# Patient Record
Sex: MALE | Race: BLACK OR AFRICAN AMERICAN | NOT HISPANIC OR LATINO | Employment: OTHER | URBAN - METROPOLITAN AREA
[De-identification: names, ages, dates, MRNs, and addresses within clinical notes are randomized per-mention and may not be internally consistent; named-entity substitution may affect disease eponyms.]

---

## 2017-02-22 ENCOUNTER — HOSPITAL ENCOUNTER (OUTPATIENT)
Dept: RADIOLOGY | Facility: OTHER | Age: 52
Discharge: HOME | End: 2017-02-22
Attending: INTERNAL MEDICINE
Payer: MEDICAID

## 2017-02-22 DIAGNOSIS — B17.10 ACUTE HEPATITIS C: ICD-10-CM

## 2017-02-22 PROCEDURE — 91200 LIVER ELASTOGRAPHY: CPT | Mod: TC

## 2017-02-22 PROCEDURE — 91200 LIVER ELASTOGRAPHY: CPT | Mod: 26,,, | Performed by: RADIOLOGY

## 2017-12-19 ENCOUNTER — HOSPITAL ENCOUNTER (EMERGENCY)
Facility: OTHER | Age: 52
Discharge: HOME | End: 2017-12-19
Attending: EMERGENCY MEDICINE
Payer: MEDICAID

## 2017-12-19 VITALS
WEIGHT: 194 LBS | SYSTOLIC BLOOD PRESSURE: 132 MMHG | TEMPERATURE: 99 F | HEART RATE: 74 BPM | BODY MASS INDEX: 28.73 KG/M2 | HEIGHT: 69 IN | RESPIRATION RATE: 16 BRPM | DIASTOLIC BLOOD PRESSURE: 89 MMHG | OXYGEN SATURATION: 98 %

## 2017-12-19 DIAGNOSIS — R73.9 HYPERGLYCEMIA: Primary | ICD-10-CM

## 2017-12-19 LAB — POCT GLUCOSE: 256 MG/DL (ref 70–110)

## 2017-12-19 PROCEDURE — 82962 GLUCOSE BLOOD TEST: CPT

## 2017-12-19 PROCEDURE — 99283 EMERGENCY DEPT VISIT LOW MDM: CPT | Mod: 25

## 2017-12-19 RX ORDER — ATORVASTATIN CALCIUM 20 MG/1
20 TABLET, FILM COATED ORAL DAILY
COMMUNITY
End: 2018-02-19 | Stop reason: SDUPTHER

## 2017-12-19 RX ORDER — AMLODIPINE BESYLATE 10 MG/1
10 TABLET ORAL DAILY
COMMUNITY
End: 2018-02-06

## 2017-12-19 RX ORDER — LEVOTHYROXINE SODIUM 25 UG/1
25 TABLET ORAL DAILY
COMMUNITY
End: 2018-02-19 | Stop reason: SDUPTHER

## 2017-12-19 NOTE — ED NOTES
"Pt reporting his CBG has been elevated for 6 months, reports his CBG ranges from 200-500. Pt reports he takes CBG once a week. Denies pain, foot sores, headache. Pt states "i'm just nervous, I don't want to have to get anything amputated." Pt AAOx4 and appropriate at this time. Respirations even and unlabored. No acute distress noted.   "

## 2017-12-19 NOTE — ED PROVIDER NOTES
"Encounter Date: 12/19/2017       History     Chief Complaint   Patient presents with    Blood Sugar Problem     Pt reports CBG >400 x 6 months. Stopped taking all meds on Friday to see if it would lower CBG. This AM . "I got scared." Denies any symptoms.      Time seen by provider: 12:40 PM    This is a 52 y.o. male with history of HTN and NIDDM who presents with complaint of chronically elevated CBG that has persisted for the past 6 months.  The patient reports that he became concerned when his CBG, which has been fluctuating between 280 mg/dL and 480 mg/dL, was 275 mg/dL this morning.      appt with dr. adames later today     Sugar running between 280-480 -   Always told that by docs - stoped meds Friday   This mornign it was 275 - hasn't seen this in a while     He mentions no pertinent surgical history.  He admits that he has not taken his daily doses of p.o. medication.  However, he states that he has been compliant with his insulin regimen.  He denies any recent dietary changes, and he states that he is complaint with a diabetic diet.  He mentions that he has an appointment with his PCP, Dr. Mensah, this afternoon.      He states that he checks his CBG every day and then once weekly   Takes insulin - hasn't stopped taking the insulin -1x week shot  And daily insjulin - taken it just stopped the pills     Told not to eat carbs - has been doing it - 2nd dinner       No fever chills nausa vomiting   He denies fevers, chills, nausea, vomiting, polyuria, and polydipsia.      The history is provided by the patient.     Review of patient's allergies indicates:  No Known Allergies  Past Medical History:   Diagnosis Date    Diabetes mellitus     Hypertension      Past Surgical History:   Procedure Laterality Date    ANKLE SURGERY Left     ELBOW SURGERY Right     EXTERNAL EAR SURGERY Left     WRIST SURGERY Left      History reviewed. No pertinent family history.  Social History   Substance Use Topics    " Smoking status: Former Smoker     Quit date: 6/1/1995    Smokeless tobacco: Never Used    Alcohol use No     Review of Systems   Constitutional: Negative.    Respiratory: Negative.    Cardiovascular: Negative.    Gastrointestinal: Negative.    Genitourinary: Negative.    Musculoskeletal: Negative.    All other systems reviewed and are negative.      Physical Exam     Initial Vitals [12/19/17 1220]   BP Pulse Resp Temp SpO2   132/89 74 16 98.5 °F (36.9 °C) 98 %      MAP       103.33         Physical Exam    Constitutional: He appears well-developed.   Obese.  nad   HENT:   Head: Normocephalic and atraumatic.   Eyes: Conjunctivae are normal. Pupils are equal, round, and reactive to light.   Neck: Normal range of motion. Neck supple. No JVD present.   Cardiovascular: Normal rate, regular rhythm and normal heart sounds.   Pulmonary/Chest: Breath sounds normal. No respiratory distress. He has no wheezes. He has no rales.   Abdominal: Soft. Bowel sounds are normal.   Musculoskeletal: Normal range of motion.   Neurological: He is alert and oriented to person, place, and time.   Skin: Skin is warm and dry.         ED Course   Procedures  Labs Reviewed   POCT GLUCOSE - Abnormal; Notable for the following:        Result Value    POCT Glucose 256 (*)     All other components within normal limits                               ED Course      Patient reports his glucose has been running very high over the past several months.  A few days ago decided to stop all of his pills, continuing only his insulin curiously he reports his glucose is now better than it has been in months.  This worried hm and he came into the emergency room because his sugar was 280, the lowest he has seen in quite a while I cannot explain why his sugar is lower after discontinuing his medications he has an appointment with his doctor today encouraged to keep this appointment and discuss medication regimen.  Stable for d/c.    Clinical Impression:   The  encounter diagnosis was Hyperglycemia.                           Tomer Gallagher II, MD  12/21/17 6067       Tomer Gallagher II, MD  01/09/18 8044

## 2017-12-19 NOTE — ED NOTES
Appearance: Pt awake, alert & oriented to person, place & time. Pt in no acute distress at present time. Pt is clean and well groomed with clothes appropriately fastened.   Skin: Skin warm, dry & intact. Color consistent with ethnicity. Mucous membranes moist. No breakdown or brusing noted.   Musculoskeletal: Patient moving all extremities well, no obvious swelling or deformities noted.   Respiratory: Respirations spontaneous, even, and non-labored. Visible chest rise noted. Airway is open and patent. No accessory muscle use noted.   Neurologic: Sensation is intact. Speech is clear and appropriate. Eyes open spontaneously, behavior appropriate to situation, follows commands, facial expression symmetrical, bilateral hand grasp equal and even, purposeful motor response noted.  Cardiac: All peripheral pulses present. No Bilateral lower extremity edema. Cap refill is <3 seconds. Pt denies active chest pains, SOB, dizziness, blurred vision, weakness or fatigue at this time.   Abdomen: Abdomen soft, non-tender to palpation. Pt denies active abd pains, cramping or discomfort, No N/V/D at this time.   : Pt reports no dysuria or hematuria.

## 2018-02-06 ENCOUNTER — OFFICE VISIT (OUTPATIENT)
Dept: INTERNAL MEDICINE | Facility: CLINIC | Age: 53
End: 2018-02-06
Attending: FAMILY MEDICINE
Payer: MEDICAID

## 2018-02-06 VITALS
OXYGEN SATURATION: 96 % | WEIGHT: 203.06 LBS | BODY MASS INDEX: 28.43 KG/M2 | SYSTOLIC BLOOD PRESSURE: 112 MMHG | HEIGHT: 71 IN | TEMPERATURE: 98 F | DIASTOLIC BLOOD PRESSURE: 79 MMHG | HEART RATE: 73 BPM

## 2018-02-06 DIAGNOSIS — E11.9 TYPE 2 DIABETES MELLITUS WITHOUT COMPLICATION, WITHOUT LONG-TERM CURRENT USE OF INSULIN: ICD-10-CM

## 2018-02-06 DIAGNOSIS — B18.2 CHRONIC HEPATITIS C WITHOUT HEPATIC COMA: ICD-10-CM

## 2018-02-06 DIAGNOSIS — E03.4 HYPOTHYROIDISM DUE TO ACQUIRED ATROPHY OF THYROID: ICD-10-CM

## 2018-02-06 DIAGNOSIS — E78.5 HYPERLIPIDEMIA, UNSPECIFIED HYPERLIPIDEMIA TYPE: ICD-10-CM

## 2018-02-06 DIAGNOSIS — I10 ESSENTIAL HYPERTENSION: Primary | ICD-10-CM

## 2018-02-06 PROCEDURE — 3008F BODY MASS INDEX DOCD: CPT | Mod: ,,, | Performed by: INTERNAL MEDICINE

## 2018-02-06 PROCEDURE — 99999 PR PBB SHADOW E&M-EST. PATIENT-LVL III: CPT | Mod: PBBFAC,,, | Performed by: INTERNAL MEDICINE

## 2018-02-06 PROCEDURE — 99213 OFFICE O/P EST LOW 20 MIN: CPT | Mod: PBBFAC,PO | Performed by: INTERNAL MEDICINE

## 2018-02-06 PROCEDURE — 99204 OFFICE O/P NEW MOD 45 MIN: CPT | Mod: S$PBB,,, | Performed by: INTERNAL MEDICINE

## 2018-02-06 RX ORDER — LOSARTAN POTASSIUM AND HYDROCHLOROTHIAZIDE 12.5; 1 MG/1; MG/1
1 TABLET ORAL EVERY MORNING
COMMUNITY
End: 2018-02-19 | Stop reason: SDUPTHER

## 2018-02-06 RX ORDER — ECONAZOLE NITRATE 10 MG/G
CREAM TOPICAL 2 TIMES DAILY
Qty: 30 G | Refills: 1 | Status: SHIPPED | OUTPATIENT
Start: 2018-02-06 | End: 2018-10-16

## 2018-02-06 RX ORDER — ACETAMINOPHEN AND CODEINE PHOSPHATE 300; 30 MG/1; MG/1
1 TABLET ORAL EVERY 6 HOURS PRN
COMMUNITY
End: 2020-07-20

## 2018-02-06 NOTE — TELEPHONE ENCOUNTER
----- Message from Augustin Lowry sent at 2/6/2018 11:55 AM CST -----  Contact: self [phone]  Pt is requesting a RX for lancets, pt states he forgot to get it this morning during his apt. Please, advise. Thanks.       [facility]  [phone]

## 2018-02-06 NOTE — PROGRESS NOTES
Subjective:       Patient ID: Juan Manuel Johnson is a 52 y.o. male.    Chief Complaint: Establish Care    HPI the patient is a 52-year-old male comes in for new patient evaluation.  He has generally been feeling well.  However he reports the glucoses at home have been in the 300-400 range recently.  The patient is taking insulin glargine at 35 units subcutaneous daily.  The patient is also reporting difficulty with a rash under his foreskin.  This been going on for approximately one month.  It is caused the glans penis to become somewhat red and inflamed.  Review of Systems   Constitutional: Negative for appetite change, fatigue and unexpected weight change.   HENT: Negative for congestion and sore throat.    Eyes: Negative for visual disturbance.   Respiratory: Negative for cough, chest tightness, shortness of breath and wheezing.    Cardiovascular: Negative for chest pain and palpitations.   Gastrointestinal: Negative for abdominal distention, abdominal pain, blood in stool, constipation, diarrhea and nausea.        History of hepatitis C for which the patient describes that he had undergone an extended treatment for a cure.   Genitourinary: Negative for difficulty urinating, dysuria, frequency and urgency.   Musculoskeletal: Negative for arthralgias, myalgias and neck stiffness.   Skin: Negative for rash.   Neurological: Negative for dizziness, weakness and headaches.   Psychiatric/Behavioral: Negative for behavioral problems, hallucinations and self-injury.       Objective:      Physical Exam   Constitutional: He is oriented to person, place, and time. He appears well-developed. No distress.   HENT:   Head: Normocephalic.   Right Ear: External ear normal.   Mouth/Throat: No oropharyngeal exudate.   The left ear has been bitten off and surgically repaired to some degree.   Eyes: Conjunctivae and EOM are normal. Pupils are equal, round, and reactive to light. Right eye exhibits no discharge. Left eye exhibits no  discharge. No scleral icterus.   Fundi benign bilaterally.   Neck: Normal range of motion. No JVD present. No tracheal deviation present. No thyromegaly present.   Cardiovascular: Normal rate, regular rhythm and normal heart sounds.  Exam reveals no gallop and no friction rub.    No murmur heard.  Pulmonary/Chest: Effort normal and breath sounds normal. No respiratory distress. He has no wheezes. He has no rales. He exhibits no tenderness.   Abdominal: Soft. Bowel sounds are normal. He exhibits no distension and no mass. There is no tenderness. There is no rebound and no guarding.   Genitourinary: Penis normal.   Genitourinary Comments: Candidiasis is present under the foreskin.  There is some redness and inflammation of the glans penis.   Musculoskeletal: Normal range of motion. He exhibits no edema or tenderness.   Lymphadenopathy:     He has no cervical adenopathy.   Neurological: He is alert and oriented to person, place, and time. He has normal reflexes. He displays normal reflexes. No cranial nerve deficit. He exhibits normal muscle tone. Coordination normal.   Skin: Skin is warm and dry. No rash noted. He is not diaphoretic. No erythema. No pallor.   Psychiatric: He has a normal mood and affect. His behavior is normal. Thought content normal.     foot exam: Intact sensation to monofilament line bilaterally.  Good dorsalis pedis pulses.  No ulcerations.  Assessment:       1. Essential hypertension    2. Hypothyroidism due to acquired atrophy of thyroid    3. Type 2 diabetes mellitus without complication, without long-term current use of insulin    4. Hyperlipidemia, unspecified hyperlipidemia type    5. Chronic hepatitis C without hepatic coma        Plan:       1.  Hemoglobin A1c, TSH, fasting lipids, CMP, CBC, PSA  2.  Necon is all green 1% applied twice a day under the foreskin for 2 weeks  3.  Probable adjustment of insulin dose once laboratory studies are available  4.  Return to clinic in 3 months

## 2018-02-07 ENCOUNTER — TELEPHONE (OUTPATIENT)
Dept: INTERNAL MEDICINE | Facility: CLINIC | Age: 53
End: 2018-02-07

## 2018-02-07 DIAGNOSIS — N47.1 PHIMOSIS: Primary | ICD-10-CM

## 2018-02-07 NOTE — TELEPHONE ENCOUNTER
----- Message from Nayana Proctor sent at 2/7/2018  4:04 PM CST -----  Contact: self/[phone]  Pt called in regards to getting a cheaper Rx than econazole nitrate 1 % cream due to his insurance will not cover it.      [facility]   Please advise

## 2018-02-08 ENCOUNTER — TELEPHONE (OUTPATIENT)
Dept: INTERNAL MEDICINE | Facility: CLINIC | Age: 53
End: 2018-02-08

## 2018-02-08 ENCOUNTER — LAB VISIT (OUTPATIENT)
Dept: LAB | Facility: OTHER | Age: 53
End: 2018-02-08
Attending: INTERNAL MEDICINE
Payer: MEDICAID

## 2018-02-08 DIAGNOSIS — E11.9 TYPE 2 DIABETES MELLITUS WITHOUT COMPLICATION, WITHOUT LONG-TERM CURRENT USE OF INSULIN: ICD-10-CM

## 2018-02-08 LAB
ALBUMIN SERPL BCP-MCNC: 4.2 G/DL
ALP SERPL-CCNC: 97 U/L
ALT SERPL W/O P-5'-P-CCNC: 11 U/L
ANION GAP SERPL CALC-SCNC: 11 MMOL/L
AST SERPL-CCNC: 12 U/L
BASOPHILS # BLD AUTO: 0.04 K/UL
BASOPHILS NFR BLD: 0.6 %
BILIRUB SERPL-MCNC: 0.4 MG/DL
BUN SERPL-MCNC: 10 MG/DL
CALCIUM SERPL-MCNC: 10.4 MG/DL
CHLORIDE SERPL-SCNC: 97 MMOL/L
CHOLEST SERPL-MCNC: 201 MG/DL
CHOLEST/HDLC SERPL: 4.9 {RATIO}
CO2 SERPL-SCNC: 29 MMOL/L
COMPLEXED PSA SERPL-MCNC: 0.36 NG/ML
CREAT SERPL-MCNC: 1.4 MG/DL
DIFFERENTIAL METHOD: NORMAL
EOSINOPHIL # BLD AUTO: 0.2 K/UL
EOSINOPHIL NFR BLD: 2.5 %
ERYTHROCYTE [DISTWIDTH] IN BLOOD BY AUTOMATED COUNT: 12.6 %
EST. GFR  (AFRICAN AMERICAN): >60 ML/MIN/1.73 M^2
EST. GFR  (NON AFRICAN AMERICAN): 57 ML/MIN/1.73 M^2
ESTIMATED AVG GLUCOSE: ABNORMAL MG/DL
GLUCOSE SERPL-MCNC: 336 MG/DL
HBA1C MFR BLD HPLC: >14 %
HCT VFR BLD AUTO: 44.5 %
HDLC SERPL-MCNC: 41 MG/DL
HDLC SERPL: 20.4 %
HGB BLD-MCNC: 15.2 G/DL
LDLC SERPL CALC-MCNC: 132.4 MG/DL
LYMPHOCYTES # BLD AUTO: 3 K/UL
LYMPHOCYTES NFR BLD: 46 %
MCH RBC QN AUTO: 28.1 PG
MCHC RBC AUTO-ENTMCNC: 34.2 G/DL
MCV RBC AUTO: 82 FL
MONOCYTES # BLD AUTO: 0.4 K/UL
MONOCYTES NFR BLD: 6.5 %
NEUTROPHILS # BLD AUTO: 2.9 K/UL
NEUTROPHILS NFR BLD: 44.2 %
NONHDLC SERPL-MCNC: 160 MG/DL
PLATELET # BLD AUTO: 309 K/UL
PMV BLD AUTO: 11.1 FL
POTASSIUM SERPL-SCNC: 4.1 MMOL/L
PROT SERPL-MCNC: 8.7 G/DL
RBC # BLD AUTO: 5.41 M/UL
SODIUM SERPL-SCNC: 137 MMOL/L
T4 FREE SERPL-MCNC: 1.09 NG/DL
TRIGL SERPL-MCNC: 138 MG/DL
TSH SERPL DL<=0.005 MIU/L-ACNC: 5.48 UIU/ML
WBC # BLD AUTO: 6.45 K/UL

## 2018-02-08 PROCEDURE — 80053 COMPREHEN METABOLIC PANEL: CPT

## 2018-02-08 PROCEDURE — 83036 HEMOGLOBIN GLYCOSYLATED A1C: CPT

## 2018-02-08 PROCEDURE — 80061 LIPID PANEL: CPT

## 2018-02-08 PROCEDURE — 36415 COLL VENOUS BLD VENIPUNCTURE: CPT

## 2018-02-08 PROCEDURE — 84153 ASSAY OF PSA TOTAL: CPT

## 2018-02-08 PROCEDURE — 84443 ASSAY THYROID STIM HORMONE: CPT

## 2018-02-08 PROCEDURE — 85025 COMPLETE CBC W/AUTO DIFF WBC: CPT

## 2018-02-08 PROCEDURE — 84439 ASSAY OF FREE THYROXINE: CPT

## 2018-02-08 RX ORDER — LANCETS
EACH MISCELLANEOUS
Qty: 100 EACH | Refills: 5 | Status: SHIPPED | OUTPATIENT
Start: 2018-02-08 | End: 2018-09-24 | Stop reason: SDUPTHER

## 2018-02-08 RX ORDER — CLOTRIMAZOLE 1 %
CREAM (GRAM) TOPICAL 2 TIMES DAILY
Qty: 45 G | Refills: 0 | Status: SHIPPED | OUTPATIENT
Start: 2018-02-08 | End: 2018-10-16

## 2018-02-08 NOTE — TELEPHONE ENCOUNTER
----- Message from Reinaldo Squires sent at 2/8/2018  9:40 AM CST -----  Contact: [facility]   Pharmacy is calling to clarify an RX.  RX name:  econazole nitrate 1 % cream  What do they need to clarify:  Need prior authorization   Comments:

## 2018-02-08 NOTE — TELEPHONE ENCOUNTER
Called patient, left message informing request for new cream was being sent to dr. Magaña with pharmacy, states that Ketoconazole or Clotrimazole are both covered. Please order one.

## 2018-02-19 ENCOUNTER — PATIENT MESSAGE (OUTPATIENT)
Dept: INTERNAL MEDICINE | Facility: CLINIC | Age: 53
End: 2018-02-19

## 2018-02-19 DIAGNOSIS — R52 PAIN: ICD-10-CM

## 2018-02-19 DIAGNOSIS — N28.9 ACUTE RENAL INSUFFICIENCY: ICD-10-CM

## 2018-02-19 RX ORDER — LEVOTHYROXINE SODIUM 25 UG/1
25 TABLET ORAL DAILY
Qty: 90 TABLET | Refills: 3 | Status: SHIPPED | OUTPATIENT
Start: 2018-02-19 | End: 2019-04-09 | Stop reason: SDUPTHER

## 2018-02-19 RX ORDER — NAPROXEN 375 MG/1
375 TABLET ORAL 2 TIMES DAILY PRN
Start: 2018-02-19 | End: 2020-07-20

## 2018-02-19 RX ORDER — INSULIN GLARGINE 300 [IU]/ML
INJECTION, SOLUTION SUBCUTANEOUS
Qty: 1.5 ML | Refills: 5 | Status: SHIPPED | OUTPATIENT
Start: 2018-02-19 | End: 2018-04-02 | Stop reason: SDUPTHER

## 2018-02-19 RX ORDER — ATORVASTATIN CALCIUM 20 MG/1
20 TABLET, FILM COATED ORAL DAILY
Qty: 90 TABLET | Refills: 3 | Status: SHIPPED | OUTPATIENT
Start: 2018-02-19 | End: 2018-04-02 | Stop reason: SDUPTHER

## 2018-02-19 RX ORDER — METFORMIN HYDROCHLORIDE 500 MG/1
500 TABLET ORAL 2 TIMES DAILY WITH MEALS
Qty: 180 TABLET | Refills: 3 | Status: SHIPPED | OUTPATIENT
Start: 2018-02-19 | End: 2018-10-16

## 2018-02-19 RX ORDER — LOSARTAN POTASSIUM AND HYDROCHLOROTHIAZIDE 12.5; 1 MG/1; MG/1
1 TABLET ORAL EVERY MORNING
Qty: 90 TABLET | Refills: 3 | Status: SHIPPED | OUTPATIENT
Start: 2018-02-19 | End: 2019-04-09 | Stop reason: SDUPTHER

## 2018-04-02 ENCOUNTER — OFFICE VISIT (OUTPATIENT)
Dept: INTERNAL MEDICINE | Facility: CLINIC | Age: 53
End: 2018-04-02
Payer: MEDICAID

## 2018-04-02 VITALS
HEART RATE: 76 BPM | HEIGHT: 71 IN | TEMPERATURE: 98 F | WEIGHT: 200.38 LBS | OXYGEN SATURATION: 96 % | DIASTOLIC BLOOD PRESSURE: 90 MMHG | SYSTOLIC BLOOD PRESSURE: 119 MMHG | BODY MASS INDEX: 28.05 KG/M2

## 2018-04-02 DIAGNOSIS — Z79.4 TYPE 2 DIABETES MELLITUS WITHOUT COMPLICATION, WITH LONG-TERM CURRENT USE OF INSULIN: Primary | ICD-10-CM

## 2018-04-02 DIAGNOSIS — E03.4 HYPOTHYROIDISM DUE TO ACQUIRED ATROPHY OF THYROID: ICD-10-CM

## 2018-04-02 DIAGNOSIS — E78.5 HYPERLIPIDEMIA, UNSPECIFIED HYPERLIPIDEMIA TYPE: ICD-10-CM

## 2018-04-02 DIAGNOSIS — E11.8 TYPE 2 DIABETES MELLITUS WITH COMPLICATION, WITH LONG-TERM CURRENT USE OF INSULIN: ICD-10-CM

## 2018-04-02 DIAGNOSIS — Z00.00 HEALTHCARE MAINTENANCE: ICD-10-CM

## 2018-04-02 DIAGNOSIS — Z79.4 TYPE 2 DIABETES MELLITUS WITH COMPLICATION, WITH LONG-TERM CURRENT USE OF INSULIN: ICD-10-CM

## 2018-04-02 DIAGNOSIS — E11.9 TYPE 2 DIABETES MELLITUS WITHOUT COMPLICATION, WITH LONG-TERM CURRENT USE OF INSULIN: Primary | ICD-10-CM

## 2018-04-02 DIAGNOSIS — I10 ESSENTIAL HYPERTENSION: ICD-10-CM

## 2018-04-02 PROCEDURE — 99214 OFFICE O/P EST MOD 30 MIN: CPT | Mod: PBBFAC | Performed by: STUDENT IN AN ORGANIZED HEALTH CARE EDUCATION/TRAINING PROGRAM

## 2018-04-02 PROCEDURE — 99999 PR PBB SHADOW E&M-EST. PATIENT-LVL IV: CPT | Mod: PBBFAC,,, | Performed by: STUDENT IN AN ORGANIZED HEALTH CARE EDUCATION/TRAINING PROGRAM

## 2018-04-02 PROCEDURE — 99214 OFFICE O/P EST MOD 30 MIN: CPT | Mod: S$PBB,,, | Performed by: STUDENT IN AN ORGANIZED HEALTH CARE EDUCATION/TRAINING PROGRAM

## 2018-04-02 RX ORDER — INSULIN GLARGINE 300 [IU]/ML
INJECTION, SOLUTION SUBCUTANEOUS
Qty: 1.5 ML | Refills: 5 | Status: SHIPPED | OUTPATIENT
Start: 2018-04-02 | End: 2018-08-13

## 2018-04-02 RX ORDER — NAPROXEN SODIUM 220 MG/1
81 TABLET, FILM COATED ORAL DAILY
Refills: 0
Start: 2018-04-02 | End: 2019-04-09 | Stop reason: SDUPTHER

## 2018-04-02 RX ORDER — BLOOD SUGAR DIAGNOSTIC
1 STRIP MISCELLANEOUS
COMMUNITY
Start: 2018-02-12 | End: 2018-04-11 | Stop reason: SDUPTHER

## 2018-04-02 RX ORDER — PEN NEEDLE, DIABETIC 30 GX3/16"
1 NEEDLE, DISPOSABLE MISCELLANEOUS
Qty: 200 EACH | Refills: 11 | Status: SHIPPED | OUTPATIENT
Start: 2018-04-02 | End: 2018-09-24 | Stop reason: SDUPTHER

## 2018-04-02 RX ORDER — INSULIN LISPRO 100 [IU]/ML
15 INJECTION, SOLUTION INTRAVENOUS; SUBCUTANEOUS
Qty: 15 ML | Refills: 11 | Status: SHIPPED | OUTPATIENT
Start: 2018-04-02 | End: 2018-07-06

## 2018-04-02 RX ORDER — CHLORHEXIDINE GLUCONATE ORAL RINSE 1.2 MG/ML
1 SOLUTION DENTAL
COMMUNITY
Start: 2018-01-05 | End: 2018-10-16

## 2018-04-02 RX ORDER — ATORVASTATIN CALCIUM 20 MG/1
40 TABLET, FILM COATED ORAL DAILY
Qty: 90 TABLET | Refills: 3
Start: 2018-04-02 | End: 2019-04-09 | Stop reason: SDUPTHER

## 2018-04-02 NOTE — PROGRESS NOTES
[facility]                                                             CLINIC NOTE    Patient Name: Juan Manuel Johnson  YOB: 1965    PRESENTING HISTORY     Chief Complaint   Patient presents with    Diabetes       History of Present Illness:  Mr. Juan Manuel Johnson is a 52 y.o. male w/ significant PMHx of HTN, HLD, DMT2, hypothyroidism, and chronic hepatitis C with long-term treatment in the past who is here for 6 week f/u for his multiple co-morbidities. Pt's primary concern with this visit is his elevated blood sugar. Blood sugar has been consistently greater than 250 with most recent read this morning in the 400's. His current diabetes regimen includes metformin 500 mg BID and daily injections of glargine 45 u in the morning. His symptoms include polyuria and polydypsia. Otherwise pt has been asymptomatic and has no other complaints. Other medications include losartan-HCTZ, synthroid, moderate-intensity statin and daily aspirin 81 mg.     During previous visit he received treatment for his candidal infection of his foreskin, which has resolved with topical medication. ROS was otherwise negative.     Review of Systems   Constitutional: Negative for chills, fever, malaise/fatigue and weight loss.   HENT: Negative for congestion, ear discharge and sore throat.    Eyes: Negative for blurred vision and redness.   Respiratory: Negative for cough, shortness of breath and wheezing.    Cardiovascular: Negative for chest pain, palpitations and leg swelling.   Gastrointestinal: Negative for abdominal pain, constipation, diarrhea, nausea and vomiting.   Genitourinary: Positive for frequency. Negative for dysuria.   Musculoskeletal: Negative for joint pain and myalgias.   Skin: Negative for itching and rash.   Neurological: Negative for dizziness, seizures, loss of consciousness and headaches.   Endo/Heme/Allergies: Negative for environmental allergies.  Does not bruise/bleed easily.   Psychiatric/Behavioral: Negative for depression. The patient is not nervous/anxious and does not have insomnia.          PAST HISTORY:     Past Medical History:   Diagnosis Date    Diabetes mellitus     Diabetes mellitus, type 2     Hepatitis C     Hyperlipidemia     Hypertension     Hypothyroidism        Past Surgical History:   Procedure Laterality Date    ANKLE SURGERY Left     COLONOSCOPY      Normal by patient reporting 2017    ELBOW SURGERY Right     EXTERNAL EAR SURGERY Left     WRIST SURGERY Left        Family History   Problem Relation Age of Onset    Diabetes Mother     Hypertension Mother        Social History     Social History    Marital status:      Spouse name: N/A    Number of children: N/A    Years of education: N/A     Social History Main Topics    Smoking status: Former Smoker     Quit date: 6/1/1995    Smokeless tobacco: Never Used    Alcohol use No    Drug use: No    Sexual activity: Not Asked     Other Topics Concern    None     Social History Narrative    The patient typically spends most of his time at home and watches TV.  He does walk daily.  He tries to stay fairly active.       MEDICATIONS & ALLERGIES:               Current Outpatient Prescriptions on File Prior to Visit   Medication Sig    acetaminophen-codeine 300-30mg (TYLENOL #3) 300-30 mg Tab Take 1 tablet by mouth every 6 (six) hours as needed.    atorvastatin (LIPITOR) 20 MG tablet Take 1 tablet (20 mg total) by mouth once daily.    clotrimazole (LOTRIMIN) 1 % cream Apply topically 2 (two) times daily.    econazole nitrate 1 % cream Apply topically 2 (two) times daily.    insulin glargine, TOUJEO, (TOUJEO) 300 unit/mL (1.5 mL) InPn pen 45 units sq q d    INSULIN GLARGINE,HUM.REC.ANLOG (TOUJEO SOLOSTAR SUBQ) Inject 35 Units into the skin every morning.    lancets Misc Use prn    levothyroxine (SYNTHROID) 25 MCG tablet Take 1 tablet (25 mcg total) by mouth once  "daily.    losartan-hydrochlorothiazide 100-12.5 mg (HYZAAR) 100-12.5 mg Tab Take 1 tablet by mouth every morning.    metFORMIN (GLUCOPHAGE) 500 MG tablet Take 1 tablet (500 mg total) by mouth 2 (two) times daily with meals.    naproxen (NAPROSYN) 375 MG tablet Take 1 tablet (375 mg total) by mouth 2 (two) times daily as needed (Pain).     No current facility-administered medications on file prior to visit.        Review of patient's allergies indicates:  No Known Allergies    OBJECTIVE:     Vital Signs:  Vitals:    04/02/18 0920   BP: (!) 119/90   Pulse: 76   Temp: 98.1 °F (36.7 °C)   TempSrc: Oral   SpO2: 96%   Weight: 90.9 kg (200 lb 6.4 oz)   Height: 5' 11" (1.803 m)       No results found for this or any previous visit (from the past 24 hour(s)).      Physical Exam   Constitutional: He is oriented to person, place, and time and well-developed, well-nourished, and in no distress.   Diabetic foot exam revealed mildly impaired proprioception of the left foot.     Sensation was intact in all areas bilaterally   HENT:   Head: Normocephalic and atraumatic.   Left ear scarred from previously being bitten off.    Eyes: EOM are normal. Pupils are equal, round, and reactive to light. No scleral icterus.   Neck: Normal range of motion. Neck supple. No thyromegaly present.   Cardiovascular: Normal rate, regular rhythm and normal heart sounds.    No murmur heard.  Pulmonary/Chest: Effort normal and breath sounds normal. No respiratory distress. He has no wheezes.   Abdominal: Soft. Bowel sounds are normal. He exhibits no distension. There is no tenderness.   Lymphadenopathy:     He has no cervical adenopathy.   Neurological: He is alert and oriented to person, place, and time.   Skin: Skin is warm and dry. No erythema.   Psychiatric: Affect normal.   Nursing note and vitals reviewed.        ASSESSMENT & PLAN:     Juan Manuel was seen today for diabetes.    Diagnoses and all orders for this visit:    Type 2 diabetes mellitus " "without complication, with long-term current use of insulin  -    Transitioning from metformin plus long-acting insulin to short-acting TIDWM and long-acting nightly. Pt to RTC in 6 weeks for A1c measurement. Also to call me in two weeks to see how well he is tolerating this new regimen.   - Lispro start off at 10 units with titration to 15U TIDWM   - insulin lispro (HUMALOG KWIKPEN) 100 unit/mL InPn pen; Inject 15 Units into the skin 3 (three) times daily with meals.  -     pen needle, diabetic 32 gauge x 5/32" Ndle; 1 each by Misc.(Non-Drug; Combo Route) route 4 (four) times daily with meals and nightly.  -     insulin glargine, TOUJEO, (TOUJEO) 300 unit/mL (1.5 mL) InPn pen; 45 units sq q d        Essential hypertension   - Diastolic pressure elevated today at 90 mmHg. Continue current regimen of losartan-HCTZ and re-measure in clinic. Would prefer to start him on ABPM at home, but do not wish to overburden pt.     Hyperlipidemia, unspecified hyperlipidemia type  -    ASCVD risk >7.5%. Transitioned patient to high-intensity regimen.    - atorvastatin (LIPITOR) 20 MG tablet; Take 2 tablets (40 mg total) by mouth once daily.   - The 10-year ASCVD risk score (Saranac JYOTHI Jr., et al., 2013) is: 16.3%    Values used to calculate the score:      Age: 52 years      Sex: Male      Is Non- : Yes      Diabetic: Yes      Tobacco smoker: No      Systolic Blood Pressure: 119 mmHg      Is BP treated: Yes      HDL Cholesterol: 41 mg/dL      Total Cholesterol: 201 mg/dL      Hypothyroidism due to acquired atrophy of thyroid   -  Continue synthroid 25 mcg     Healthcare maintenance  -    D/t increased ASCVD risk, recommended pt start daily aspirin 81 mg for prevention of colorectal cancer.    - aspirin 81 MG Chew; Take 1 tablet (81 mg total) by mouth once daily.      RTC in 6 weeks for follow-up. Phone follow up in two weeks. Pt was instructed to contact the clinic if symptoms worsened or if new symptoms " arise.      Behram Khan, MD  Internal Medicine PGY-1  #[phone]

## 2018-04-11 DIAGNOSIS — Z79.4 TYPE 2 DIABETES MELLITUS WITH COMPLICATION, WITH LONG-TERM CURRENT USE OF INSULIN: ICD-10-CM

## 2018-04-11 DIAGNOSIS — Z79.4 TYPE 2 DIABETES MELLITUS WITHOUT COMPLICATION, WITH LONG-TERM CURRENT USE OF INSULIN: ICD-10-CM

## 2018-04-11 DIAGNOSIS — E11.8 TYPE 2 DIABETES MELLITUS WITH COMPLICATION, WITH LONG-TERM CURRENT USE OF INSULIN: ICD-10-CM

## 2018-04-11 DIAGNOSIS — E11.9 TYPE 2 DIABETES MELLITUS WITHOUT COMPLICATION, WITH LONG-TERM CURRENT USE OF INSULIN: ICD-10-CM

## 2018-04-11 RX ORDER — BLOOD SUGAR DIAGNOSTIC
1 STRIP MISCELLANEOUS
Qty: 100 EACH | Refills: 3 | Status: SHIPPED | OUTPATIENT
Start: 2018-04-11 | End: 2018-09-24 | Stop reason: SDUPTHER

## 2018-04-11 NOTE — TELEPHONE ENCOUNTER
"----- Message from Reinaldo Squires sent at 4/11/2018 11:15 AM CDT -----  Contact: self [phone]  RX request - refill or new RX.  Is this a refill or new RX:  Refill   RX name and strength: ONETOUCH ULTRA TEST Strp  Directions:   Is this a 30 day or 90 day RX:    Pharmacy name and phone # (DON'T enter "on file" or "in chart"): [facility]   [phone] (Phone)  [phone] (Fax)  Comments:          "

## 2018-04-11 NOTE — TELEPHONE ENCOUNTER
----- Message from Mariza Boykin sent at 4/9/2018  4:45 PM CDT -----  Contact: [facility]//[phone]  States that she spoke to someone on 4/6, that was suppose to fax over paperwork for the commutative  care referral. [phone]

## 2018-04-12 ENCOUNTER — TELEPHONE (OUTPATIENT)
Dept: INTERNAL MEDICINE | Facility: CLINIC | Age: 53
End: 2018-04-12

## 2018-04-12 NOTE — TELEPHONE ENCOUNTER
----- Message from Augustin Lowry sent at 4/11/2018 12:43 PM CDT -----  Contact: [place] [phone]  Behram Khan, MD Dr.Sheriff office requesting a call to check status of  paperwork for the commutative  care referral   Please advise, Thanks

## 2018-05-21 ENCOUNTER — TELEPHONE (OUTPATIENT)
Dept: INTERNAL MEDICINE | Facility: CLINIC | Age: 53
End: 2018-05-21

## 2018-05-21 NOTE — TELEPHONE ENCOUNTER
----- Message from Mansoor Tamayo sent at 5/21/2018  4:48 PM CDT -----  Contact: Pt  Pt called to schedule a follow up appt.    Pt can be reached at [phone].    Thank you!

## 2018-05-24 ENCOUNTER — TELEPHONE (OUTPATIENT)
Dept: INTERNAL MEDICINE | Facility: CLINIC | Age: 53
End: 2018-05-24

## 2018-05-24 ENCOUNTER — PATIENT MESSAGE (OUTPATIENT)
Dept: INTERNAL MEDICINE | Facility: CLINIC | Age: 53
End: 2018-05-24

## 2018-06-23 ENCOUNTER — HOSPITAL ENCOUNTER (EMERGENCY)
Facility: OTHER | Age: 53
Discharge: HOME | End: 2018-06-23
Attending: EMERGENCY MEDICINE
Payer: MEDICAID

## 2018-06-23 VITALS
RESPIRATION RATE: 18 BRPM | HEIGHT: 66 IN | SYSTOLIC BLOOD PRESSURE: 114 MMHG | HEART RATE: 68 BPM | WEIGHT: 151 LBS | TEMPERATURE: 98 F | DIASTOLIC BLOOD PRESSURE: 79 MMHG | OXYGEN SATURATION: 98 % | BODY MASS INDEX: 24.27 KG/M2

## 2018-06-23 DIAGNOSIS — R73.9 HYPERGLYCEMIA: Primary | ICD-10-CM

## 2018-06-23 LAB
ALBUMIN SERPL BCP-MCNC: 4 G/DL
ALP SERPL-CCNC: 81 U/L
ALT SERPL W/O P-5'-P-CCNC: 13 U/L
ANION GAP SERPL CALC-SCNC: 10 MMOL/L
AST SERPL-CCNC: 14 U/L
BASOPHILS # BLD AUTO: 0.03 K/UL
BASOPHILS NFR BLD: 0.4 %
BILIRUB SERPL-MCNC: 0.7 MG/DL
BUN SERPL-MCNC: 16 MG/DL
CALCIUM SERPL-MCNC: 10.4 MG/DL
CHLORIDE SERPL-SCNC: 95 MMOL/L
CO2 SERPL-SCNC: 30 MMOL/L
CREAT SERPL-MCNC: 1.5 MG/DL
DIFFERENTIAL METHOD: NORMAL
EOSINOPHIL # BLD AUTO: 0.2 K/UL
EOSINOPHIL NFR BLD: 2.2 %
ERYTHROCYTE [DISTWIDTH] IN BLOOD BY AUTOMATED COUNT: 12.7 %
EST. GFR  (AFRICAN AMERICAN): >60 ML/MIN/1.73 M^2
EST. GFR  (NON AFRICAN AMERICAN): 52 ML/MIN/1.73 M^2
GLUCOSE SERPL-MCNC: 410 MG/DL
HCT VFR BLD AUTO: 40.9 %
HGB BLD-MCNC: 14 G/DL
LYMPHOCYTES # BLD AUTO: 2.5 K/UL
LYMPHOCYTES NFR BLD: 37 %
MCH RBC QN AUTO: 28.2 PG
MCHC RBC AUTO-ENTMCNC: 34.2 G/DL
MCV RBC AUTO: 83 FL
MONOCYTES # BLD AUTO: 0.4 K/UL
MONOCYTES NFR BLD: 5.8 %
NEUTROPHILS # BLD AUTO: 3.6 K/UL
NEUTROPHILS NFR BLD: 54.6 %
PLATELET # BLD AUTO: 317 K/UL
PMV BLD AUTO: 10.5 FL
POCT GLUCOSE: 251 MG/DL (ref 70–110)
POCT GLUCOSE: 365 MG/DL (ref 70–110)
POTASSIUM SERPL-SCNC: 3.7 MMOL/L
PROT SERPL-MCNC: 7.9 G/DL
RBC # BLD AUTO: 4.96 M/UL
SODIUM SERPL-SCNC: 135 MMOL/L
WBC # BLD AUTO: 6.67 K/UL

## 2018-06-23 PROCEDURE — 80053 COMPREHEN METABOLIC PANEL: CPT

## 2018-06-23 PROCEDURE — 96361 HYDRATE IV INFUSION ADD-ON: CPT

## 2018-06-23 PROCEDURE — 25000003 PHARM REV CODE 250: Performed by: EMERGENCY MEDICINE

## 2018-06-23 PROCEDURE — 96374 THER/PROPH/DIAG INJ IV PUSH: CPT

## 2018-06-23 PROCEDURE — 85025 COMPLETE CBC W/AUTO DIFF WBC: CPT

## 2018-06-23 PROCEDURE — 82962 GLUCOSE BLOOD TEST: CPT

## 2018-06-23 PROCEDURE — 63600175 PHARM REV CODE 636 W HCPCS: Performed by: EMERGENCY MEDICINE

## 2018-06-23 PROCEDURE — 99283 EMERGENCY DEPT VISIT LOW MDM: CPT | Mod: 25

## 2018-06-23 RX ADMIN — INSULIN HUMAN 6 UNITS: 100 INJECTION, SOLUTION PARENTERAL at 02:06

## 2018-06-23 RX ADMIN — SODIUM CHLORIDE 1000 ML: 0.9 INJECTION, SOLUTION INTRAVENOUS at 01:06

## 2018-06-23 NOTE — ED TRIAGE NOTES
Pt reports switching back to his Lantus 1 wk ago because he was went from 235 lbs to 191 lbs in 3 months. Reports his blood glucose has been 300's when waking up and not decreasing throughout the day. Pt has log of blood glucose since the start of the year.

## 2018-06-23 NOTE — ED PROVIDER NOTES
Encounter Date: 6/23/2018    SCRIBE #1 NOTE: I, Kimberly Shelton, am scribing for, and in the presence of, Dr. Gallagher.       History     Chief Complaint   Patient presents with    Hyperglycemia     Patient reports blood sugar of 329 30 min PTA.  Recent medication change.      Time seen by provider: 12:37 PM    This is a 53 y.o. male who presents with complaint of elevated blood sugar thirty minutes prior to arrival. Patient reports blood sugar of 329 mg/dL. He notes his blood sugar levels in the 300s when he wakes in the morning, 400s after he eats, and 500s-600s at dinner. He reports increased urine, but denies nausea or vomiting. Patient states he stopped taking Toujeo because it caused too much weight lost. He is currently only using Lancets. The patient wants to switch PCP from [facility] to [facility] due to distance. His last visit at [facility] was in March. He denies use of tobacco, alcohol or illicit drugs.      The history is provided by the patient.     Review of patient's allergies indicates:  No Known Allergies  Past Medical History:   Diagnosis Date    Diabetes mellitus     Diabetes mellitus, type 2     Hepatitis C     Hyperlipidemia     Hypertension     Hypothyroidism      Past Surgical History:   Procedure Laterality Date    ANKLE SURGERY Left     COLONOSCOPY      Normal by patient reporting 2017    ELBOW SURGERY Right     EXTERNAL EAR SURGERY Left     WRIST SURGERY Left      Family History   Problem Relation Age of Onset    Diabetes Mother     Hypertension Mother      Social History   Substance Use Topics    Smoking status: Former Smoker     Quit date: 6/1/1995    Smokeless tobacco: Never Used    Alcohol use No     Review of Systems   Constitutional: Negative for activity change, chills, diaphoresis and fever.   HENT: Negative for congestion, drooling, ear pain, rhinorrhea, sneezing, sore throat and trouble swallowing.    Eyes: Negative for pain.   Respiratory: Negative for cough,  chest tightness, shortness of breath, wheezing and stridor.    Cardiovascular: Negative for chest pain, palpitations and leg swelling.   Gastrointestinal: Negative for abdominal distention, abdominal pain, constipation, diarrhea, nausea and vomiting.   Genitourinary: Negative for difficulty urinating, dysuria, frequency and urgency.        Increased urine.   Musculoskeletal: Negative for arthralgias, back pain, myalgias, neck pain and neck stiffness.   Skin: Negative for pallor, rash and wound.   Neurological: Negative for dizziness, syncope, weakness, light-headedness, numbness and headaches.       Physical Exam     Initial Vitals [06/23/18 1202]   BP Pulse Resp Temp SpO2   122/82 91 18 98 °F (36.7 °C) 100 %      MAP       --         Physical Exam    Nursing note and vitals reviewed.  Constitutional: He appears well-developed and well-nourished. He is not diaphoretic. No distress.   HENT:   Head: Atraumatic.   Right Ear: External ear normal.   Mildly dry mucous membranes. Post-traumatic change to left external ear.   Eyes: EOM are normal. Pupils are equal, round, and reactive to light. Right eye exhibits no discharge. Left eye exhibits no discharge.   Neck: Normal range of motion.   Cardiovascular: Normal rate, regular rhythm and normal heart sounds. Exam reveals no gallop and no friction rub.    No murmur heard.  Pulmonary/Chest: Breath sounds normal. No respiratory distress. He has no wheezes. He has no rhonchi. He has no rales.   Abdominal: Soft. There is no tenderness. There is no rebound and no guarding.   Musculoskeletal: Normal range of motion. He exhibits no edema or tenderness.   Neurological: He is alert and oriented to person, place, and time.   Skin: Skin is warm and dry. No rash and no abscess noted. No erythema. No pallor.   Psychiatric: He has a normal mood and affect. His behavior is normal. Judgment and thought content normal.         ED Course   Procedures  Labs Reviewed   COMPREHENSIVE METABOLIC  PANEL - Abnormal; Notable for the following:        Result Value    Sodium 135 (*)     CO2 30 (*)     Glucose 410 (*)     Creatinine 1.5 (*)     eGFR if non  52 (*)     All other components within normal limits   POCT GLUCOSE - Abnormal; Notable for the following:     POCT Glucose 365 (*)     All other components within normal limits   POCT GLUCOSE - Abnormal; Notable for the following:     POCT Glucose 251 (*)     All other components within normal limits   CBC W/ AUTO DIFFERENTIAL   POCT GLUCOSE MONITORING CONTINUOUS          Imaging Results    None          Medical Decision Making:   Clinical Tests:   Lab Tests: Ordered and Reviewed  ED Management:  2:59 PM- Patient is feeling better. Discussed diabetic diet and Accu-Chek. Patient is to follow up this week.            Scribe Attestation:   Scribe #1: I performed the above scribed service and the documentation accurately describes the services I performed. I attest to the accuracy of the note.    Attending Attestation:           Physician Attestation for Scribe:  Physician Attestation Statement for Scribe #1: I, Dr. Gallagher, reviewed documentation, as scribed by Kimberly Shelton in my presence, and it is both accurate and complete.         pt presents due to glu's 300-500 over past few weeks.  Stopped taking his trujea, now only taking lantus insulin.  Well appearing on exam.  geiven ivfs, observed.  Labs - baseline mild elevation cr.  O/w unremark. Glu improved after ivf, insulin.  Counseled on strict diabetic diet, f/u w/ pcp.  Requests referral to new pcp.  Given f/u info.  Suspect will need alternate oral med or 2nd type insulin.           Clinical Impression:     1. Hyperglycemia                                   Tomer Gallagher II, MD  06/23/18 1914

## 2018-06-25 ENCOUNTER — TELEPHONE (OUTPATIENT)
Dept: INTERNAL MEDICINE | Facility: CLINIC | Age: 53
End: 2018-06-25

## 2018-06-25 NOTE — TELEPHONE ENCOUNTER
Explained we do not work at [facility] ,   He will keep his appointment on 07/02   Also will call is needs to be seen sooner

## 2018-07-02 ENCOUNTER — OFFICE VISIT (OUTPATIENT)
Dept: INTERNAL MEDICINE | Facility: CLINIC | Age: 53
End: 2018-07-02
Payer: MEDICAID

## 2018-07-02 ENCOUNTER — LAB VISIT (OUTPATIENT)
Dept: LAB | Facility: HOSPITAL | Age: 53
End: 2018-07-02
Payer: MEDICAID

## 2018-07-02 VITALS
BODY MASS INDEX: 27.87 KG/M2 | DIASTOLIC BLOOD PRESSURE: 82 MMHG | HEART RATE: 76 BPM | SYSTOLIC BLOOD PRESSURE: 112 MMHG | WEIGHT: 194.69 LBS | HEIGHT: 70 IN | OXYGEN SATURATION: 97 %

## 2018-07-02 DIAGNOSIS — B18.2 CHRONIC HEPATITIS C WITHOUT HEPATIC COMA: ICD-10-CM

## 2018-07-02 DIAGNOSIS — Z00.00 HEALTHCARE MAINTENANCE: ICD-10-CM

## 2018-07-02 DIAGNOSIS — E11.9 TYPE 2 DIABETES MELLITUS WITHOUT COMPLICATION, WITH LONG-TERM CURRENT USE OF INSULIN: Primary | ICD-10-CM

## 2018-07-02 DIAGNOSIS — Z79.4 TYPE 2 DIABETES MELLITUS WITHOUT COMPLICATION, WITH LONG-TERM CURRENT USE OF INSULIN: Primary | ICD-10-CM

## 2018-07-02 DIAGNOSIS — E03.4 HYPOTHYROIDISM DUE TO ACQUIRED ATROPHY OF THYROID: ICD-10-CM

## 2018-07-02 DIAGNOSIS — Z79.4 TYPE 2 DIABETES MELLITUS WITHOUT COMPLICATION, WITH LONG-TERM CURRENT USE OF INSULIN: ICD-10-CM

## 2018-07-02 DIAGNOSIS — E78.5 HYPERLIPIDEMIA, UNSPECIFIED HYPERLIPIDEMIA TYPE: ICD-10-CM

## 2018-07-02 DIAGNOSIS — E11.9 TYPE 2 DIABETES MELLITUS WITHOUT COMPLICATION, WITH LONG-TERM CURRENT USE OF INSULIN: ICD-10-CM

## 2018-07-02 DIAGNOSIS — I10 ESSENTIAL HYPERTENSION: ICD-10-CM

## 2018-07-02 LAB
ALBUMIN SERPL BCP-MCNC: 4.1 G/DL
ANION GAP SERPL CALC-SCNC: 12 MMOL/L
BUN SERPL-MCNC: 13 MG/DL
CALCIUM SERPL-MCNC: 10.5 MG/DL
CHLORIDE SERPL-SCNC: 97 MMOL/L
CO2 SERPL-SCNC: 28 MMOL/L
CREAT SERPL-MCNC: 1.4 MG/DL
CREAT UR-MCNC: 85 MG/DL
EST. GFR  (AFRICAN AMERICAN): >60 ML/MIN/1.73 M^2
EST. GFR  (NON AFRICAN AMERICAN): 57 ML/MIN/1.73 M^2
ESTIMATED AVG GLUCOSE: ABNORMAL MG/DL
GLUCOSE SERPL-MCNC: 361 MG/DL
HBA1C MFR BLD HPLC: >14 %
PHOSPHATE SERPL-MCNC: 3.2 MG/DL
POTASSIUM SERPL-SCNC: 3.4 MMOL/L
PROT UR-MCNC: 11 MG/DL
PROT/CREAT RATIO, UR: 0.13
SODIUM SERPL-SCNC: 137 MMOL/L

## 2018-07-02 PROCEDURE — 99999 PR PBB SHADOW E&M-EST. PATIENT-LVL V: CPT | Mod: PBBFAC,,, | Performed by: STUDENT IN AN ORGANIZED HEALTH CARE EDUCATION/TRAINING PROGRAM

## 2018-07-02 PROCEDURE — 84156 ASSAY OF PROTEIN URINE: CPT

## 2018-07-02 PROCEDURE — 99215 OFFICE O/P EST HI 40 MIN: CPT | Mod: PBBFAC | Performed by: STUDENT IN AN ORGANIZED HEALTH CARE EDUCATION/TRAINING PROGRAM

## 2018-07-02 PROCEDURE — 83036 HEMOGLOBIN GLYCOSYLATED A1C: CPT

## 2018-07-02 PROCEDURE — 36415 COLL VENOUS BLD VENIPUNCTURE: CPT

## 2018-07-02 PROCEDURE — 99214 OFFICE O/P EST MOD 30 MIN: CPT | Mod: S$PBB,,, | Performed by: STUDENT IN AN ORGANIZED HEALTH CARE EDUCATION/TRAINING PROGRAM

## 2018-07-02 PROCEDURE — 80069 RENAL FUNCTION PANEL: CPT

## 2018-07-02 RX ORDER — LANCETS
EACH MISCELLANEOUS
Qty: 100 EACH | Refills: 5 | Status: CANCELLED | OUTPATIENT
Start: 2018-07-02

## 2018-07-02 RX ORDER — PEN NEEDLE, DIABETIC 30 GX3/16"
1 NEEDLE, DISPOSABLE MISCELLANEOUS
Qty: 200 EACH | Refills: 11 | Status: CANCELLED | OUTPATIENT
Start: 2018-07-02

## 2018-07-02 RX ORDER — INSULIN LISPRO 100 [IU]/ML
15 INJECTION, SOLUTION INTRAVENOUS; SUBCUTANEOUS
Qty: 15 ML | Refills: 11 | Status: CANCELLED | OUTPATIENT
Start: 2018-07-02
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
## 2018-07-02 NOTE — PATIENT INSTRUCTIONS
Step-by-Step  Checking Your Blood Sugar    Date Last Reviewed: 5/1/2016  © 9177-0253 [employer]. [address] [place] [address], [place] [address]. All rights reserved. This information is not intended as a substitute for professional medical care. Always follow your healthcare professional's instructions.        Insulin Glargine injection  What is this medicine?  INSULIN GLARGINE (IN [employer] GLAMICHELLE nunezn) is a human-made form of insulin. This drug lowers the amount of sugar in your blood. It is a long-acting insulin that is usually given once a day.  How should I use this medicine?  This medicine is for injection under the skin. Use this medicine at the same time each day. Use exactly as directed. This insulin should never be mixed in the same syringe with other insulins before injection. Do not vigorously shake before use. You will be taught how to use this medicine and how to adjust doses for activities and illness. Do not use more insulin than prescribed.  Always check the appearance of your insulin before using it. This medicine should be clear and colorless like water. Do not use it if it is cloudy, thickened, colored, or has solid particles in it.  It is important that you put your used needles and syringes in a special sharps container. Do not put them in a trash can. If you do not have a sharps container, call your pharmacist or healthcare provider to get one.  Talk to your pediatrician regarding the use of this medicine in children. Special care may be needed.  What side effects may I notice from receiving this medicine?  Side effects that you should report to your health care professional or doctor as soon as possible:  · allergic reactions like skin rash, itching or hives, swelling of the face, lips, or tongue  · breathing problems  · signs and symptoms of high blood sugar such as dizziness, dry mouth, dry skin, fruity breath, nausea, stomach pain, increased hunger or thirst, increased  urination  · signs and symptoms of low blood sugar such as feeling anxious, confusion, dizziness, increased hunger, unusually weak or tired, sweating, shakiness, cold, irritable, headache, blurred vision, fast heartbeat, loss of consciousness  Side effects that usually do not require medical attention (report to your health care professional or doctor if they continue or are bothersome):  · increase or decrease in fatty tissue under the skin due to overuse of a particular injection site  · itching, burning, swelling, or rash at site where injected  What may interact with this medicine?  · other medicines for diabetes  Many medications may cause an increase or decrease in blood sugar, these include:  · alcohol containing beverages  · aspirin and aspirin-like drugs  · chloramphenicol  · chromium  · diuretics  · female hormones, like estrogens or progestins and birth control pills  · heart medicines  · isoniazid  · male hormones or anabolic steroids  · medicines for weight loss  · medicines for allergies, asthma, cold, or cough  · medicines for mental problems  · medicines called MAO Inhibitors like Nardil, Parnate, Marplan, Eldepryl  · niacin  · NSAIDs, medicines for pain and inflammation, like ibuprofen or naproxen  · pentamidine  · phenytoin  · probenecid  · quinolone antibiotics like ciprofloxacin, levofloxacin, ofloxacin  · some herbal dietary supplements  · steroid medicines like prednisone or cortisone  · thyroid medicine  Some medications can hide the warning symptoms of low blood sugar. You may need to monitor your blood sugar more closely if you are taking one of these medications. These include:  · beta-blockers such as atenolol, metoprolol, propranolol  · clonidine  · guanethidine  · reserpine  What if I miss a dose?  It is important not to miss a dose. Your health care professional or doctor should discuss a plan for missed doses with you. If you do miss a dose, follow their plan. Do not take double  doses.  Where should I keep my medicine?  Keep out of the reach of children.  Store unopened vials in a refrigerator between 2 and 8 degrees C (36 and 46 degrees F). Do not freeze or use if the insulin has been frozen. Opened vials (vials currently in use) may be stored in the refrigerator or at room temperature, at approximately 25 degrees C (77 degrees F) or cooler. Keeping your insulin at room temperature decreases the amount of pain during injection. Once opened, your insulin can be used for 28 days. After 28 days, the vial should be thrown away.  Store Lantus Solostar Pens or Basaglar KwikPens in a refrigerator between 2 and 8 degrees C (36 and 46 degrees F) or at room temperature below 30 degrees C (86 degrees F). Do not freeze or use if the insulin has been frozen. Once opened, the pens should be kept at room temperature. Do not store in the refrigerator once opened. Once opened, the insulin can be used for 28 days. After 28 days, the Lantus Solostar Pen or Basaglar KwikPen should be thrown away.  Store Toujeo Solostar Pens in a refrigerator between 2 and 8 degrees C (36 and 46 degrees F). Do not freeze or use if the insulin has been frozen. Once opened, the pens should be kept at room temperature below 30 degrees C (86 degrees F). Do not store in the refrigerator once opened. Once opened, the insulin can be used for 42 days. After 42 days, the Toujeo Solostar Pen should be thrown away.  Protect all insulin vials and pens from light and excessive heat. Throw away any unused medicine after the expiration date or after the specified time for room temperature storage has passed.  What should I tell my health care provider before I take this medicine?  They need to know if you have any of these conditions:  · episodes of hypoglycemia  · kidney disease  · liver disease  · an unusual or allergic reaction to insulin, metacresol, other medicines, foods, dyes, or preservatives  · pregnant or trying to get  pregnant  · breast-feeding  What should I watch for while using this medicine?  Visit your health care professional or doctor for regular checks on your progress.  Do not drive, use machinery, or do anything that needs mental alertness until you know how this medicine affects you. Alcohol may interfere with the effect of this medicine. Avoid alcoholic drinks.  A test called the HbA1C (A1C) will be monitored. This is a simple blood test. It measures your blood sugar control over the last 2 to 3 months. You will receive this test every 3 to 6 months.  Learn how to check your blood sugar. Learn the symptoms of low and high blood sugar and how to manage them.  Always carry a quick-source of sugar with you in case you have symptoms of low blood sugar. Examples include hard sugar candy or glucose tablets. Make sure others know that you can choke if you eat or drink when you develop serious symptoms of low blood sugar, such as seizures or unconsciousness. They must get medical help at once.  Tell your doctor or health care professional if you have high blood sugar. You might need to change the dose of your medicine. If you are sick or exercising more than usual, you might need to change the dose of your medicine.  Do not skip meals. Ask your doctor or health care professional if you should avoid alcohol. Many nonprescription cough and cold products contain sugar or alcohol. These can affect blood sugar.  Make sure that you have the right kind of syringe for the type of insulin you use. Try not to change the brand and type of insulin or syringe unless your health care professional or doctor tells you to. Switching insulin brand or type can cause dangerously high or low blood sugar. Always keep an extra supply of insulin, syringes, and needles on hand. Use a syringe one time only. Throw away syringe and needle in a closed container to prevent accidental needle sticks.  Insulin pens and cartridges should never be shared. Even  if the needle is changed, sharing may result in passing of viruses like hepatitis or HIV.  Wear a medical ID bracelet or chain, and carry a card that describes your disease and details of your medicine and dosage times.  NOTE:This sheet is a summary. It may not cover all possible information. If you have questions about this medicine, talk to your doctor, pharmacist, or health care provider. Copyright© 2017 [employer]

## 2018-07-03 ENCOUNTER — TELEPHONE (OUTPATIENT)
Dept: INTERNAL MEDICINE | Facility: CLINIC | Age: 53
End: 2018-07-03

## 2018-07-03 NOTE — TELEPHONE ENCOUNTER
----- Message from Behram Khan, MD sent at 4/2/2018 10:24 AM CDT -----  Call about blood glucose levels

## 2018-07-06 ENCOUNTER — CLINICAL SUPPORT (OUTPATIENT)
Dept: DIABETES | Facility: CLINIC | Age: 53
End: 2018-07-06
Payer: MEDICAID

## 2018-07-06 DIAGNOSIS — E11.9 TYPE 2 DIABETES MELLITUS WITHOUT COMPLICATION, WITH LONG-TERM CURRENT USE OF INSULIN: ICD-10-CM

## 2018-07-06 DIAGNOSIS — Z79.4 TYPE 2 DIABETES MELLITUS WITHOUT COMPLICATION, WITH LONG-TERM CURRENT USE OF INSULIN: ICD-10-CM

## 2018-07-06 PROCEDURE — 99999 PR PBB SHADOW E&M-EST. PATIENT-LVL II: CPT | Mod: PBBFAC,,,

## 2018-07-06 PROCEDURE — G0108 DIAB MANAGE TRN  PER INDIV: HCPCS | Mod: PBBFAC | Performed by: DIETITIAN, REGISTERED

## 2018-07-06 PROCEDURE — 99212 OFFICE O/P EST SF 10 MIN: CPT | Mod: PBBFAC

## 2018-07-06 RX ORDER — INSULIN LISPRO 100 [IU]/ML
15 INJECTION, SOLUTION INTRAVENOUS; SUBCUTANEOUS
Qty: 1 BOX | Refills: 11 | Status: CANCELLED | OUTPATIENT
Start: 2018-07-06

## 2018-07-06 NOTE — PROGRESS NOTES
Diabetes Education  Author: Sue Pradhan RD, CDE  Date: 7/6/2018    Diabetes Education Visit  Diabetes Education Record Assessment/Progress: Initial    Diabetes Type  Diabetes Type : Type II    Diabetes History  Diabetes Diagnosis: >10 years    Nutrition  Meal Planning: 3 meals per day, snacks between meal, water, drinks regular soda, eats out seldom (Occasionally regular soft drink)  What type of sweetener do you use?: sugar  What type of beverages do you drink?: other (see comments), water, milk, sport drinks, juice (Coffee w/ sugar, milk w/ cereal, sport drinks)    Monitoring   Self Monitoring :  (Checks 3+ times a day)  Blood Glucose Logs: No  Do you use a personal glucose monitor?: No  In the last month, how often have you had a low blood sugar reaction?: never  What are your symptoms of low blood sugar?:  (N/A)  How do you treat low blood sugar?:  (N/A)  Can you tell when your blood sugar is too high?: no  How do you treat high blood sugar?:  (N/A)    Exercise   Exercise Type: walking (Does alot of walking)    Current Diabetes Treatment   Current Treatment: Oral Medication, Insulin (Has not really been taking medication regimen correctly; was supposed to transition from long acting/Metformin to MDI, but patient never did)    Social History  Preferred Learning Method: Face to Face  Primary Support: Self  Smoking Status: Ex Smoker  Alcohol Use: Never    Barriers to Change  Barriers to Change: None  Learning Challenges : None    Readiness to Learn   Readiness to Learn : Acceptance    Cultural Influences  Cultural Influences: No    Diabetes Education Assessment/Progress  Diabetes Disease Process (diabetes disease process and treatment options): Instructed, Discussion, Individual Session, Written Materials Provided  Nutrition (Incorporating nutritional management into one's lifestyle): Instructed, Discussion, Individual Session, Written Materials Provided (Reviewed general nutrition guidelines and plate method;  encouraged to eliminate sugary beverages)  Physical Activity (incorporating physical activity into one's lifestyle): Instructed, Discussion, Individual Session, Written Materials Provided (Reviewed goals and benefits)  Medications (states correct name, dose, onset, peak, duration, side effects & timing of meds): Instructed, Discussion, Individual Session, Written Materials Provided (Reviewed medication regimen; spoke to pharmacy - Humalog not covered; states Admelog is covered instead - Rx request sent to PCP )  Monitoring (monitoring blood glucose/other parameters & using results): Instructed, Discussion, Individual Session, Written Materials Provided (Reviewed SMBG schedule and BG goals)  Acute Complications (preventing, detecting, and treating acute complications): Instructed, Discussion, Individual Session, Written Materials Provided (Reviewed s/s and treatment of hypoglycemia)  Chronic Complications (preventing, detecting, and treating chronic complications): Instructed, Discussion, Individual Session, Written Materials Provided (Reviewed standards of care)  Clinical (diabetes, other pertinent medical history, and relevant comorbidities reviewed during visit): Instructed, Discussion, Individual Session  Cognitive (knowledge of self-management skills, functional health literacy): Instructed, Discussion, Individual Session  Psychosocial (emotional response to diabetes): Instructed, Discussion, Individual Session  Diabetes Distress and Support Systems: Not Covered/Deferred (Time constraints)  Behavioral (readiness for change, lifestyle practices, self-care behaviors): Instructed, Discussion, Individual Session    Goals  Patient has selected/evaluated goals during today's session: Yes, selected  Medications: Set (Take medication as prescribed)    Diabetes Care Plan/Intervention  Education Plan/Intervention: Individual Follow-Up DSMT    Education Units of Time   Time Spent: 45 min    Health Maintenance was reviewed  today with patient. Discussed with patient importance of routine eye exams, foot exams/foot care, blood work (i.e.: A1c, microalbumin, and lipid), dental visits, yearly flu vaccine, and pneumonia vaccine as indicated by PCP. Patient verbalized understanding.     Health Maintenance Topics with due status: Not Due       Topic Last Completion Date    Colonoscopy 06/07/2016    Eye Exam 02/01/2018    Lipid Panel 02/08/2018    Foot Exam 04/02/2018    Hemoglobin A1c 07/02/2018    Low Dose Statin 07/06/2018    Influenza Vaccine Not Due     Health Maintenance Due   Topic Date Due    TETANUS VACCINE  06/05/1983    Pneumococcal PPSV23 (Medium Risk) (1) 06/05/1983

## 2018-07-09 ENCOUNTER — TELEPHONE (OUTPATIENT)
Dept: PHARMACY | Facility: CLINIC | Age: 53
End: 2018-07-09

## 2018-07-09 RX ORDER — INSULIN LISPRO 100 [IU]/ML
15 INJECTION, SOLUTION INTRAVENOUS; SUBCUTANEOUS 3 TIMES DAILY
Qty: 15 ML | Refills: 1 | Status: SHIPPED | OUTPATIENT
Start: 2018-07-09 | End: 2018-09-24 | Stop reason: SDUPTHER

## 2018-08-13 ENCOUNTER — OFFICE VISIT (OUTPATIENT)
Dept: INTERNAL MEDICINE | Facility: CLINIC | Age: 53
End: 2018-08-13
Payer: MEDICAID

## 2018-08-13 VITALS
HEIGHT: 71 IN | SYSTOLIC BLOOD PRESSURE: 128 MMHG | HEART RATE: 66 BPM | BODY MASS INDEX: 28.33 KG/M2 | OXYGEN SATURATION: 98 % | WEIGHT: 202.38 LBS | DIASTOLIC BLOOD PRESSURE: 80 MMHG

## 2018-08-13 DIAGNOSIS — E11.9 TYPE 2 DIABETES MELLITUS WITHOUT COMPLICATION, WITH LONG-TERM CURRENT USE OF INSULIN: Primary | ICD-10-CM

## 2018-08-13 DIAGNOSIS — Z00.00 HEALTHCARE MAINTENANCE: ICD-10-CM

## 2018-08-13 DIAGNOSIS — Z79.4 TYPE 2 DIABETES MELLITUS WITHOUT COMPLICATION, WITH LONG-TERM CURRENT USE OF INSULIN: Primary | ICD-10-CM

## 2018-08-13 DIAGNOSIS — M25.512 LEFT SHOULDER PAIN, UNSPECIFIED CHRONICITY: ICD-10-CM

## 2018-08-13 PROCEDURE — 99999 PR PBB SHADOW E&M-EST. PATIENT-LVL III: CPT | Mod: PBBFAC,,, | Performed by: STUDENT IN AN ORGANIZED HEALTH CARE EDUCATION/TRAINING PROGRAM

## 2018-08-13 PROCEDURE — 99396 PREV VISIT EST AGE 40-64: CPT | Mod: S$PBB,,, | Performed by: STUDENT IN AN ORGANIZED HEALTH CARE EDUCATION/TRAINING PROGRAM

## 2018-08-13 PROCEDURE — 99213 OFFICE O/P EST LOW 20 MIN: CPT | Mod: PBBFAC | Performed by: STUDENT IN AN ORGANIZED HEALTH CARE EDUCATION/TRAINING PROGRAM

## 2018-08-13 RX ORDER — INSULIN GLARGINE 300 [IU]/ML
INJECTION, SOLUTION SUBCUTANEOUS
Qty: 1.5 ML | Refills: 5 | Status: SHIPPED | OUTPATIENT
Start: 2018-08-13 | End: 2018-09-24 | Stop reason: SDUPTHER

## 2018-08-13 NOTE — PATIENT INSTRUCTIONS
DIABETES MEDICATION AS FOLLOWS:  Toujeo (also known as glargine) 45 units before bed  Admelog (also known as lispro) 15 units before meals   Check blood sugar before meals       STOP TAKING METFORMIN    CONTINUE TAKING THE FOLLOWING MEDICATIONS AS YOU ARE:   Atorvastatin 20 mg every day   Losartan-HCTZ 100-25 mg every day  Levothyroxine 25 mcg every day        How to Check Your Blood Sugar    Keeping track of how much sugar (glucose) is in your blood is an important part of self-care when you have diabetes. This is also called self-monitoring of blood glucose (SMBG). To make sure your glucose and insulin are in balance, check your blood sugar as instructed by your healthcare provider. You may need to check your blood glucose levels at certain times every day. Or you may need to check them only a few times a week.  What you need  To check your blood sugar, make sure you have the following:   · A small pricking needle (lancing device)  · Test strips  · A glucose meter  · A notebook, chart, or log book and pen or pencil   Using a blood glucose meter  You can check your blood sugar at home, at work, and anywhere else. Your diabetes team will help you choose a blood glucose meter. A meter measures the amount of glucose in a tiny drop of blood. Youll use a device called a lancet to draw a drop of blood. Put the strip in the meter first. Then touch the test strip to the drop of blood. The meter then gives you a number (reading) that tells you the level of your blood sugar.  Aim for your target range  Your blood sugar should be in your target range--not too high and not too low. A target range is where your blood sugar level is healthiest. Staying in this range as much as possible will help lower your risk for health problems (complications). Your diabetes team will help you figure out the best target range for you. That range depends on many things. They include your age, other health problems, how well your diabetes is  controlled, and how long you have had diabetes. In general, target ranges are:  · Control of blood glucose (hemoglobin A1c or A1c): generally, 7.0% or less. You will usually have this test at the lab.  · Before a meal (preprandial glucose): Between 80 and 130 mg/dL.  · One to 2 hours after a meal (postprandial glucose): Less than 180 mg/dL.  Track your readings  Use a notebook, chart, or log book to keep track of your readings. Write down the date, time, and your blood sugar level numbers. This helps you see patterns, such as high blood sugar after eating certain foods. Take your log along when you see your healthcare provider. Your blood glucose levels will help your provider decide if he or she needs to make any changes to your management plan. To check your blood sugar, follow the steps below.  Step 1. Get ready  · Wash your hands with soap and warm (not hot) water.  · Follow all of the instructions that came with your glucometer. Be sure that your test strips were designed to be used with your meter and are not .   Step 2. Draw a drop of blood  · Prick the side of your finger with the lancet. Squeeze gently until you get a drop of blood. Squeezing too hard can cause an inaccurate reading.  · Put the lancet in a special sharps container. Ask your healthcare team where you can buy one or what you can use to throw away any sharps.  · If you are unable to get enough blood, hold your hand at your side and gently shake it.  Step 3. Place the drop on a strip  · Wait for the meter to show a message or symbol that it is time to test.  · Touch the test strip to the drop of blood.  · Be sure to follow the instructions included with meter.  Step 4. Read and record your results  · Wait for your meter to show the result.  · If you see an error message, recheck using a fresh strip and a fresh drop of blood. Also recheck if the glucose numbers aren't what you expect--too low without symptoms, or too high for no  reason.  · Write the results in your log book.  Date Last Reviewed: 6/1/2016  © 0618-1627 [employer]. [address] [place] [address], [place] [address]. All rights reserved. This information is not intended as a substitute for professional medical care. Always follow your healthcare professional's instructions.      RICE     Rest an injury, elevate it, and use ice and compression as directed.   RICE stands for rest, ice, compression, and elevation. These can limit pain and swelling after an injury. RICE may be recommended to help treat fractures, sprains, strains, and bruises or bumps.   Home care  The following explain the details of RICE:  · Rest. Limit the use of the injured body part. This helps prevent further damage to the body part and gives it time to heal. In some cases, you may need a sling, brace, splint, or cast to help keep the body part still until it has healed.  · Ice. Applying ice right after an injury helps relieve pain and swelling. Wrap a bag of ice in a thin towel. Then, place it over the injured area. Do this for 10 to 15 minutes every 3 to 4 hours. Continue for the next 1 to 3 days or until your symptoms improve. Never put ice directly on your skin or ice an area longer than 15 minutes at a time.  · Compression. Putting pressure on an injury helps reduce swelling and provides support. Wrap the injured area firmly with an elastic bandage/wrap. Make sure not to wrap the bandage too tightly or you will cut off blood flow to the injured area. If your bandage loosens, rewrap it.  · Elevation. Keeping an injury raised above the level of your heart reduces swelling, pain, and throbbing. For instance, if you have a broken leg, it may help to rest your leg on several pillows when sitting or lying down. Try to keep the injured area elevated for at least 2 to 3 hours per day.  Follow-up care  Follow up with your healthcare provider, or as advised.  When to seek medical advice  Call your healthcare  provider right away if any of these occur:  · Fever of 100.4°F (38°C) or higher, or as directed by your healthcare provider  · Increased pain or swelling in the injured body part  · Injured body part becomes cold, blue, numb, or tingly  · Signs of infection. These include warmth in the skin, redness, drainage, or bad smell coming from the injured body part.  Date Last Reviewed: 1/18/2016  © 2159-5353 [employer]. [address] [place] [address]. All rights reserved. This information is not intended as a substitute for professional medical care. Always follow your healthcare professional's instructions.

## 2018-08-13 NOTE — PROGRESS NOTES
[facility]                                                             CLINIC NOTE    Patient Name: Juan Manuel Johnson  YOB: 1965    PRESENTING HISTORY     Chief Complaint   Patient presents with    Follow-up     6 week       History of Present Illness:  Mr. Juan Manuel Johnson is a 53 y.o. male w/ significant PMHx of DMT2, HoTh, HTN and h/o hepatitis C who prevents for close follow up for management of his diabetes.     During his previous visit, his BGL readings were noted to be in the 400's and 500's. He states that they have improved with readings mainly in the 200's and 300's. States he has made an effort to decrease his portion sizes with meals. He checks his blood sugar three times a day before meals consistently. He has been taking his insulin as follows:     Glargine 10 units every morning  Lispro 15 units TIDWM    He works performing small jobs. He states three weeks ago he was moving a sofa when he felt his left shoulder give out. He also had some associated neck pain. He denies chest pain and shortness of breath with this episode. He states he has been drinking plenty of water and reports staying very hydrated.     Review of Systems   Constitutional: Negative for chills, fever, malaise/fatigue and weight loss.   HENT: Negative for congestion, ear discharge, hearing loss and sore throat.    Eyes: Negative for blurred vision, discharge and redness.   Respiratory: Negative for cough, shortness of breath and wheezing.    Cardiovascular: Negative for chest pain, palpitations and leg swelling.   Gastrointestinal: Negative for abdominal pain, blood in stool, constipation, diarrhea, nausea and vomiting.   Genitourinary: Negative for dysuria, frequency, hematuria and urgency.   Musculoskeletal: Positive for neck pain. Negative for joint pain and myalgias.   Skin: Negative for itching and rash.   Neurological: Positive for weakness. Negative for  dizziness, seizures, loss of consciousness and headaches.   Endo/Heme/Allergies: Negative for environmental allergies and polydipsia. Does not bruise/bleed easily.   Psychiatric/Behavioral: Negative for depression. The patient is not nervous/anxious and does not have insomnia.          PAST HISTORY:     Past Medical History:   Diagnosis Date    Diabetes mellitus     Diabetes mellitus, type 2     Hepatitis C     Hyperlipidemia     Hypertension     Hypothyroidism        Past Surgical History:   Procedure Laterality Date    ANKLE SURGERY Left     COLONOSCOPY      Normal by patient reporting 2017    ELBOW SURGERY Right     EXTERNAL EAR SURGERY Left     WRIST SURGERY Left        Family History   Problem Relation Age of Onset    Diabetes Mother     Hypertension Mother        Social History     Socioeconomic History    Marital status:      Spouse name: None    Number of children: None    Years of education: None    Highest education level: None   Social Needs    Financial resource strain: None    Food insecurity - worry: None    Food insecurity - inability: None    Transportation needs - medical: None    Transportation needs - non-medical: None   Occupational History    None   Tobacco Use    Smoking status: Former Smoker     Last attempt to quit: 1995     Years since quittin.2    Smokeless tobacco: Never Used   Substance and Sexual Activity    Alcohol use: No    Drug use: No    Sexual activity: None   Other Topics Concern    None   Social History Narrative    The patient typically spends most of his time at home and watches TV.  He does walk daily.  He tries to stay fairly active.       MEDICATIONS & ALLERGIES:               Medication List with Changes/Refills   Current Medications    ACETAMINOPHEN-CODEINE 300-30MG (TYLENOL #3) 300-30 MG TAB    Take 1 tablet by mouth every 6 (six) hours as needed.    ASPIRIN 81 MG CHEW    Take 1 tablet (81 mg total) by mouth once daily.     "ATORVASTATIN (LIPITOR) 20 MG TABLET    Take 2 tablets (40 mg total) by mouth once daily.    CHLORHEXIDINE (PERIDEX) 0.12 % SOLUTION    1 mL.    CLOTRIMAZOLE (LOTRIMIN) 1 % CREAM    Apply topically 2 (two) times daily.    ECONAZOLE NITRATE 1 % CREAM    Apply topically 2 (two) times daily.    INSULIN GLARGINE, TOUJEO, (TOUJEO) 300 UNIT/ML (1.5 ML) INPN PEN    45 units sq q d    INSULIN LISPRO (ADMELOG SOLOSTAR U-100 INSULIN) 100 UNIT/ML INPN PEN    Inject 15 Units into the skin 3 (three) times daily.    LANCETS MISC    Use prn    LEVOTHYROXINE (SYNTHROID) 25 MCG TABLET    Take 1 tablet (25 mcg total) by mouth once daily.    LOSARTAN-HYDROCHLOROTHIAZIDE 100-12.5 MG (HYZAAR) 100-12.5 MG TAB    Take 1 tablet by mouth every morning.    METFORMIN (GLUCOPHAGE) 500 MG TABLET    Take 1 tablet (500 mg total) by mouth 2 (two) times daily with meals.    NAPROXEN (NAPROSYN) 375 MG TABLET    Take 1 tablet (375 mg total) by mouth 2 (two) times daily as needed (Pain).    ONETOUCH ULTRA TEST STRP    Inject 1 strip into the skin 3 (three) times daily with meals.    PEN NEEDLE, DIABETIC 32 GAUGE X 5/32" NDLE    1 each by Misc.(Non-Drug; Combo Route) route 4 (four) times daily with meals and nightly.       Review of patient's allergies indicates:  No Known Allergies    OBJECTIVE:     Vital Signs:  Vitals:    08/13/18 0936   BP: 128/80   Pulse: 66   SpO2: 98%   Weight: 91.8 kg (202 lb 6.1 oz)   Height: 5' 11" (1.803 m)     Wt Readings from Last 5 Encounters:   08/13/18 91.8 kg (202 lb 6.1 oz)   07/02/18 88.3 kg (194 lb 10.7 oz)   06/23/18 68.5 kg (151 lb)   04/02/18 90.9 kg (200 lb 6.4 oz)   02/06/18 92.1 kg (203 lb 0.7 oz)       No results found for this or any previous visit (from the past 24 hour(s)).      Physical Exam   Constitutional: He is oriented to person, place, and time and well-developed, well-nourished, and in no distress.   HENT:   Head: Normocephalic and atraumatic.   Eyes: EOM are normal. Pupils are equal, round, and " "reactive to light. No scleral icterus.   Neck: Normal range of motion. Neck supple. No thyromegaly present.   Cardiovascular: Normal rate, regular rhythm and normal heart sounds.   No murmur heard.  Pulmonary/Chest: Effort normal and breath sounds normal. No respiratory distress. He has no wheezes.   Abdominal: Soft. Bowel sounds are normal. He exhibits no distension. There is no tenderness.   Musculoskeletal:   Lift-off test positive for tenderness.      Lymphadenopathy:     He has no cervical adenopathy.   Neurological: He is alert and oriented to person, place, and time.   Skin: Skin is warm and dry. No erythema.   Psychiatric: Affect normal.   Nursing note and vitals reviewed.        ASSESSMENT & PLAN:     Juan Manuel was seen today for follow-up.    Diagnoses and all orders for this visit:    Type 2 diabetes mellitus without complication, with long-term current use of insulin  Diabetic regimen has been difficult to establish with patient despite frequent visits and diabetic education. Pt voiced understanding that long-acting insulin (glargine) should be dosed at 45 units and taken every night. We performed a teach back and he confirmed his understanding.     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 That said, given his recently difficult to manage diabetes, would like to evaluate cardiac with a new baseline EKG. Previous EKG from 2013 showed J point in septal leads.     Plan  -     Ambulatory Referral to Physical/Occupational Therapy  - ECG-12 lead; future    Healthcare maintenance  Reviewed other home medications with him. Advised to continue taking ASA, losartan-HCTZ, LT4 and " atorvastatin as prescribed.    -  Will require a tetanus shot, PPSV23 and Flu vaccine at next visit.       AT NEXT VISIT (TO THE RESIDENT WHO SEES HIM NEXT):  Touch base on glucose monitoring and control as well as improvement or lack thereof in shoulder pain with initiation of physical therapy. If able, please administer vaccines as well. Please have him follow up in three weeks so I may visit with him again and perform an A1c.     RTC in 3 weeks. Pt was instructed to contact the clinic if symptoms worsened or if new symptoms arise.    I have discussed the plan with Dr Mast.     Behram Khan, MD  Internal Medicine PGY-2  #[phone]          Answers for HPI/ROS submitted by the patient on 8/12/2018   activity change: No  unexpected weight change: No  rhinorrhea: No  trouble swallowing: No  visual disturbance: No  chest tightness: No  polyuria: No  difficulty urinating: No  joint swelling: No  arthralgias: Yes  confusion: No  dysphoric mood: No

## 2018-08-13 NOTE — PROGRESS NOTES
I have reviewed the notes, assessments, and/or procedures performed by Dr. Larsen, I concur with her/his documentation of Juan Manuel Johnson.

## 2018-09-04 ENCOUNTER — OFFICE VISIT (OUTPATIENT)
Dept: INTERNAL MEDICINE | Facility: CLINIC | Age: 53
End: 2018-09-04
Payer: MEDICAID

## 2018-09-04 VITALS
SYSTOLIC BLOOD PRESSURE: 122 MMHG | BODY MASS INDEX: 29.02 KG/M2 | HEIGHT: 71 IN | DIASTOLIC BLOOD PRESSURE: 84 MMHG | WEIGHT: 207.25 LBS | HEART RATE: 72 BPM

## 2018-09-04 DIAGNOSIS — E11.9 TYPE 2 DIABETES MELLITUS WITHOUT COMPLICATION, WITH LONG-TERM CURRENT USE OF INSULIN: ICD-10-CM

## 2018-09-04 DIAGNOSIS — Z79.4 TYPE 2 DIABETES MELLITUS WITH COMPLICATION, WITH LONG-TERM CURRENT USE OF INSULIN: Primary | ICD-10-CM

## 2018-09-04 DIAGNOSIS — E78.5 HYPERLIPIDEMIA, UNSPECIFIED HYPERLIPIDEMIA TYPE: ICD-10-CM

## 2018-09-04 DIAGNOSIS — Z00.00 HEALTHCARE MAINTENANCE: ICD-10-CM

## 2018-09-04 DIAGNOSIS — E03.4 HYPOTHYROIDISM DUE TO ACQUIRED ATROPHY OF THYROID: ICD-10-CM

## 2018-09-04 DIAGNOSIS — I10 ESSENTIAL HYPERTENSION: ICD-10-CM

## 2018-09-04 DIAGNOSIS — E11.8 TYPE 2 DIABETES MELLITUS WITH COMPLICATION, WITH LONG-TERM CURRENT USE OF INSULIN: Primary | ICD-10-CM

## 2018-09-04 DIAGNOSIS — M25.512 LEFT SHOULDER PAIN, UNSPECIFIED CHRONICITY: ICD-10-CM

## 2018-09-04 DIAGNOSIS — Z79.4 TYPE 2 DIABETES MELLITUS WITHOUT COMPLICATION, WITH LONG-TERM CURRENT USE OF INSULIN: ICD-10-CM

## 2018-09-04 PROCEDURE — 99215 OFFICE O/P EST HI 40 MIN: CPT | Mod: PBBFAC | Performed by: STUDENT IN AN ORGANIZED HEALTH CARE EDUCATION/TRAINING PROGRAM

## 2018-09-04 PROCEDURE — 99213 OFFICE O/P EST LOW 20 MIN: CPT | Mod: S$PBB,,, | Performed by: STUDENT IN AN ORGANIZED HEALTH CARE EDUCATION/TRAINING PROGRAM

## 2018-09-04 PROCEDURE — 99999 PR PBB SHADOW E&M-EST. PATIENT-LVL V: CPT | Mod: PBBFAC,,, | Performed by: STUDENT IN AN ORGANIZED HEALTH CARE EDUCATION/TRAINING PROGRAM

## 2018-09-04 NOTE — PROGRESS NOTES
Clinic Note  9/4/2018      Subjective:       Patient ID:  Juan Manuel is a 53 y.o. male being seen for an established visit.    Chief Complaint: Follow-up (dr millan)    Juan Manuel Johnson is a 54 yo male with Diabetes type 2 who presents as a follow-up for diabetes management. He currently follows with Dr. Millan. He checks his blood sugar 4 times daily: in the morning, after breakfast, before dinner, and at night. He does not check his blood glucose before or after lunch since he states that it is difficult to do so at his work. After his last visit, he started taking his trujeo 45 units nightly and continued on Lispro 15 units TIDWM. He reports that his blood sugars have ranged from high 300's to 200's that appear relatively consistent, though he does note some readings in 400's. He has one low nightly reading of 102 a few days ago. He denies any fevers, chills, nausea or vomiting during that time but states that he is unsure if he may have eaten less that day. He states that he has tried to snack more throughout the day on fruits but does admit to eating more sweets such as cookies. He states that he will need additional test strips soon. He states that he completed diabetic education a few years ago but has forgotten a lot. Additionally, he denies symptoms of polyuria, polydipsia, confusion, or hypoglycemic episodes.      In addition, he states that he was not able to get the physical therapy ordered for his left shoulder from his last visit since he was not contacted by PT. He states that his left shoulder pain has worsened. Pain is positional and only elicited with posterior extension of left shoulder. The pain is sharp and 6/10 with movement. He does not have this pain when the shoulder is at rest. He has not taken anything for the pain. He denies chest pain, SOB, or palpitations.     He is scheduled for the following vaccines:  tetanus shot and PPSV23 vaccine. He is amendable to receiving them today.             Review  of Systems   Constitutional: Negative for chills, diaphoresis, fever and malaise/fatigue.   HENT: Negative for ear pain and hearing loss.    Eyes: Negative for blurred vision.   Respiratory: Negative for cough, shortness of breath and wheezing.    Cardiovascular: Negative for chest pain, palpitations and leg swelling.   Gastrointestinal: Negative for abdominal pain, constipation, diarrhea, nausea and vomiting.   Genitourinary: Negative for dysuria and flank pain.   Musculoskeletal: Negative for joint pain and myalgias.   Skin: Negative for rash.   Neurological: Negative for dizziness, sensory change, weakness and headaches.   Psychiatric/Behavioral: Negative for depression. The patient does not have insomnia.        Past Medical History:   Diagnosis Date    Diabetes mellitus     Diabetes mellitus, type 2     Hepatitis C     Hyperlipidemia     Hypertension     Hypothyroidism        Family History   Problem Relation Age of Onset    Diabetes Mother     Hypertension Mother         reports that he quit smoking about 23 years ago. he has never used smokeless tobacco. He reports that he does not drink alcohol or use drugs.    Medication List with Changes/Refills   New Medications    BLOOD SUGAR DIAGNOSTIC STRP    To check BG QID times daily, to use with insurance preferred meter   Current Medications    ACETAMINOPHEN-CODEINE 300-30MG (TYLENOL #3) 300-30 MG TAB    Take 1 tablet by mouth every 6 (six) hours as needed.    ASPIRIN 81 MG CHEW    Take 1 tablet (81 mg total) by mouth once daily.    ATORVASTATIN (LIPITOR) 20 MG TABLET    Take 2 tablets (40 mg total) by mouth once daily.    CHLORHEXIDINE (PERIDEX) 0.12 % SOLUTION    1 mL.    CLOTRIMAZOLE (LOTRIMIN) 1 % CREAM    Apply topically 2 (two) times daily.    ECONAZOLE NITRATE 1 % CREAM    Apply topically 2 (two) times daily.    INSULIN GLARGINE, TOUJEO, (TOUJEO) 300 UNIT/ML (1.5 ML) INPN PEN    45 units every night before bed    INSULIN LISPRO (ADMELOG SOLOSTAR  "U-100 INSULIN) 100 UNIT/ML INPN PEN    Inject 15 Units into the skin 3 (three) times daily.    LANCETS MISC    Use prn    LEVOTHYROXINE (SYNTHROID) 25 MCG TABLET    Take 1 tablet (25 mcg total) by mouth once daily.    LOSARTAN-HYDROCHLOROTHIAZIDE 100-12.5 MG (HYZAAR) 100-12.5 MG TAB    Take 1 tablet by mouth every morning.    METFORMIN (GLUCOPHAGE) 500 MG TABLET    Take 1 tablet (500 mg total) by mouth 2 (two) times daily with meals.    NAPROXEN (NAPROSYN) 375 MG TABLET    Take 1 tablet (375 mg total) by mouth 2 (two) times daily as needed (Pain).    ONETOUCH ULTRA TEST STRP    Inject 1 strip into the skin 3 (three) times daily with meals.    PEN NEEDLE, DIABETIC 32 GAUGE X 5/32" NDLE    1 each by Misc.(Non-Drug; Combo Route) route 4 (four) times daily with meals and nightly.     Review of patient's allergies indicates:  No Known Allergies    Patient Active Problem List   Diagnosis    Type 2 diabetes mellitus with complication, with long-term current use of insulin    Hypertension    Deep vein thrombosis prophylaxis    Hepatitis C, chronic    Hyperglycemia    Hypokalemia    Dehydration    Hypothyroidism due to acquired atrophy of thyroid    Hyperlipidemia           Objective:      /84   Pulse 72   Ht 5' 11" (1.803 m)   Wt 94 kg (207 lb 3.7 oz)   BMI 28.90 kg/m²   Estimated body mass index is 28.9 kg/m² as calculated from the following:    Height as of this encounter: 5' 11" (1.803 m).    Weight as of this encounter: 94 kg (207 lb 3.7 oz).  Physical Exam   Constitutional: He is well-developed, well-nourished, and in no distress. No distress.   HENT:   Head: Normocephalic and atraumatic.   Eyes: Conjunctivae and EOM are normal. Right eye exhibits no discharge. Left eye exhibits no discharge. No scleral icterus.   Neck: Normal range of motion.   Cardiovascular: Normal rate, regular rhythm and normal heart sounds. Exam reveals no gallop and no friction rub.   No murmur heard.  Pulmonary/Chest: Effort " normal and breath sounds normal. No respiratory distress. He has no wheezes. He has no rales.   Abdominal: Soft. Bowel sounds are normal. He exhibits no distension. There is no rebound and no guarding.   Musculoskeletal: He exhibits no edema.   Pain elicited on extension of shoulder   Skin: Skin is warm and dry. He is not diaphoretic.   Nursing note and vitals reviewed.        Assessment and Plan:         Juan Manuel was seen today for follow-up.    Diagnoses and all orders for this visit:    Type 2 diabetes mellitus with complication, with long-term current use of insulin  -     Blood glucose appears to be improving since patient has a history of BG in 500's-400's. Current range 300-200's with some readings in 400's. One isolated low reading nightly reading of 102. Will make low dose correction due to isolated nightly low reading and increase Trujeo from 45 to 50 units nightly. Patient may need to increase short acting insulin but due to inconsistencies, will keep lispro at current dose and follow up with Dr. New in 3 weeks  -    Patient amenable to repeat Diabetic Education, will place ambulatory referral again today  -    Refilled blood sugar diagnostic Strp; To check BG QID times daily, to use with insurance preferred meter    Left shoulder pain, unspecified chronicity  -    Most likely MSK due to positional nature   -    Placed Ambulatory consult to Physical Therapy again today, patient instructed to call PT if not contacted    Healthcare maintenance  -     (In Office Administered) Tdap Vaccine  -     (In Office Administered) Pneumococcal Polysaccharide Vaccine (23 Valent) (SQ/IM)      Follow-up in about 3 weeks (around 9/25/2018) with Dr. Larsen.    Other Orders Placed This Visit:  Orders Placed This Encounter   Procedures    (In Office Administered) Tdap Vaccine    (In Office Administered) Pneumococcal Polysaccharide Vaccine (23 Valent) (SQ/IM)    Ambulatory consult to Diabetic Education    Ambulatory consult  to Physical Therapy         Alley Reyes    Discussed with Dr. Collado

## 2018-09-06 NOTE — PROGRESS NOTES
I have reviewed the notes, assessments, and/or procedures performed by Dr. Reyes, I concur with her/his documentation of Juan Manuel Johnson.

## 2018-09-24 ENCOUNTER — OFFICE VISIT (OUTPATIENT)
Dept: INTERNAL MEDICINE | Facility: CLINIC | Age: 53
End: 2018-09-24
Payer: MEDICAID

## 2018-09-24 ENCOUNTER — IMMUNIZATION (OUTPATIENT)
Dept: INTERNAL MEDICINE | Facility: CLINIC | Age: 53
End: 2018-09-24
Payer: MEDICAID

## 2018-09-24 ENCOUNTER — LAB VISIT (OUTPATIENT)
Dept: LAB | Facility: HOSPITAL | Age: 53
End: 2018-09-24
Payer: MEDICAID

## 2018-09-24 VITALS
HEART RATE: 81 BPM | DIASTOLIC BLOOD PRESSURE: 88 MMHG | WEIGHT: 215.63 LBS | BODY MASS INDEX: 30.19 KG/M2 | OXYGEN SATURATION: 98 % | HEIGHT: 71 IN | SYSTOLIC BLOOD PRESSURE: 122 MMHG

## 2018-09-24 DIAGNOSIS — Z00.00 HEALTHCARE MAINTENANCE: ICD-10-CM

## 2018-09-24 DIAGNOSIS — E11.9 TYPE 2 DIABETES MELLITUS WITHOUT COMPLICATION, WITH LONG-TERM CURRENT USE OF INSULIN: ICD-10-CM

## 2018-09-24 DIAGNOSIS — Z79.4 TYPE 2 DIABETES MELLITUS WITH COMPLICATION, WITH LONG-TERM CURRENT USE OF INSULIN: ICD-10-CM

## 2018-09-24 DIAGNOSIS — E11.8 TYPE 2 DIABETES MELLITUS WITH COMPLICATION, WITH LONG-TERM CURRENT USE OF INSULIN: Primary | ICD-10-CM

## 2018-09-24 DIAGNOSIS — I10 ESSENTIAL HYPERTENSION: ICD-10-CM

## 2018-09-24 DIAGNOSIS — E11.8 TYPE 2 DIABETES MELLITUS WITH COMPLICATION, WITH LONG-TERM CURRENT USE OF INSULIN: ICD-10-CM

## 2018-09-24 DIAGNOSIS — Z79.4 TYPE 2 DIABETES MELLITUS WITH COMPLICATION, WITH LONG-TERM CURRENT USE OF INSULIN: Primary | ICD-10-CM

## 2018-09-24 DIAGNOSIS — E03.4 HYPOTHYROIDISM DUE TO ACQUIRED ATROPHY OF THYROID: ICD-10-CM

## 2018-09-24 DIAGNOSIS — Z79.4 TYPE 2 DIABETES MELLITUS WITHOUT COMPLICATION, WITH LONG-TERM CURRENT USE OF INSULIN: ICD-10-CM

## 2018-09-24 LAB
ESTIMATED AVG GLUCOSE: 263 MG/DL
HBA1C MFR BLD HPLC: 10.8 %

## 2018-09-24 PROCEDURE — 83036 HEMOGLOBIN GLYCOSYLATED A1C: CPT

## 2018-09-24 PROCEDURE — 90686 IIV4 VACC NO PRSV 0.5 ML IM: CPT | Mod: PBBFAC

## 2018-09-24 PROCEDURE — 99213 OFFICE O/P EST LOW 20 MIN: CPT | Mod: S$PBB,,, | Performed by: STUDENT IN AN ORGANIZED HEALTH CARE EDUCATION/TRAINING PROGRAM

## 2018-09-24 PROCEDURE — 90472 IMMUNIZATION ADMIN EACH ADD: CPT | Mod: PBBFAC

## 2018-09-24 PROCEDURE — 99999 PR PBB SHADOW E&M-EST. PATIENT-LVL IV: CPT | Mod: PBBFAC,,, | Performed by: STUDENT IN AN ORGANIZED HEALTH CARE EDUCATION/TRAINING PROGRAM

## 2018-09-24 PROCEDURE — 36415 COLL VENOUS BLD VENIPUNCTURE: CPT

## 2018-09-24 PROCEDURE — 90471 IMMUNIZATION ADMIN: CPT | Mod: PBBFAC

## 2018-09-24 PROCEDURE — 99214 OFFICE O/P EST MOD 30 MIN: CPT | Mod: PBBFAC,25 | Performed by: STUDENT IN AN ORGANIZED HEALTH CARE EDUCATION/TRAINING PROGRAM

## 2018-09-24 RX ORDER — LANCETS
EACH MISCELLANEOUS
Qty: 100 EACH | Refills: 5 | Status: SHIPPED | OUTPATIENT
Start: 2018-09-24 | End: 2020-07-20 | Stop reason: SDUPTHER

## 2018-09-24 RX ORDER — PEN NEEDLE, DIABETIC 30 GX3/16"
1 NEEDLE, DISPOSABLE MISCELLANEOUS
Qty: 200 EACH | Refills: 11 | Status: SHIPPED | OUTPATIENT
Start: 2018-09-24 | End: 2018-09-25 | Stop reason: SDUPTHER

## 2018-09-24 RX ORDER — INSULIN GLARGINE 300 [IU]/ML
INJECTION, SOLUTION SUBCUTANEOUS
Qty: 1.5 ML | Refills: 5 | Status: SHIPPED | OUTPATIENT
Start: 2018-09-24 | End: 2019-04-09 | Stop reason: SDUPTHER

## 2018-09-24 RX ORDER — INSULIN LISPRO 100 [IU]/ML
15 INJECTION, SOLUTION INTRAVENOUS; SUBCUTANEOUS 3 TIMES DAILY
Qty: 15 ML | Refills: 1 | Status: SHIPPED | OUTPATIENT
Start: 2018-09-24 | End: 2019-04-09

## 2018-09-24 NOTE — PROGRESS NOTES
Pt w/ previously uncontrolled DM now with much better control but still elevated fasting glucose and some prandial values. Insulin increased today and will f/u closely in 4 wks. Signs and symptoms of hypoglycemia explained to patient who expresses understanding. Will contact us and discontinue insulin if any such symptoms occur.

## 2018-09-24 NOTE — PROGRESS NOTES
"                                     [facility]                                                             CLINIC NOTE    Patient Name: Juan Manuel Johnson  YOB: 1965    PRESENTING HISTORY     Chief Complaint   Patient presents with    Follow-up    Shoulder Pain       History of Present Illness:  Mr. Juan Manuel Johnson is a 53 y.o. male w/ significant PMHx of HTN, HLD, DMT2, hypothyroidism, and chronic hepatitis C with long-term treatment.     Here for follow up of his blood glucose control and left shoulder pain. Pt brought blood sugar recordings with him. 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 Per his log, morning BGL's have ranged from 130 to 240 with spikes into the 400's and a one time low of 70. The high spike was attributed to snacks/meals at 0300 while the low was attributed to skipping both dinner and breakfast. For the most, part however, he is very pleased with the progress he has made with his blood glucose.     He continues to have shoulder pain, however he has made an appointment with physical therapy for Wednesday. When asked if he is taking tylenol or advil for his pain, he states that he avoids pills because he is a recovering heroin dependency. He did, however admit to using his wife's hydrocodone when the pain becomes unbearable.       Review of Systems   Constitutional: Negative for chills, fever, malaise/fatigue and weight loss.   HENT: Negative for congestion, ear discharge and sore throat.    Eyes: Negative for blurred vision and redness.   Respiratory: Negative for cough, shortness of breath and wheezing.    Cardiovascular: Negative for chest pain, palpitations and leg swelling.   Gastrointestinal: Negative for abdominal pain, constipation, diarrhea, nausea and vomiting.   Genitourinary: Positive for frequency. " Negative for dysuria.   Musculoskeletal: Negative for joint pain and myalgias.   Skin: Negative for itching and rash.   Neurological: Negative for dizziness, seizures, loss of consciousness and headaches.   Endo/Heme/Allergies: Negative for environmental allergies. Does not bruise/bleed easily.   Psychiatric/Behavioral: Negative for depression. The patient is not nervous/anxious and does not have insomnia.          PAST HISTORY:     Past Medical History:   Diagnosis Date    Diabetes mellitus     Diabetes mellitus, type 2     Hepatitis C     Hyperlipidemia     Hypertension     Hypothyroidism        Past Surgical History:   Procedure Laterality Date    ANKLE SURGERY Left     COLONOSCOPY      Normal by patient reporting 2017    ELBOW SURGERY Right     EXTERNAL EAR SURGERY Left     WRIST SURGERY Left        Family History   Problem Relation Age of Onset    Diabetes Mother     Hypertension Mother        Social History     Socioeconomic History    Marital status:      Spouse name: None    Number of children: None    Years of education: None    Highest education level: None   Social Needs    Financial resource strain: None    Food insecurity - worry: None    Food insecurity - inability: None    Transportation needs - medical: None    Transportation needs - non-medical: None   Occupational History    None   Tobacco Use    Smoking status: Former Smoker     Last attempt to quit: 1995     Years since quittin.3    Smokeless tobacco: Never Used   Substance and Sexual Activity    Alcohol use: No    Drug use: No    Sexual activity: None   Other Topics Concern    None   Social History Narrative    The patient typically spends most of his time at home and watches TV.  He does walk daily.  He tries to stay fairly active.       MEDICATIONS & ALLERGIES:                  Medication List           Accurate as of 18  8:24 AM. If you have any questions, ask your nurse or doctor.              "  CONTINUE taking these medications    acetaminophen-codeine 300-30mg 300-30 mg Tab  Commonly known as:  TYLENOL #3     aspirin 81 MG Chew  Take 1 tablet (81 mg total) by mouth once daily.     atorvastatin 20 MG tablet  Commonly known as:  LIPITOR  Take 2 tablets (40 mg total) by mouth once daily.     chlorhexidine 0.12 % solution  Commonly known as:  PERIDEX     clotrimazole 1 % cream  Commonly known as:  LOTRIMIN  Apply topically 2 (two) times daily.     econazole nitrate 1 % cream  Apply topically 2 (two) times daily.     HumaLOG KwikPen Insulin 100 unit/mL Inpn pen  Generic drug:  insulin lispro  Inject 15 Units into the skin 3 (three) times daily.     insulin glargine (TOUJEO) 300 unit/mL (1.5 mL) Inpn pen  Commonly known as:  TOUJEO  45 units every night before bed     lancets Misc  Use prn     levothyroxine 25 MCG tablet  Commonly known as:  SYNTHROID  Take 1 tablet (25 mcg total) by mouth once daily.     losartan-hydrochlorothiazide 100-12.5 mg 100-12.5 mg Tab  Commonly known as:  HYZAAR  Take 1 tablet by mouth every morning.     metFORMIN 500 MG tablet  Commonly known as:  GLUCOPHAGE  Take 1 tablet (500 mg total) by mouth 2 (two) times daily with meals.     naproxen 375 MG tablet  Commonly known as:  NAPROSYN  Take 1 tablet (375 mg total) by mouth 2 (two) times daily as needed (Pain).     * ONETOUCH ULTRA TEST Strp  Generic drug:  blood sugar diagnostic  Inject 1 strip into the skin 3 (three) times daily with meals.     * blood sugar diagnostic Strp  To check BG QID times daily, to use with insurance preferred meter     pen needle, diabetic 32 gauge x 5/32" Ndle  1 each by Misc.(Non-Drug; Combo Route) route 4 (four) times daily with meals and nightly.         * This list has 2 medication(s) that are the same as other medications prescribed for you. Read the directions carefully, and ask your doctor or other care provider to review them with you.                Review of patient's allergies indicates:  No " "Known Allergies    OBJECTIVE:     Vital Signs:  Vitals:    09/24/18 0819   BP: (!) 140/88   Pulse: 81   SpO2: 98%   Weight: 97.8 kg (215 lb 9.8 oz)   Height: 5' 11" (1.803 m)     Wt Readings from Last 5 Encounters:   09/24/18 97.8 kg (215 lb 9.8 oz)   09/04/18 94 kg (207 lb 3.7 oz)   08/13/18 91.8 kg (202 lb 6.1 oz)   07/02/18 88.3 kg (194 lb 10.7 oz)   06/23/18 68.5 kg (151 lb)       No results found for this or any previous visit (from the past 24 hour(s)).      Physical Exam   Constitutional: He is oriented to person, place, and time and well-developed, well-nourished, and in no distress.   HENT:   Head: Normocephalic and atraumatic.   Eyes: EOM are normal. Pupils are equal, round, and reactive to light. No scleral icterus.   Neck: Normal range of motion. Neck supple. No thyromegaly present.   Cardiovascular: Normal rate, regular rhythm and normal heart sounds.   No murmur heard.  Pulmonary/Chest: Effort normal and breath sounds normal. No respiratory distress. He has no wheezes.   Abdominal: Soft. Bowel sounds are normal. He exhibits no distension. There is no tenderness.   Lymphadenopathy:     He has no cervical adenopathy.   Neurological: He is alert and oriented to person, place, and time.   Skin: Skin is warm and dry. No erythema.   Psychiatric: Affect normal.         ASSESSMENT & PLAN:     Juan Manuel was seen today for follow-up and shoulder pain.    Diagnoses and all orders for this visit:    Type 2 diabetes mellitus with complication, with long-term current use of insulin  -     Hemoglobin A1c; Future  -     lancets Misc; Use prn  -     blood sugar diagnostic (ONETOUCH ULTRA TEST) Strp; Inject 1 strip into the skin 3 (three) times daily with meals.  -     pen needle, diabetic 32 gauge x 5/32" Ndle; 1 each by Misc.(Non-Drug; Combo Route) route 4 (four) times daily with meals and nightly.    Hypothyroidism due to acquired atrophy of thyroid  No issues associated with thyroid.    Essential hypertension  Initial " reading was 144/88. Repeat was 120/88.    Healthcare maintenance  -     (In Office Administered) Tdap Vaccine  -     Cancel: Influenza - Quadrivalent (Recombinant) (PF)  -     (In Office Administered) Pneumococcal Polysaccharide Vaccine (23 Valent) (SQ/IM)    Uncontrolled type 2 diabetes mellitus with other specified complication, with long-term current use of insulin  -     insulin lispro (ADMELOG SOLOSTAR U-100 INSULIN) 100 unit/mL InPn pen; Inject 15 Units into the skin 3 (three) times daily.    Other orders  -     insulin glargine, TOUJEO, (TOUJEO) 300 unit/mL (1.5 mL) InPn pen; 45 units every night before bed       TO THE NEXT RESIDENT TAKING CARE OF THIS PATIENT:   I have given the patient a weekly logbook to check his blood glucose. Since he is not taking insulin or measurements at lunch time, I would like to assess the need for NPH or regular insulin instead of glargine. If you could go over the log with the patient and ask about his shoulder pain, I would appreciate it. Thanks.       RTC in 3 weeks. Pt was instructed to contact the clinic if symptoms worsened or if new symptoms arise.    I have discussed the plan with Dr Mast.     Behram Khan, MD  Internal Medicine PGY-2  #[phone]

## 2018-09-25 ENCOUNTER — TELEPHONE (OUTPATIENT)
Dept: INTERNAL MEDICINE | Facility: CLINIC | Age: 53
End: 2018-09-25

## 2018-09-25 DIAGNOSIS — Z79.4 TYPE 2 DIABETES MELLITUS WITH COMPLICATION, WITH LONG-TERM CURRENT USE OF INSULIN: ICD-10-CM

## 2018-09-25 DIAGNOSIS — E11.8 TYPE 2 DIABETES MELLITUS WITH COMPLICATION, WITH LONG-TERM CURRENT USE OF INSULIN: ICD-10-CM

## 2018-09-25 RX ORDER — PEN NEEDLE, DIABETIC 30 GX3/16"
1 NEEDLE, DISPOSABLE MISCELLANEOUS
Qty: 200 EACH | Refills: 11 | Status: SHIPPED | OUTPATIENT
Start: 2018-09-25 | End: 2019-12-20 | Stop reason: SDUPTHER

## 2018-09-25 NOTE — TELEPHONE ENCOUNTER
Spoke with patient about his improvement in A1c (>14.0 --> 10.8). Encouraged him to continue logging his blood glucose levels and continue with the changes that we had made with his insulin.

## 2018-09-26 ENCOUNTER — CLINICAL SUPPORT (OUTPATIENT)
Dept: REHABILITATION | Facility: OTHER | Age: 53
End: 2018-09-26
Payer: MEDICAID

## 2018-09-26 DIAGNOSIS — M25.512 CHRONIC LEFT SHOULDER PAIN: ICD-10-CM

## 2018-09-26 DIAGNOSIS — G89.29 CHRONIC LEFT SHOULDER PAIN: ICD-10-CM

## 2018-09-26 DIAGNOSIS — M25.60 JOINT STIFFNESS: ICD-10-CM

## 2018-09-26 PROCEDURE — 97161 PT EVAL LOW COMPLEX 20 MIN: CPT | Mod: PN

## 2018-09-26 NOTE — PLAN OF CARE
TIME RECORD    Date: 09/26/2018    Start Time:  9:00  Stop Time:  10:00  Total Timed Minutes:  60 min      OUTPATIENT PHYSICAL THERAPY   PATIENT EVALUATION  Onset Date: July 2018  Primary Diagnosis:   1. Chronic left shoulder pain     2. Joint stiffness       Treatment Diagnosis: left shoulder pain, decreased joint mobility, ROM, flexibility, strength with impingement > biceps tendinopathy  Past Medical History:   Diagnosis Date    Diabetes mellitus     Diabetes mellitus, type 2     Hepatitis C     Hyperlipidemia     Hypertension     Hypothyroidism      Precautions: diabetes Type 2, ORIF R elbow, L wrist. Hx Hep C  Prior Therapy: yes - for R elbow, L wrist  Medications: Juan Manuel Johnson has a current medication list which includes the following prescription(s): acetaminophen-codeine 300-30mg, aspirin, atorvastatin, blood sugar diagnostic, blood sugar diagnostic, chlorhexidine, clotrimazole, econazole nitrate, insulin glargine (toujeo), insulin lispro, lancets, levothyroxine, losartan-hydrochlorothiazide 100-12.5 mg, metformin, naproxen, and pen needle, diabetic.  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   Place of Residence (Steps/Adaptations): lives with wife  Functional Deficits Leading to Referral/Nature of Injury: pain and difficulty with ADLs, HHCs, work-related activities  Patient Therapy Goals: reduce pain so that he can use his L arm for work    Subjective     Juan Manuel Johnson states that his L shoulder started hurting in July 2018 while he was trying to move a sofa and he felt like his shoulder gave out. Pt reports that symptoms are neither improving or worsening since onset. Pt reports sharp worsening pain with reaching OH, lifting OH, and reaching behind his back. Pt says that he occasionally gets a dull achy pain with the weather changing. Pt reports that  "it feels like it will give out on him. Hx of R elbow, wrist, and ankle fractures after falling off of a roof in 1996. Also previously had an ORIF to the R elbow.     Pain:  Location: Left shoulder (posterior, superior)  Description: Aching, Dull and Sharp  Activities Which Increase Pain: reaching OH, lifting OH, reaching behind his back. Heavy lifting/carrying, ADLs, HHCs, bad weather, pulling self in/out of bath tub (takes baths, not shower)  Activities Which Decrease Pain: change position, denies use of pain medication (says that he is a recovering drug addict)  Pain Scale: 0/10 at best 0/10 now  7/10 at worst    Objective     Posture: FHP with rounded shoulders, increased GHJ IR  Palpation: no TTP  Sensation/DTRs: intact    Cervical AROM: WNL, Min limited L sidebending, L rotation with c/o stiffness    Shoulder  Right   Left  Pain/Dysfunction with Movement    AROM PROM MMT AROM PROM MMT    flexion 140 150 4+ 120* 125* 4+    extension 80 NT 4+ 70* NT 4+    abduction 130 135 NT 77* 100* NT C/o stiffness/tightness at endrange in R shoulder    increased scap elevation with José Miguel AROM   adduction NT NT NT NT NT NT    Internal rotation T6 50 4+ L5* 20* 4+    ER at 90° abd NT NT NT NT NT NT    ER at 0° abd 49 55 4+ 55* 60* 4+ R shoulder: Muscle tightness at endrange with passive OP   *indicates pain    Joint mobility: AP/inf glides = 1+/6  Scapular Strength: grossly 4/5  Elbow screen: strength 5/5, ROM WFL but lacks >20 deg ext in R elbow    Flexibility: pecs = poor, rotators = poor  Special Tests:    Josergasons Test: -  Apprehension Sign: -  Supraspinatus (Empty Can Test): -  Simeon-Socrates: +  Neers: -  Speeds: +    Other: FOTO limitation = 41% disability  Examination time: 20 min  Treatment:   OH cane flexion: 10x  Sleeper stretch: 10x 3" holds  Posterior cuff stretch: 3 x 20"  Manual therapy: 10 min x GHJ joint mobs  Modalities: 10 min x ice to L shoulder at end of session  Patient education: Patient educated " regarding pathogenesis, diagnosis, protocol, prognosis, POC, and HEP. Written Home Exercises Provided with written and verbal instructions for frequency and duration of the following exercises: see EMR for patient instructions. Pt educated on HEP and activity modifications to reduce c/o pain and improve overall function. Pt was educated in posture and body mechanics.  Pt also educated on use of modalities prn to reduce c/o pain and dysfunction. Patient demo good understanding of the education provided. Patient demo good return demo of skill of exercises.        Assessment     Initial Assessment (Pertinent finding, problem list and factors affecting outcome): Patient presents with chronic left shoulder pain with marked limitations in joint mobility, ROM, and flexibility, which continue to facilitate s/s associated with impingement and biceps tendinopathy. Current impairments limits patient with all functional activities. Patient requires skilled PT to address remaining deficits and return patient to PLOF. Pt has set realistic goals and has verbalized good understanding and agreement with reported diagnosis, prognosis and treatment. Pt demonstrates no additional cultural, spiritual or educational need and currently has no barriers to learning.     Rehab Potiential: good     Medical necessity is demonstrated by the following problem list.  Pt presents with the following impairments:     History  Co-morbidities and personal factors that may impact the plan of care Examination  Body Structures and Functions, activity limitations and participation restrictions that may impact the plan of care Clinical Presentation   Decision Making/ Complexity Score     Co-morbidities:   advanced age, diabetes, education level, financial considerations, level of undertstanding of current condition and prior surgeries to R shoulder and L wrist                Personal Factors:   education level  social  background  lifestyle  attitudes  occupations - physical labor Body Regions: trunk, back, LUE    Body Systems: Musculoskeletal (ROM, strength, symmetry, joint mobility, soft tissue or myofascial mobility, flexibility), Neuromuscular (postural alignment, body mechanics, balance, gait, transfers, motor control, motor learning), cardiovascular (endurance), Integumentary (skin integrity)    Activity limitations:   Learning and applying knowledge  no deficits    General Tasks and Commands  no deficits    Communication  no deficits    Mobility  lifting and carrying objects  fine hand use (grasping/picking up)  moving around using equipment (WC)    Self care  washing oneself (bathing, drying, washing hands)  dressing  looking after one's health    Domestic Life  shopping  cooking  doing house work (cleaning house, washing dishes, laundry)  assisting others    Interactions/Relationships  no deficits    Life Areas  employment    Community and Social Life  community life      Participation Restrictions:   reaching OH, lifting OH, reaching behind his back. Heavy lifting/carrying, ADLs, HHCs, bad weather, pulling self in/out of bath tub (takes baths, not shower)       Stable and uncomplicated           Low      FOTO: 41% disability         Short Term Goals (10 Weeks):   1. Pt to demonstrate improved AROM by 10% to allow pt to perform self care activities with increased ease.  2. Pt to demonstrate improved joint mobility by a half grade to allow improved ADLs with increased ease.   3. Pt will report <5/10 pain within the left shoulder for ease with reaching OH with HHCs.  4. Pt will report being independent with his/her HEP for maintenance of improvements gained during therapy sessions  5. Pt to demonstrate improved functional ability with FOTO limitation <=35% disability.    Long Term Goals (20 Weeks):   1. Pt to demonstrate improved ROM to WNL, R=L, to allow pt to perform self care with increased ease.  2. Pt to demonstrate  improved flexibility by a full grade to allow improved postural alignment with increased ease.  3. Pt will demonstrate 5/5 strength within the left shoulder and scapula for ease with house hold chores  4. Pt to demonstrate improved functional ability with FOTO limitation <=30% disability.  5. Pt independent with HEP and demonstrates good return technique.      Plan     Certification Period: 9/26/18 to 12/26/18  Recommended Treatment Plan: 1-2 times per week for 12 weeks: Aquatic Therapy, Cervical/Lumbar Traction, Electrical Stimulation prn, Iontophoresis (with dexamethasone prn), Manual Therapy, Moist Heat/ Ice, Neuromuscular Re-ed, Patient Education, Self Care, Therapeutic Activites, Therapeutic Exercise and IASTYM, therapeutic taping, dry needling  Other Recommendations: Progress HEP towards D/C. Recommend F/U with MD if symptoms worsen or do not resolve. Patient may be seen by a PTA for treatment to carry out their plan of care.  Face-to-face conferences will be held.          Therapist: Debbie Cisneros, PT    I CERTIFY THE NEED FOR THESE SERVICES FURNISHED UNDER THIS PLAN OF TREATMENT AND WHILE UNDER MY CARE    Physician's comments: ________________________________________________________________________________________________________________________________________________      Physician's Name: ___________________________________

## 2018-10-01 ENCOUNTER — CLINICAL SUPPORT (OUTPATIENT)
Dept: REHABILITATION | Facility: OTHER | Age: 53
End: 2018-10-01
Payer: MEDICAID

## 2018-10-01 DIAGNOSIS — G89.29 CHRONIC LEFT SHOULDER PAIN: ICD-10-CM

## 2018-10-01 DIAGNOSIS — M25.60 JOINT STIFFNESS: ICD-10-CM

## 2018-10-01 DIAGNOSIS — M25.512 CHRONIC LEFT SHOULDER PAIN: ICD-10-CM

## 2018-10-01 PROCEDURE — 97110 THERAPEUTIC EXERCISES: CPT | Mod: PN

## 2018-10-01 NOTE — PROGRESS NOTES
"OUTPATIENT PHYSICAL THERAPY   PATIENT TREATMENT  Onset Date: July 2018  Primary Diagnosis:   1. Chronic left shoulder pain      2. Joint stiffness         Treatment Diagnosis: left shoulder pain, decreased joint mobility, ROM, flexibility, strength with impingement > biceps tendinopathy  Treatment date: 10/1/18  Visit # 3  Treatment time start 9:05 AM  Treatment time end 10:00 AM  Subjective      Pt reports that he was compliant with HEP. Pt stated, "I feel really good today."      Pain: 4/10        Objective         ROM MEASURED ON EVAL DATE (9/26/18)  Cervical AROM: WNL, Min limited L sidebending, L rotation with c/o stiffness     Shoulder   Right     Left   Pain/Dysfunction with Movement     AROM PROM MMT AROM PROM MMT     flexion 140 150 4+ 120* 125* 4+     extension 80 NT 4+ 70* NT 4+     abduction 130 135 NT 77* 100* NT C/o stiffness/tightness at endrange in R shoulder     increased scap elevation with José Miguel AROM   adduction NT NT NT NT NT NT     Internal rotation T6 50 4+ L5* 20* 4+     ER at 90° abd NT NT NT NT NT NT     ER at 0° abd 49 55 4+ 55* 60* 4+ R shoulder: Muscle tightness at endrange with passive OP   *indicates pain    Pt received individual therapeutic exercises to develop strength, endurance, ROM, flexibility, and posture for 40 minutes including:  Supine AAROM 1#  Dowel Flexion 2x15   Supine AAROM 1# Dowel Abd to 90 degrees 2x15  Pulleys Scaption/Flx x3mins  Rows w/ OTB 2x15  Pulldown ext w/ OTB 2x15  SL ER w/ 1# 2x15  Prone row ext w/ ER 2x10   Prone T horiz abd 2x15    Pt received received the following manual therapy techniques for 15 minutes:   Grade I-II oscillation   Inferior/posterior glides Grade I-II   PROM all plane directions 30 seconds each       Assessment      .Pt completed treatment with minimal pain (soreness) and required frequent VC/TC for proper technique and form (scapular retraction). Patient reports of decreased pain following treatment and fatigue was noted.      Rehab " Potiential: good      Short Term Goals (10 Weeks):   1. Pt to demonstrate improved AROM by 10% to allow pt to perform self care activities with increased ease. (Progressing)  2. Pt to demonstrate improved joint mobility by a half grade to allow improved ADLs with increased ease. (Progressing)  3. Pt will report <5/10 pain within the left shoulder for ease with reaching OH with HHCs. (Progressing)   4. Pt will report being independent with his/her HEP for maintenance of improvements gained during therapy sessions (Progressing)   5. Pt to demonstrate improved functional ability with FOTO limitation <=35% disability.(Progressing)      Long Term Goals (20 Weeks):   1. Pt to demonstrate improved ROM to WNL, R=L, to allow pt to perform self care with increased ease.(Progressing)  2. Pt to demonstrate improved flexibility by a full grade to allow improved postural alignment with increased ease.(Progressing)  3. Pt will demonstrate 5/5 strength within the left shoulder and scapula for ease with house hold chores (Progressing)  4. Pt to demonstrate improved functional ability with FOTO limitation <=30% disability. (Progressing)  5. Pt independent with HEP and demonstrates good return technique. (Progressing)         Plan      Cont with established PT POC and cont with scapular strengthening and ROM.    Certification Period: 9/26/18 to 12/26/18  Recommended Treatment Plan: 1-2 times per week for 12 weeks: Aquatic Therapy, Cervical/Lumbar Traction, Electrical Stimulation prn, Iontophoresis (with dexamethasone prn), Manual Therapy, Moist Heat/ Ice, Neuromuscular Re-ed, Patient Education, Self Care, Therapeutic Activites, Therapeutic Exercise and IASTYM, therapeutic taping, dry needling  Other Recommendations: Progress HEP towards D/C. Recommend F/U with MD if symptoms worsen or do not resolve. Patient may be seen by a PTA for treatment to carry out their plan of care.  Face-to-face conferences will be held.      Douglas Bills  PTA     As the supervising physical therapist, I certify that I was providing continuous supervision of the provisional licensee including daily face-to-face communications. I have reviewed the provisional licensee's documentation and have made modifications to in the patient's treatment plan as nessessary.     Bettina Dove, PT

## 2018-10-10 ENCOUNTER — CLINICAL SUPPORT (OUTPATIENT)
Dept: REHABILITATION | Facility: OTHER | Age: 53
End: 2018-10-10
Payer: MEDICAID

## 2018-10-10 DIAGNOSIS — M25.60 JOINT STIFFNESS: ICD-10-CM

## 2018-10-10 DIAGNOSIS — M25.512 CHRONIC LEFT SHOULDER PAIN: ICD-10-CM

## 2018-10-10 DIAGNOSIS — G89.29 CHRONIC LEFT SHOULDER PAIN: ICD-10-CM

## 2018-10-10 PROCEDURE — 97110 THERAPEUTIC EXERCISES: CPT | Mod: PN

## 2018-10-10 NOTE — PROGRESS NOTES
OUTPATIENT PHYSICAL THERAPY   PATIENT TREATMENT  Onset Date: July 2018  Primary Diagnosis:   1. Chronic left shoulder pain      2. Joint stiffness         Treatment Diagnosis: left shoulder pain, decreased joint mobility, ROM, flexibility, strength with impingement > biceps tendinopathy  Treatment date: 10/10/18  Visit # 3  Treatment time start 9:55 AM  Treatment time end 10:50 AM  Subjective      Pt reports that he was compliant with HEP. Pt reports a pain level of 6/10 to L shoulder region today.   Stated that he felt good from last treatment.     Pain: 6/10        Objective         ROM MEASURED ON EVAL DATE (9/26/18)  Cervical AROM: WNL, Min limited L sidebending, L rotation with c/o stiffness     Shoulder   Right     Left   Pain/Dysfunction with Movement     AROM PROM MMT AROM PROM MMT     flexion 140 150 4+ 120* 125* 4+     extension 80 NT 4+ 70* NT 4+     abduction 130 135 NT 77* 100* NT C/o stiffness/tightness at endrange in R shoulder     increased scap elevation with José Miguel AROM   adduction NT NT NT NT NT NT     Internal rotation T6 50 4+ L5* 20* 4+     ER at 90° abd NT NT NT NT NT NT     ER at 0° abd 49 55 4+ 55* 60* 4+ R shoulder: Muscle tightness at endrange with passive OP   *indicates pain    Pt received individual therapeutic exercises to develop strength, endurance, ROM, flexibility, and posture for 50 minutes including:  Supine AAROM 1#  Dowel Flexion 2x15   Supine AAROM 1# Dowel Abd to 90 degrees 2x15  Pulleys Scaption/Flx x3mins  Rows w/ OTB 2x15  Pulldown ext w/ OTB 2x15  SL ER w/ 1# 2x15  Prone row ext w/ ER 2x10   Prone T horiz abd 2x15    Pt received received the following manual therapy techniques for 10 minutes:   Grade I-II oscillation   Inferior/posterior glides Grade I-II   PROM all plane directions 30 seconds each       Assessment      Patient completed therex without any difficulty or pain. Improved technique on all therex and reports of decr pain following intervention.      Rehab  Potiential: good      Short Term Goals (10 Weeks):   1. Pt to demonstrate improved AROM by 10% to allow pt to perform self care activities with increased ease. (Progressing)  2. Pt to demonstrate improved joint mobility by a half grade to allow improved ADLs with increased ease. (Progressing)  3. Pt will report <5/10 pain within the left shoulder for ease with reaching OH with HHCs. (Progressing)   4. Pt will report being independent with his/her HEP for maintenance of improvements gained during therapy sessions (Progressing)   5. Pt to demonstrate improved functional ability with FOTO limitation <=35% disability.(Progressing)      Long Term Goals (20 Weeks):   1. Pt to demonstrate improved ROM to WNL, R=L, to allow pt to perform self care with increased ease.(Progressing)  2. Pt to demonstrate improved flexibility by a full grade to allow improved postural alignment with increased ease.(Progressing)  3. Pt will demonstrate 5/5 strength within the left shoulder and scapula for ease with house hold chores (Progressing)  4. Pt to demonstrate improved functional ability with FOTO limitation <=30% disability. (Progressing)  5. Pt independent with HEP and demonstrates good return technique. (Progressing)         Plan      Cont with established PT POC and cont with scapular strengthening and ROM.    Certification Period: 9/26/18 to 12/26/18  Recommended Treatment Plan: 1-2 times per week for 12 weeks: Aquatic Therapy, Cervical/Lumbar Traction, Electrical Stimulation prn, Iontophoresis (with dexamethasone prn), Manual Therapy, Moist Heat/ Ice, Neuromuscular Re-ed, Patient Education, Self Care, Therapeutic Activites, Therapeutic Exercise and IASTYM, therapeutic taping, dry needling  Other Recommendations: Progress HEP towards D/C. Recommend F/U with MD if symptoms worsen or do not resolve. Patient may be seen by a PTA for treatment to carry out their plan of care.  Face-to-face conferences will be held.      Douglas Bills  PTA

## 2018-10-16 ENCOUNTER — OFFICE VISIT (OUTPATIENT)
Dept: INTERNAL MEDICINE | Facility: CLINIC | Age: 53
End: 2018-10-16
Payer: MEDICAID

## 2018-10-16 VITALS
WEIGHT: 218.69 LBS | BODY MASS INDEX: 30.62 KG/M2 | SYSTOLIC BLOOD PRESSURE: 135 MMHG | DIASTOLIC BLOOD PRESSURE: 88 MMHG | HEIGHT: 71 IN | OXYGEN SATURATION: 98 % | HEART RATE: 83 BPM

## 2018-10-16 DIAGNOSIS — Z79.4 TYPE 2 DIABETES MELLITUS WITH COMPLICATION, WITH LONG-TERM CURRENT USE OF INSULIN: Primary | ICD-10-CM

## 2018-10-16 DIAGNOSIS — E11.8 TYPE 2 DIABETES MELLITUS WITH COMPLICATION, WITH LONG-TERM CURRENT USE OF INSULIN: Primary | ICD-10-CM

## 2018-10-16 DIAGNOSIS — G89.29 CHRONIC LEFT SHOULDER PAIN: ICD-10-CM

## 2018-10-16 DIAGNOSIS — M25.512 CHRONIC LEFT SHOULDER PAIN: ICD-10-CM

## 2018-10-16 PROCEDURE — 99214 OFFICE O/P EST MOD 30 MIN: CPT | Mod: PBBFAC | Performed by: STUDENT IN AN ORGANIZED HEALTH CARE EDUCATION/TRAINING PROGRAM

## 2018-10-16 PROCEDURE — 99999 PR PBB SHADOW E&M-EST. PATIENT-LVL IV: CPT | Mod: PBBFAC,,, | Performed by: STUDENT IN AN ORGANIZED HEALTH CARE EDUCATION/TRAINING PROGRAM

## 2018-10-16 PROCEDURE — 99213 OFFICE O/P EST LOW 20 MIN: CPT | Mod: S$PBB,,, | Performed by: STUDENT IN AN ORGANIZED HEALTH CARE EDUCATION/TRAINING PROGRAM

## 2018-10-16 NOTE — PROGRESS NOTES
Juan Manuel Johnson  1965        Subjective     Chief Complaint: Diabetes Management    History of Present Illness:  Mr. Juan Manuel Johnson is a 53 y.o. male with diabetes and chronic left shoulder pain presents for a follow up to his diabetes management. He states that he was given a log to record his BGs last visit but forgot to bring it to the appointment today due to being in a hurry. He thinks that his glucose range is usually in the 150-200's. He does note a episode of hypoglycemia where his glucose was 60 due to taking his insulin but not eating breakfast that morning. Otherwise, he does not report other BG lows. He states his highest reading was 358 which was at night before taking his nightly tuojeo.      The following is the insulin regimen that the patient reports to follow:    Tuojeo (glargine): 55 units nightly   Lispro: 35 units BID (before breakfast and before dinner)     He states that he does eat lunch daily but does not measure his blood sugar or use insulin during that time. 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 He is afraid that the insulin would get too hot in the sun on the sites where he works. Therefore, he only uses his short-acting twice a day.    In addition, he states that his left shoulder pain is improving after working with PT. It is still occasionally a 5/10 but he reports that it does not hurt as much as it used to. He also reports increased ROM. He occasionally uses tylenol to treat the pain. He is scheduled to see PT again tomorrow.      Review of Systems   Constitutional: Negative for chills, fever, malaise/fatigue and weight loss.   HENT: Negative for congestion, hearing loss and sore throat.    Eyes: Negative for blurred vision and double vision.   Respiratory: Negative for cough and shortness of breath.    Cardiovascular: Negative for chest pain, palpitations and leg  swelling.   Gastrointestinal: Negative for abdominal pain, constipation, diarrhea, nausea and vomiting.   Genitourinary: Negative for dysuria, frequency, hematuria and urgency.   Musculoskeletal: Negative for joint pain and myalgias.   Skin: Negative for rash.   Neurological: Negative for dizziness, sensory change, focal weakness, weakness and headaches.   Psychiatric/Behavioral: Negative for depression. The patient does not have insomnia.        PAST HISTORY:     Past Medical History:   Diagnosis Date    Diabetes mellitus     Diabetes mellitus, type 2     Hepatitis C     Hyperlipidemia     Hypertension     Hypothyroidism        Past Surgical History:   Procedure Laterality Date    ANKLE SURGERY Left     COLONOSCOPY      Normal by patient reporting 2017    ELBOW SURGERY Right     EXTERNAL EAR SURGERY Left     WRIST SURGERY Left        Family History   Problem Relation Age of Onset    Diabetes Mother     Hypertension Mother        Social History     Socioeconomic History    Marital status:      Spouse name: None    Number of children: None    Years of education: None    Highest education level: None   Social Needs    Financial resource strain: None    Food insecurity - worry: None    Food insecurity - inability: None    Transportation needs - medical: None    Transportation needs - non-medical: None   Occupational History    None   Tobacco Use    Smoking status: Former Smoker     Last attempt to quit: 1995     Years since quittin.3    Smokeless tobacco: Never Used   Substance and Sexual Activity    Alcohol use: No    Drug use: No    Sexual activity: None   Other Topics Concern    None   Social History Narrative    The patient typically spends most of his time at home and watches TV.  He does walk daily.  He tries to stay fairly active.       MEDICATIONS & ALLERGIES:     Current Outpatient Medications on File Prior to Visit   Medication Sig    acetaminophen-codeine  "300-30mg (TYLENOL #3) 300-30 mg Tab Take 1 tablet by mouth every 6 (six) hours as needed.    aspirin 81 MG Chew Take 1 tablet (81 mg total) by mouth once daily.    atorvastatin (LIPITOR) 20 MG tablet Take 2 tablets (40 mg total) by mouth once daily.    blood sugar diagnostic (ONETOUCH ULTRA TEST) Strp Inject 1 strip into the skin 3 (three) times daily with meals.    blood sugar diagnostic Strp To check BG QID times daily, to use with insurance preferred meter    chlorhexidine (PERIDEX) 0.12 % solution 1 mL.    clotrimazole (LOTRIMIN) 1 % cream Apply topically 2 (two) times daily.    econazole nitrate 1 % cream Apply topically 2 (two) times daily.    insulin glargine, TOUJEO, (TOUJEO) 300 unit/mL (1.5 mL) InPn pen 45 units every night before bed    insulin lispro (ADMELOG SOLOSTAR U-100 INSULIN) 100 unit/mL InPn pen Inject 15 Units into the skin 3 (three) times daily.    lancets Misc Use prn    levothyroxine (SYNTHROID) 25 MCG tablet Take 1 tablet (25 mcg total) by mouth once daily.    losartan-hydrochlorothiazide 100-12.5 mg (HYZAAR) 100-12.5 mg Tab Take 1 tablet by mouth every morning.    metFORMIN (GLUCOPHAGE) 500 MG tablet Take 1 tablet (500 mg total) by mouth 2 (two) times daily with meals.    naproxen (NAPROSYN) 375 MG tablet Take 1 tablet (375 mg total) by mouth 2 (two) times daily as needed (Pain).    pen needle, diabetic 32 gauge x 5/32" Ndle 1 each by Misc.(Non-Drug; Combo Route) route 4 (four) times daily with meals and nightly.     No current facility-administered medications on file prior to visit.        Review of patient's allergies indicates:  No Known Allergies    OBJECTIVE:     Vital Signs:  Vitals:    10/16/18 0809   BP: 135/88   BP Location: Left arm   Patient Position: Sitting   BP Method: Large (Manual)   Pulse: 83   SpO2: 98%   Weight: 99.2 kg (218 lb 11.1 oz)   Height: 5' 11" (1.803 m)       Body mass index is 30.5 kg/m².     Physical Exam:  General:  Well developed, well " nourished, no acute distress  Head: Normocephalic, atraumatic  Eyes: PERRL, EOMI, clear sclera  Throat: No posterior pharyngeal erythema or exudate, no tonsillar exudate  Neck: supple, normal ROM, no thyromegaly   CVS:  RRR, S1 and S2 normal, no murmurs, rubs, gallops, 2+ peripheral pulses  Resp:  Lungs clear to auscultation, no wheezes, rales, rhonchi, cough  GI:  Abdomen soft, non-tender, non-distended, normoactive bowel sounds  MSK:  No muscle atrophy, cyanosis, peripheral edema   Skin:  No rashes, ulcers, erythema  Neuro:  CNII-XII grossly intact, no focal deficits noted  Psych:  Appropriate mood and affect, normal judgement    Laboratory  Lab Results   Component Value Date    WBC 6.67 06/23/2018    HGB 14.0 06/23/2018    HCT 40.9 06/23/2018    MCV 83 06/23/2018     06/23/2018     Lab Results   Component Value Date     (H) 07/02/2018     07/02/2018    K 3.4 (L) 07/02/2018    CL 97 07/02/2018    CO2 28 07/02/2018    BUN 13 07/02/2018    CREATININE 1.4 07/02/2018    CALCIUM 10.5 07/02/2018    MG 1.7 06/06/2013     No results found for: INR, PROTIME  Lab Results   Component Value Date    HGBA1C 10.8 (H) 09/24/2018         ASSESSMENT & PLAN:   Mr. Juan Manuel Johnson is a 53 y.o. male      1. Type 2 diabetes mellitus with complication, with long-term current use of insulin  -Patient did not bring log to appointment so unable to assess need for modification of insulin regimen.  -Currently on Tuojeo 55 units nightly and Lispro 35 unit BID (before breakfast and dinner)  -Encouraged patient to [facility] picture [facility] to assess need for modification in long or short acting insulin    2. Chronic left shoulder pain  -Reports improvement with pain and ROM  -PT recommended 12 week treatment program  -continue PT      RTC in 1 month for follow up with Dr. Larsen    Discussed with Dr. Tobias Reyes MD  Internal Medicine PGY1  [facility]  [address] [place] [address]  [place] [address]  [phone]

## 2018-10-17 ENCOUNTER — CLINICAL SUPPORT (OUTPATIENT)
Dept: REHABILITATION | Facility: OTHER | Age: 53
End: 2018-10-17
Payer: MEDICAID

## 2018-10-17 DIAGNOSIS — G89.29 CHRONIC LEFT SHOULDER PAIN: ICD-10-CM

## 2018-10-17 DIAGNOSIS — M25.60 JOINT STIFFNESS: ICD-10-CM

## 2018-10-17 DIAGNOSIS — M25.512 CHRONIC LEFT SHOULDER PAIN: ICD-10-CM

## 2018-10-17 PROCEDURE — 97110 THERAPEUTIC EXERCISES: CPT | Mod: PN

## 2018-10-17 NOTE — PROGRESS NOTES
I have reviewed the notes, assessments, and/or procedures performed by Dr. Salomon, I concur with her/his documentation of Juan Manuel Johnson.   Pt does not have his BG log.unabke to adjust insulin currently. Pt to e-mail the log to resident

## 2018-10-17 NOTE — PROGRESS NOTES
OUTPATIENT PHYSICAL THERAPY   PATIENT TREATMENT  Onset Date: July 2018  Primary Diagnosis:   1. Chronic left shoulder pain      2. Joint stiffness         Treatment Diagnosis: left shoulder pain, decreased joint mobility, ROM, flexibility, strength with impingement > biceps tendinopathy  Treatment date: 10/10/18  Visit # 4  PTA Visit #: 3  Treatment time start 10:00 AM  Treatment time end 10:55 AM  Subjective      Pt reports that he was compliant with HEP. Pt reports a pain level of 5/10 to L shoulder region today.   Pt stated his L shoulder froze on him last night during sleep, but it does not feel it anymore upon waking up this morning     Pain: 5/10     Objective         ROM MEASURED ON EVAL DATE (9/26/18)  Cervical AROM: WNL, Min limited L sidebending, L rotation with c/o stiffness     Shoulder   Right     Left   Pain/Dysfunction with Movement     AROM PROM MMT AROM PROM MMT     flexion 140 150 4+ 120* 125* 4+     extension 80 NT 4+ 70* NT 4+     abduction 130 135 NT 77* 100* NT C/o stiffness/tightness at endrange in R shoulder     increased scap elevation with José Miguel AROM   adduction NT NT NT NT NT NT     Internal rotation T6 50 4+ L5* 20* 4+     ER at 90° abd NT NT NT NT NT NT     ER at 0° abd 49 55 4+ 55* 60* 4+ R shoulder: Muscle tightness at endrange with passive OP   *indicates pain    Pt received individual therapeutic exercises to develop strength, endurance, ROM, flexibility, and posture for 40 minutes including:    UBE 3 min fwd / 3 min bwd  Supine AAROM 1#  Dowel Flexion 2x15   Supine AAROM 1# Dowel Abd to 90 degrees 2x15  Pulleys Scaption/Flx x3mins  Rows w/ OTB 2x15  Pulldown ext w/ OTB 2x15  SL ER w/ 1# 2x15  Prone row ext w/ ER 2x15  Prone T horiz abd 2x15    Pt received received the following manual therapy techniques for 15 minutes:   Grade I-II oscillation   Inferior/posterior glides Grade I-II   Posterior capsule stretch  PROM all plane directions 30 seconds each       Assessment      Patient  tolerated treatment with minimal reports of pain to L shoulder. Tightness noted during IR PROM, improved with manual stretch.      Rehab Potiential: good      Short Term Goals (10 Weeks):   1. Pt to demonstrate improved AROM by 10% to allow pt to perform self care activities with increased ease. (Progressing)  2. Pt to demonstrate improved joint mobility by a half grade to allow improved ADLs with increased ease. (Progressing)  3. Pt will report <5/10 pain within the left shoulder for ease with reaching OH with HHCs. (Progressing)   4. Pt will report being independent with his/her HEP for maintenance of improvements gained during therapy sessions (Progressing)   5. Pt to demonstrate improved functional ability with FOTO limitation <=35% disability.(Progressing)      Long Term Goals (20 Weeks):   1. Pt to demonstrate improved ROM to WNL, R=L, to allow pt to perform self care with increased ease.(Progressing)  2. Pt to demonstrate improved flexibility by a full grade to allow improved postural alignment with increased ease.(Progressing)  3. Pt will demonstrate 5/5 strength within the left shoulder and scapula for ease with house hold chores (Progressing)  4. Pt to demonstrate improved functional ability with FOTO limitation <=30% disability. (Progressing)  5. Pt independent with HEP and demonstrates good return technique. (Progressing)         Plan      Cont with established PT POC and cont with scapular strengthening and ROM.    Certification Period: 9/26/18 to 12/26/18  Recommended Treatment Plan: 1-2 times per week for 12 weeks: Aquatic Therapy, Cervical/Lumbar Traction, Electrical Stimulation prn, Iontophoresis (with dexamethasone prn), Manual Therapy, Moist Heat/ Ice, Neuromuscular Re-ed, Patient Education, Self Care, Therapeutic Activites, Therapeutic Exercise and IASTYM, therapeutic taping, dry needling  Other Recommendations: Progress HEP towards D/C. Recommend F/U with MD if symptoms worsen or do not resolve.  Patient may be seen by a PTA for treatment to carry out their plan of care.  Face-to-face conferences will be held.      Douglas Bills PTA

## 2018-10-24 ENCOUNTER — DOCUMENTATION ONLY (OUTPATIENT)
Dept: REHABILITATION | Facility: OTHER | Age: 53
End: 2018-10-24

## 2018-12-27 ENCOUNTER — HOSPITAL ENCOUNTER (EMERGENCY)
Facility: OTHER | Age: 53
Discharge: HOME | End: 2018-12-27
Attending: EMERGENCY MEDICINE
Payer: MEDICAID

## 2018-12-27 VITALS
SYSTOLIC BLOOD PRESSURE: 193 MMHG | HEART RATE: 86 BPM | WEIGHT: 213 LBS | TEMPERATURE: 99 F | RESPIRATION RATE: 18 BRPM | HEIGHT: 76 IN | BODY MASS INDEX: 25.94 KG/M2 | DIASTOLIC BLOOD PRESSURE: 96 MMHG | OXYGEN SATURATION: 96 %

## 2018-12-27 DIAGNOSIS — R03.0 ELEVATED BLOOD PRESSURE READING: ICD-10-CM

## 2018-12-27 DIAGNOSIS — M79.671 FOOT PAIN, BILATERAL: ICD-10-CM

## 2018-12-27 DIAGNOSIS — R20.2 PARESTHESIAS: Primary | ICD-10-CM

## 2018-12-27 DIAGNOSIS — M79.672 FOOT PAIN, BILATERAL: ICD-10-CM

## 2018-12-27 LAB — POCT GLUCOSE: 145 MG/DL (ref 70–110)

## 2018-12-27 PROCEDURE — 25000003 PHARM REV CODE 250: Performed by: PHYSICIAN ASSISTANT

## 2018-12-27 PROCEDURE — 99283 EMERGENCY DEPT VISIT LOW MDM: CPT | Mod: 25

## 2018-12-27 PROCEDURE — 82962 GLUCOSE BLOOD TEST: CPT

## 2018-12-27 RX ORDER — HYDROCHLOROTHIAZIDE 12.5 MG/1
12.5 TABLET ORAL ONCE
Status: COMPLETED | OUTPATIENT
Start: 2018-12-27 | End: 2018-12-27

## 2018-12-27 RX ORDER — LOSARTAN POTASSIUM 50 MG/1
100 TABLET ORAL ONCE
Status: COMPLETED | OUTPATIENT
Start: 2018-12-27 | End: 2018-12-27

## 2018-12-27 RX ADMIN — HYDROCHLOROTHIAZIDE 12.5 MG: 12.5 TABLET ORAL at 01:12

## 2018-12-27 RX ADMIN — LOSARTAN POTASSIUM 100 MG: 50 TABLET, FILM COATED ORAL at 01:12

## 2018-12-27 NOTE — ED NOTES
Patient Identifiers for Juan Manuel Johnson checked and correct  Pt reports bilateral foot pain/tingling for 2 weeks, compliant with all meds  LOC: The patient is awake, alert and aware of environment with an appropriate affect, the patient is oriented x 3 and speaking appropriate.  APPEARANCE: Patient resting comfortably and in no acute distress, patient is clean and well groomed, patient's clothing is properly fastened.  SKIN: The skin is warm and dry, patient has normal skin turgor and moist mucus membranes,no rashes or lesions.Skin Intact , No Breakdown Noted  Musculoskeletal :  Normal range of motion noted. Moves all extremeties well, No swelling or tenderness noted  RESPIRATORY: Airway is open and patent, respirations are spontaneous, patient has a normal effort and rate.  CARDIAC: Patient has a normal rate and rhythm, no periphreal edema noted, capillary refill < 3 seconds.   ABDOMEN: Soft and non tender to palpation, no distention noted.   PULSES: 2+  And symmetrical in all extremeties  NEUROLOGIC: PERRL,  facial expression is symmetrical, patient moving all extremities, normal sensation in all extremities when touched with a finger.The patient is awake, alert and cooperative with a calm affect, patient is aware of environment.    Will continue to monitor

## 2018-12-27 NOTE — ED PROVIDER NOTES
Encounter Date: 2018       History     Chief Complaint   Patient presents with    Foot Pain     with numbness/tingling x 2 weeks. Denies any trauma or opens wounds to feet. PMH of DM. Last CBG this morning was 275 prior to insulin admin.      Patient is 53 year old male IDDM who presents with complaints of bilateral foot discomfort this been present for 2 weeks.  He reports that feet feel sometimes as if there is particularly pens on the bottom of them and sometimes feels as if the weight of his body is too much and causes feet hurt.  He reports no trauma or injury to his feet and has no report of swelling warmth redness or bruising.  And he has not been taking any medications to help with symptoms.  He reports that he has a podiatrist that he follows with regularly and reports that his wife takes care of his toenails and rubs lotion on his feet nightly.  There is no associated fever, chills, nausea, vomiting, chest pain or shortness of breath. No lower extremity pain or weakness no upper extremity pain or weakness.  Currently unaccompanied in the ER.          Review of patient's allergies indicates:  No Known Allergies  Past Medical History:   Diagnosis Date    Diabetes mellitus     Diabetes mellitus, type 2     Hepatitis C     Hyperlipidemia     Hypertension     Hypothyroidism      Past Surgical History:   Procedure Laterality Date    ANKLE SURGERY Left     COLONOSCOPY      Normal by patient reporting     ELBOW SURGERY Right     EXTERNAL EAR SURGERY Left     WRIST SURGERY Left      Family History   Problem Relation Age of Onset    Diabetes Mother     Hypertension Mother      Social History     Tobacco Use    Smoking status: Former Smoker     Last attempt to quit: 1995     Years since quittin.5    Smokeless tobacco: Never Used   Substance Use Topics    Alcohol use: No    Drug use: No     Review of Systems   Constitutional: Negative for fever.   HENT: Negative for sore throat.     Respiratory: Negative for shortness of breath.    Cardiovascular: Negative for chest pain.   Gastrointestinal: Negative for nausea.   Genitourinary: Negative for dysuria.   Musculoskeletal: Negative for back pain.        Bilateral foot pain   Skin: Negative for rash.   Neurological: Negative for weakness.   Hematological: Does not bruise/bleed easily.       Physical Exam     Initial Vitals [12/27/18 1207]   BP Pulse Resp Temp SpO2   (!) 140/104 81 16 98.1 °F (36.7 °C) 97 %      MAP       --         Physical Exam    Nursing note and vitals reviewed.  Constitutional: He appears well-developed and well-nourished. He is not diaphoretic. No distress.   Healthy-appearing male in no acute distress or apparent pain.  He makes good eye contact, speaks in clear full sentences and ambulates with ease.   HENT:   Head: Normocephalic and atraumatic.   Eyes: Conjunctivae and EOM are normal. Pupils are equal, round, and reactive to light. Right eye exhibits no discharge. Left eye exhibits no discharge. No scleral icterus.   Neck: Normal range of motion.   Cardiovascular: Normal rate, regular rhythm, normal heart sounds and intact distal pulses. Exam reveals no gallop and no friction rub.    No murmur heard.  Pulmonary/Chest: Breath sounds normal. He has no wheezes. He has no rhonchi. He has no rales.   Abdominal: Soft. Bowel sounds are normal. There is no tenderness. There is no rebound and no guarding.   Musculoskeletal: Normal range of motion. He exhibits no edema or tenderness.   Bilateral feet have no abnormalities noted with normal DP and PT pulses bilaterally, normal strength against resistance, normal sensation to light touch, normal range of motion of toes and ankle.  No tenderness to palpation to bony landmarks of the foot and ankle.  No overlying skin changes including erythema, edema, warmth to touch.    Negative Sandy sign bilaterally    Lower extremity has normal strength against resistance bilaterally    Lymphadenopathy:     He has no cervical adenopathy.   Neurological: He is alert and oriented to person, place, and time. He has normal strength. GCS score is 15. GCS eye subscore is 4. GCS verbal subscore is 5. GCS motor subscore is 6.   Skin: Skin is warm. Capillary refill takes less than 2 seconds. No rash and no abscess noted. No erythema.   Psychiatric: He has a normal mood and affect. His behavior is normal. Thought content normal.         ED Course   Procedures  Labs Reviewed   POCT GLUCOSE - Abnormal; Notable for the following components:       Result Value    POCT Glucose 145 (*)     All other components within normal limits   POCT GLUCOSE MONITORING CONTINUOUS             Medical Decision Making:   ED Management:  Urgent evaluation a 53-year-old male who presents with complaints of paresthesias to bilateral feet with no evidence of acute pathology.  He is afebrile, nontoxic appearing, hemodynamically stable though hypertensive at discharge at 193/96.  Patient is not taken his antihypertensive medications prior to arrival so home doses are given to him.  I do not suspect end-organ damage.  Bilateral feet have no abnormalities on exam.  There is no evidence of infection, fracture, dislocation, acute neurovascular compromise.  Certainly symptoms that he is describing could be related to diabetic neuropathy.  No intervention or imaging felt warranted at this time.  Will encourage follow-up with Podiatry in the outpatient setting so that medications such as gabapentin may be continued.  I do not feel is appropriate for me to prescribe this medication from the emergency setting as I am not able to follow him in the outpatient setting.  Patient verbalizes understanding of this plan.  I discussed case with attending physician who agrees with plan.  Patient is stable for discharge.  Other:   I have discussed this case with another health care provider.       <> Summary of the Discussion: Elliott                       Clinical Impression:   The primary encounter diagnosis was Paresthesias. Diagnoses of Foot pain, bilateral and Elevated blood pressure reading were also pertinent to this visit.                             Syeda Kenney PA-C  12/27/18 7598

## 2019-01-02 ENCOUNTER — OFFICE VISIT (OUTPATIENT)
Dept: PODIATRY | Facility: CLINIC | Age: 54
End: 2019-01-02
Payer: MEDICAID

## 2019-01-02 VITALS — WEIGHT: 213 LBS | HEIGHT: 76 IN | BODY MASS INDEX: 25.94 KG/M2

## 2019-01-02 DIAGNOSIS — E11.49 TYPE II DIABETES MELLITUS WITH NEUROLOGICAL MANIFESTATIONS: Primary | ICD-10-CM

## 2019-01-02 PROCEDURE — 99204 PR OFFICE/OUTPT VISIT, NEW, LEVL IV, 45-59 MIN: ICD-10-PCS | Mod: S$PBB,,, | Performed by: PODIATRIST

## 2019-01-02 PROCEDURE — 99204 OFFICE O/P NEW MOD 45 MIN: CPT | Mod: S$PBB,,, | Performed by: PODIATRIST

## 2019-01-02 PROCEDURE — 99213 OFFICE O/P EST LOW 20 MIN: CPT | Mod: PBBFAC,PN | Performed by: PODIATRIST

## 2019-01-02 PROCEDURE — 99999 PR PBB SHADOW E&M-EST. PATIENT-LVL III: CPT | Mod: PBBFAC,,, | Performed by: PODIATRIST

## 2019-01-02 PROCEDURE — 99999 PR PBB SHADOW E&M-EST. PATIENT-LVL III: ICD-10-PCS | Mod: PBBFAC,,, | Performed by: PODIATRIST

## 2019-01-02 RX ORDER — GABAPENTIN 300 MG/1
300 CAPSULE ORAL NIGHTLY
Qty: 30 CAPSULE | Refills: 11 | Status: SHIPPED | OUTPATIENT
Start: 2019-01-02 | End: 2019-04-09 | Stop reason: SDUPTHER

## 2019-01-02 NOTE — PATIENT INSTRUCTIONS
Long-Term Complications of Diabetes    Diabetes can cause health problems over time. These are called complications. They are more likely to happen if your blood sugar is often too high. Over time, high blood sugar can damage blood vessels in your body. It is important to keep your blood sugar in your target range. This can help prevent or delay complications from diabetes.  Possible complications  Complications of diabetes include:  · Eye problems, including damage to the blood vessels in the eyes (retinopathy), pressure in the eye (glaucoma), and clouding of the eyes lens (a cataract). Eye problems can eventually lead to irreversible blindness.   · Tooth and gum problems (periodontal disease), causing loss of teeth and bone  · Blood vessel (vascular) disease leading to circulation problems, heart attack or stroke, or a need for amputation of a limb   · Problems with sexual function leading to erectile dysfunction in men and sexual discomfort in women   · Kidney disease (nephropathy) can eventually lead to kidney failure, which may require dialysis or kidney transplant   · Nerve problems (neuropathy), causing pain or loss of feeling in your feet and other parts of your body, potentially leading to an amputation of a limb   · High blood pressure (hypertension), putting strain on your heart and blood vessels  · Serious infections, possibly leading to loss of toes, feet, or limbs  How to avoid complications  The serious consequences of these complications may be avoidable for most people with diabetes by managing your blood glucose, blood pressure, and cholesterol levels. This can help you feel better and stay healthy. You can manage diabetes by tracking your blood sugar. You can also eat healthy and exercise to avoid gaining weight. And you should take medicine if directed by your healthcare provider.  Date Last Reviewed: 5/1/2016  © 7099-1261 [employer]. [address] [place] [address], [place] [address].  All rights reserved. This information is not intended as a substitute for professional medical care. Always follow your healthcare professional's instructions.        Treating Peripheral Neuropathy  Peripheral neuropathy is a disease of the nerves. It most often starts in your feet and may also eventually affect the arms. It may cause pain or may make you unable to sense pain. Sometimes, weakness occurs as well. Lack of pain and weakness makes you more likely to injure yourself without knowing it.  Learn ways to protect your feet. Check your feet daily for wounds you may not have felt. Avoid burns by testing bath water with your elbow before stepping in. Also, always wear shoes to prevent injury.    Regular foot care  If you have foot numbness, you may not notice cutting yourself while trimming your nails. To prevent problems, your doctor may ask you to visit for nail and callus trimming. See your doctor for foot care as often as suggested.  Check your feet daily  Catch problems early by checking your feet every day for changes. Look at the top and bottom of your feet, your heels, and between your toes. It may help to use a mirror. If this is hard, ask someone to check for you. Call your doctor if you notice a wound, ulceration, ingrown nail, or any changes in your feet. This includes increased heat, swelling, and redness.  Wear proper footwear  Always wear shoes and socks, even indoors. Ask your doctor how to choose the right shoe. After buying shoes, bring them to your doctor to be checked for fit. Take new shoes off every hour or so to check for red pressure areas on your feet. Each time you put on your shoes, use your fingers first to feel inside for foreign objects.  Common causes of peripheral neuropathy  Some common causes of peripheral neuropathy include:  · Diabetes or other endocrine disorders  · Toxins (such as alcohol)  · Nutritional deficiencies (such as Vitamin B-12)  · Kidney  disease  · Injury  · Repetitive stress (such as carpal tunnel syndrome)  · Autoimmune disease  · Cancer and tumors  · Infection  Diagnosis and treatment  Diagnosis of peripheral neuropathy includes a complete history and physical exam.  Lab tests including blood work and imaging often help determine the cause.  Special nerve tests are often helpful including nerve conduction velocity studies (NCV), and electromyelography (EMG).  Treatment focuses on treating the underlying disorder and treating symptoms through the use of medicines, injections, TENS (transcutaneous electrical nerve stimulation), acupuncture, massage, and other methods.  Date Last Reviewed: 10/19/2015  © 9115-6173 [employer] [address] [place] [address], [place] [address]. All rights reserved. This information is not intended as a substitute for professional medical care. Always follow your healthcare professional's instructions.

## 2019-01-02 NOTE — PROGRESS NOTES
Chief Complaint   Patient presents with    Foot Pain     Bilateral x 2 weeks    Diabetes Mellitus     PCP: Behram Hhan 09/24/2018  A1C: 09/24/2018 / 10.8               HPI:   Patient is a 53 y.o. male with complaints of bilateral  foot pain/numbness for about two weeks.  Sensation is described as tingling/burning on the soles and occurs when weight bearing and walking.  It is better when off his feet.  Patient recalls no trauma to the affected area.   He reports no low back pain.   He hasn't changed shoes or activities.   He does note occasional tingling in his hands.  He does not note any wounds or drainage from his feet.      PCP: Behram Khan, MD         Past Medical History:   Diagnosis Date    Diabetes mellitus     Diabetes mellitus, type 2     Hepatitis C     Hyperlipidemia     Hypertension     Hypothyroidism        Current Outpatient Medications on File Prior to Visit   Medication Sig Dispense Refill    acetaminophen-codeine 300-30mg (TYLENOL #3) 300-30 mg Tab Take 1 tablet by mouth every 6 (six) hours as needed.      aspirin 81 MG Chew Take 1 tablet (81 mg total) by mouth once daily.  0    atorvastatin (LIPITOR) 20 MG tablet Take 2 tablets (40 mg total) by mouth once daily. 90 tablet 3    blood sugar diagnostic (ONETOUCH ULTRA TEST) Strp Inject 1 strip into the skin 3 (three) times daily with meals. 100 each 3    blood sugar diagnostic Strp To check BG QID times daily, to use with insurance preferred meter 100 each 8    insulin glargine, TOUJEO, (TOUJEO) 300 unit/mL (1.5 mL) InPn pen 45 units every night before bed (Patient taking differently: 40 units every night before bed) 1.5 mL 5    insulin lispro (ADMELOG SOLOSTAR U-100 INSULIN) 100 unit/mL InPn pen Inject 15 Units into the skin 3 (three) times daily. (Patient taking differently: Inject 30 Units into the skin once daily. ) 15 mL 1    lancets Misc Use prn 100 each 5    levothyroxine (SYNTHROID) 25 MCG tablet Take 1 tablet (25 mcg total)  "by mouth once daily. 90 tablet 3    losartan-hydrochlorothiazide 100-12.5 mg (HYZAAR) 100-12.5 mg Tab Take 1 tablet by mouth every morning. 90 tablet 3    naproxen (NAPROSYN) 375 MG tablet Take 1 tablet (375 mg total) by mouth 2 (two) times daily as needed (Pain).      pen needle, diabetic 32 gauge x 5/32" Ndle 1 each by Misc.(Non-Drug; Combo Route) route 4 (four) times daily with meals and nightly. 200 each 11     No current facility-administered medications on file prior to visit.        ALLG:   Review of patient's allergies indicates:  No Known Allergies          ROS:   General ROS: negative for chills, fatigue or fever   Cardiovascular ROS: no chest pain or dyspnea on exertion   Musculoskeletal ROS: negative for joint pain or joint stiffness. Negative for loss of strength. Positive for foot pain.   Neuro ROS: Negative for syncope, numbness, or muscle weakness.  Positive for paresthesias  Skin ROS: Negative for rash, itching or nail/hair changes.         EXAM:   Vitals:    01/02/19 1553   Weight: 96.6 kg (213 lb)   Height: 6' 4" (1.93 m)       General: Well-developed, well-nourished, in no acute distress, alert and oriented x 3.   LOWER EXTREMITY EXAM:   Vascular: Dorsalis pedis and posterior tibial pulses are 2/4 . normal capillary refill time and toes are warm to touch. There is presence of digital hair. There is no edema.    no varicosities.   Neurological: Light touch, proprioception, and sharp/dull sensation are intact.    No tinel's elicited at this time.   No Mulders click;  No pain with lateral forefoot squeeze.   Dermatological: There is normal skin tone, turgor, and temperature .  There is no presence of hyperkeratotic lesions. There is no ecchymoses. no erythema noted.   Musculoskeletal:  Muscle strength is 5/5 in all quadrants. Metatarsophalangeal range of motion is intact. Subtalar joint range of motion is intact. Ankle joint range of motion is intact. Overall, no gross foot deformities "           ASSESSMENT / PLAN:     Problem List Items Addressed This Visit     None      Visit Diagnoses     Type II diabetes mellitus with neurological manifestations    -  Primary    Relevant Medications    gabapentin (NEURONTIN) 300 MG capsule    Other Relevant Orders    DIABETIC SHOES FOR HOME USE          I counseled the patient on the patient's conditions, their implications and medical management.  Diabetic foot care education.   See orders above.  Follow-up in about 1 month (around 2/2/2019) for diabetic foot exam, or sooner if concerned.

## 2019-01-07 ENCOUNTER — HOSPITAL ENCOUNTER (EMERGENCY)
Facility: OTHER | Age: 54
Discharge: HOME | End: 2019-01-07
Attending: EMERGENCY MEDICINE
Payer: MEDICAID

## 2019-01-07 VITALS
HEART RATE: 86 BPM | WEIGHT: 213 LBS | OXYGEN SATURATION: 95 % | SYSTOLIC BLOOD PRESSURE: 133 MMHG | DIASTOLIC BLOOD PRESSURE: 97 MMHG | HEIGHT: 75 IN | BODY MASS INDEX: 26.49 KG/M2 | TEMPERATURE: 98 F | RESPIRATION RATE: 16 BRPM

## 2019-01-07 DIAGNOSIS — R20.2 PARESTHESIA: Primary | ICD-10-CM

## 2019-01-07 LAB
BILIRUB UR QL STRIP: NEGATIVE
CLARITY UR: CLEAR
COLOR UR: YELLOW
GLUCOSE UR QL STRIP: NEGATIVE
HGB UR QL STRIP: NEGATIVE
KETONES UR QL STRIP: NEGATIVE
LEUKOCYTE ESTERASE UR QL STRIP: NEGATIVE
NITRITE UR QL STRIP: NEGATIVE
PH UR STRIP: 7 [PH] (ref 5–8)
POCT GLUCOSE: 151 MG/DL (ref 70–110)
PROT UR QL STRIP: ABNORMAL
SP GR UR STRIP: 1.01 (ref 1–1.03)
URN SPEC COLLECT METH UR: ABNORMAL
UROBILINOGEN UR STRIP-ACNC: 1 EU/DL

## 2019-01-07 PROCEDURE — 82962 GLUCOSE BLOOD TEST: CPT

## 2019-01-07 PROCEDURE — 99284 EMERGENCY DEPT VISIT MOD MDM: CPT | Mod: 25

## 2019-01-07 PROCEDURE — 81003 URINALYSIS AUTO W/O SCOPE: CPT

## 2019-01-07 PROCEDURE — 96372 THER/PROPH/DIAG INJ SC/IM: CPT

## 2019-01-07 PROCEDURE — 63600175 PHARM REV CODE 636 W HCPCS: Performed by: EMERGENCY MEDICINE

## 2019-01-07 RX ORDER — KETOROLAC TROMETHAMINE 30 MG/ML
15 INJECTION, SOLUTION INTRAMUSCULAR; INTRAVENOUS
Status: COMPLETED | OUTPATIENT
Start: 2019-01-07 | End: 2019-01-07

## 2019-01-07 RX ADMIN — KETOROLAC TROMETHAMINE 15 MG: 30 INJECTION, SOLUTION INTRAMUSCULAR at 09:01

## 2019-01-07 NOTE — ED PROVIDER NOTES
"Encounter Date: 2019    SCRIBE #1 NOTE: I, Wolf Dejesusial, am scribing for, and in the presence of, Dr. Laguerre.       History     Chief Complaint   Patient presents with    Penis Pain     Per pt "My private part feels like someone put icy hot on it". Pt denies penile drainage     Time seen by provider: 7:32 AM    This is a 53 y.o. male with a history of HTN and DM who presents with complaint of penis pain/testicluar pain that began approximately one week ago. He describes the pain as a "numbness/sensativity" or like "someone put icy hot on it". He reports that it is similar to his DM neuropathy pain that he experiences in his bilateral feet. The patient reports that he was recently seen here for ft neuropathy pain and started on gabapentin by Podiatry a few days ago, but reports that it didn't help. He also states that he experiences similar "numbness/sensativity" and is lower abdomen for the past week where he wears the a brace/belt that he wears for his back. He denies fever, sore throat, chest pain, shortness of breath, nausea, dysuria, frequency, penile discharge, penile swelling, testicle swelling, and rash. He is monogamous with his wife and uses protection, no STD history      The history is provided by the patient.     Review of patient's allergies indicates:  No Known Allergies  Past Medical History:   Diagnosis Date    Diabetes mellitus     Diabetes mellitus, type 2     Hepatitis C     Hyperlipidemia     Hypertension     Hypothyroidism      Past Surgical History:   Procedure Laterality Date    ANKLE SURGERY Left     COLONOSCOPY      Normal by patient reporting 2017    ELBOW SURGERY Right     EXTERNAL EAR SURGERY Left     WRIST SURGERY Left      Family History   Problem Relation Age of Onset    Diabetes Mother     Hypertension Mother      Social History     Tobacco Use    Smoking status: Former Smoker     Last attempt to quit: 1995     Years since quittin.6    Smokeless " tobacco: Never Used   Substance Use Topics    Alcohol use: No    Drug use: No     Review of Systems   Constitutional: Negative for fever.   HENT: Negative for sore throat.    Respiratory: Negative for shortness of breath.    Cardiovascular: Negative for chest pain.   Gastrointestinal: Negative for nausea.   Genitourinary: Positive for penile pain and testicular pain. Negative for dysuria, frequency, penile swelling and scrotal swelling.   Musculoskeletal: Negative for back pain.   Skin: Negative for rash.   Neurological: Positive for numbness (Penis/testicles). Negative for weakness.   Hematological: Does not bruise/bleed easily.       Physical Exam     Initial Vitals [01/07/19 0722]   BP Pulse Resp Temp SpO2   137/89 90 18 98.1 °F (36.7 °C) 96 %      MAP       --         Physical Exam    Constitutional: He appears well-developed and well-nourished.   HENT:   Head: Normocephalic and atraumatic.   Eyes: Conjunctivae and EOM are normal. Pupils are equal, round, and reactive to light.   Neck: Normal range of motion. Neck supple.   Cardiovascular: Normal rate, regular rhythm, normal heart sounds and normal pulses.   No murmur heard.  Pulmonary/Chest: Breath sounds normal. No respiratory distress. He has no wheezes. He has no rhonchi. He has no rales.   Abdominal: Soft. There is no tenderness. There is no rebound and no guarding.   Genitourinary: Right testis shows no swelling and no tenderness. Left testis shows no swelling and no tenderness. No discharge found.   Genitourinary Comments: No rashes or lesions.    Neurological: He is alert and oriented to person, place, and time. He has normal strength. No cranial nerve deficit.   Area of decreased sensation to light touch over lower abdomen belt area, penis, and bilateral testicles; spares suprapubic area   Skin: Skin is warm and dry. No lesion and no rash noted.   Psychiatric: He has a normal mood and affect. His behavior is normal. Thought content normal.         ED  Course   Procedures  Labs Reviewed   URINALYSIS, REFLEX TO URINE CULTURE - Abnormal; Notable for the following components:       Result Value    Protein, UA Trace (*)     All other components within normal limits    Narrative:     Preferred Collection Type->Urine, Clean Catch   POCT GLUCOSE - Abnormal; Notable for the following components:    POCT Glucose 151 (*)     All other components within normal limits   POCT GLUCOSE MONITORING CONTINUOUS          Imaging Results    None          Medical Decision Making:   Initial Assessment:       53-year-old male with history of DM, HTN, hep C, hypothyroid presents with 1 week of hypersensitivity to lower abdomen over belt area and scrotum/penis.  He states it feels like his diabetic neuropathy that has been affecting his feet chronically, but there is no pain associated.  No rash, urinary symptoms, STD risk factors, or penile discharge. No history of similar previous episodes.  He was seen here for diabetic neuropathy about 10 days ago, and was started on gabapentin by Podiatry few days ago with no improvement yet.  He does wear a belt around his lower abdomen where his location of hypersensitivity is, but states he has not worn it more than usual recently.   Exam with slightly decreased sensation to 1 inch lower abdomen bilaterally where his lifting brace is, and over testicle and penis.  No rash or sign of infectious cause noted and no testicular tenderness.      UA with no sign of infection, and fingerstick 151.  He has history of poorly controlled diabetes but states he has been doing much better recently.  Could be atypical diabetic neuropathy, though unclear why would be affecting his  area and spares his suprapubic region.  Could be related to wearing back brace too tight, patient advised to stop using it and monitor symptoms. No concerning findings to warrant further emergent workup. He will continue gabapentin and strict fingerstick control, and follow up with  PCP for re-evaluation in a few days, and make urology appointment for any worsening.  Patient educated on return precautions and comfortable with discharge plan.      Clinical Tests:   Lab Tests: Ordered and Reviewed            Scribe Attestation:   Scribe #1: I performed the above scribed service and the documentation accurately describes the services I performed. I attest to the accuracy of the note.    Attending Attestation:           Physician Attestation for Scribe:  Physician Attestation Statement for Scribe #1: I, Dr. Laguerre, reviewed documentation, as scribed by Wolf Chang in my presence, and it is both accurate and complete.                    Clinical Impression:     1. Paresthesia                               Hira Laguerre MD  01/07/19 1045

## 2019-01-07 NOTE — ED TRIAGE NOTES
"Pt reports to ED c/o penile pain x 1 week "feels likes icy hot is on it". Denies discharge, swelling, lesions, dysuria or hematuria.       "

## 2019-02-06 ENCOUNTER — OFFICE VISIT (OUTPATIENT)
Dept: PODIATRY | Facility: CLINIC | Age: 54
End: 2019-02-06
Payer: MEDICAID

## 2019-02-06 VITALS — WEIGHT: 213 LBS | HEIGHT: 75 IN | BODY MASS INDEX: 26.49 KG/M2

## 2019-02-06 DIAGNOSIS — E11.49 TYPE II DIABETES MELLITUS WITH NEUROLOGICAL MANIFESTATIONS: Primary | ICD-10-CM

## 2019-02-06 DIAGNOSIS — L84 CORNS AND CALLUS: ICD-10-CM

## 2019-02-06 PROCEDURE — 99213 OFFICE O/P EST LOW 20 MIN: CPT | Mod: S$PBB,,, | Performed by: PODIATRIST

## 2019-02-06 PROCEDURE — 99213 PR OFFICE/OUTPT VISIT, EST, LEVL III, 20-29 MIN: ICD-10-PCS | Mod: S$PBB,,, | Performed by: PODIATRIST

## 2019-02-06 PROCEDURE — 99213 OFFICE O/P EST LOW 20 MIN: CPT | Mod: PBBFAC,PN | Performed by: PODIATRIST

## 2019-02-06 PROCEDURE — 99999 PR PBB SHADOW E&M-EST. PATIENT-LVL III: ICD-10-PCS | Mod: PBBFAC,,, | Performed by: PODIATRIST

## 2019-02-06 PROCEDURE — 99999 PR PBB SHADOW E&M-EST. PATIENT-LVL III: CPT | Mod: PBBFAC,,, | Performed by: PODIATRIST

## 2019-02-06 NOTE — PROGRESS NOTES
Chief Complaint   Patient presents with    Diabetes Mellitus     A1C 9/24/18 10.8 PCP Behram Khan 9/24/18    Nail Care     Bilateral               HPI:   Patient is a 53 y.o. male with complaints of bilateral  foot pain/numbness for about two weeks.  Sensation is described as tingling/burning on the soles and occurs when weight bearing and walking.  It is better when off his feet.  Patient recalls no trauma to the affected area.   He reports no low back pain.   He hasn't changed shoes or activities.   He does note occasional tingling in his hands.  He does not note any wounds or drainage from his feet.        2/6/19:  One month follow up of bilateral foot pain.  He is on gabapentin 300mg qhs and reports improvement.  He is doing well today.  He states he is continuing to monitor his blood sugar.     He reports today that he was on Gabapentin 600mg in the past but stopped it himself awhile back because he felt that it wasn't helping.     A1c:  10.8% (9/24/18)     He is wearing athletic type shoes with insoles.            PCP: Behram Khan, MD   Last PCP visit:    9/24/18      Past Medical History:   Diagnosis Date    Diabetes mellitus     Diabetes mellitus, type 2     Hepatitis C     Hyperlipidemia     Hypertension     Hypothyroidism            Current Outpatient Medications on File Prior to Visit   Medication Sig Dispense Refill    acetaminophen-codeine 300-30mg (TYLENOL #3) 300-30 mg Tab Take 1 tablet by mouth every 6 (six) hours as needed.      aspirin 81 MG Chew Take 1 tablet (81 mg total) by mouth once daily.  0    atorvastatin (LIPITOR) 20 MG tablet Take 2 tablets (40 mg total) by mouth once daily. 90 tablet 3    blood sugar diagnostic (ONETOUCH ULTRA TEST) Strp Inject 1 strip into the skin 3 (three) times daily with meals. 100 each 3    blood sugar diagnostic Strp To check BG QID times daily, to use with insurance preferred meter 100 each 8    gabapentin (NEURONTIN) 300 MG capsule Take 1 capsule  "(300 mg total) by mouth every evening. 30 capsule 11    insulin glargine, TOUJEO, (TOUJEO) 300 unit/mL (1.5 mL) InPn pen 45 units every night before bed (Patient taking differently: 40 units every night before bed) 1.5 mL 5    insulin lispro (ADMELOG SOLOSTAR U-100 INSULIN) 100 unit/mL InPn pen Inject 15 Units into the skin 3 (three) times daily. (Patient taking differently: Inject 30 Units into the skin once daily. ) 15 mL 1    lancets Misc Use prn 100 each 5    levothyroxine (SYNTHROID) 25 MCG tablet Take 1 tablet (25 mcg total) by mouth once daily. 90 tablet 3    losartan-hydrochlorothiazide 100-12.5 mg (HYZAAR) 100-12.5 mg Tab Take 1 tablet by mouth every morning. 90 tablet 3    naproxen (NAPROSYN) 375 MG tablet Take 1 tablet (375 mg total) by mouth 2 (two) times daily as needed (Pain).      pen needle, diabetic 32 gauge x 5/32" Ndle 1 each by Misc.(Non-Drug; Combo Route) route 4 (four) times daily with meals and nightly. 200 each 11     No current facility-administered medications on file prior to visit.        ALLG:   Review of patient's allergies indicates:  No Known Allergies          ROS:   General ROS: negative for chills, fatigue or fever   Cardiovascular ROS: no chest pain or dyspnea on exertion   Musculoskeletal ROS: negative for joint pain or joint stiffness. Negative for loss of strength. Positive for foot pain.   Neuro ROS: Negative for syncope, numbness, or muscle weakness.  Positive for paresthesias  Skin ROS: Negative for rash, itching or nail/hair changes.         EXAM:   Vitals:    02/06/19 0827   Weight: 96.6 kg (213 lb)   Height: 6' 3" (1.905 m)       General: Well-developed, well-nourished, in no acute distress, alert and oriented x 3.   LOWER EXTREMITY EXAM:   Vascular: Dorsalis pedis and posterior tibial pulses are 2/4 . normal capillary refill time and toes are warm to touch. There is presence of digital hair. There is no edema.    no varicosities.   Neurological: Light touch, " proprioception, and sharp/dull sensation are intact.    No tinel's elicited at this time.   No Mulders click;  No pain with lateral forefoot squeeze.   Dermatological: There is normal skin tone, turgor, and temperature .  There is presence of hyperkeratotic lesions bilateral medial hallux (pinch calluses). There is no ecchymoses. no erythema noted.   No open wounds.   Musculoskeletal:  Muscle strength is 5/5 in all quadrants. Metatarsophalangeal range of motion is intact. Subtalar joint range of motion is intact. Ankle joint range of motion is intact. Overall, no gross foot deformities           ASSESSMENT / PLAN:     Problem List Items Addressed This Visit     None      Visit Diagnoses     Type II diabetes mellitus with neurological manifestations    -  Primary    Corns and callus              · I counseled the patient on the patient's conditions, their implications and medical management.  Diabetic foot care education.   · Continue Gabapentin 300mg qhs.  Check feet daily.  Never walk barefoot.  Check shoes before wearing them.    · Moisturize feet daily.   Consider Gold Bond lotion for diabetics.   · Follow up in 6 months or sooner if concerned.             Katelyn Bills DPM  NPI: [id number]         [facility] - PODIATRY  [address] [place] [id number]  Dept: [phone]  Dept Fax: [phone]

## 2019-02-06 NOTE — PATIENT INSTRUCTIONS
Your A1c:    Hemoglobin A1C   Date Value Ref Range Status   09/24/2018 10.8 (H) 4.0 - 5.6 % Final     Comment:     ADA Screening Guidelines:  5.7-6.4%  Consistent with prediabetes  >or=6.5%  Consistent with diabetes  High levels of fetal hemoglobin interfere with the HbA1C  assay. Heterozygous hemoglobin variants (HbS, HgC, etc)do  not significantly interfere with this assay.   However, presence of multiple variants may affect accuracy.     07/02/2018 >14.0 (H) 4.0 - 5.6 % Final     Comment:     ADA Screening Guidelines:  5.7-6.4%  Consistent with prediabetes  >or=6.5%  Consistent with diabetes  High levels of fetal hemoglobin interfere with the HbA1C  assay. Heterozygous hemoglobin variants (HbS, HgC, etc)do  not significantly interfere with this assay.   However, presence of multiple variants may affect accuracy.     02/08/2018 >14.0 (H) 4.0 - 5.6 % Final     Comment:     According to ADA guidelines, hemoglobin A1c <7.0% represents  optimal control in non-pregnant diabetic patients. Different  metrics may apply to specific patient populations.   Standards of Medical Care in Diabetes-2016.  For the purpose of screening for the presence of diabetes:  <5.7%     Consistent with the absence of diabetes  5.7-6.4%  Consistent with increasing risk for diabetes   (prediabetes)  >or=6.5%  Consistent with diabetes  Currently, no consensus exists for use of hemoglobin A1c  for diagnosis of diabetes for children.  This Hemoglobin A1c assay has significant interference with fetal   hemoglobin   (HbF). The results are invalid for patients with abnormal amounts of   HbF,   including those with known Hereditary Persistence   of Fetal Hemoglobin. Heterozygous hemoglobin variants (HbAS, HbAC,   HbAD, HbAE, HbA2) do not significantly interfere with this assay;   however, presence of multiple variants in a sample may impact the %   interference.         How to Check Your Feet    Below are tips to help you look for foot problems. Try  to check your feet at the same time each day, such as when you get out of bed in the morning.    · Check the top of each foot. The tops of toes, back of the heel, and outer edge of the foot can get a lot of rubbing from poor-fitting shoes.    · Check the bottom of each foot. Daily wear and tear often leads to problems at pressure spots.    · Check the toes and nails. Fungal infections often occur between toes. Toenail problems can also be a sign of fungal infections or lead to breaks in the skin.    · Check your shoes, too. Loose objects inside a shoe can injure the foot. Use your hand to feel inside your shoes for things like rosangela, loose stitching, or rough areas that could irritate your skin.        Diabetic Foot Care    Diabetes can lead to a number of different foot complications. Fortunately, most of these complications can be prevented with a little extra foot care. If diabetes is not well controlled, the high blood sugar can cause damage to blood vessels and result in poor circulation to the foot. When the skin does not get enough blood flow, it becomes prone to pressure sores and ulcers, which heal slowly.  High blood sugar can also damage nerves, interfering with the ability to feel pain and pressure. When you cant feel your foot normally, it is easy to injure your skin, bones and joints without knowing it. For these reasons diabetes increases the risk of fungal infections, bunions and ulcers. Deep ulcers can lead to bone infection. Gangrene is the most serious foot complication of diabetes. It usually occurs on the tips of the toes as blacked areas of skin. The black area is dead tissue. In severe cases, gangrene spreads to involve the entire toe, other toes and the entire foot. Foot or toe amputation may be required. Good foot care and blood sugar control can prevent this.    Home Care  1. Wear comfortable, proper fitting shoes.  2. Wash your feet daily with warm water and mild soap.  3. After drying,  apply a moisturizing cream or lotion.  4. Check your feet daily for skin breaks, blisters, swelling, or redness. Look between your toes also.  5. Wear cotton socks and change them every day.  6. Trim toe nails carefully and do not cut your cuticles.  7. Strive to keep your blood sugar under control with a combination of medicines, diet and activity.  8. If you smoke and have diabetes, it is very important that you stop. Smoking reduces blood flow to your foot.  9. Avoid activities that increase your risk of foot injury:  · Do not walk barefoot.  · Do not use heating pads or hot water bottles on your feet.  · Do not put your foot in a hot tub without first checking the temperature with your hand.  10) Schedule yearly foot exams.    Follow Up  with your doctor or as advised by our staff. Report any cut, puncture, scrape, other injury, blister, ingrown toenail or ulcer on your foot.    Get Prompt Medical Attention  if any of the following occur:  -- Open ulcer with pus draining from the wound  -- Increasing foot or leg pain  -- New areas of redness or swelling or tender areas of the foot    © 7181-1888 [employer]. [address] [place] [address]. All rights reserved. This information is not intended as a substitute for professional medical care. Always follow your healthcare professional's instructions.            Managing Diabetes: The A1C Test       Healthy red blood cells have some glucose stuck to them. A high A1C means that unhealthy amounts of glucose are stuck to the cells.   What is the A1C test?  Using your meter helps you track your blood sugar every day. But your glucose meter tells you the value at the time of testing only. You also need to know if your treatment plan is keeping you healthy over time. The hemoglobin A1C (or glycated hemoglobin) test can help. This test measures your average blood sugar level over a few months. A higher A1C result means that you have a higher risk of  developing complications.  The A1C test  The A1C is a blood test done by your healthcare provider. You will likely have an A1C test every 3 to 6 months.  Your blood glucose goal  A1C has been shown as a percentage. But it can also be shown as a number representing the estimated Average Glucose (eAG). Unlike the A1C percentage, eAG is a number similar to the numbers listed on your daily glucose monitor. Both A1C and eAG measure the amount of glucose stuck to a protein called hemoglobin in red blood cells. Your healthcare provider will help you figure out what your ideal A1C or eAG should be. Your target number will depend on your age, general health, and other factors. If your current number is too high, your treatment plan may need changes, such as different medicines.  Sample results  Most people aim for an A1c lower than 7%. Thats an eAG less than 154 mg/dL. Or, your healthcare provider may want you to aim for an A1C of 6%. Thats an eAG of 126 mg/dL.     Glucose calculator  Visit http://professional.diabetes.org/diapro/glucose_calc for a chart that helps convert your A1C percentages into eAG numbers.   Date Last Reviewed: 6/1/2016  © 2469-6371 [employer]. [address] [place] [address], [place] [address]. All rights reserved. This information is not intended as a substitute for professional medical care. Always follow your healthcare professional's instructions.

## 2019-04-09 ENCOUNTER — OFFICE VISIT (OUTPATIENT)
Dept: INTERNAL MEDICINE | Facility: CLINIC | Age: 54
End: 2019-04-09
Payer: MEDICAID

## 2019-04-09 VITALS
SYSTOLIC BLOOD PRESSURE: 130 MMHG | DIASTOLIC BLOOD PRESSURE: 85 MMHG | WEIGHT: 224.19 LBS | BODY MASS INDEX: 27.87 KG/M2 | OXYGEN SATURATION: 95 % | HEART RATE: 78 BPM | HEIGHT: 75 IN

## 2019-04-09 DIAGNOSIS — E11.42 TYPE 2 DIABETES MELLITUS WITH DIABETIC POLYNEUROPATHY, WITH LONG-TERM CURRENT USE OF INSULIN: Primary | ICD-10-CM

## 2019-04-09 DIAGNOSIS — E03.9 HYPOTHYROIDISM, UNSPECIFIED TYPE: ICD-10-CM

## 2019-04-09 DIAGNOSIS — E78.5 HYPERLIPIDEMIA, UNSPECIFIED HYPERLIPIDEMIA TYPE: ICD-10-CM

## 2019-04-09 DIAGNOSIS — Z79.4 TYPE 2 DIABETES MELLITUS WITH DIABETIC POLYNEUROPATHY, WITH LONG-TERM CURRENT USE OF INSULIN: Primary | ICD-10-CM

## 2019-04-09 DIAGNOSIS — I10 ESSENTIAL HYPERTENSION: ICD-10-CM

## 2019-04-09 PROCEDURE — 99999 PR PBB SHADOW E&M-EST. PATIENT-LVL III: ICD-10-PCS | Mod: PBBFAC,,, | Performed by: INTERNAL MEDICINE

## 2019-04-09 PROCEDURE — 99999 PR PBB SHADOW E&M-EST. PATIENT-LVL III: CPT | Mod: PBBFAC,,, | Performed by: INTERNAL MEDICINE

## 2019-04-09 PROCEDURE — 99213 OFFICE O/P EST LOW 20 MIN: CPT | Mod: PBBFAC | Performed by: INTERNAL MEDICINE

## 2019-04-09 PROCEDURE — 99212 PR OFFICE/OUTPT VISIT, EST, LEVL II, 10-19 MIN: ICD-10-PCS | Mod: S$PBB,,, | Performed by: INTERNAL MEDICINE

## 2019-04-09 PROCEDURE — 99212 OFFICE O/P EST SF 10 MIN: CPT | Mod: S$PBB,,, | Performed by: INTERNAL MEDICINE

## 2019-04-09 RX ORDER — INSULIN GLARGINE 300 [IU]/ML
INJECTION, SOLUTION SUBCUTANEOUS
Qty: 1.5 ML | Refills: 5 | Status: SHIPPED | OUTPATIENT
Start: 2019-04-09 | End: 2019-06-18

## 2019-04-09 RX ORDER — ATORVASTATIN CALCIUM 20 MG/1
40 TABLET, FILM COATED ORAL DAILY
Qty: 90 TABLET | Refills: 3
Start: 2019-04-09 | End: 2019-07-15 | Stop reason: SDUPTHER

## 2019-04-09 RX ORDER — NAPROXEN SODIUM 220 MG/1
81 TABLET, FILM COATED ORAL DAILY
Refills: 0
Start: 2019-04-09 | End: 2023-10-11

## 2019-04-09 RX ORDER — GABAPENTIN 300 MG/1
300 CAPSULE ORAL NIGHTLY
Qty: 30 CAPSULE | Refills: 11 | Status: SHIPPED | OUTPATIENT
Start: 2019-04-09 | End: 2019-07-15 | Stop reason: SDUPTHER

## 2019-04-09 RX ORDER — LOSARTAN POTASSIUM AND HYDROCHLOROTHIAZIDE 12.5; 1 MG/1; MG/1
1 TABLET ORAL EVERY MORNING
Qty: 90 TABLET | Refills: 3 | Status: SHIPPED | OUTPATIENT
Start: 2019-04-09 | End: 2020-04-16 | Stop reason: SDUPTHER

## 2019-04-09 RX ORDER — INSULIN LISPRO 100 [IU]/ML
15 INJECTION, SOLUTION INTRAVENOUS; SUBCUTANEOUS 3 TIMES DAILY
Qty: 15 ML | Refills: 1 | Status: SHIPPED | OUTPATIENT
Start: 2019-04-09 | End: 2019-04-25

## 2019-04-09 RX ORDER — LEVOTHYROXINE SODIUM 25 UG/1
25 TABLET ORAL DAILY
Qty: 90 TABLET | Refills: 3 | Status: SHIPPED | OUTPATIENT
Start: 2019-04-09 | End: 2019-07-15 | Stop reason: SDUPTHER

## 2019-04-09 NOTE — PROGRESS NOTES
"Subjective:       Patient ID: Juan Manuel Johnson is a 53 y.o. male.    Chief Complaint: Follow-up and Medication Refill    Mr. Johnson is a pleasant 54 yo gentleman with poorly controlled T2 DM (9.28 A1c = 10.4%) who presents to clinic today for medication refill and insulin titration.  He needs refills on all of his medications today, namely his aspirin 81 mg po qd, atorvastatin 20 mg po qd, gabapentin 300 mg po qhs, synthroid 25 mcg po qd, losartan-HCTZ 100-12.5 po qd.  He has been checking his blood sugar in the morning and evening and brought his home blood sugar log with him today.  On review of his log his morning blood sugar normally ranges from 75 - 130's whereas his evening sugar range in the high 100's to low 200's.  He is only taking his bolus insulin twice a day and checking his sugar twice a day because he states that is he "hustleing" all day and that makes it difficult to take his lunch-time insulin and check his sugar then too.         Review of Systems   Constitutional: Negative for chills and fever.   Respiratory: Negative for cough and shortness of breath.    Cardiovascular: Negative for chest pain and palpitations.   Gastrointestinal: Negative for constipation, diarrhea, nausea and vomiting.       Objective:     Vitals:    04/09/19 0933   BP: 130/85   Pulse: 78   SpO2: 95%   Weight: 101.7 kg (224 lb 3.3 oz)   Height: 6' 3" (1.905 m)       Physical Exam   Constitutional: He is oriented to person, place, and time. No distress.   Cardiovascular: Normal rate, regular rhythm and intact distal pulses. Exam reveals no gallop and no friction rub.   No murmur heard.  Pulmonary/Chest: Breath sounds normal. No respiratory distress. He has no wheezes. He has no rhonchi. He has no rales.   Abdominal: Soft. Bowel sounds are normal. He exhibits no distension. There is no tenderness.   Neurological: He is alert and oriented to person, place, and time.         Assessment:       1. Type 2 diabetes mellitus with " diabetic polyneuropathy, with long-term current use of insulin    2. Hyperlipidemia, unspecified hyperlipidemia type    3. Hypothyroidism, unspecified type    4. Essential hypertension        Plan:         1. T2 DM  -Continue insulin glargine 45 U qhs  -Change insulin lispro from 15 U BID to qhs  -Refill aspirin 81 mg po qd for primary prevention  -RTC in 3 months to see Dr. Larsen for insulin titration      2. HLD  -Refill atorvastatin 20 mg po qd      3. Hypothyroidism  -Refill synthroid 25 mcg po qd    4. HTN  -Refill losartan-HCTZ 100-12.5 mg po qd      Chava Kramer MD  PGY - 3  Pager: [phone]

## 2019-04-17 DIAGNOSIS — Z79.4 TYPE 2 DIABETES MELLITUS WITHOUT COMPLICATION, WITH LONG-TERM CURRENT USE OF INSULIN: ICD-10-CM

## 2019-04-17 DIAGNOSIS — Z79.4 TYPE 2 DIABETES MELLITUS WITH DIABETIC POLYNEUROPATHY, WITH LONG-TERM CURRENT USE OF INSULIN: ICD-10-CM

## 2019-04-17 DIAGNOSIS — E11.42 TYPE 2 DIABETES MELLITUS WITH DIABETIC POLYNEUROPATHY, WITH LONG-TERM CURRENT USE OF INSULIN: ICD-10-CM

## 2019-04-17 DIAGNOSIS — E11.9 TYPE 2 DIABETES MELLITUS WITHOUT COMPLICATION, WITH LONG-TERM CURRENT USE OF INSULIN: ICD-10-CM

## 2019-04-25 RX ORDER — INSULIN LISPRO 100 [IU]/ML
15 INJECTION, SOLUTION INTRAVENOUS; SUBCUTANEOUS 3 TIMES DAILY
Qty: 15 ML | Refills: 11 | Status: SHIPPED | OUTPATIENT
Start: 2019-04-25 | End: 2020-07-20

## 2019-04-25 RX ORDER — INSULIN LISPRO 100 [IU]/ML
INJECTION, SOLUTION INTRAVENOUS; SUBCUTANEOUS
Qty: 15 ML | Refills: 11 | OUTPATIENT
Start: 2019-04-25

## 2019-06-18 ENCOUNTER — OFFICE VISIT (OUTPATIENT)
Dept: INTERNAL MEDICINE | Facility: CLINIC | Age: 54
End: 2019-06-18
Payer: MEDICAID

## 2019-06-18 ENCOUNTER — LAB VISIT (OUTPATIENT)
Dept: LAB | Facility: HOSPITAL | Age: 54
End: 2019-06-18
Payer: MEDICAID

## 2019-06-18 VITALS
SYSTOLIC BLOOD PRESSURE: 130 MMHG | HEIGHT: 75 IN | OXYGEN SATURATION: 97 % | HEART RATE: 73 BPM | DIASTOLIC BLOOD PRESSURE: 82 MMHG | WEIGHT: 217.81 LBS | BODY MASS INDEX: 27.08 KG/M2

## 2019-06-18 DIAGNOSIS — Z79.4 TYPE 2 DIABETES MELLITUS WITH DIABETIC POLYNEUROPATHY, WITH LONG-TERM CURRENT USE OF INSULIN: Primary | ICD-10-CM

## 2019-06-18 DIAGNOSIS — E78.5 HYPERLIPIDEMIA, UNSPECIFIED HYPERLIPIDEMIA TYPE: ICD-10-CM

## 2019-06-18 DIAGNOSIS — R76.8 POSITIVE HEPATITIS C ANTIBODY TEST: ICD-10-CM

## 2019-06-18 DIAGNOSIS — I10 ESSENTIAL HYPERTENSION: ICD-10-CM

## 2019-06-18 DIAGNOSIS — E11.42 TYPE 2 DIABETES MELLITUS WITH DIABETIC POLYNEUROPATHY, WITH LONG-TERM CURRENT USE OF INSULIN: Primary | ICD-10-CM

## 2019-06-18 DIAGNOSIS — G89.29 CHRONIC LEFT SHOULDER PAIN: ICD-10-CM

## 2019-06-18 DIAGNOSIS — N52.9 ERECTILE DYSFUNCTION, UNSPECIFIED ERECTILE DYSFUNCTION TYPE: ICD-10-CM

## 2019-06-18 DIAGNOSIS — Z79.4 TYPE 2 DIABETES MELLITUS WITH DIABETIC POLYNEUROPATHY, WITH LONG-TERM CURRENT USE OF INSULIN: ICD-10-CM

## 2019-06-18 DIAGNOSIS — M25.512 CHRONIC LEFT SHOULDER PAIN: ICD-10-CM

## 2019-06-18 DIAGNOSIS — Z00.00 HEALTHCARE MAINTENANCE: ICD-10-CM

## 2019-06-18 DIAGNOSIS — E11.42 TYPE 2 DIABETES MELLITUS WITH DIABETIC POLYNEUROPATHY, WITH LONG-TERM CURRENT USE OF INSULIN: ICD-10-CM

## 2019-06-18 LAB
ALBUMIN SERPL BCP-MCNC: 3.9 G/DL (ref 3.5–5.2)
ANION GAP SERPL CALC-SCNC: 9 MMOL/L (ref 8–16)
BASOPHILS # BLD AUTO: 0.05 K/UL (ref 0–0.2)
BASOPHILS NFR BLD: 0.9 % (ref 0–1.9)
BUN SERPL-MCNC: 15 MG/DL (ref 6–20)
CALCIUM SERPL-MCNC: 10.7 MG/DL (ref 8.7–10.5)
CHLORIDE SERPL-SCNC: 95 MMOL/L (ref 95–110)
CHOLEST SERPL-MCNC: 175 MG/DL (ref 120–199)
CHOLEST/HDLC SERPL: 4.7 {RATIO} (ref 2–5)
CO2 SERPL-SCNC: 29 MMOL/L (ref 23–29)
CREAT SERPL-MCNC: 1.4 MG/DL (ref 0.5–1.4)
DIFFERENTIAL METHOD: NORMAL
EOSINOPHIL # BLD AUTO: 0.3 K/UL (ref 0–0.5)
EOSINOPHIL NFR BLD: 5.9 % (ref 0–8)
ERYTHROCYTE [DISTWIDTH] IN BLOOD BY AUTOMATED COUNT: 12.9 % (ref 11.5–14.5)
EST. GFR  (AFRICAN AMERICAN): >60 ML/MIN/1.73 M^2
EST. GFR  (NON AFRICAN AMERICAN): 56.6 ML/MIN/1.73 M^2
ESTIMATED AVG GLUCOSE: 260 MG/DL (ref 68–131)
GLUCOSE SERPL-MCNC: 390 MG/DL (ref 70–110)
HBA1C MFR BLD HPLC: 10.7 % (ref 4–5.6)
HCT VFR BLD AUTO: 44.6 % (ref 40–54)
HDLC SERPL-MCNC: 37 MG/DL (ref 40–75)
HDLC SERPL: 21.1 % (ref 20–50)
HGB BLD-MCNC: 14.7 G/DL (ref 14–18)
LDLC SERPL CALC-MCNC: 93.6 MG/DL (ref 63–159)
LYMPHOCYTES # BLD AUTO: 2.1 K/UL (ref 1–4.8)
LYMPHOCYTES NFR BLD: 36.9 % (ref 18–48)
MCH RBC QN AUTO: 27.2 PG (ref 27–31)
MCHC RBC AUTO-ENTMCNC: 33 G/DL (ref 32–36)
MCV RBC AUTO: 82 FL (ref 82–98)
MONOCYTES # BLD AUTO: 0.6 K/UL (ref 0.3–1)
MONOCYTES NFR BLD: 10 % (ref 4–15)
NEUTROPHILS # BLD AUTO: 2.6 K/UL (ref 1.8–7.7)
NEUTROPHILS NFR BLD: 46.1 % (ref 38–73)
NONHDLC SERPL-MCNC: 138 MG/DL
PHOSPHATE SERPL-MCNC: 3.5 MG/DL (ref 2.7–4.5)
PLATELET # BLD AUTO: 339 K/UL (ref 150–350)
PMV BLD AUTO: 10.4 FL (ref 9.2–12.9)
POTASSIUM SERPL-SCNC: 4.1 MMOL/L (ref 3.5–5.1)
RBC # BLD AUTO: 5.41 M/UL (ref 4.6–6.2)
SODIUM SERPL-SCNC: 133 MMOL/L (ref 136–145)
TRIGL SERPL-MCNC: 222 MG/DL (ref 30–150)
WBC # BLD AUTO: 5.72 K/UL (ref 3.9–12.7)

## 2019-06-18 PROCEDURE — 80061 LIPID PANEL: CPT

## 2019-06-18 PROCEDURE — 83036 HEMOGLOBIN GLYCOSYLATED A1C: CPT

## 2019-06-18 PROCEDURE — 99999 PR PBB SHADOW E&M-EST. PATIENT-LVL IV: ICD-10-PCS | Mod: PBBFAC,,, | Performed by: STUDENT IN AN ORGANIZED HEALTH CARE EDUCATION/TRAINING PROGRAM

## 2019-06-18 PROCEDURE — 99214 OFFICE O/P EST MOD 30 MIN: CPT | Mod: PBBFAC | Performed by: STUDENT IN AN ORGANIZED HEALTH CARE EDUCATION/TRAINING PROGRAM

## 2019-06-18 PROCEDURE — 85025 COMPLETE CBC W/AUTO DIFF WBC: CPT

## 2019-06-18 PROCEDURE — 99999 PR PBB SHADOW E&M-EST. PATIENT-LVL IV: CPT | Mod: PBBFAC,,, | Performed by: STUDENT IN AN ORGANIZED HEALTH CARE EDUCATION/TRAINING PROGRAM

## 2019-06-18 PROCEDURE — 36415 COLL VENOUS BLD VENIPUNCTURE: CPT

## 2019-06-18 PROCEDURE — 99214 PR OFFICE/OUTPT VISIT, EST, LEVL IV, 30-39 MIN: ICD-10-PCS | Mod: S$PBB,,, | Performed by: STUDENT IN AN ORGANIZED HEALTH CARE EDUCATION/TRAINING PROGRAM

## 2019-06-18 PROCEDURE — 99214 OFFICE O/P EST MOD 30 MIN: CPT | Mod: S$PBB,,, | Performed by: STUDENT IN AN ORGANIZED HEALTH CARE EDUCATION/TRAINING PROGRAM

## 2019-06-18 PROCEDURE — 80069 RENAL FUNCTION PANEL: CPT

## 2019-06-18 RX ORDER — INSULIN GLARGINE 100 [IU]/ML
45 INJECTION, SOLUTION SUBCUTANEOUS DAILY
Qty: 45 ML | Refills: 11 | Status: SHIPPED | OUTPATIENT
Start: 2019-06-18 | End: 2019-07-15 | Stop reason: SDUPTHER

## 2019-06-18 RX ORDER — SILDENAFIL 25 MG/1
25 TABLET, FILM COATED ORAL DAILY PRN
Qty: 90 TABLET | Refills: 3 | Status: SHIPPED | OUTPATIENT
Start: 2019-06-18 | End: 2019-07-23

## 2019-06-18 NOTE — PROGRESS NOTES
[facility]                                                             CLINIC NOTE    Patient Name: Juan Manuel Johnson  YOB: 1965    PRESENTING HISTORY     Chief Complaint   Patient presents with    Follow-up       History of Present Illness:  Mr. Juan Manuel Johnson is a 54 y.o. male w/ significant PMHx of HTN, HLD, DMT2, hypothyroidism, and history of hepatitis C with SVR.    Here for follow up of his blood glucose control, left shoulder pain, medication refill and hypertension. His shoulder pain has resolved.     He currently complains of erectile dysfunction. He states that this has been going on for the past year. He denies morning erections. He denies any changes in his relationship with his wife.  Brought his BGL from October. At that time, he was on insulin lispro 15 TID and insulin glargine 45 QHS. Morning blood glucose is lowest at 135 with evening glucose as high as 200's.    He currently takes insulin lispro 40 units twice per day because he has run out of his toujeo. He does not check his blood glucose with lunch, nor does he take lunchtime insulin because of lack of access to refrigeration.     Morning checks: between 0630 and 0700 // 106 and 135  Checks again between 1700 and 1800 // 235 to 245    Has a podiatrist appointment in one month    Is having some difficulty arranging an appointment with his usual optometrist.       Review of Systems   Constitutional: Negative for chills, fever, malaise/fatigue and weight loss.   HENT: Negative for congestion, ear discharge and sore throat.    Eyes: Negative for blurred vision and redness.   Respiratory: Negative for cough, shortness of breath and wheezing.    Cardiovascular: Negative for chest pain, palpitations and leg swelling.   Gastrointestinal: Negative for abdominal pain, constipation, diarrhea, nausea and vomiting.   Genitourinary: Negative for dysuria and frequency.    Musculoskeletal: Negative for joint pain and myalgias.   Skin: Negative for itching and rash.   Neurological: Negative for dizziness, seizures, loss of consciousness and headaches.   Endo/Heme/Allergies: Negative for environmental allergies. Does not bruise/bleed easily.   Psychiatric/Behavioral: Negative for depression. The patient is not nervous/anxious and does not have insomnia.          PAST HISTORY:     Past Medical History:   Diagnosis Date    Diabetes mellitus     Diabetes mellitus, type 2     Hepatitis C     Hyperlipidemia     Hypertension     Hypothyroidism        Past Surgical History:   Procedure Laterality Date    ANKLE SURGERY Left     COLONOSCOPY      Normal by patient reporting     ELBOW SURGERY Right     EXTERNAL EAR SURGERY Left     WRIST SURGERY Left        Family History   Problem Relation Age of Onset    Diabetes Mother     Hypertension Mother        Social History     Socioeconomic History    Marital status:      Spouse name: Not on file    Number of children: Not on file    Years of education: Not on file    Highest education level: Not on file   Occupational History    Not on file   Social Needs    Financial resource strain: Not on file    Food insecurity:     Worry: Not on file     Inability: Not on file    Transportation needs:     Medical: Not on file     Non-medical: Not on file   Tobacco Use    Smoking status: Former Smoker     Last attempt to quit: 1995     Years since quittin.0    Smokeless tobacco: Never Used   Substance and Sexual Activity    Alcohol use: No    Drug use: No    Sexual activity: Not on file   Lifestyle    Physical activity:     Days per week: Not on file     Minutes per session: Not on file    Stress: Not on file   Relationships    Social connections:     Talks on phone: Not on file     Gets together: Not on file     Attends Latter day service: Not on file     Active member of club or organization: Not on file      "Attends meetings of clubs or organizations: Not on file     Relationship status: Not on file   Other Topics Concern    Not on file   Social History Narrative    The patient typically spends most of his time at home and watches TV.  He does walk daily.  He tries to stay fairly active.       MEDICATIONS & ALLERGIES:               Medication List with Changes/Refills   Current Medications    ACETAMINOPHEN-CODEINE 300-30MG (TYLENOL #3) 300-30 MG TAB    Take 1 tablet by mouth every 6 (six) hours as needed.    ASPIRIN 81 MG CHEW    Take 1 tablet (81 mg total) by mouth once daily.    ATORVASTATIN (LIPITOR) 20 MG TABLET    Take 2 tablets (40 mg total) by mouth once daily.    BLOOD SUGAR DIAGNOSTIC (ONETOUCH ULTRA TEST) STRP    Inject 1 strip into the skin 3 (three) times daily with meals.    BLOOD SUGAR DIAGNOSTIC STRP    To check BG QID times daily, to use with insurance preferred meter    GABAPENTIN (NEURONTIN) 300 MG CAPSULE    Take 1 capsule (300 mg total) by mouth every evening.    INSULIN GLARGINE, TOUJEO, (TOUJEO) 300 UNIT/ML (1.5 ML) INPN PEN    45 units every night before bed    INSULIN LISPRO (ADMELOG SOLOSTAR U-100 INSULIN) 100 UNIT/ML PEN    Inject 15 Units into the skin 3 (three) times daily.    LANCETS MISC    Use prn    LEVOTHYROXINE (SYNTHROID) 25 MCG TABLET    Take 1 tablet (25 mcg total) by mouth once daily.    LOSARTAN-HYDROCHLOROTHIAZIDE 100-12.5 MG (HYZAAR) 100-12.5 MG TAB    Take 1 tablet by mouth every morning.    NAPROXEN (NAPROSYN) 375 MG TABLET    Take 1 tablet (375 mg total) by mouth 2 (two) times daily as needed (Pain).    PEN NEEDLE, DIABETIC 32 GAUGE X 5/32" NDLE    1 each by Misc.(Non-Drug; Combo Route) route 4 (four) times daily with meals and nightly.       Review of patient's allergies indicates:  No Known Allergies    OBJECTIVE:     Vital Signs:  There were no vitals filed for this visit.  Wt Readings from Last 5 Encounters:   04/09/19 101.7 kg (224 lb 3.3 oz)   02/06/19 96.6 kg (213 lb) "   01/07/19 96.6 kg (213 lb)   01/02/19 96.6 kg (213 lb)   12/27/18 96.6 kg (213 lb)       No results found for this or any previous visit (from the past 24 hour(s)).      Physical Exam   Constitutional: He is oriented to person, place, and time and well-developed, well-nourished, and in no distress.   HENT:   Head: Normocephalic and atraumatic.   Eyes: Pupils are equal, round, and reactive to light. EOM are normal. No scleral icterus.   Neck: Normal range of motion. Neck supple. No thyromegaly present.   Cardiovascular: Normal rate, regular rhythm and normal heart sounds.   No murmur heard.  Pulmonary/Chest: Effort normal and breath sounds normal. No respiratory distress. He has no wheezes.   Abdominal: Soft. Bowel sounds are normal. He exhibits no distension. There is no tenderness.   Lymphadenopathy:     He has no cervical adenopathy.   Neurological: He is alert and oriented to person, place, and time.   Skin: Skin is warm and dry. No erythema.   Psychiatric: Affect normal.         ASSESSMENT & PLAN:     Juan Manuel was seen today for follow-up.    Diagnoses and all orders for this visit:    Type 2 diabetes mellitus with diabetic polyneuropathy, with long-term current use of insulin  -     Hemoglobin A1c; Future  -     Ambulatory Referral to Optometry  -     insulin (LANTUS SOLOSTAR U-100 INSULIN) glargine 100 units/mL (3mL) SubQ pen; Inject 45 Units into the skin once daily.  -     insulin lispro protamin-lispro (HUMALOG MIX 50-50 KWIKPEN) 100 unit/mL (50-50) InPn; Inject 15 Units into the skin 3 (three) times daily with meals.  -     Renal function panel; Future  -     CBC auto differential; Future    Hyperlipidemia, unspecified hyperlipidemia type  -     Lipid panel; Future    Essential hypertension  - Continue current meds     Chronic left shoulder pain    Healthcare maintenance  - Lipid panel  - Podiatry appointment arranged for one month from now  - Optometry referral     Erectile dysfunction, unspecified  erectile dysfunction type  -     sildenafil (VIAGRA) 25 MG tablet; Take 1 tablet (25 mg total) by mouth daily as needed for Erectile Dysfunction. PRN         AT NEXT VISIT  Asked him to bring in his glucometer with him during his next visit.     RTC in 3 months. Pt was instructed to contact the clinic if symptoms worsened or if new symptoms arise.    I have discussed the plan with Dr Foy.     Behram Khan, MD  Internal Medicine PGY-2  M: [phone]   #[phone]

## 2019-06-19 ENCOUNTER — TELEPHONE (OUTPATIENT)
Dept: INTERNAL MEDICINE | Facility: CLINIC | Age: 54
End: 2019-06-19

## 2019-06-19 NOTE — TELEPHONE ENCOUNTER
Discussed most recent labwork (6/18), which included CBC, RFP, lipid panel and A1c. Pt's blood glucose at time of labwork was 391. States it was 150 this morning. Disclosed that A1c is now 10.7% from 10.8% eight months ago. Discussed that he will start using his short-acting insulin with lunch time. He is to return to clinic in 6 weeks.     Behram Khan, MD  Internal Medicine, PGY-2  #[phone]  M: [phone]

## 2019-07-15 ENCOUNTER — OFFICE VISIT (OUTPATIENT)
Dept: INTERNAL MEDICINE | Facility: CLINIC | Age: 54
End: 2019-07-15
Payer: MEDICAID

## 2019-07-15 VITALS
WEIGHT: 219.13 LBS | BODY MASS INDEX: 27.25 KG/M2 | HEART RATE: 77 BPM | OXYGEN SATURATION: 99 % | DIASTOLIC BLOOD PRESSURE: 78 MMHG | HEIGHT: 75 IN | SYSTOLIC BLOOD PRESSURE: 130 MMHG

## 2019-07-15 DIAGNOSIS — E11.42 TYPE 2 DIABETES MELLITUS WITH DIABETIC POLYNEUROPATHY, WITH LONG-TERM CURRENT USE OF INSULIN: Primary | ICD-10-CM

## 2019-07-15 DIAGNOSIS — E78.5 HYPERLIPIDEMIA, UNSPECIFIED HYPERLIPIDEMIA TYPE: ICD-10-CM

## 2019-07-15 DIAGNOSIS — E03.9 HYPOTHYROIDISM, UNSPECIFIED TYPE: ICD-10-CM

## 2019-07-15 DIAGNOSIS — Z79.4 TYPE 2 DIABETES MELLITUS WITH DIABETIC POLYNEUROPATHY, WITH LONG-TERM CURRENT USE OF INSULIN: Primary | ICD-10-CM

## 2019-07-15 DIAGNOSIS — E83.52 HYPERCALCEMIA: ICD-10-CM

## 2019-07-15 PROCEDURE — 99999 PR PBB SHADOW E&M-EST. PATIENT-LVL III: ICD-10-PCS | Mod: PBBFAC,,, | Performed by: STUDENT IN AN ORGANIZED HEALTH CARE EDUCATION/TRAINING PROGRAM

## 2019-07-15 PROCEDURE — 99213 OFFICE O/P EST LOW 20 MIN: CPT | Mod: PBBFAC | Performed by: STUDENT IN AN ORGANIZED HEALTH CARE EDUCATION/TRAINING PROGRAM

## 2019-07-15 PROCEDURE — 99214 PR OFFICE/OUTPT VISIT, EST, LEVL IV, 30-39 MIN: ICD-10-PCS | Mod: S$PBB,,, | Performed by: STUDENT IN AN ORGANIZED HEALTH CARE EDUCATION/TRAINING PROGRAM

## 2019-07-15 PROCEDURE — 99214 OFFICE O/P EST MOD 30 MIN: CPT | Mod: S$PBB,,, | Performed by: STUDENT IN AN ORGANIZED HEALTH CARE EDUCATION/TRAINING PROGRAM

## 2019-07-15 PROCEDURE — 99999 PR PBB SHADOW E&M-EST. PATIENT-LVL III: CPT | Mod: PBBFAC,,, | Performed by: STUDENT IN AN ORGANIZED HEALTH CARE EDUCATION/TRAINING PROGRAM

## 2019-07-15 RX ORDER — INSULIN GLARGINE 100 [IU]/ML
30 INJECTION, SOLUTION SUBCUTANEOUS 2 TIMES DAILY
Qty: 45 ML | Refills: 11 | Status: SHIPPED | OUTPATIENT
Start: 2019-07-15 | End: 2019-10-24 | Stop reason: SDUPTHER

## 2019-07-15 RX ORDER — ATORVASTATIN CALCIUM 20 MG/1
40 TABLET, FILM COATED ORAL DAILY
Qty: 90 TABLET | Refills: 3
Start: 2019-07-15 | End: 2020-07-20 | Stop reason: SDUPTHER

## 2019-07-15 RX ORDER — LEVOTHYROXINE SODIUM 25 UG/1
25 TABLET ORAL DAILY
Qty: 90 TABLET | Refills: 3 | Status: SHIPPED | OUTPATIENT
Start: 2019-07-15 | End: 2020-11-18 | Stop reason: SDUPTHER

## 2019-07-15 RX ORDER — GABAPENTIN 300 MG/1
300 CAPSULE ORAL NIGHTLY
Qty: 30 CAPSULE | Refills: 11 | Status: SHIPPED | OUTPATIENT
Start: 2019-07-15 | End: 2020-07-20

## 2019-07-15 RX ORDER — INSULIN LISPRO 100 [IU]/ML
15 INJECTION, SOLUTION INTRAVENOUS; SUBCUTANEOUS 3 TIMES DAILY
Qty: 15 ML | Refills: 11 | Status: CANCELLED | OUTPATIENT
Start: 2019-07-15

## 2019-07-15 NOTE — PROGRESS NOTES
"                                     INTERNAL MEDICINE RESIDENT CLINIC                                                             CLINIC NOTE    Patient Name: Juan Manuel Johnson  YOB: 1965    PRESENTING HISTORY     Chief Complaint   Patient presents with    Follow-up    Foot Pain     both       History of Present Illness:  Mr. Juan Manuel Johnson is a 54 y.o. male w/ significant PMHx of DMT2, HTN, HLD, HoTh, and HCV w SVR.     He presents for follow up visit for review of his blood glucose. He brought his glucometer with him. Does not take his lunch time insulin because it is not part of his routine yet. He has had one episode of symptomatic hypoglycemia with low blood glucose of 43. Review of his glucometer also disclosed a high blood glucose of 533. Discussed the need for adherence to a consistent diabetic diet. Unfortunately, pt often skips meals and takes [id number]-cjbhuk.     He needs new prescriptions for levothyroxine, sildenafil and insulin as he has "lost these medications".     Breakfast: grits, eggs and sausage/stover // often skips  Lunch: Hot food/ sandwiches / chicken   Dinner: Varies // often skips  Dybghxu-urwg-oapmyp: often and varied.    He continues to report burning and tingling pain in his feet and erectile dysfunction.      Review of Systems   Constitutional: Negative for chills, fever, malaise/fatigue and weight loss.   HENT: Negative for congestion, ear discharge and sore throat.    Eyes: Negative for blurred vision and redness.   Respiratory: Negative for cough, shortness of breath and wheezing.    Cardiovascular: Negative for chest pain, palpitations and leg swelling.   Gastrointestinal: Negative for abdominal pain, constipation, diarrhea, nausea and vomiting.   Genitourinary: Negative for dysuria and frequency.   Musculoskeletal: Negative for joint pain and myalgias.   Skin: Negative for itching and rash.   Neurological: Negative for dizziness, seizures, loss of consciousness " and headaches.   Endo/Heme/Allergies: Negative for environmental allergies. Does not bruise/bleed easily.   Psychiatric/Behavioral: Negative for depression. The patient is not nervous/anxious and does not have insomnia.          PAST HISTORY:     Past Medical History:   Diagnosis Date    Diabetes mellitus     Diabetes mellitus, type 2     Hepatitis C     Hepatitis C, chronic 2013    Hyperlipidemia     Hypertension     Hypothyroidism        Past Surgical History:   Procedure Laterality Date    ANKLE SURGERY Left     COLONOSCOPY      Normal by patient reporting     ELBOW SURGERY Right     EXTERNAL EAR SURGERY Left     WRIST SURGERY Left        Family History   Problem Relation Age of Onset    Diabetes Mother     Hypertension Mother        Social History     Socioeconomic History    Marital status:      Spouse name: Not on file    Number of children: Not on file    Years of education: Not on file    Highest education level: Not on file   Occupational History    Not on file   Social Needs    Financial resource strain: Not on file    Food insecurity:     Worry: Not on file     Inability: Not on file    Transportation needs:     Medical: Not on file     Non-medical: Not on file   Tobacco Use    Smoking status: Former Smoker     Last attempt to quit: 1995     Years since quittin.1    Smokeless tobacco: Never Used   Substance and Sexual Activity    Alcohol use: No    Drug use: No    Sexual activity: Not on file   Lifestyle    Physical activity:     Days per week: Not on file     Minutes per session: Not on file    Stress: Not on file   Relationships    Social connections:     Talks on phone: Not on file     Gets together: Not on file     Attends Tenriism service: Not on file     Active member of club or organization: Not on file     Attends meetings of clubs or organizations: Not on file     Relationship status: Not on file   Other Topics Concern    Not on file  "  Social History Narrative    The patient typically spends most of his time at home and watches TV.  He does walk daily.  He tries to stay fairly active.       MEDICATIONS & ALLERGIES:               Medication List with Changes/Refills   Current Medications    ACETAMINOPHEN-CODEINE 300-30MG (TYLENOL #3) 300-30 MG TAB    Take 1 tablet by mouth every 6 (six) hours as needed.    ASPIRIN 81 MG CHEW    Take 1 tablet (81 mg total) by mouth once daily.    ATORVASTATIN (LIPITOR) 20 MG TABLET    Take 2 tablets (40 mg total) by mouth once daily.    BLOOD SUGAR DIAGNOSTIC (ONETOUCH ULTRA TEST) STRP    Inject 1 strip into the skin 3 (three) times daily with meals.    BLOOD SUGAR DIAGNOSTIC STRP    To check BG QID times daily, to use with insurance preferred meter    GABAPENTIN (NEURONTIN) 300 MG CAPSULE    Take 1 capsule (300 mg total) by mouth every evening.    INSULIN (LANTUS SOLOSTAR U-100 INSULIN) GLARGINE 100 UNITS/ML (3ML) SUBQ PEN    Inject 45 Units into the skin once daily.    INSULIN LISPRO (ADMELOG SOLOSTAR U-100 INSULIN) 100 UNIT/ML PEN    Inject 15 Units into the skin 3 (three) times daily.    INSULIN LISPRO PROTAMIN-LISPRO (HUMALOG MIX 50-50 KWIKPEN) 100 UNIT/ML (50-50) INPN    Inject 15 Units into the skin 3 (three) times daily with meals.    LANCETS MISC    Use prn    LEVOTHYROXINE (SYNTHROID) 25 MCG TABLET    Take 1 tablet (25 mcg total) by mouth once daily.    LOSARTAN-HYDROCHLOROTHIAZIDE 100-12.5 MG (HYZAAR) 100-12.5 MG TAB    Take 1 tablet by mouth every morning.    NAPROXEN (NAPROSYN) 375 MG TABLET    Take 1 tablet (375 mg total) by mouth 2 (two) times daily as needed (Pain).    PEN NEEDLE, DIABETIC 32 GAUGE X 5/32" NDLE    1 each by Misc.(Non-Drug; Combo Route) route 4 (four) times daily with meals and nightly.    SILDENAFIL (VIAGRA) 25 MG TABLET    Take 1 tablet (25 mg total) by mouth daily as needed for Erectile Dysfunction.       Review of patient's allergies indicates:  No Known Allergies    OBJECTIVE: " "    Vital Signs:  Vitals:    07/15/19 1007   BP: 130/78   Pulse: 77   SpO2: 99%   Weight: 99.4 kg (219 lb 2.2 oz)   Height: 6' 3" (1.905 m)     Wt Readings from Last 5 Encounters:   07/15/19 99.4 kg (219 lb 2.2 oz)   06/18/19 98.8 kg (217 lb 13 oz)   04/09/19 101.7 kg (224 lb 3.3 oz)   02/06/19 96.6 kg (213 lb)   01/07/19 96.6 kg (213 lb)       No results found for this or any previous visit (from the past 24 hour(s)).      Physical Exam   Constitutional: He is oriented to person, place, and time and well-developed, well-nourished, and in no distress.   HENT:   Head: Normocephalic and atraumatic.   Eyes: Pupils are equal, round, and reactive to light. EOM are normal. No scleral icterus.   Neck: Normal range of motion. Neck supple. No thyromegaly present.   Cardiovascular: Normal rate, regular rhythm and normal heart sounds.   No murmur heard.  Pulmonary/Chest: Effort normal and breath sounds normal. No respiratory distress. He has no wheezes.   Abdominal: Soft. Bowel sounds are normal. He exhibits no distension. There is no tenderness.   Lymphadenopathy:     He has no cervical adenopathy.   Neurological: He is alert and oriented to person, place, and time.   Skin: Skin is warm and dry. No erythema.   Psychiatric: Affect normal.         ASSESSMENT & PLAN:     Juan Manuel was seen today for follow-up and foot pain.    Will change his insulin regimen from nightly basal with TIDWM short-acting to basal BID. He was advised to no longer take his short-acting insulin.     Type 2 diabetes mellitus with diabetic polyneuropathy, with long-term current use of insulin  -     insulin (LANTUS SOLOSTAR U-100 INSULIN) glargine 100 units/mL (3mL) SubQ pen; Inject 30 Units into the skin 2 (two) times daily.  -     gabapentin (NEURONTIN) 300 MG capsule; Take 1 capsule (300 mg total) by mouth every evening.    Hyperlipidemia, unspecified hyperlipidemia type  -     atorvastatin (LIPITOR) 20 MG tablet; Take 2 tablets (40 mg total) by mouth " once daily.    Hypothyroidism, unspecified type  -     levothyroxine (SYNTHROID) 25 MCG tablet; Take 1 tablet (25 mcg total) by mouth once daily.    Hypercalcemia  -     PTH, intact; Future  -     PSA, Screening; Future    Other orders  -     Cancel: insulin lispro (ADMELOG SOLOSTAR U-100 INSULIN) 100 unit/mL pen; Inject 15 Units into the skin 3 (three) times daily.        AT NEXT VISIT  Review glucometer.    RTC in 6 weeks. Pt was instructed to contact the clinic if symptoms worsened or if new symptoms arise.    I have discussed the plan with Khloe Swenson and Carlos.     Behram Khan, MD  Internal Medicine PGY-3  M: [phone]   #[phone]

## 2019-07-18 NOTE — PROGRESS NOTES
"I have personally taken the history and examined this patient and agree with the resident's note as stated above with the following thoughts:  /78 (BP Location: Right arm, Patient Position: Sitting, BP Method: Large (Manual))   Pulse 77   Ht 6' 3" (1.905 m)   Wt 99.4 kg (219 lb 2.2 oz)   SpO2 99%   BMI 27.39 kg/m²     Discussed getting vaccines up to date.  Discussed importance of low fat diet and exercise     "

## 2019-07-23 ENCOUNTER — TELEPHONE (OUTPATIENT)
Dept: INTERNAL MEDICINE | Facility: CLINIC | Age: 54
End: 2019-07-23

## 2019-07-23 DIAGNOSIS — N52.9 ERECTILE DYSFUNCTION, UNSPECIFIED ERECTILE DYSFUNCTION TYPE: Primary | ICD-10-CM

## 2019-07-23 RX ORDER — SILDENAFIL 25 MG/1
25 TABLET, FILM COATED ORAL DAILY PRN
Qty: 20 TABLET | Refills: 3 | Status: SHIPPED | OUTPATIENT
Start: 2019-07-23 | End: 2020-04-16 | Stop reason: SDUPTHER

## 2019-07-23 NOTE — TELEPHONE ENCOUNTER
----- Message from Santy Gonzalez, Milo sent at 7/23/2019  9:18 AM CDT -----  Contact: [facility]  [facility],     Patient requested pharmacy to request drug change for patient. Patient was originally prescribed Viagra 25 mg, but the cost of the medication is too expensive. [facility] do have a discount price for generic Viagra.     Sildenafil 100 mg tablet = $2.25 per tablet.     Or     Sildenafil 20 mg tablet = $1.00 per tablet.       If appropriate, can provider please send in new Rx for Sildenafil 100 mg or 20 mg?     Thank you,  Santy Gonzalez PharmD.

## 2019-10-24 DIAGNOSIS — E11.42 TYPE 2 DIABETES MELLITUS WITH DIABETIC POLYNEUROPATHY, WITH LONG-TERM CURRENT USE OF INSULIN: ICD-10-CM

## 2019-10-24 DIAGNOSIS — Z79.4 TYPE 2 DIABETES MELLITUS WITH DIABETIC POLYNEUROPATHY, WITH LONG-TERM CURRENT USE OF INSULIN: ICD-10-CM

## 2019-10-24 NOTE — TELEPHONE ENCOUNTER
----- Message from Chayito Shelton sent at 10/24/2019  2:29 PM CDT -----  Contact: Patient [phone]  [place] in the airport:    insulin (LANTUS SOLOSTAR U-100 INSULIN) glargine 100 units/mL (3mL) SubQ pen. He will also need the pen needles.    Please send to:  [id number] [place] [address] [address] [facility] AT [facility] [address]  [phone] (Phone) [phone] (Fax)    Patient has not had insulin today.  Patient expects to be out of town for 19 day.    Request call back.    Please call and advise  Thank you

## 2019-10-25 RX ORDER — INSULIN GLARGINE 100 [IU]/ML
30 INJECTION, SOLUTION SUBCUTANEOUS 2 TIMES DAILY
Qty: 45 ML | Refills: 11 | Status: SHIPPED | OUTPATIENT
Start: 2019-10-25 | End: 2020-07-20 | Stop reason: SDUPTHER

## 2019-11-27 ENCOUNTER — TELEPHONE (OUTPATIENT)
Dept: PODIATRY | Facility: CLINIC | Age: 54
End: 2019-11-27

## 2019-11-27 NOTE — TELEPHONE ENCOUNTER
----- Message from Susan Lowry sent at 11/27/2019  1:17 PM CST -----  Contact: self   Pt states he needs to be seen for a fu appointment.       Contact Info

## 2019-11-27 NOTE — TELEPHONE ENCOUNTER
Informed pt that we don't have any available slots at this time. Provided pt with Medicaid Resource numbers.

## 2019-12-20 DIAGNOSIS — Z79.4 TYPE 2 DIABETES MELLITUS WITH COMPLICATION, WITH LONG-TERM CURRENT USE OF INSULIN: ICD-10-CM

## 2019-12-20 DIAGNOSIS — E11.8 TYPE 2 DIABETES MELLITUS WITH COMPLICATION, WITH LONG-TERM CURRENT USE OF INSULIN: ICD-10-CM

## 2019-12-20 NOTE — TELEPHONE ENCOUNTER
"----- Message from Jo Livingston sent at 12/20/2019 12:19 PM CST -----  Contact: self   Diabetic or Medical Supplies.  What supplies are needed: pen needle, diabetic 32 gauge x 5/32" Ndle  What is the brand name of the supplies:   Is this a refill or new prescription:  refill  Who prescribed the supplies:  Chava  Pharmacy or company name, phone # and location:  [id number] [place] [address] AT [facility] [address] [phone] (Phone)  [phone] (Fax)  Comments:    "

## 2019-12-30 RX ORDER — PEN NEEDLE, DIABETIC 30 GX3/16"
1 NEEDLE, DISPOSABLE MISCELLANEOUS
Qty: 200 EACH | Refills: 11 | Status: SHIPPED | OUTPATIENT
Start: 2019-12-30 | End: 2020-11-18 | Stop reason: SDUPTHER

## 2020-04-16 DIAGNOSIS — N52.9 ERECTILE DYSFUNCTION, UNSPECIFIED ERECTILE DYSFUNCTION TYPE: ICD-10-CM

## 2020-04-16 DIAGNOSIS — E11.8 TYPE 2 DIABETES MELLITUS WITH COMPLICATION, WITH LONG-TERM CURRENT USE OF INSULIN: ICD-10-CM

## 2020-04-16 DIAGNOSIS — E11.42 TYPE 2 DIABETES MELLITUS WITH DIABETIC POLYNEUROPATHY, WITH LONG-TERM CURRENT USE OF INSULIN: ICD-10-CM

## 2020-04-16 DIAGNOSIS — Z79.4 TYPE 2 DIABETES MELLITUS WITH DIABETIC POLYNEUROPATHY, WITH LONG-TERM CURRENT USE OF INSULIN: ICD-10-CM

## 2020-04-16 DIAGNOSIS — Z79.4 TYPE 2 DIABETES MELLITUS WITH COMPLICATION, WITH LONG-TERM CURRENT USE OF INSULIN: ICD-10-CM

## 2020-04-16 NOTE — TELEPHONE ENCOUNTER
----- Message from Anabel Khan sent at 4/16/2020  3:34 PM CDT -----  Contact: [facility] Patient Portal  Type:  Apoointment Request    Caller is requesting an appointment.    Name of Caller Patient Portal Juan Manuel Johnson  When is the first available appointment?: Schedule is unavailable  Symptoms: diabetic and high blood pressure medication  Would the patient rather a call back or a response via [facility]? [facility]  Best Call Back Number: [phone]  Additional Information: Would like an appointment anytime

## 2020-04-17 RX ORDER — SILDENAFIL 25 MG/1
25 TABLET, FILM COATED ORAL DAILY PRN
Qty: 20 TABLET | Refills: 3 | Status: SHIPPED | OUTPATIENT
Start: 2020-04-17 | End: 2020-05-19 | Stop reason: SDUPTHER

## 2020-04-17 RX ORDER — LOSARTAN POTASSIUM AND HYDROCHLOROTHIAZIDE 12.5; 1 MG/1; MG/1
1 TABLET ORAL EVERY MORNING
Qty: 90 TABLET | Refills: 3 | Status: SHIPPED | OUTPATIENT
Start: 2020-04-17 | End: 2020-11-18 | Stop reason: SDUPTHER

## 2020-04-27 ENCOUNTER — TELEPHONE (OUTPATIENT)
Dept: INTERNAL MEDICINE | Facility: CLINIC | Age: 55
End: 2020-04-27

## 2020-04-27 NOTE — TELEPHONE ENCOUNTER
----- Message from Shy Luis sent at 4/27/2020 10:06 AM CDT -----  Contact: Patient [phone]  Prescription Request:     Name of medication: Desonide cream    Reason for request: New    Pharmacy: [id number] - [place] [id number] [place] [facility] [address]    Please advise.    Thank You

## 2020-04-29 DIAGNOSIS — N52.9 ERECTILE DYSFUNCTION, UNSPECIFIED ERECTILE DYSFUNCTION TYPE: ICD-10-CM

## 2020-04-29 RX ORDER — SILDENAFIL 25 MG/1
25 TABLET, FILM COATED ORAL DAILY PRN
Qty: 20 TABLET | Refills: 3 | Status: CANCELLED | OUTPATIENT
Start: 2020-04-29 | End: 2021-04-29

## 2020-04-30 DIAGNOSIS — N52.9 ERECTILE DYSFUNCTION, UNSPECIFIED ERECTILE DYSFUNCTION TYPE: ICD-10-CM

## 2020-04-30 RX ORDER — SILDENAFIL 25 MG/1
25 TABLET, FILM COATED ORAL DAILY PRN
Qty: 20 TABLET | Refills: 3 | Status: CANCELLED | OUTPATIENT
Start: 2020-04-29 | End: 2021-04-29

## 2020-05-01 DIAGNOSIS — N52.9 ERECTILE DYSFUNCTION, UNSPECIFIED ERECTILE DYSFUNCTION TYPE: ICD-10-CM

## 2020-05-01 RX ORDER — SILDENAFIL 25 MG/1
25 TABLET, FILM COATED ORAL DAILY PRN
Qty: 20 TABLET | Refills: 3 | OUTPATIENT
Start: 2020-05-01 | End: 2021-05-01

## 2020-05-19 DIAGNOSIS — N52.9 ERECTILE DYSFUNCTION, UNSPECIFIED ERECTILE DYSFUNCTION TYPE: ICD-10-CM

## 2020-05-19 NOTE — TELEPHONE ENCOUNTER
Care Due:                  Date            Visit Type   Department     Provider  --------------------------------------------------------------------------------    Last Visit: None Found      None         None Found  Next Visit: None Scheduled  None         None Found                                                            Last  Test          Frequency    Reason                     Performed    Due Date  --------------------------------------------------------------------------------    Office Visit  12 months..  atorvastatin, gabapentin,   Not Found    Overdue                             insulin, levothyroxine,                             losartan-hydrochlorothiaz                             bradley, sildenafiL..........    Ca..........  12 months..  losartan-hydrochlorothiaz  06- 06-                             bradley......................    Cr..........  6 months...  insulin,                   06-   12-                             losartan-hydrochlorothiaz                             bradley......................    eGFR........  6 months...  insulin,                   Not Found    Overdue                             losartan-hydrochlorothiaz                             bradley......................    HBA1C.......  6 months...  insulin..................  06-   12-    K...........  6 months...  losartan-hydrochlorothiaz  06-   12-                             bradley......................    LFT.........  6 months...  atorvastatin.............  Not Found    Overdue    Lipid Panel.  6 months...  atorvastatin.............  Not Found    Overdue    Na..........  6 months...  losartan-hydrochlorothiaz  06-   12-                             bradley......................    TSH.........  12 months..  levothyroxine............  Not Found    Overdue    Powered by [employer] Reference number: [id number]. 5/19/2020 12:05:28 PM   CDT

## 2020-05-19 NOTE — TELEPHONE ENCOUNTER
----- Message from Jo Livingston sent at 5/19/2020 11:57 AM CDT -----  Contact: self   Requesting an RX refill or new RX.  Is this a refill or new RX:  refill  RX name and strength: sildenafiL (VIAGRA) 25 MG tablet  Directions (copy/paste from chart):  Take 1 tablet (25 mg total) by mouth daily as needed for Erectile Dysfunction. - Oral  Is this a 30 day or 90 day RX:    Local pharmacy or mail order pharmacy:  local  Pharmacy name and phone # (copy/paste from chart):   [id number] - [facility] [address] AT [facility] [address] [phone] (Phone)  [phone] (Fax)  Comments:      Requesting an RX refill or new RX.  Is this a refill or new RX:  new  RX name and strength: clotrimazole (LOTRIMIN) 1 % cream   Directions (copy/paste from chart):  Apply topically 2 (two) times daily. - Topical (Top)  Is this a 30 day or 90 day RX:    Local pharmacy or mail order pharmacy:  local  Pharmacy name and phone # (copy/paste from chart):   [facility] #[id number] - [place], [place] - [address] AT [facility] [address] [phone] (Phone)  [phone] (Fax)  Comments:

## 2020-05-21 RX ORDER — SILDENAFIL 25 MG/1
25 TABLET, FILM COATED ORAL DAILY PRN
Qty: 20 TABLET | Refills: 3 | Status: SHIPPED | OUTPATIENT
Start: 2020-05-21 | End: 2020-07-20

## 2020-05-21 RX ORDER — CLOTRIMAZOLE 1 %
CREAM (GRAM) TOPICAL 2 TIMES DAILY
Qty: 90 G | Refills: 0 | Status: SHIPPED | OUTPATIENT
Start: 2020-05-21 | End: 2020-07-20

## 2020-07-19 NOTE — PROGRESS NOTES
Subjective:       Patient ID: Juan Manuel Johnson is a 55 y.o. male.    Chief Complaint: Establish Care    HPI  This patient is new to me.   Juan Manuel Johnson is a 55 y.o. year old male with HTN, DM, former smoker who presents today to establish care.    Hypertension - compliant with losartan-hydrochlorothiazide 100-12.5 mg daily    Hypothyroid - compliant with levothyroxine 25 mcg daily  Lab Results   Component Value Date    TSH 1.674 07/20/2020     DM type 2   Lab Results   Component Value Date    HGBA1C 13.1 (H) 07/20/2020     ~Current medications - Lantus 40 units twice daily, Humalog 40 units BID  ~Previously tried medications -   ~Blood glucose monitoring: haven't checked in 6 months (glucometer broken)  ~Diet - fruits, steak, red beans, fried chicken (no skin), some veggies, Coke daily, lots of juice  ~Exercise -   ~ Seen diabetes education? -    --- Complications:  ~Retinopathy - no  Last optho visit - Dec 2019  ~Neuropathy - yes  Sees podiatry? - yes, 1/2/19  ~ Nephropathy - no    BMP  Lab Results   Component Value Date     07/20/2020    K 3.8 07/20/2020     07/20/2020    CO2 25 07/20/2020    BUN 13 07/20/2020    CREATININE 1.2 07/20/2020    CALCIUM 10.1 07/20/2020    ANIONGAP 15 07/20/2020    ESTGFRAFRICA >60 07/20/2020    EGFRNONAA >60 07/20/2020     The 10-year ASCVD risk score (Salty ROE Jr., et al., 2013) is: 18.9%    Values used to calculate the score:      Age: 55 years      Sex: Male      Is Non- : Yes      Diabetic: Yes      Tobacco smoker: No      Systolic Blood Pressure: 120 mmHg      Is BP treated: Yes      HDL Cholesterol: 34 mg/dL      Total Cholesterol: 171 mg/dL    ~ Statin? - yes  ~ PNA vaccine? - UTD    Health Maintenance  Colon Cancer Screening: colonoscopy 6/7/16  Prostate Cancer Screening: n/a  Hepatitis C screening: due  Flu vaccine: due in fall   Tetanus vaccine: UTD  PNA vaccine: UTD  Shingles vaccine: due    I personally reviewed Past Medical History,  "Past Surgical History, Social History, and Family History    Review of Systems   Constitutional: Negative for chills, fatigue, fever and unexpected weight change.   HENT: Negative for congestion, hearing loss, rhinorrhea and sore throat.    Eyes: Negative for visual disturbance.   Respiratory: Negative for cough, shortness of breath and wheezing.    Cardiovascular: Negative for chest pain, palpitations and leg swelling.   Gastrointestinal: Negative for abdominal pain, constipation, diarrhea, nausea and vomiting.   Genitourinary: Negative for dysuria, frequency and urgency.   Musculoskeletal: Negative for arthralgias and myalgias.   Skin: Negative for rash.   Neurological: Negative for dizziness, syncope and headaches.   Psychiatric/Behavioral: Negative for dysphoric mood and sleep disturbance. The patient is not nervous/anxious.        Objective:      Vitals:    07/20/20 1405 07/20/20 1432   BP: (!) 142/94 120/88   Pulse: 79    SpO2: 97%    Weight: 101.2 kg (223 lb 1.7 oz)    Height: 6' 3" (1.905 m)      Physical Exam  Vitals signs and nursing note reviewed.   Constitutional:       General: He is not in acute distress.     Appearance: He is well-developed.   HENT:      Head: Normocephalic and atraumatic.      Right Ear: Hearing normal.      Left Ear: Hearing normal.      Nose: Nose normal.      Mouth/Throat:      Dentition: Normal dentition.      Pharynx: No oropharyngeal exudate.   Eyes:      General: Lids are normal.      Conjunctiva/sclera: Conjunctivae normal.      Pupils: Pupils are equal, round, and reactive to light.   Neck:      Musculoskeletal: Normal range of motion.      Thyroid: No thyroid mass or thyromegaly.   Cardiovascular:      Rate and Rhythm: Normal rate and regular rhythm.      Heart sounds: S1 normal and S2 normal. No murmur.      Comments: No lower extremity edema  Pulmonary:      Effort: Pulmonary effort is normal. No respiratory distress.      Breath sounds: Normal breath sounds.   Abdominal: "      General: Bowel sounds are normal.      Palpations: Abdomen is soft.      Tenderness: There is no abdominal tenderness.   Lymphadenopathy:      Cervical: No cervical adenopathy.      Upper Body:      Right upper body: No supraclavicular adenopathy.      Left upper body: No supraclavicular adenopathy.   Skin:     General: Skin is warm and dry.      Findings: No rash.   Neurological:      Mental Status: He is alert and oriented to person, place, and time. He is not disoriented.   Psychiatric:         Behavior: Behavior normal.         Thought Content: Thought content normal.         Assessment:       1. Type 2 diabetes mellitus with diabetic polyneuropathy, with long-term current use of insulin    2. Essential hypertension    3. Hyperlipidemia, unspecified hyperlipidemia type    4. Hypothyroidism due to acquired atrophy of thyroid    5. Positive hepatitis C antibody test    6. Screening for prostate cancer        Plan:     Juan Manuel was seen today for establish care.    Diagnoses and all orders for this visit:    Type 2 diabetes mellitus with diabetic polyneuropathy, with long-term current use of insulin  Not controlled.  It appears that there has been some confusion in what medicines the patient is supposed to take.  He thought that he was was to use either Lantus or Humalog, not both in the same day.  Will repeat labs now and referred to diabetes education.  Follow-up in 3 months sooner if needed.  -     gabapentin (NEURONTIN) 300 MG capsule; Take 1 capsule (300 mg total) by mouth 2 (two) times daily.  -     insulin (LANTUS SOLOSTAR U-100 INSULIN) glargine 100 units/mL (3mL) SubQ pen; Inject 40 Units into the skin 2 (two) times daily.  -     Ambulatory referral/consult to Diabetes Education; Future  -     blood sugar diagnostic Strp; To check BG QID times daily, to use with insurance preferred meter  -     lancets Misc; Check blood sugar 4 times daily  -     blood-glucose meter kit; Check blood sugar 4 times  daily  -     Microalbumin/creatinine urine ratio; Future  -     Hemoglobin A1C; Future  -     CBC auto differential; Future  -     Comprehensive metabolic panel; Future  -     Lipid Panel; Future    Essential hypertension  Controlled. Continue current medication regimen.     Hyperlipidemia, unspecified hyperlipidemia type  -     atorvastatin (LIPITOR) 20 MG tablet; Take 1 tablet (20 mg total) by mouth once daily.    Hypothyroidism due to acquired atrophy of thyroid  -     TSH; Future    Positive hepatitis C antibody test  -     Hepatitis C RNA, quantitative, PCR; Future  -     Hepatitis Panel, Acute; Future    Screening for prostate cancer  -     PSA, Screening; Future    I personally reviewed the patient's medical record, including physician notes, imaging, and labs that were available to me today.

## 2020-07-20 ENCOUNTER — LAB VISIT (OUTPATIENT)
Dept: LAB | Facility: OTHER | Age: 55
End: 2020-07-20
Attending: FAMILY MEDICINE
Payer: MEDICAID

## 2020-07-20 ENCOUNTER — OFFICE VISIT (OUTPATIENT)
Dept: INTERNAL MEDICINE | Facility: CLINIC | Age: 55
End: 2020-07-20
Payer: MEDICAID

## 2020-07-20 VITALS
HEART RATE: 79 BPM | HEIGHT: 75 IN | SYSTOLIC BLOOD PRESSURE: 120 MMHG | OXYGEN SATURATION: 97 % | BODY MASS INDEX: 27.74 KG/M2 | DIASTOLIC BLOOD PRESSURE: 88 MMHG | WEIGHT: 223.13 LBS

## 2020-07-20 DIAGNOSIS — Z79.4 TYPE 2 DIABETES MELLITUS WITH COMPLICATION, WITH LONG-TERM CURRENT USE OF INSULIN: ICD-10-CM

## 2020-07-20 DIAGNOSIS — E11.42 TYPE 2 DIABETES MELLITUS WITH DIABETIC POLYNEUROPATHY, WITH LONG-TERM CURRENT USE OF INSULIN: Primary | ICD-10-CM

## 2020-07-20 DIAGNOSIS — R76.8 POSITIVE HEPATITIS C ANTIBODY TEST: ICD-10-CM

## 2020-07-20 DIAGNOSIS — I10 ESSENTIAL HYPERTENSION: ICD-10-CM

## 2020-07-20 DIAGNOSIS — E11.8 TYPE 2 DIABETES MELLITUS WITH COMPLICATION, WITH LONG-TERM CURRENT USE OF INSULIN: ICD-10-CM

## 2020-07-20 DIAGNOSIS — Z79.4 TYPE 2 DIABETES MELLITUS WITH DIABETIC POLYNEUROPATHY, WITH LONG-TERM CURRENT USE OF INSULIN: Primary | ICD-10-CM

## 2020-07-20 DIAGNOSIS — E83.52 HYPERCALCEMIA: ICD-10-CM

## 2020-07-20 DIAGNOSIS — E78.5 HYPERLIPIDEMIA, UNSPECIFIED HYPERLIPIDEMIA TYPE: ICD-10-CM

## 2020-07-20 DIAGNOSIS — E03.4 HYPOTHYROIDISM DUE TO ACQUIRED ATROPHY OF THYROID: ICD-10-CM

## 2020-07-20 DIAGNOSIS — Z12.5 SCREENING FOR PROSTATE CANCER: ICD-10-CM

## 2020-07-20 LAB
ALBUMIN SERPL BCP-MCNC: 4.2 G/DL (ref 3.5–5.2)
ALP SERPL-CCNC: 80 U/L (ref 55–135)
ALT SERPL W/O P-5'-P-CCNC: 13 U/L (ref 10–44)
ANION GAP SERPL CALC-SCNC: 15 MMOL/L (ref 8–16)
AST SERPL-CCNC: 19 U/L (ref 10–40)
BASOPHILS # BLD AUTO: 0.06 K/UL (ref 0–0.2)
BASOPHILS NFR BLD: 0.9 % (ref 0–1.9)
BILIRUB SERPL-MCNC: 0.5 MG/DL (ref 0.1–1)
BUN SERPL-MCNC: 13 MG/DL (ref 6–20)
CALCIUM SERPL-MCNC: 10.1 MG/DL (ref 8.7–10.5)
CHLORIDE SERPL-SCNC: 100 MMOL/L (ref 95–110)
CHOLEST SERPL-MCNC: 171 MG/DL (ref 120–199)
CHOLEST/HDLC SERPL: 5 {RATIO} (ref 2–5)
CO2 SERPL-SCNC: 25 MMOL/L (ref 23–29)
CREAT SERPL-MCNC: 1.2 MG/DL (ref 0.5–1.4)
DIFFERENTIAL METHOD: ABNORMAL
EOSINOPHIL # BLD AUTO: 0.2 K/UL (ref 0–0.5)
EOSINOPHIL NFR BLD: 3 % (ref 0–8)
ERYTHROCYTE [DISTWIDTH] IN BLOOD BY AUTOMATED COUNT: 12.8 % (ref 11.5–14.5)
EST. GFR  (AFRICAN AMERICAN): >60 ML/MIN/1.73 M^2
EST. GFR  (NON AFRICAN AMERICAN): >60 ML/MIN/1.73 M^2
GLUCOSE SERPL-MCNC: 123 MG/DL (ref 70–110)
HCT VFR BLD AUTO: 44.4 % (ref 40–54)
HDLC SERPL-MCNC: 34 MG/DL (ref 40–75)
HDLC SERPL: 19.9 % (ref 20–50)
HGB BLD-MCNC: 14.1 G/DL (ref 14–18)
IMM GRANULOCYTES # BLD AUTO: 0.01 K/UL (ref 0–0.04)
IMM GRANULOCYTES NFR BLD AUTO: 0.2 % (ref 0–0.5)
LDLC SERPL CALC-MCNC: 116.2 MG/DL (ref 63–159)
LYMPHOCYTES # BLD AUTO: 2.4 K/UL (ref 1–4.8)
LYMPHOCYTES NFR BLD: 35.4 % (ref 18–48)
MCH RBC QN AUTO: 27 PG (ref 27–31)
MCHC RBC AUTO-ENTMCNC: 31.8 G/DL (ref 32–36)
MCV RBC AUTO: 85 FL (ref 82–98)
MONOCYTES # BLD AUTO: 0.5 K/UL (ref 0.3–1)
MONOCYTES NFR BLD: 8 % (ref 4–15)
NEUTROPHILS # BLD AUTO: 3.5 K/UL (ref 1.8–7.7)
NEUTROPHILS NFR BLD: 52.5 % (ref 38–73)
NONHDLC SERPL-MCNC: 137 MG/DL
NRBC BLD-RTO: 0 /100 WBC
PLATELET # BLD AUTO: 398 K/UL (ref 150–350)
PMV BLD AUTO: 10.6 FL (ref 9.2–12.9)
POTASSIUM SERPL-SCNC: 3.8 MMOL/L (ref 3.5–5.1)
PROT SERPL-MCNC: 8.3 G/DL (ref 6–8.4)
PTH-INTACT SERPL-MCNC: 56.1 PG/ML (ref 9–77)
RBC # BLD AUTO: 5.22 M/UL (ref 4.6–6.2)
SODIUM SERPL-SCNC: 140 MMOL/L (ref 136–145)
TRIGL SERPL-MCNC: 104 MG/DL (ref 30–150)
TSH SERPL DL<=0.005 MIU/L-ACNC: 1.67 UIU/ML (ref 0.4–4)
WBC # BLD AUTO: 6.63 K/UL (ref 3.9–12.7)

## 2020-07-20 PROCEDURE — 99999 PR PBB SHADOW E&M-EST. PATIENT-LVL IV: ICD-10-PCS | Mod: PBBFAC,,, | Performed by: FAMILY MEDICINE

## 2020-07-20 PROCEDURE — 85025 COMPLETE CBC W/AUTO DIFF WBC: CPT

## 2020-07-20 PROCEDURE — 99999 PR PBB SHADOW E&M-EST. PATIENT-LVL IV: CPT | Mod: PBBFAC,,, | Performed by: FAMILY MEDICINE

## 2020-07-20 PROCEDURE — 83036 HEMOGLOBIN GLYCOSYLATED A1C: CPT

## 2020-07-20 PROCEDURE — 83970 ASSAY OF PARATHORMONE: CPT

## 2020-07-20 PROCEDURE — 99214 OFFICE O/P EST MOD 30 MIN: CPT | Mod: S$PBB,,, | Performed by: FAMILY MEDICINE

## 2020-07-20 PROCEDURE — 84443 ASSAY THYROID STIM HORMONE: CPT

## 2020-07-20 PROCEDURE — 84153 ASSAY OF PSA TOTAL: CPT

## 2020-07-20 PROCEDURE — 87522 HEPATITIS C REVRS TRNSCRPJ: CPT

## 2020-07-20 PROCEDURE — 80074 ACUTE HEPATITIS PANEL: CPT

## 2020-07-20 PROCEDURE — 80053 COMPREHEN METABOLIC PANEL: CPT

## 2020-07-20 PROCEDURE — 99214 PR OFFICE/OUTPT VISIT, EST, LEVL IV, 30-39 MIN: ICD-10-PCS | Mod: S$PBB,,, | Performed by: FAMILY MEDICINE

## 2020-07-20 PROCEDURE — 80061 LIPID PANEL: CPT

## 2020-07-20 PROCEDURE — 36415 COLL VENOUS BLD VENIPUNCTURE: CPT

## 2020-07-20 PROCEDURE — 99214 OFFICE O/P EST MOD 30 MIN: CPT | Mod: PBBFAC | Performed by: FAMILY MEDICINE

## 2020-07-20 RX ORDER — INSULIN PUMP SYRINGE, 3 ML
EACH MISCELLANEOUS
Qty: 1 EACH | Refills: 0 | Status: SHIPPED | OUTPATIENT
Start: 2020-07-20 | End: 2020-08-28

## 2020-07-20 RX ORDER — INSULIN GLARGINE 100 [IU]/ML
40 INJECTION, SOLUTION SUBCUTANEOUS 2 TIMES DAILY
Qty: 15 ML | Refills: 1 | Status: SHIPPED | OUTPATIENT
Start: 2020-07-20 | End: 2020-08-03 | Stop reason: SDUPTHER

## 2020-07-20 RX ORDER — GABAPENTIN 300 MG/1
300 CAPSULE ORAL 2 TIMES DAILY
Qty: 60 CAPSULE | Refills: 0
Start: 2020-07-20 | End: 2020-11-18

## 2020-07-20 RX ORDER — ATORVASTATIN CALCIUM 20 MG/1
20 TABLET, FILM COATED ORAL DAILY
Qty: 90 TABLET | Refills: 3 | Status: SHIPPED | OUTPATIENT
Start: 2020-07-20 | End: 2020-11-18 | Stop reason: SDUPTHER

## 2020-07-20 RX ORDER — LANCETS
EACH MISCELLANEOUS
Qty: 200 EACH | Refills: 5 | Status: SHIPPED | OUTPATIENT
Start: 2020-07-20

## 2020-07-21 ENCOUNTER — TELEPHONE (OUTPATIENT)
Dept: PODIATRY | Facility: CLINIC | Age: 55
End: 2020-07-21

## 2020-07-21 LAB
COMPLEXED PSA SERPL-MCNC: 0.32 NG/ML (ref 0–4)
ESTIMATED AVG GLUCOSE: 329 MG/DL (ref 68–131)
HAV IGM SERPL QL IA: NEGATIVE
HBA1C MFR BLD HPLC: 13.1 % (ref 4–5.6)
HBV CORE IGM SERPL QL IA: NEGATIVE
HBV SURFACE AG SERPL QL IA: NEGATIVE
HCV AB SERPL QL IA: POSITIVE

## 2020-07-21 NOTE — TELEPHONE ENCOUNTER
----- Message from Prosper Tucker sent at 7/21/2020 12:21 PM CDT -----   Name of Who is Calling:     What is the request in detail: patient request call back in reference to appointment Please contact to further discuss and advise      Can the clinic reply by [facility]: no     [facility]  [phone]

## 2020-07-24 LAB
HCV RNA SERPL NAA+PROBE-LOG IU: <1.08 LOG (10) IU/ML
HCV RNA SERPL QL NAA+PROBE: NOT DETECTED IU/ML
HCV RNA SPEC NAA+PROBE-ACNC: <12 IU/ML

## 2020-07-25 ENCOUNTER — PATIENT OUTREACH (OUTPATIENT)
Dept: ADMINISTRATIVE | Facility: OTHER | Age: 55
End: 2020-07-25

## 2020-07-25 DIAGNOSIS — Z79.4 TYPE 2 DIABETES MELLITUS WITH DIABETIC POLYNEUROPATHY, WITH LONG-TERM CURRENT USE OF INSULIN: Primary | ICD-10-CM

## 2020-07-25 DIAGNOSIS — E11.42 TYPE 2 DIABETES MELLITUS WITH DIABETIC POLYNEUROPATHY, WITH LONG-TERM CURRENT USE OF INSULIN: Primary | ICD-10-CM

## 2020-07-25 NOTE — PROGRESS NOTES
Chart reviewed.   Immunizations: Triggered Imm Registry     Orders placed: n/a  Upcoming appts to satisfy REHAN topics: n/a  No orders placed due to PCP no longer work at [facility]

## 2020-07-27 ENCOUNTER — CLINICAL SUPPORT (OUTPATIENT)
Dept: DIABETES | Facility: CLINIC | Age: 55
End: 2020-07-27
Payer: MEDICAID

## 2020-07-27 VITALS — WEIGHT: 228.63 LBS | BODY MASS INDEX: 28.58 KG/M2

## 2020-07-27 DIAGNOSIS — Z79.4 TYPE 2 DIABETES MELLITUS WITH DIABETIC POLYNEUROPATHY, WITH LONG-TERM CURRENT USE OF INSULIN: ICD-10-CM

## 2020-07-27 DIAGNOSIS — E11.42 TYPE 2 DIABETES MELLITUS WITH DIABETIC POLYNEUROPATHY, WITH LONG-TERM CURRENT USE OF INSULIN: ICD-10-CM

## 2020-07-27 PROCEDURE — G0108 PR DIAB MANAGE TRN  PER INDIV: ICD-10-PCS | Mod: ,,, | Performed by: DIETITIAN, REGISTERED

## 2020-07-27 PROCEDURE — G0108 DIAB MANAGE TRN  PER INDIV: HCPCS | Mod: ,,, | Performed by: DIETITIAN, REGISTERED

## 2020-07-27 PROCEDURE — 99212 OFFICE O/P EST SF 10 MIN: CPT | Performed by: DIETITIAN, REGISTERED

## 2020-07-27 NOTE — PROGRESS NOTES
Diabetes Education  Author: Delmi Marina RD  Date: 7/27/2020    Diabetes Care Management Summary  Diabetes Education Record Assessment/Progress: Initial  Current Diabetes Risk Level: High     Last A1c:   Lab Results   Component Value Date    HGBA1C 13.1 (H) 07/20/2020     Last Visit with Diabetes Educator: Last Education Visit: Not Found  Last OPCM Referral: : Not Found   Enrolled in OPCM: No      Diabetes Type  Diabetes Type : Type II    Diabetes History  Diabetes Diagnosis: >10 years  Current Treatment: Insulin(Is taking 40 units of lantus between 6-8am and 15 units of Humalog 50/50 mix at the same time.  Takes this amount again in the eveing between 8-10pm. States does not skip doses)    Health Maintenance was reviewed today with patient. Discussed with patient importance of routine eye exams, foot exams/foot care, blood work (i.e.: A1c, microalbumin, and lipid), dental visits, yearly flu vaccine, and pneumonia vaccine as indicated by PCP. Patient verbalized understanding.     Health Maintenance Topics with due status: Not Due       Topic Last Completion Date    Colorectal Cancer Screening 06/07/2016    TETANUS VACCINE 09/24/2018    Influenza Vaccine 09/24/2018    Low Dose Statin 07/20/2020    Lipid Panel 07/20/2020    Hemoglobin A1c 07/20/2020     Health Maintenance Due   Topic Date Due    HIV Screening  06/05/1980    Shingles Vaccine (1 of 2) 06/05/2015    Eye Exam  02/01/2019    Foot Exam  04/02/2019       Nutrition  Meal Planning: eats out often, drinks regular soda, water, skipping meals, snacks between meal(fast food and grocery store lunches, wife cooks 3 meals per week. likes beans and rice, jambalya, baked fish and chicken, ettofee, yakamein. likes most foods)  What type of sweetener do you use?: sugar  What type of beverages do you drink?: water, regular soda/tea, juice(coffee with sugar)  Meal Plan 24 Hour Recall - Breakfast: spam sandwich with water  Meal Plan 24 Hour Recall - Lunch:  nothing yesterday,  usually eats a ham and cheese or turkey and cheese poboy 6 or 12 inches with juice or soda or water  Meal Plan 24 Hour Recall - Dinner: ribs, baked mac and cheese and a stuffed bell pepper with water  Meal Plan 24 Hour Recall - Snack: oreo cookies, grapes, corn chips, potato  chips and mini snickers-3    Monitoring   Monitoring: Other  Self Monitoring : SMBG stated 2 days ago, had  not checked his BG 6 months prior to that. SMBG before and 1-2 hours after breakfast 73 this am fasting & 153 1 hr after eating. Meter has 5 readings: 385,73,163,155,279  Blood Glucose Logs: Yes  Do you use a personal continuous glucose monitor?: No  In the last month, how often have you had a low blood sugar reaction?: never  What are your symptoms of low blood sugar?: dizzy, seeing spots  How do you treat low blood sugar?: went to the ED  Can you tell when your blood sugar is too high?: yes  How do you treat high blood sugar?: thristy    Exercise   Frequency: Never    Current Diabetes Treatment   Current Treatment: Insulin(Is taking 40 units of lantus between 6-8am and 15 units of Humalog 50/50 mix at the same time.  Takes this amount again in the eveing between 8-10pm. States does not skip doses)    Social History  Preferred Learning Method: Face to Face  Primary Support: Self, Spouse  Educational Level: High School(11th grade)  Occupation: disabled  Smoking Status: Ex Smoker(quit smoking in 1995)  Alcohol Use: Never            DDS-2 Score  ( > 3 = SIGNIFICANT DISTRESS): 2                   Barriers to Change  Barriers to Change: None  Learning Challenges : None    Readiness to Learn   Readiness to Learn : Acceptance    Cultural Influences  Cultural Influences: No    Diabetes Education Assessment/Progress  Diabetes Disease Process (diabetes disease process and treatment options): Discussion, Needs Instruction, Individual Session    Nutrition (Incorporating nutritional management into one's lifestyle): Discussion,  Needs Instruction, Individual Session, Instructed(States that he eats too much rice and wants to work on portion control. Suggested he first work to omit all sweet beverages in his diet including juice, He agreed. He will increase his consumption of water and try some diet and zero sigifredo drink options)    Physical Activity (incorporating physical activity into one's lifestyle): Discussion, Needs Instruction, Individual Session    Medications (states correct name, dose, onset, peak, duration, side effects & timing of meds): Discussion, Needs Instruction, Demonstration, Individual Session, Instructed, Written Materials Provided(He is taking lantus and humalog 50/50 mix. Ed on different types of insulin and proper timing. He agrees to discontinue humalog mix and take 40 units of lantus twice a day at 7 am and 7 pm as orderd. He will return to the clinic in 1 week w/logs)    Monitoring (monitoring blood glucose/other parameters & using results): Discussion, Needs Instruction, Individual Session, Instructed, Written Materials Provided(He started taking his BG yesterday. Agrees to take BG fasting and before and after dinner and return to the [facility] in one week to review log and discuss rapid insulin options.Logs provided)    Acute Complications (preventing, detecting, and treating acute complications): Discussion, Needs Instruction, Individual Session(Did not have any hypoglycemic s/s this am when his BG was 73 fasting)    Chronic Complications (preventing, detecting, and treating chronic complications): Discussion, Needs Instruction, Individual Session(Will attend Podiatry appt today)    Clinical (diabetes, other pertinent medical history, and relevant comorbidities reviewed during visit): Discussion, Needs Instruction, Individual Session, Instructed, Written Materials Provided(REviewd elevated A1c result with goal range)    Cognitive (knowledge of self-management skills, functional health literacy): Discussion, Needs  Instruction, Individual Session, Instructed    Psychosocial (emotional response to diabetes): Discussion, Needs Instruction, Individual Session, Instructed(States is ready to take control of his BG)    Diabetes Distress and Support Systems: Discussion, Needs Instruction, Individual Session(He has a supportive wife. Mentioned that his daughters do not speak to him)    Behavioral (readiness for change, lifestyle practices, self-care behaviors): Discussion, Needs Instruction, Individual Session, Instructed, Written Materials Provided    Goals  Patient has selected/evaluated goals during today's session: Yes, selected  Healthy Eating: Set(omit all sweet beverages from diet)  Start Date: 07/27/20  Target Date: 08/03/20  Medications: Set(Stop taking humalog mix. Take 40 units of lantus at 7 am and again at 7 pm)  Start Date: 07/27/20  Target Date: 08/03/20         Diabetes Care Plan/Intervention  Education Plan/Intervention: Individual Follow-Up DSMT    Diabetes Meal Plan  Restrictions: Restricted Carbohydrate  Calories: 1800  Carbohydrate Per Meal: 30-45g  Carbohydrate Per Snack : 15-30g  Fat: 72-80g  Protein: 72-81g    Today's Self-Management Care Plan was developed with the patient's input and is based on barriers identified during today's assessment.    The long and short-term goals in the care plan were written with the patient/caregiver's input. The patient has agreed to work toward these goals to improve his overall diabetes control.      The patient received a copy of today's self-management plan and verbalized understanding of the care plan, goals, and all of today's instructions.      The patient was encouraged to communicate with his physician and care team regarding his condition(s) and treatment.  I provided the patient with my contact information today and encouraged him to contact me via phone or patient portal as needed.     Education Units of Time   Time Spent: 60 min

## 2020-08-03 ENCOUNTER — OFFICE VISIT (OUTPATIENT)
Dept: ENDOCRINOLOGY | Facility: CLINIC | Age: 55
End: 2020-08-03
Payer: MEDICAID

## 2020-08-03 ENCOUNTER — NUTRITION (OUTPATIENT)
Dept: DIABETES | Facility: CLINIC | Age: 55
End: 2020-08-03
Payer: MEDICAID

## 2020-08-03 ENCOUNTER — TELEPHONE (OUTPATIENT)
Dept: INTERNAL MEDICINE | Facility: CLINIC | Age: 55
End: 2020-08-03

## 2020-08-03 VITALS
SYSTOLIC BLOOD PRESSURE: 156 MMHG | HEART RATE: 70 BPM | WEIGHT: 229.06 LBS | DIASTOLIC BLOOD PRESSURE: 87 MMHG | TEMPERATURE: 98 F | HEIGHT: 75 IN | BODY MASS INDEX: 28.48 KG/M2

## 2020-08-03 VITALS — BODY MASS INDEX: 28.55 KG/M2 | WEIGHT: 228.38 LBS

## 2020-08-03 DIAGNOSIS — E11.42 TYPE 2 DIABETES MELLITUS WITH DIABETIC POLYNEUROPATHY, WITH LONG-TERM CURRENT USE OF INSULIN: Primary | ICD-10-CM

## 2020-08-03 DIAGNOSIS — E78.5 HYPERLIPIDEMIA, UNSPECIFIED HYPERLIPIDEMIA TYPE: ICD-10-CM

## 2020-08-03 DIAGNOSIS — Z79.4 TYPE 2 DIABETES MELLITUS WITH DIABETIC POLYNEUROPATHY, WITH LONG-TERM CURRENT USE OF INSULIN: Primary | ICD-10-CM

## 2020-08-03 DIAGNOSIS — I10 ESSENTIAL HYPERTENSION: ICD-10-CM

## 2020-08-03 PROCEDURE — 99204 PR OFFICE/OUTPT VISIT, NEW, LEVL IV, 45-59 MIN: ICD-10-PCS | Mod: S$GLB,,, | Performed by: INTERNAL MEDICINE

## 2020-08-03 PROCEDURE — G0108 DIAB MANAGE TRN  PER INDIV: HCPCS | Mod: ,,, | Performed by: DIETITIAN, REGISTERED

## 2020-08-03 PROCEDURE — G0108 PR DIAB MANAGE TRN  PER INDIV: ICD-10-PCS | Mod: ,,, | Performed by: DIETITIAN, REGISTERED

## 2020-08-03 PROCEDURE — 99211 OFF/OP EST MAY X REQ PHY/QHP: CPT | Performed by: DIETITIAN, REGISTERED

## 2020-08-03 PROCEDURE — 99204 OFFICE O/P NEW MOD 45 MIN: CPT | Mod: S$GLB,,, | Performed by: INTERNAL MEDICINE

## 2020-08-03 RX ORDER — METFORMIN HYDROCHLORIDE 500 MG/1
500 TABLET ORAL 2 TIMES DAILY WITH MEALS
Qty: 180 TABLET | Refills: 3 | Status: SHIPPED | OUTPATIENT
Start: 2020-08-03 | End: 2020-11-18 | Stop reason: SDUPTHER

## 2020-08-03 RX ORDER — INSULIN GLARGINE 100 [IU]/ML
40 INJECTION, SOLUTION SUBCUTANEOUS 2 TIMES DAILY
Qty: 15 ML | Refills: 11 | Status: SHIPPED | OUTPATIENT
Start: 2020-08-03 | End: 2020-11-18 | Stop reason: SDUPTHER

## 2020-08-03 RX ORDER — REPAGLINIDE 1 MG/1
1 TABLET ORAL
Qty: 270 TABLET | Refills: 3 | Status: SHIPPED | OUTPATIENT
Start: 2020-08-03 | End: 2020-11-18 | Stop reason: SDUPTHER

## 2020-08-03 NOTE — ASSESSMENT & PLAN NOTE
Reviewed goals of therapy - to get the best control we can without hypoglycemia. Last A1C well above goal   - on exam, pt with obese abdomen but normal/thin legs and arms. 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   - recently seen DM education, encourage him to keep f/u there   - currently on basal insulin BID, not on prandial, not on other meds   - sugars okay, even low in the AM. Then very high by dinner.  Medication changes:   Start: metformin. 500 mg BID   - needs prandial coverage. Discussed options for insulin shots vs PO prandin/starlix. Pt prefers trying to pill   - will go with 1 mg prandin with meals. May be able to be fine with 0.5 mg at breakfast but with how high sugar gets later in the day, suspect 1-2 mg would be better from lunch and dinner.   -Advised frequent self blood glucose monitoring. Patient encouraged to document glucose results and bring them to every clinic visit    -Close adherence to lifestyle changes recommended.    -Periodic follow ups for eye evaluations, foot care suggested.

## 2020-08-03 NOTE — TELEPHONE ENCOUNTER
----- Message from Radha Fernando sent at 8/3/2020 11:47 AM CDT -----  Contact: Self [phone]  Type: Patient Call Back    Who called: Self    What is the request in detail: pt is wondering which doctor did Dr. Baker's patients were referred to so he can schedule an appt    Can the clinic reply by [facility]? Call back    Would the patient rather a call back or a response via [facility]? Call back    Best call back number: [phone]

## 2020-08-03 NOTE — PROGRESS NOTES
Diabetes Education  Author: Delmi Marina RD  Date: 8/3/2020    Diabetes Care Management Summary  Diabetes Education Record Assessment/Progress: Comprehensive/Group     Last A1c:   Lab Results   Component Value Date    HGBA1C 13.1 (H) 07/20/2020     Last Visit with Diabetes Educator: Last Education Visit: Not Found  Last OPCM Referral: : Not Found   Enrolled in OPCM: No                Health Maintenance was reviewed today with patient. Discussed with patient importance of routine eye exams, foot exams/foot care, blood work (i.e.: A1c, microalbumin, and lipid), dental visits, yearly flu vaccine, and pneumonia vaccine as indicated by PCP. Patient verbalized understanding.     Health Maintenance Topics with due status: Not Due       Topic Last Completion Date    Colorectal Cancer Screening 06/07/2016    TETANUS VACCINE 09/24/2018    Influenza Vaccine 09/24/2018    Lipid Panel 07/20/2020    Hemoglobin A1c 07/20/2020    Low Dose Statin 08/03/2020     Health Maintenance Due   Topic Date Due    HIV Screening  06/05/1980    Shingles Vaccine (1 of 2) 06/05/2015    Eye Exam  02/01/2019    Foot Exam  04/02/2019       Nutrition  Meal Planning: 3 meals per day, water  What type of beverages do you drink?: water  Meal Plan 24 Hour Recall - Breakfast: 3 fried chicken wings, green peas and mac n cheese with water- ate this bc he had hypoglycemia  Meal Plan 24 Hour Recall - Lunch: baked chicken with fried okra with water from [employer] chicken  Meal Plan 24 Hour Recall - Dinner: 6 fried chicken wings, green peas and mac n chees with water  Meal Plan 24 Hour Recall - Snack: potato chips                     Diabetes Education Assessment/Progress  Diabetes Disease Process (diabetes disease process and treatment options): Not Covered/Deferred    Nutrition (Incorporating nutritional management into one's lifestyle): Discussion, Needs Instruction, Individual Session, Demonstration, Instructed, Written Materials Provided(He has  omitted all sweet beverages. Ed on carb counting using plate method. Encouraged him to cut back on starch servings at meals to 1-1.5 cups or 2 slices of bread. Encouraged lean protiens and increased consumption of non starchy vegetables.)    Physical Activity (incorporating physical activity into one's lifestyle): Discussion, Needs Instruction, Individual Session(He suggested that he should exercise to improve his DM and weight loss)    Medications (states correct name, dose, onset, peak, duration, side effects & timing of meds): Discussion, Needs Instruction, Individual Session, Instructed(He is no longer taking the lants plus the humalog mix. PP dinner BG is elevated and needs futher medication management. Set up Endo appointment today.)    Monitoring (monitoring blood glucose/other parameters & using results): Discussion, Needs Instruction, Individual Session, Instructed, Written Materials Provided(Fastin(140),140(164),104(210),93(111),89(194),117(194),69(dizzy weak)(169) Before and after mihatw527(351), 296(348), 234(221), 391(337), 197(154), 241(229))    Acute Complications (preventing, detecting, and treating acute complications): Discussion, Demonstration, Needs Instruction, Instructed, Written Materials Provided(Experiencend a hypoglycemic episode this am. Ed on causes, s/s and proper Tx. He ate a large breakfast as treatment)    Chronic Complications (preventing, detecting, and treating chronic complications): Not Covered/Deferred    Clinical (diabetes, other pertinent medical history, and relevant comorbidities reviewed during visit): Discussion, Needs Instruction, Individual Session, Instructed(His weight is stable. He would like to lose weight.)    Cognitive (knowledge of self-management skills, functional health literacy): Discussion, Needs Instruction, Individual Session, Instructed    Psychosocial (emotional response to diabetes): Not Covered/Deferred    Diabetes Distress and Support Systems: Not  Covered/Deferred    Behavioral (readiness for change, lifestyle practices, self-care behaviors): Discussion, Needs Review, Individual Session, Instructed, Written Materials Provided    Goals  Patient has selected/evaluated goals during today's session: Yes, evaluated  Healthy Eating: % Met(limit carbs to 1-1.5 cups or 2 slices of bread per meal)  Met Percentage : 100%  Start Date: 08/03/20  Target Date: 08/18/20  Medications: % Met  Met Percentage : 75%         Diabetes Care Plan/Intervention  Education Plan/Intervention: Individual Follow-Up DSMT, Endocrine Provider Visit Set Up         Today's Self-Management Care Plan was developed with the patient's input and is based on barriers identified during today's assessment.    The long and short-term goals in the care plan were written with the patient/caregiver's input. The patient has agreed to work toward these goals to improve his overall diabetes control.      The patient received a copy of today's self-management plan and verbalized understanding of the care plan, goals, and all of today's instructions.      The patient was encouraged to communicate with his physician and care team regarding his condition(s) and treatment.  I provided the patient with my contact information today and encouraged him to contact me via phone or patient portal as needed.     Education Units of Time   Time Spent: 60 min

## 2020-08-03 NOTE — PATIENT INSTRUCTIONS
For the diabetes:   Decrease insulin to 36 units twice a day.   Restart Metformin 500 mg twice a day.   Start Prandin (repaglinide) 1 mg with meals, up to 3 times a day (if you skip a meal, don't take this medication).    Keep checking the sugar.   - goal AM glucose 90-130s. If you get lower sugars than that, drop the lantus dose another 2 units.      Healthy Meals for Diabetes     A healthcare provider will help you develop a meal plan that fits your needs.   Ask your healthcare team to help you make a meal plan that fits your needs. Your meal plan tells you when to eat your meals and snacks, what kinds of foods to eat, and how much of each food to eat. You dont have to give up all the foods you like. But you do need to follow some guidelines.  Choose healthy carbohydrates  Starches, sugars, and fiber are all types of carbohydrates. Fiber can help lower your cholesterol and triglycerides. Fiber is also healthy for your heart. You should have 20 to 35 grams of total fiber each day. Fiber-rich foods include:  · Whole-grain breads and cereals  · Bulgur wheat  · Brown rice     · Whole-wheat pasta  · Fruits and vegetables  · Dry beans, and peas   Keep track of the amount of carbohydrates you eat. This can help you keep the right balance of physical activity and medicine. The amount of carbohydrates needed will vary for each person. It depends on many things such as your health, the medicines you take, and how active you are. Your healthcare team will help you figure out the right amount of carbohydrates for you. You may start with around 45 to 60 grams of carbohydrates per meal, depending on your situation.   Here are some examples of foods containing about 15 grams of carbohydrates (1 serving of carbohydrates):  · 1/2 cup of canned or frozen fruit  · A small piece of fresh fruit (4 ounces)  · 1 slice of bread  · 1/2 cup of oatmeal  · 1/3 cup of rice  · 4 to 6 crackers  · 1/2 English muffin  · 1/2 cup of black  beans  · 1/4 of a large baked potato (3 ounces)  · 2/3 cup of plain fat-free yogurt  · 1 cup of soup  · 1/2 cup of casserole  · 6 chicken nuggets  · 2-inch-square brownie or cake without frosting  · 2 small cookies  · 1/2 cup of ice cream or sherbet  Choose healthy protein foods  Eating protein that is low in fat can help you control your weight. It also helps keep your heart healthy. Low-fat protein foods include:  · Fish  · Plant proteins, such as dry beans and peas, nuts, and soy products like tofu and soymilk  · Lean meat with all visible fat removed  · Poultry with the skin removed  · Low-fat or nonfat milk, cheese, and yogurt  Limit unhealthy fats and sugar  Saturated and trans fats are unhealthy for your heart. They raise LDL (bad) cholesterol. Fat is also high in calories, so it can make you gain weight. To cut down on unhealthy fats and sugar, limit these foods:  · Butter or margarine  · Palm and palm kernel oils and coconut oil  · Cream  · Cheese  · Gibbs  · Lunch meats     · Ice cream  · Sweet bakery goods such as pies, muffins, and donuts  · Jams and jellies  · Candy bars  · Regular sodas   How much to eat  The amount of food you eat affects your blood sugar. It also affects your weight. Your healthcare team will tell you how much of each type of food you should eat.  · Use measuring cups and spoons and a food scale to measure serving sizes.  · Learn what a correct serving size looks like on your plate. This will help when you are away from home and cant measure your servings.  · Eat only the number of servings given on your meal plan for each food. Dont take seconds.  · Learn to read food labels. Be sure to look at serving size, total carbohydrates, fiber, calories, sugar, and saturated and trans fats. Look for healthier alternatives to foods that have added sugar.  · Plan ahead for parties so you can still have a good time without going overboard with unhealthy food choices. Set a good example  yourself by bringing a healthy dish to pot lucks.   Choose healthy snacks  When it comes to snacks, we usually think about foods with added sugar and fats. But there are many other options for healthier snack choices. Here are a few snack ideas to choose from:  Snacks with less than 5 grams of carbohydrates  · 1 piece of string cheese  · 3 celery sticks plus 1 tablespoon of peanut butter  · 5 cherry tomatoes plus 1 tablespoon of ranch dressing  · 1 hard-boiled egg  · 1/4 cup of fresh blueberries  ·  5 baby carrots  · 1 cup of light popcorn  · 1/2 cup of sugar-free gelatin  · 15 almonds  Snacks with about 10 to 20 grams of carbohydrates  · 1/3 cup of hummus plus 1 cup of fresh cut nonstarchy vegetables (carrots, green peppers, broccoli, celery, or a combination)  · 1/2 cup of fresh or canned fruit plus 1/4 cup of cottage cheese  · 1/2 cup of tuna salad with 4 crackers  · 2 rice cakes and a tablespoon of peanut butter  · 1 small apple or orange  · 3 cups light popcorn  · 1/2 of a turkey sandwich (1 slice of whole-wheat bread, 2 ounces of turkey, and mustard)  Portion sizes are important to controlling your blood sugar and staying at a healthy weight. Stock up on healthy snack items so you always have them on hand.  When to eat  Your meal plan will likely include breakfast, lunch, dinner, and some snacks.  · Try to eat your meals and snacks at about the same times each day.  · Eat all your meals and snacks. Skipping a meal or snack can make your blood sugar drop too low. It can also cause you to eat too much at the next meal or snack. Then your blood sugar could get too high.  Date Last Reviewed: 7/1/2016  © 7659-4779 [employer]. [address] [place] [address], [place] [address]. All rights reserved. This information is not intended as a substitute for professional medical care. Always follow your healthcare professional's instructions.

## 2020-08-03 NOTE — ASSESSMENT & PLAN NOTE
bp high today   - last few BP have been fine   - continue meds, continue to monitor.    - consider checking bp at home sometimes. If BP remains high would need more medication

## 2020-08-03 NOTE — PROGRESS NOTES
Subjective:      Chief Complaint: Establish Care and Diabetes (type 2)      HPI: Juan Manuel Johnson is a 55 y.o. male who is here for an initial evaluation for diabetes.    With regards to the diabetes:  Diagnosed with T2DM since over 15 years ago    Known complications:     Retinopathy? No    Nephropathy? No    Neuropathy? Yes    CAD? No    CVA? No    Current regimen:   Lantus 40 units BID     Missed doses? denies    Prior treatments:     Humalog 40 units BID    mixed insulin   Metformin   Glyburide   Tanzeum   Admelog    # times a day testin/day  Log reviewed: Yes    Pre breakfast:    After breakfast: 101-210  Pre dinner: 197-391  qHS: 154-351    Hypoglycemic events? Rare, maybe one a month. Had some symptoms with the sugar to 69.    Lifestyle modification:   DM education - last visit: 8/3/2020   Exercise: walks   Diet/typical meals: working on it. Largest meal is dinner.     Soda: avoids   snacks: Yes. Chips.    Current symptoms:   polyuria and polydipsia  Pt denies:   vision changes, nausea, vomit and diarrhea    Diabetes Management Status    Statin: Taking  ACE/ARB: Taking    Screening or Prevention Patient's value Goal Complete/Controlled?   HgA1C Testing and Control   Lab Results   Component Value Date    HGBA1C 13.1 (H) 2020      q6months/Less than 7.5% No   Lipid profile : 2020 Annually Yes   LDL control Lab Results   Component Value Date    LDLCALC 116.2 2020    Annually/Less than 100 mg/dl  No   Nephropathy screening No results found for: MICALBCREAT  Lab Results   Component Value Date    CREATININE 1.2 2020     Lab Results   Component Value Date    PROTEINUA Trace (A) 2019    Annually No   Blood pressure BP Readings from Last 1 Encounters:   20 (!) 156/87    Less than 140/90 No   Dilated retinal exam : 2018 Annually No   Foot exam   : 2018 Annually No     Reviewed past medical, family, social history and updated as appropriate.    Review of Systems    Constitutional: Positive for unexpected weight change (gained 7 lbs since COVID).   HENT: Negative for trouble swallowing.    Eyes: Negative for visual disturbance.   Respiratory: Negative for shortness of breath.    Cardiovascular: Negative for palpitations.   Gastrointestinal: Negative for constipation, diarrhea and nausea.   Endocrine: Positive for polydipsia and polyuria.   Genitourinary: Positive for frequency.   Neurological: Positive for dizziness (with low sugar). Negative for light-headedness.   Psychiatric/Behavioral: Positive for sleep disturbance.     Objective:     Vitals:    08/03/20 1109   BP: (!) 156/87   Pulse: 70   Temp: 98.1 °F (36.7 °C)     BP Readings from Last 5 Encounters:   08/03/20 (!) 156/87   07/20/20 120/88   07/15/19 130/78   06/18/19 130/82   04/09/19 130/85     Physical Exam  Vitals signs reviewed.   Constitutional:       Appearance: He is well-developed.   HENT:      Head: Normocephalic and atraumatic.   Neck:      Musculoskeletal: Normal range of motion and neck supple.      Thyroid: No thyromegaly.   Cardiovascular:      Rate and Rhythm: Normal rate and regular rhythm.      Heart sounds: No murmur.   Pulmonary:      Effort: Pulmonary effort is normal.      Breath sounds: Normal breath sounds.   Abdominal:      General: There is distension.      Palpations: Abdomen is soft. There is no mass.      Tenderness: There is no abdominal tenderness.   Musculoskeletal: Normal range of motion.         General: No tenderness.   Neurological:      Mental Status: He is alert and oriented to person, place, and time.   Psychiatric:         Judgment: Judgment normal.         Wt Readings from Last 5 Encounters:   08/03/20 1109 103.9 kg (229 lb 0.9 oz)   08/03/20 1006 103.6 kg (228 lb 6.3 oz)   07/27/20 0803 103.7 kg (228 lb 9.9 oz)   07/20/20 1405 101.2 kg (223 lb 1.7 oz)   07/15/19 1007 99.4 kg (219 lb 2.2 oz)       Lab Results   Component Value Date    HGBA1C 13.1 (H) 07/20/2020    HGBA1C 10.7  (H) 06/18/2019    HGBA1C 10.8 (H) 09/24/2018    HGBA1C >14.0 (H) 07/02/2018     Lab Results   Component Value Date    CHOL 171 07/20/2020    CHOL 175 06/18/2019    HDL 34 (L) 07/20/2020    HDL 37 (L) 06/18/2019    LDLCALC 116.2 07/20/2020    LDLCALC 93.6 06/18/2019    TRIG 104 07/20/2020    TRIG 222 (H) 06/18/2019    CHOLHDL 19.9 (L) 07/20/2020    CHOLHDL 21.1 06/18/2019     Lab Results   Component Value Date     07/20/2020    K 3.8 07/20/2020     07/20/2020    CO2 25 07/20/2020     (H) 07/20/2020    BUN 13 07/20/2020    CREATININE 1.2 07/20/2020    CALCIUM 10.1 07/20/2020    PROT 8.3 07/20/2020    ALBUMIN 4.2 07/20/2020    BILITOT 0.5 07/20/2020    ALKPHOS 80 07/20/2020    AST 19 07/20/2020    ALT 13 07/20/2020    ANIONGAP 15 07/20/2020    ESTGFRAFRICA >60 07/20/2020    EGFRNONAA >60 07/20/2020    TSH 1.674 07/20/2020      Assessment/Plan:     Type 2 diabetes mellitus with neurologic complication, with long-term current use of insulin  Reviewed goals of therapy - to get the best control we can without hypoglycemia. Last A1C well above goal   - on exam, pt with obese abdomen but normal/thin legs and arms. 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   - recently seen DM education, encourage him to keep f/u there   - currently on basal insulin BID, not on prandial, not on other meds   - sugars okay, even low in the AM. Then very high by dinner.  Medication changes:   Start: metformin. 500 mg BID   - needs prandial coverage. Discussed options for insulin shots vs PO prandin/starlix. Pt prefers trying to pill   - will go with 1 mg prandin with meals. May be able to be fine with 0.5 mg at breakfast but with how high sugar gets later in the day, suspect 1-2 mg would be better from lunch and dinner.   -Advised frequent self blood glucose monitoring. Patient encouraged to document glucose results and bring them to every clinic visit    -Close adherence to lifestyle changes recommended.     -Periodic follow ups for eye evaluations, foot care suggested.      Hyperlipidemia  Last ldl above goal   - now on statin   - continue    - recheck after 6 months    Hypertension  bp high today   - last few BP have been fine   - continue meds, continue to monitor.    - consider checking bp at home sometimes. If BP remains high would need more medication      Follow up in about 3 months (around 11/3/2020) for lab review, further monitoring.      Abdirahman Cerda MD  Endocrinology

## 2020-08-10 ENCOUNTER — TELEPHONE (OUTPATIENT)
Dept: INTERNAL MEDICINE | Facility: CLINIC | Age: 55
End: 2020-08-10

## 2020-08-10 NOTE — TELEPHONE ENCOUNTER
----- Message from Hilda Ca sent at 8/10/2020  9:42 AM CDT -----  Type:  Sooner Appointment Request    Patient is requesting a sooner appointment.  Patient declined first available appointment listed as well as another facility and provider .  Patient will not accept being placed on the waitlist and is requesting a message be sent to doctor.    Name of Caller: pt     When is the first available appointment? Nothing available     Symptoms: est care    Would the patient rather a call back or a response via [facility]? Call back     Best Call Back Number:  [phone]    Additional Information:  forme pt of Dr. Baker

## 2020-08-17 ENCOUNTER — PATIENT OUTREACH (OUTPATIENT)
Dept: ADMINISTRATIVE | Facility: OTHER | Age: 55
End: 2020-08-17

## 2020-08-17 NOTE — PROGRESS NOTES
Chart reviewed.   Immunizations: Triggered Imm Registry     Orders placed: n/a  Upcoming appts to satisfy RE[facility]AN topics: n/a

## 2020-08-18 ENCOUNTER — NUTRITION (OUTPATIENT)
Dept: DIABETES | Facility: CLINIC | Age: 55
End: 2020-08-18
Payer: MEDICAID

## 2020-08-18 VITALS — WEIGHT: 227.06 LBS | BODY MASS INDEX: 28.38 KG/M2

## 2020-08-18 DIAGNOSIS — Z79.4 TYPE 2 DIABETES MELLITUS WITH DIABETIC POLYNEUROPATHY, WITH LONG-TERM CURRENT USE OF INSULIN: Primary | ICD-10-CM

## 2020-08-18 DIAGNOSIS — E11.42 TYPE 2 DIABETES MELLITUS WITH DIABETIC POLYNEUROPATHY, WITH LONG-TERM CURRENT USE OF INSULIN: Primary | ICD-10-CM

## 2020-08-18 PROCEDURE — G0108 PR DIAB MANAGE TRN  PER INDIV: ICD-10-PCS | Mod: ,,, | Performed by: DIETITIAN, REGISTERED

## 2020-08-18 PROCEDURE — G0108 DIAB MANAGE TRN  PER INDIV: HCPCS | Mod: ,,, | Performed by: DIETITIAN, REGISTERED

## 2020-08-19 NOTE — PROGRESS NOTES
Diabetes Education  Author: Delmi Marina RD  Date: 8/19/2020    Diabetes Care Management Summary  Diabetes Education Record Assessment/Progress: Comprehensive/Group     Last A1c:   Lab Results   Component Value Date    HGBA1C 13.1 (H) 07/20/2020     Last Visit with Diabetes Educator: Last Education Visit: Not Found  Last OPCM Referral: : Not Found   Enrolled in OPCM: No      Diabetes Type  Diabetes Type : Type II    Diabetes History  Current Treatment: Oral Medication(metformin and starlix)    Health Maintenance was reviewed today with patient. Discussed with patient importance of routine eye exams, foot exams/foot care, blood work (i.e.: A1c, microalbumin, and lipid), dental visits, yearly flu vaccine, and pneumonia vaccine as indicated by PCP. Patient verbalized understanding.     Health Maintenance Topics with due status: Not Due       Topic Last Completion Date    Colorectal Cancer Screening 06/07/2016    TETANUS VACCINE 09/24/2018    Influenza Vaccine 09/24/2018    Lipid Panel 07/20/2020    Hemoglobin A1c 07/20/2020    Low Dose Statin 08/03/2020     Health Maintenance Due   Topic Date Due    HIV Screening  06/05/1980    Shingles Vaccine (1 of 2) 06/05/2015    Eye Exam  02/01/2019    Foot Exam  04/02/2019       Nutrition  What type of beverages do you drink?: water, regular soda/tea(soda about every 3 days)  Meal Plan 24 Hour Recall - Breakfast: 2 hot dogs with buns chili and water  Meal Plan 24 Hour Recall - Lunch: sandwiches on sliced wheat bread, yesterday ate canned meatballs and spaghetti  Meal Plan 24 Hour Recall - Dinner: seafood and sausage gumbo with 1/3 cup of rice with water  Meal Plan 24 Hour Recall - Snack: snacking on candies, skilltles and mini chocolates. He even eats these in the middle of the night    Monitoring   Self Monitoring : Before [id number],86,152,153,81,152,111 After lunch 178, before dinner:137,228,288,96,202,239,193 After dinner:152,124,277,158,201,191  Blood Glucose  Logs: Yes  Do you use a personal continuous glucose monitor?: No         Current Diabetes Treatment   Current Treatment: Oral Medication(metformin and starlix)                Diabetes Education Assessment/Progress  Nutrition (Incorporating nutritional management into one's lifestyle): Discussion, Needs Instruction, Individual Session, Instructed, Written Materials Provided(Admits to fasting BG elevations bc he eats candies in the middle of the night. Is still drinking  a can of soda once every 3 days. Ed on carb counting, Stressed limit servings to 3 per meal. He agrees to move the candy out of his bedside table. Will work to eat less candy and sugary foods throughout the day as well.     Physical Activity (incorporating physical activity into one's lifestyle): Not Covered/Deferred    Medications (states correct name, dose, onset, peak, duration, side effects & timing of meds): Discussion, Needs Instruction, Individual Session, Instructed(Admits to skipping some doeses of prandin but takes them at most meals. Ed on mech and timing of prandin stressed skip if he skips a meal.)    Monitoring (monitoring blood glucose/other parameters & using results): Discussion, Needs Instruction, Individual Session, Instructed, Written Materials Provided(Reviewed Bg pattern recognition)    Acute Complications (preventing, detecting, and treating acute complications): Not Covered/Deferred    Chronic Complications (preventing, detecting, and treating chronic complications): Not Covered/Deferred    Clinical (diabetes, other pertinent medical history, and relevant comorbidities reviewed during visit): Not Covered/Deferred    Cognitive (knowledge of self-management skills, functional health literacy): Not Covered/Deferred    Psychosocial (emotional response to diabetes): Not Covered/Deferred    Diabetes Distress and Support Systems: Not Covered/Deferred    Behavioral (readiness for change, lifestyle practices, self-care behaviors):  Discussion, Needs Instruction, Individual Session, Instructed, Written Materials Provided    Goals  Patient has selected/evaluated goals during today's session: Yes, selected  Healthy Eating: Set(remove all candy from bedside table)  Start Date: 08/18/20  Target Date: 09/14/20         Diabetes Care Plan/Intervention  Education Plan/Intervention: Individual Follow-Up DSMT         Today's Self-Management Care Plan was developed with the patient's input and is based on barriers identified during today's assessment.    The long and short-term goals in the care plan were written with the patient/caregiver's input. The patient has agreed to work toward these goals to improve his overall diabetes control.      The patient received a copy of today's self-management plan and verbalized understanding of the care plan, goals, and all of today's instructions.      The patient was encouraged to communicate with his physician and care team regarding his condition(s) and treatment.  I provided the patient with my contact information today and encouraged him to contact me via phone or patient portal as needed.     Education Units of Time   Time Spent: 60 min

## 2020-09-10 ENCOUNTER — HOSPITAL ENCOUNTER (EMERGENCY)
Facility: OTHER | Age: 55
Discharge: HOME | End: 2020-09-10
Attending: EMERGENCY MEDICINE
Payer: MEDICAID

## 2020-09-10 VITALS
HEART RATE: 79 BPM | SYSTOLIC BLOOD PRESSURE: 125 MMHG | BODY MASS INDEX: 34.4 KG/M2 | DIASTOLIC BLOOD PRESSURE: 86 MMHG | RESPIRATION RATE: 18 BRPM | HEIGHT: 68 IN | TEMPERATURE: 98 F | WEIGHT: 227 LBS | OXYGEN SATURATION: 97 %

## 2020-09-10 DIAGNOSIS — T14.8XXA PUNCTURE WOUND: ICD-10-CM

## 2020-09-10 PROCEDURE — 99284 EMERGENCY DEPT VISIT MOD MDM: CPT | Mod: 25

## 2020-09-10 PROCEDURE — 25000003 PHARM REV CODE 250: Performed by: PHYSICIAN ASSISTANT

## 2020-09-10 RX ORDER — MUPIROCIN 20 MG/G
1 OINTMENT TOPICAL
Status: COMPLETED | OUTPATIENT
Start: 2020-09-10 | End: 2020-09-10

## 2020-09-10 RX ORDER — LEVOFLOXACIN 500 MG/1
500 TABLET, FILM COATED ORAL DAILY
Qty: 5 TABLET | Refills: 0 | Status: SHIPPED | OUTPATIENT
Start: 2020-09-10 | End: 2020-09-15

## 2020-09-10 RX ORDER — CEPHALEXIN 500 MG/1
500 CAPSULE ORAL 4 TIMES DAILY
Qty: 20 CAPSULE | Refills: 0 | Status: SHIPPED | OUTPATIENT
Start: 2020-09-10 | End: 2020-09-15

## 2020-09-10 RX ORDER — ONDANSETRON 4 MG/1
4 TABLET, ORALLY DISINTEGRATING ORAL EVERY 8 HOURS PRN
Qty: 12 TABLET | Refills: 0 | Status: SHIPPED | OUTPATIENT
Start: 2020-09-10 | End: 2021-03-30

## 2020-09-10 RX ORDER — MUPIROCIN 20 MG/G
OINTMENT TOPICAL 3 TIMES DAILY
Qty: 22 G | Refills: 0 | Status: SHIPPED | OUTPATIENT
Start: 2020-09-10 | End: 2020-09-20

## 2020-09-10 RX ADMIN — MUPIROCIN 22 G: 20 OINTMENT TOPICAL at 12:09

## 2020-09-10 NOTE — ED PROVIDER NOTES
Encounter Date: 9/10/2020       History     Chief Complaint   Patient presents with    stepped on nail     pt with c/o stepped on nail i right foot yesterday.      Patient is 55 year old male history of DM, HTN, HLD who presents with complaints of right plantar puncture wound. He reports that he was wearing sneakers  Yesterday when he stepped on a wendi nail that went straight through the bottom of his shoe. He reports that there was only minimal pain with no bleeding. He reports that because of his medical history he was told to come tot he ER to get checked out. He reports that he took a dose of PCN today but quickly vomited the medication because he took it on an empty stomach. He denies fever, chills, nausea, vomiting, chest pain or SOB. He does have an [facility]. He is currently unaccompanied in the ER.         Review of patient's allergies indicates:  No Known Allergies  Past Medical History:   Diagnosis Date    Diabetes mellitus     Diabetes mellitus, type 2     Hepatitis C     Hepatitis C, chronic 2013    Hyperlipidemia     Hypertension     Hypothyroidism      Past Surgical History:   Procedure Laterality Date    ANKLE SURGERY Left     COLONOSCOPY      Normal by patient reporting     ELBOW SURGERY Right     EXTERNAL EAR SURGERY Left     WRIST SURGERY Left      Family History   Problem Relation Age of Onset    Diabetes Mother     Hypertension Mother      Social History     Tobacco Use    Smoking status: Former Smoker     Types: Cigarettes     Quit date: 1995     Years since quittin.2    Smokeless tobacco: Never Used   Substance Use Topics    Alcohol use: Not Currently    Drug use: Not Currently     Types: Cocaine     Review of Systems   Constitutional: Negative for fever.   HENT: Negative for sore throat.    Respiratory: Negative for shortness of breath.    Cardiovascular: Negative for chest pain.   Gastrointestinal: Negative for nausea.   Genitourinary: Negative  for dysuria.   Musculoskeletal: Negative for back pain.   Skin: Negative for rash.        Plantar puncture wound    Neurological: Negative for weakness.   Hematological: Does not bruise/bleed easily.       Physical Exam     Initial Vitals [09/10/20 1050]   BP Pulse Resp Temp SpO2   120/85 84 18 98.3 °F (36.8 °C) 97 %      MAP       --         Physical Exam    Nursing note and vitals reviewed.  Constitutional: He appears well-developed and well-nourished. He is not diaphoretic. No distress.   Healthy appearing male in NAD or apparent pain. He makes good eye contact, speaks in clear full sentences and ambulates with ease.    HENT:   Head: Normocephalic and atraumatic.   Eyes: Conjunctivae and EOM are normal. Pupils are equal, round, and reactive to light.   Neck: Normal range of motion.   Cardiovascular: Normal rate, regular rhythm and normal heart sounds.   Pulmonary/Chest: Breath sounds normal.   Abdominal: Soft.   Musculoskeletal: Normal range of motion. No tenderness or edema.   Neurological: He is alert and oriented to person, place, and time. He has normal strength. GCS score is 15. GCS eye subscore is 4. GCS verbal subscore is 5. GCS motor subscore is 6.   Skin: Skin is warm. Capillary refill takes less than 2 seconds.   There is a 3 mm puncture wound to the plantar surface of the right foot with no bleeding or purulence. There is only minimal TTP over the puncture wound. There is normal foot exam otherwise.    Psychiatric: He has a normal mood and affect. His behavior is normal. Thought content normal.         ED Course   Procedures  Labs Reviewed - No data to display        Imaging Results          X-Ray Foot Complete Right (Final result)  Result time 09/10/20 11:57:53    Final result by Oma Ball MD (09/10/20 11:57:53)                 Impression:      No acute osseous abnormality seen.      Electronically signed by: Oma Ball  Date:    09/10/2020  Time:    11:57             Narrative:     EXAMINATION:  XR FOOT COMPLETE 3 VIEW RIGHT    CLINICAL HISTORY:  . Other injury of unspecified body region, initial encounter    TECHNIQUE:  AP, lateral, and oblique views of the right foot were performed.    COMPARISON:  None    FINDINGS:  No acute fracture or dislocation seen.  No soft tissue edema or radiopaque retained foreign body.                                   Medical Decision Making:   ED Management:  Urgent evaluation of 55 year old male who presents with complaints of plantar puncture wound. He is afebrile, non-toxic apeparing and hemodynamically stable. Physical exam outlined above and reveals small puncture wound with no repair warranted. No fb or contamination appreciated. X-ray reveals no fb or acute osseous process. Will cover with cipro and keflex for complete coverage including pseudomonas. His tetanus is already up to date.  Patient is discharged with Zofran, Bactroban and refill on blood sugar testing strips as per his request.  He is educated on the importance of monitoring his wound carefully and returning to the emergency department if any worsening presents.  Otherwise he is instructed to follow up with his already established podiatrist within a week for wound recheck.  Patient verbalizes understanding and is stable for discharge.                             Clinical Impression:       ICD-10-CM ICD-9-CM   1. Puncture wound  T14.8XXA 879.8                                  Syeda Kenney PA-C  09/11/20 1218

## 2020-09-10 NOTE — ED NOTES
Pt reports he stepped on nail x yesterday. Pt with superficial puncture wound to bottom of RIGHT foot. Denies other complaints. Pt AAOx4 and appropriate at this time. Respirations even and unlabored. No acute distress noted.

## 2020-09-14 ENCOUNTER — NUTRITION (OUTPATIENT)
Dept: DIABETES | Facility: CLINIC | Age: 55
End: 2020-09-14
Payer: MEDICAID

## 2020-09-14 VITALS — BODY MASS INDEX: 34.12 KG/M2 | WEIGHT: 224.44 LBS

## 2020-09-14 DIAGNOSIS — Z79.4 TYPE 2 DIABETES MELLITUS WITH DIABETIC POLYNEUROPATHY, WITH LONG-TERM CURRENT USE OF INSULIN: Primary | ICD-10-CM

## 2020-09-14 DIAGNOSIS — E11.42 TYPE 2 DIABETES MELLITUS WITH DIABETIC POLYNEUROPATHY, WITH LONG-TERM CURRENT USE OF INSULIN: Primary | ICD-10-CM

## 2020-09-14 PROCEDURE — G0108 DIAB MANAGE TRN  PER INDIV: HCPCS | Mod: ,,, | Performed by: DIETITIAN, REGISTERED

## 2020-09-14 PROCEDURE — G0108 PR DIAB MANAGE TRN  PER INDIV: ICD-10-PCS | Mod: ,,, | Performed by: DIETITIAN, REGISTERED

## 2020-09-25 ENCOUNTER — PATIENT OUTREACH (OUTPATIENT)
Dept: ADMINISTRATIVE | Facility: HOSPITAL | Age: 55
End: 2020-09-25

## 2020-09-25 DIAGNOSIS — E11.42 TYPE 2 DIABETES MELLITUS WITH DIABETIC POLYNEUROPATHY, WITH LONG-TERM CURRENT USE OF INSULIN: Primary | ICD-10-CM

## 2020-09-25 DIAGNOSIS — Z79.4 TYPE 2 DIABETES MELLITUS WITH DIABETIC POLYNEUROPATHY, WITH LONG-TERM CURRENT USE OF INSULIN: Primary | ICD-10-CM

## 2020-10-24 ENCOUNTER — PATIENT OUTREACH (OUTPATIENT)
Dept: ADMINISTRATIVE | Facility: OTHER | Age: 55
End: 2020-10-24

## 2020-10-24 NOTE — PROGRESS NOTES
Health Maintenance Due   Topic Date Due    HIV Screening  06[birth year]05/1980    Shingles Vaccine (1 of 2) 06/05/2015    Eye Exam  02/01/2019    Foot Exam  04/02/2019    Influenza Vaccine (1) 08/01/2020    Hemoglobin A1c  10/20/2020     Updates were requested from care everywhere.  Chart was reviewed for overdue Proactive [facility] Encounters ([facility]) topics (CRS, Breast Cancer Screening, Eye exam)  Health Maintenance has been updated.  LINKS immunization registry triggered.  Immunizations were reconciled.

## 2020-10-27 ENCOUNTER — NUTRITION (OUTPATIENT)
Dept: DIABETES | Facility: CLINIC | Age: 55
End: 2020-10-27
Payer: MEDICAID

## 2020-10-27 ENCOUNTER — LAB VISIT (OUTPATIENT)
Dept: LAB | Facility: OTHER | Age: 55
End: 2020-10-27
Attending: INTERNAL MEDICINE
Payer: MEDICAID

## 2020-10-27 VITALS — BODY MASS INDEX: 33.35 KG/M2 | WEIGHT: 219.38 LBS

## 2020-10-27 DIAGNOSIS — Z79.4 TYPE 2 DIABETES MELLITUS WITH DIABETIC POLYNEUROPATHY, WITH LONG-TERM CURRENT USE OF INSULIN: Primary | ICD-10-CM

## 2020-10-27 DIAGNOSIS — E11.42 TYPE 2 DIABETES MELLITUS WITH DIABETIC POLYNEUROPATHY, WITH LONG-TERM CURRENT USE OF INSULIN: Primary | ICD-10-CM

## 2020-10-27 DIAGNOSIS — Z79.4 TYPE 2 DIABETES MELLITUS WITH DIABETIC POLYNEUROPATHY, WITH LONG-TERM CURRENT USE OF INSULIN: ICD-10-CM

## 2020-10-27 DIAGNOSIS — E11.42 TYPE 2 DIABETES MELLITUS WITH DIABETIC POLYNEUROPATHY, WITH LONG-TERM CURRENT USE OF INSULIN: ICD-10-CM

## 2020-10-27 LAB
ANION GAP SERPL CALC-SCNC: 8 MMOL/L (ref 8–16)
BUN SERPL-MCNC: 12 MG/DL (ref 6–20)
CALCIUM SERPL-MCNC: 9.8 MG/DL (ref 8.7–10.5)
CHLORIDE SERPL-SCNC: 104 MMOL/L (ref 95–110)
CO2 SERPL-SCNC: 28 MMOL/L (ref 23–29)
CREAT SERPL-MCNC: 1.1 MG/DL (ref 0.5–1.4)
EST. GFR  (AFRICAN AMERICAN): >60 ML/MIN/1.73 M^2
EST. GFR  (NON AFRICAN AMERICAN): >60 ML/MIN/1.73 M^2
ESTIMATED AVG GLUCOSE: 177 MG/DL (ref 68–131)
GLUCOSE SERPL-MCNC: 109 MG/DL (ref 70–110)
HBA1C MFR BLD HPLC: 7.8 % (ref 4–5.6)
POTASSIUM SERPL-SCNC: 4.4 MMOL/L (ref 3.5–5.1)
SODIUM SERPL-SCNC: 140 MMOL/L (ref 136–145)

## 2020-10-27 PROCEDURE — G0108 PR DIAB MANAGE TRN  PER INDIV: ICD-10-PCS | Mod: ,,, | Performed by: DIETITIAN, REGISTERED

## 2020-10-27 PROCEDURE — 36415 COLL VENOUS BLD VENIPUNCTURE: CPT

## 2020-10-27 PROCEDURE — 83036 HEMOGLOBIN GLYCOSYLATED A1C: CPT

## 2020-10-27 PROCEDURE — 80048 BASIC METABOLIC PNL TOTAL CA: CPT

## 2020-10-27 PROCEDURE — G0108 DIAB MANAGE TRN  PER INDIV: HCPCS | Mod: ,,, | Performed by: DIETITIAN, REGISTERED

## 2020-10-27 NOTE — PROGRESS NOTES
Diabetes Education  Author: Delmi Marina RD  Date: 10/27/2020    Diabetes Care Management Summary  Diabetes Education Record Assessment/Progress: Comprehensive/Group     Last A1c:   Lab Results   Component Value Date    HGBA1C 13.1 (H) 07/20/2020     Last Visit with Diabetes Educator: Last Education Visit: Not Found  Last OPCM Referral: : Not Found   Enrolled in OPCM: No                Health Maintenance was reviewed today with patient. Discussed with patient importance of routine eye exams, foot exams/foot care, blood work (i.e.: A1c, microalbumin, and lipid), dental visits, yearly flu vaccine, and pneumonia vaccine as indicated by PCP. Patient verbalized understanding.     Health Maintenance Topics with due status: Not Due       Topic Last Completion Date    Colorectal Cancer Screening 06/07/2016    TETANUS VACCINE 09/24/2018    Lipid Panel 07/20/2020    Low Dose Statin 08/03/2020     Health Maintenance Due   Topic Date Due    HIV Screening  06/05/1980    Shingles Vaccine (1 of 2) 06/05/2015    Eye Exam  02/01/2019    Foot Exam  04/02/2019    Influenza Vaccine (1) 08/01/2020    Hemoglobin A1c  10/20/2020                   Diabetes Education Assessment/Progress  Diabetes Disease Process (diabetes disease process and treatment options): Discussion, Needs Instruction, Individual Session, Instructed(Continues to lose weight.)  ABC's of Diabetes    Discussed imporance of A1c less than 8% to reduce risk of micro and macro complications, controlled Blood Pressure and Cholesterol Lab Values--Total Cholesterol <200mg, LDL < 100, HDL >45 men and >50 women, Triglycerides <150mg for prevention heart disease, heart attach, stroke.    Nutrition (Incorporating nutritional management into one's lifestyle): Discussion, Needs Review, Individual Session, Instructed(No longer has candy by his bed, and has omitted all candy and sweet drinks from his diet. He is eating smaller portions.)    Physical Activity  (incorporating physical activity into one's lifestyle): Discussion, Needs Instruction, Individual Session, Instructed, Written Materials Provided   Ed on ADA recs for physical activity and how activity reduces insulin resistance     Medications (states correct name, dose, onset, peak, duration, side effects & timing of meds): Discussion, Individual Session, Needs Review, Instructed, Written Materials Provided(Is taking all meds as orderd. Is no longer skipping his prandin. Disposing of needles in a sharps container.     Monitoring (monitoring blood glucose/other parameters & using results): Discussion, Needs Review, Individual Session, Instructed, Written Materials Provided(before breakfast:117,131,97,92,91, 82. PP breakfast:146,149,150,156,158,175 Before dinner:148,170,103,157,150 PP dinner:190,186,197,173. Agrees to cont to SMBG 3-4 times per day and bring logs to all clinic visits)    Acute Complications (preventing, detecting, and treating acute complications): Discussion, Demonstration, Needs Instruction, Individual Session, Instructed, Written Materials Provided(Had a 64 when he woke up, States ate breakfast immediatly, He had  no symptoms. Ed on causes, s/s and Tx for hypoglycemia. STressed report hypoglycemia to dr if occurs 1-2 times per week)    Chronic Complications (preventing, detecting, and treating chronic complications): Discussion, Needs Instruction, Individual Session, Instructed, Written Materials Provided   Ed on how improved BG control reduces risk of complications    Ed on importance of annual health maintenance screenings to catch changes early     Clinical (diabetes, other pertinent medical history, and relevant comorbidities reviewed during visit): Not Covered/Deferred    Cognitive (knowledge of self-management skills, functional health literacy): Not Covered/Deferred    Psychosocial (emotional response to diabetes): Discussion, Needs Review, Individual Session, Instructed, Comprehends  Key Points(Pt is very happy with his improved BG. He is very proud of the changes he has made)   Encouraged and supported pt in building self-confidence to continue making small steps towards long term change   Incorporated problem-solving scenarios into education material    Diabetes Distress and Support Systems: Discussion, Individual Session, Requires Assistance Family/SO, Comprehends Key Points(He has a very supportive wife and family)    Behavioral (readiness for change, lifestyle practices, self-care behaviors): Discussion, Needs Review, Written Materials Provided, Individual Session, Instructed   Pt is in action stage of change.    Has been making lifestyle modifications and adjustments as goals are set and accomplished.    Assisted pt w/ problem-solving and strategizing how to continue/maintain new behaviors for the long haul.       Goals  Patient has selected/evaluated goals during today's session: Yes, evaluated  Monitoring: % Met  Met Percentage : 100%                   Today's Self-Management Care Plan was developed with the patient's input and is based on barriers identified during today's assessment.    The long and short-term goals in the care plan were written with the patient/caregiver's input. The patient has agreed to work toward these goals to improve his overall diabetes control.      The patient received a copy of today's self-management plan and verbalized understanding of the care plan, goals, and all of today's instructions.      The patient was encouraged to communicate with his physician and care team regarding his condition(s) and treatment.  I provided the patient with my contact information today and encouraged him to contact me via phone or patient portal as needed.

## 2020-11-02 PROBLEM — N52.9 ERECTILE DYSFUNCTION: Status: ACTIVE | Noted: 2020-11-02

## 2020-11-02 PROBLEM — G62.9 PERIPHERAL NEUROPATHY: Status: ACTIVE | Noted: 2020-11-02

## 2020-11-02 PROBLEM — B18.2 CHRONIC HEPATITIS C WITHOUT HEPATIC COMA: Status: ACTIVE | Noted: 2019-06-18

## 2020-11-02 PROBLEM — E78.2 MIXED HYPERLIPIDEMIA: Status: ACTIVE | Noted: 2018-02-06

## 2020-11-18 ENCOUNTER — OFFICE VISIT (OUTPATIENT)
Dept: ENDOCRINOLOGY | Facility: CLINIC | Age: 55
End: 2020-11-18
Payer: MEDICAID

## 2020-11-18 VITALS
HEIGHT: 68 IN | SYSTOLIC BLOOD PRESSURE: 162 MMHG | DIASTOLIC BLOOD PRESSURE: 91 MMHG | WEIGHT: 220.5 LBS | OXYGEN SATURATION: 97 % | BODY MASS INDEX: 33.42 KG/M2 | HEART RATE: 70 BPM

## 2020-11-18 DIAGNOSIS — E11.8 TYPE 2 DIABETES MELLITUS WITH COMPLICATION, WITH LONG-TERM CURRENT USE OF INSULIN: ICD-10-CM

## 2020-11-18 DIAGNOSIS — E78.2 MIXED HYPERLIPIDEMIA: ICD-10-CM

## 2020-11-18 DIAGNOSIS — I10 ESSENTIAL HYPERTENSION: ICD-10-CM

## 2020-11-18 DIAGNOSIS — E78.5 HYPERLIPIDEMIA, UNSPECIFIED HYPERLIPIDEMIA TYPE: ICD-10-CM

## 2020-11-18 DIAGNOSIS — E03.9 HYPOTHYROIDISM, UNSPECIFIED TYPE: ICD-10-CM

## 2020-11-18 DIAGNOSIS — E11.42 TYPE 2 DIABETES MELLITUS WITH DIABETIC POLYNEUROPATHY, WITH LONG-TERM CURRENT USE OF INSULIN: ICD-10-CM

## 2020-11-18 DIAGNOSIS — E66.09 CLASS 1 OBESITY DUE TO EXCESS CALORIES WITH SERIOUS COMORBIDITY AND BODY MASS INDEX (BMI) OF 33.0 TO 33.9 IN ADULT: ICD-10-CM

## 2020-11-18 DIAGNOSIS — E11.42 DIABETIC POLYNEUROPATHY ASSOCIATED WITH TYPE 2 DIABETES MELLITUS: ICD-10-CM

## 2020-11-18 DIAGNOSIS — Z79.4 TYPE 2 DIABETES MELLITUS WITH COMPLICATION, WITH LONG-TERM CURRENT USE OF INSULIN: ICD-10-CM

## 2020-11-18 DIAGNOSIS — Z79.4 TYPE 2 DIABETES MELLITUS WITH DIABETIC POLYNEUROPATHY, WITH LONG-TERM CURRENT USE OF INSULIN: ICD-10-CM

## 2020-11-18 PROBLEM — E66.9 OBESE: Status: ACTIVE | Noted: 2020-11-18

## 2020-11-18 PROCEDURE — 99214 OFFICE O/P EST MOD 30 MIN: CPT | Mod: S$GLB,,, | Performed by: INTERNAL MEDICINE

## 2020-11-18 PROCEDURE — 99214 PR OFFICE/OUTPT VISIT, EST, LEVL IV, 30-39 MIN: ICD-10-PCS | Mod: S$GLB,,, | Performed by: INTERNAL MEDICINE

## 2020-11-18 RX ORDER — INSULIN GLARGINE 100 [IU]/ML
40 INJECTION, SOLUTION SUBCUTANEOUS 2 TIMES DAILY
Qty: 15 ML | Refills: 11 | Status: SHIPPED | OUTPATIENT
Start: 2020-11-18 | End: 2021-03-01 | Stop reason: SDUPTHER

## 2020-11-18 RX ORDER — LOSARTAN POTASSIUM AND HYDROCHLOROTHIAZIDE 12.5; 1 MG/1; MG/1
1 TABLET ORAL EVERY MORNING
Qty: 90 TABLET | Refills: 3 | Status: SHIPPED | OUTPATIENT
Start: 2020-11-18 | End: 2021-03-30

## 2020-11-18 RX ORDER — REPAGLINIDE 1 MG/1
1 TABLET ORAL
Qty: 270 TABLET | Refills: 3 | Status: SHIPPED | OUTPATIENT
Start: 2020-11-18 | End: 2021-02-19 | Stop reason: SDUPTHER

## 2020-11-18 RX ORDER — LEVOTHYROXINE SODIUM 25 UG/1
25 TABLET ORAL DAILY
Qty: 90 TABLET | Refills: 3 | Status: SHIPPED | OUTPATIENT
Start: 2020-11-18 | End: 2021-02-19 | Stop reason: SDUPTHER

## 2020-11-18 RX ORDER — METFORMIN HYDROCHLORIDE 500 MG/1
500 TABLET ORAL 2 TIMES DAILY WITH MEALS
Qty: 180 TABLET | Refills: 3 | Status: SHIPPED | OUTPATIENT
Start: 2020-11-18 | End: 2021-09-22

## 2020-11-18 RX ORDER — ATORVASTATIN CALCIUM 20 MG/1
20 TABLET, FILM COATED ORAL DAILY
Qty: 90 TABLET | Refills: 3 | Status: SHIPPED | OUTPATIENT
Start: 2020-11-18 | End: 2021-02-19 | Stop reason: SDUPTHER

## 2020-11-18 RX ORDER — PEN NEEDLE, DIABETIC 30 GX3/16"
1 NEEDLE, DISPOSABLE MISCELLANEOUS
Qty: 200 EACH | Refills: 11 | Status: SHIPPED | OUTPATIENT
Start: 2020-11-18 | End: 2023-03-06

## 2020-11-18 NOTE — ASSESSMENT & PLAN NOTE
bmi 33.5   - complicated by diabetes, HTN, HLd   - saw dm education   - lost weight since last visit   - encourage pt to keep up his efforts on diet, exercise habits   - monitor weight. If additional meds needed, consider GLP1

## 2020-11-18 NOTE — ASSESSMENT & PLAN NOTE
With callus. Keep f/u with podiatry. Gave prescription for diabetes shoes. Monitor at least yearly.

## 2020-11-18 NOTE — ASSESSMENT & PLAN NOTE
Reviewed goals of therapy - to get the best control we can without hypoglycemia. Last A1C improved a lot, almost at goal   - on exam, pt with obese abdomen but normal/thin legs and arms. 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    Medication changes:   ReStart: metformin. 500 mg BID   continue insulin, prandin  Continue checking sugar. Patient encouraged to document glucose results and bring them to every clinic visit    -Close adherence to lifestyle changes recommended.    -Periodic follow ups for eye evaluations, foot care suggested.

## 2020-11-18 NOTE — PATIENT INSTRUCTIONS
For diabetes, stick with the medications. prandin 1 mg with meals, insulin 40 units twice a day, metformin.

## 2020-11-18 NOTE — PROGRESS NOTES
Subjective:      Chief Complaint: Establish Care and Diabetes (type 2)    HPI: Juan Manuel Johnson is a 55 y.o. male who is here for a follow-up evaluation for diabetes. Last seen 8/3/2020    For htn: ran out of medications, didn't take recently.    With regards to the diabetes:  Diagnosed with T2DM since over 15 years ago     Known complications:     Retinopathy? No    Nephropathy? No    Neuropathy? Yes    CAD? No    CVA? No     Current regimen:   Lantus 40 units BID    prandin 1 mg with meals    Missed doses? denies     Prior treatments:     Humalog 40 units BID    mixed insulin   Metformin   Glyburide   Tanzeum   Admelog    Self-Monitored blood glucose.  # times a day testin/day  Log reviewed: Yes    Pre breakfast: , mostly in 120-150s.   after breakfast: 146-210  Pre dinner: 122-163  qHS: 120-230    Hypoglycemic events? Rare, sometimes to 60s    Diet:   soda: Decreased    Current symptoms:   polyuria and polydipsia  Pt denies:   vision changes, nausea, vomit and diarrhea    Diabetes Management Status    Statin: Taking  ACE/ARB: Taking    Screening or Prevention Patient's value Goal Complete/Controlled?   HgA1C Testing and Control   Lab Results   Component Value Date    HGBA1C 7.8 (H) 10/27/2020      q6months/Less than 7.5% Almost   Lipid profile : 2020 Annually Yes   LDL control Lab Results   Component Value Date    LDLCALC 116.2 2020    Annually/Less than 100 mg/dl  No   Nephropathy screening No results found for: MICALBCREAT  Lab Results   Component Value Date    CREATININE 1.1 10/27/2020     Lab Results   Component Value Date    PROTEINUA Trace (A) 2019    Annually No   Blood pressure BP Readings from Last 1 Encounters:   20 (!) 162/91    Less than 140/90 No   Dilated retinal exam : 2018 Annually No   Foot exam   : 2018 Annually No     Reviewed past medical, family, social history and updated as appropriate.    Review of Systems   Constitutional: Positive for  unexpected weight change (lost some weight).   HENT: Negative for trouble swallowing.    Eyes: Negative for visual disturbance.   Respiratory: Negative for shortness of breath.    Cardiovascular: Negative for palpitations.   Gastrointestinal: Negative for constipation, diarrhea and nausea.   Endocrine: Positive for polydipsia and polyuria.   Genitourinary: Positive for frequency.   Neurological: Positive for dizziness (with low sugar). Negative for light-headedness.   Psychiatric/Behavioral: Positive for sleep disturbance.     Objective:     Vitals:    11/18/20 0843   BP: (!) 162/91   Pulse: 70     BP Readings from Last 5 Encounters:   11/18/20 (!) 162/91   09/10/20 125/86   08/03/20 (!) 156/87   07/20/20 120/88   07/15/19 130/78       Physical Exam  Vitals signs reviewed.   Constitutional:       General: He is not in acute distress.     Appearance: He is obese.   Neck:      Thyroid: No thyromegaly.   Cardiovascular:      Pulses:           Dorsalis pedis pulses are 2+ on the right side and 2+ on the left side.      Heart sounds: Normal heart sounds.   Pulmonary:      Effort: Pulmonary effort is normal.   Musculoskeletal:      Right foot: No deformity.      Left foot: No deformity.   Feet:      Right foot:      Protective Sensation: 5 sites tested. 5 sites sensed.      Skin integrity: Callus and dry skin present. No ulcer or erythema.      Left foot:      Protective Sensation: 5 sites tested. 5 sites sensed.      Skin integrity: Callus and dry skin present. No ulcer or erythema.     moderately decreased vibration sense bilaterally    Wt Readings from Last 10 Encounters:   11/18/20 0843 100 kg (220 lb 8 oz)   10/27/20 0948 99.5 kg (219 lb 5.7 oz)   09/14/20 0945 101.8 kg (224 lb 6.9 oz)   09/10/20 1050 103 kg (227 lb)   08/18/20 0852 103 kg (227 lb 1.2 oz)   08/03/20 1109 103.9 kg (229 lb 0.9 oz)   08/03/20 1006 103.6 kg (228 lb 6.3 oz)   07/27/20 0803 103.7 kg (228 lb 9.9 oz)   07/20/20 1405 101.2 kg (223 lb 1.7 oz)    07/15/19 1007 99.4 kg (219 lb 2.2 oz)       Lab Results   Component Value Date    HGBA1C 7.8 (H) 10/27/2020     Lab Results   Component Value Date    CHOL 171 07/20/2020    HDL 34 (L) 07/20/2020    LDLCALC 116.2 07/20/2020    TRIG 104 07/20/2020    CHOLHDL 19.9 (L) 07/20/2020     Lab Results   Component Value Date     10/27/2020    K 4.4 10/27/2020     10/27/2020    CO2 28 10/27/2020     10/27/2020    BUN 12 10/27/2020    CREATININE 1.1 10/27/2020    CALCIUM 9.8 10/27/2020    PROT 8.3 07/20/2020    ALBUMIN 4.2 07/20/2020    BILITOT 0.5 07/20/2020    ALKPHOS 80 07/20/2020    AST 19 07/20/2020    ALT 13 07/20/2020    ANIONGAP 8 10/27/2020    ESTGFRAFRICA >60 10/27/2020    EGFRNONAA >60 10/27/2020    TSH 1.674 07/20/2020      Assessment/Plan:     Type 2 diabetes mellitus with neurologic complication, with long-term current use of insulin  Reviewed goals of therapy - to get the best control we can without hypoglycemia. Last A1C improved a lot, almost at goal   - on exam, pt with obese abdomen but normal/thin legs and arms. 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    Medication changes:   ReStart: metformin. 500 mg BID   continue insulin, prandin  Continue checking sugar. Patient encouraged to document glucose results and bring them to every clinic visit    -Close adherence to lifestyle changes recommended.    -Periodic follow ups for eye evaluations, foot care suggested.      Essential hypertension  bp high today   - last few BP have mostly been fine   - ran out of meds, sending in refill   - consider checking bp at home sometimes. Otherwise f/u with PCP. If BP remains high would need more medication    Mixed hyperlipidemia  Last ldl above goal   - now on statin   - continue    - recheck before next visit      Obese  bmi 33.5   - complicated by diabetes, HTN, HLd   - saw dm education   - lost weight since last visit   - encourage pt to keep up his efforts on diet, exercise habits   -  monitor weight. If additional meds needed, consider GLP1    Diabetic polyneuropathy associated with type 2 diabetes mellitus  With callus. Keep f/u with podiatry. Gave prescription for diabetes shoes. Monitor at least yearly.      Follow up in about 3 months (around 2/18/2021) for lab review, further monitoring.      Abdirahman Cerda MD  Endocrinology

## 2020-11-19 DIAGNOSIS — N52.9 ERECTILE DYSFUNCTION, UNSPECIFIED ERECTILE DYSFUNCTION TYPE: ICD-10-CM

## 2020-11-19 RX ORDER — SILDENAFIL 25 MG/1
25 TABLET, FILM COATED ORAL DAILY PRN
Qty: 20 TABLET | Refills: 3 | Status: SHIPPED | OUTPATIENT
Start: 2020-11-19 | End: 2021-03-30

## 2020-12-08 ENCOUNTER — TELEPHONE (OUTPATIENT)
Dept: INTERNAL MEDICINE | Facility: CLINIC | Age: 55
End: 2020-12-08

## 2020-12-21 ENCOUNTER — TELEPHONE (OUTPATIENT)
Dept: INTERNAL MEDICINE | Facility: CLINIC | Age: 55
End: 2020-12-21

## 2021-01-12 DIAGNOSIS — E11.69 TYPE 2 DIABETES MELLITUS WITH OTHER SPECIFIED COMPLICATION, UNSPECIFIED WHETHER LONG TERM INSULIN USE: Primary | ICD-10-CM

## 2021-01-21 ENCOUNTER — PATIENT OUTREACH (OUTPATIENT)
Dept: ADMINISTRATIVE | Facility: OTHER | Age: 56
End: 2021-01-21

## 2021-01-25 ENCOUNTER — NUTRITION (OUTPATIENT)
Dept: DIABETES | Facility: CLINIC | Age: 56
End: 2021-01-25
Payer: MEDICAID

## 2021-01-25 VITALS — BODY MASS INDEX: 32.72 KG/M2 | WEIGHT: 215.19 LBS

## 2021-01-25 DIAGNOSIS — E11.69 TYPE 2 DIABETES MELLITUS WITH OTHER SPECIFIED COMPLICATION, UNSPECIFIED WHETHER LONG TERM INSULIN USE: ICD-10-CM

## 2021-01-25 PROCEDURE — 99999 PR PBB SHADOW E&M-EST. PATIENT-LVL I: ICD-10-PCS | Mod: PBBFAC,,, | Performed by: DIETITIAN, REGISTERED

## 2021-01-25 PROCEDURE — 99999 PR PBB SHADOW E&M-EST. PATIENT-LVL I: CPT | Mod: PBBFAC,,, | Performed by: DIETITIAN, REGISTERED

## 2021-01-25 PROCEDURE — G0108 DIAB MANAGE TRN  PER INDIV: HCPCS | Mod: PBBFAC | Performed by: DIETITIAN, REGISTERED

## 2021-01-25 PROCEDURE — 99211 OFF/OP EST MAY X REQ PHY/QHP: CPT | Mod: PBBFAC | Performed by: DIETITIAN, REGISTERED

## 2021-01-28 ENCOUNTER — HOSPITAL ENCOUNTER (EMERGENCY)
Facility: OTHER | Age: 56
Discharge: HOME | End: 2021-01-28
Attending: EMERGENCY MEDICINE
Payer: MEDICAID

## 2021-01-28 ENCOUNTER — TELEPHONE (OUTPATIENT)
Dept: INTERNAL MEDICINE | Facility: CLINIC | Age: 56
End: 2021-01-28

## 2021-01-28 VITALS
BODY MASS INDEX: 28.31 KG/M2 | HEIGHT: 72 IN | SYSTOLIC BLOOD PRESSURE: 135 MMHG | RESPIRATION RATE: 14 BRPM | HEART RATE: 63 BPM | OXYGEN SATURATION: 96 % | WEIGHT: 209 LBS | DIASTOLIC BLOOD PRESSURE: 85 MMHG | TEMPERATURE: 98 F

## 2021-01-28 DIAGNOSIS — M79.671 FOOT PAIN, RIGHT: Primary | ICD-10-CM

## 2021-01-28 LAB — POCT GLUCOSE: 186 MG/DL (ref 70–110)

## 2021-01-28 PROCEDURE — 99282 EMERGENCY DEPT VISIT SF MDM: CPT

## 2021-01-28 PROCEDURE — 82962 GLUCOSE BLOOD TEST: CPT

## 2021-02-11 ENCOUNTER — LAB VISIT (OUTPATIENT)
Dept: LAB | Facility: OTHER | Age: 56
End: 2021-02-11
Attending: INTERNAL MEDICINE
Payer: MEDICAID

## 2021-02-11 DIAGNOSIS — E11.8 TYPE 2 DIABETES MELLITUS WITH COMPLICATION, WITH LONG-TERM CURRENT USE OF INSULIN: ICD-10-CM

## 2021-02-11 DIAGNOSIS — E78.5 HYPERLIPIDEMIA, UNSPECIFIED HYPERLIPIDEMIA TYPE: ICD-10-CM

## 2021-02-11 DIAGNOSIS — Z79.4 TYPE 2 DIABETES MELLITUS WITH COMPLICATION, WITH LONG-TERM CURRENT USE OF INSULIN: ICD-10-CM

## 2021-02-11 DIAGNOSIS — E11.42 TYPE 2 DIABETES MELLITUS WITH DIABETIC POLYNEUROPATHY, WITH LONG-TERM CURRENT USE OF INSULIN: ICD-10-CM

## 2021-02-11 DIAGNOSIS — Z79.4 TYPE 2 DIABETES MELLITUS WITH DIABETIC POLYNEUROPATHY, WITH LONG-TERM CURRENT USE OF INSULIN: ICD-10-CM

## 2021-02-11 LAB
ANION GAP SERPL CALC-SCNC: 8 MMOL/L (ref 8–16)
BUN SERPL-MCNC: 14 MG/DL (ref 6–20)
CALCIUM SERPL-MCNC: 9.7 MG/DL (ref 8.7–10.5)
CHLORIDE SERPL-SCNC: 105 MMOL/L (ref 95–110)
CHOLEST SERPL-MCNC: 100 MG/DL (ref 120–199)
CHOLEST/HDLC SERPL: 3.2 {RATIO} (ref 2–5)
CO2 SERPL-SCNC: 27 MMOL/L (ref 23–29)
CREAT SERPL-MCNC: 1 MG/DL (ref 0.5–1.4)
EST. GFR  (AFRICAN AMERICAN): >60 ML/MIN/1.73 M^2
EST. GFR  (NON AFRICAN AMERICAN): >60 ML/MIN/1.73 M^2
ESTIMATED AVG GLUCOSE: 232 MG/DL (ref 68–131)
GLUCOSE SERPL-MCNC: 127 MG/DL (ref 70–110)
HBA1C MFR BLD: 9.7 % (ref 4–5.6)
HDLC SERPL-MCNC: 31 MG/DL (ref 40–75)
HDLC SERPL: 31 % (ref 20–50)
LDLC SERPL CALC-MCNC: 50.8 MG/DL (ref 63–159)
NONHDLC SERPL-MCNC: 69 MG/DL
POTASSIUM SERPL-SCNC: 4.1 MMOL/L (ref 3.5–5.1)
SODIUM SERPL-SCNC: 140 MMOL/L (ref 136–145)
TRIGL SERPL-MCNC: 91 MG/DL (ref 30–150)

## 2021-02-11 PROCEDURE — 80048 BASIC METABOLIC PNL TOTAL CA: CPT

## 2021-02-11 PROCEDURE — 80061 LIPID PANEL: CPT

## 2021-02-11 PROCEDURE — 83036 HEMOGLOBIN GLYCOSYLATED A1C: CPT

## 2021-02-11 PROCEDURE — 36415 COLL VENOUS BLD VENIPUNCTURE: CPT

## 2021-02-19 ENCOUNTER — OFFICE VISIT (OUTPATIENT)
Dept: ENDOCRINOLOGY | Facility: CLINIC | Age: 56
End: 2021-02-19
Payer: MEDICAID

## 2021-02-19 VITALS
HEART RATE: 63 BPM | BODY MASS INDEX: 29.11 KG/M2 | HEIGHT: 72 IN | WEIGHT: 214.94 LBS | OXYGEN SATURATION: 96 % | SYSTOLIC BLOOD PRESSURE: 158 MMHG | DIASTOLIC BLOOD PRESSURE: 93 MMHG

## 2021-02-19 DIAGNOSIS — E11.42 TYPE 2 DIABETES MELLITUS WITH DIABETIC POLYNEUROPATHY, WITH LONG-TERM CURRENT USE OF INSULIN: ICD-10-CM

## 2021-02-19 DIAGNOSIS — E03.4 HYPOTHYROIDISM DUE TO ACQUIRED ATROPHY OF THYROID: ICD-10-CM

## 2021-02-19 DIAGNOSIS — E78.2 MIXED HYPERLIPIDEMIA: ICD-10-CM

## 2021-02-19 DIAGNOSIS — I10 ESSENTIAL HYPERTENSION: ICD-10-CM

## 2021-02-19 DIAGNOSIS — E03.9 HYPOTHYROIDISM, UNSPECIFIED TYPE: ICD-10-CM

## 2021-02-19 DIAGNOSIS — E78.5 HYPERLIPIDEMIA, UNSPECIFIED HYPERLIPIDEMIA TYPE: ICD-10-CM

## 2021-02-19 DIAGNOSIS — Z79.4 TYPE 2 DIABETES MELLITUS WITH DIABETIC POLYNEUROPATHY, WITH LONG-TERM CURRENT USE OF INSULIN: ICD-10-CM

## 2021-02-19 PROCEDURE — 99214 OFFICE O/P EST MOD 30 MIN: CPT | Mod: S$GLB,,, | Performed by: INTERNAL MEDICINE

## 2021-02-19 PROCEDURE — 99214 PR OFFICE/OUTPT VISIT, EST, LEVL IV, 30-39 MIN: ICD-10-PCS | Mod: S$GLB,,, | Performed by: INTERNAL MEDICINE

## 2021-02-19 RX ORDER — REPAGLINIDE 2 MG/1
2 TABLET ORAL
Qty: 270 TABLET | Refills: 3 | Status: SHIPPED | OUTPATIENT
Start: 2021-02-19 | End: 2021-03-30

## 2021-02-19 RX ORDER — GABAPENTIN 400 MG/1
400 CAPSULE ORAL 2 TIMES DAILY PRN
Qty: 180 CAPSULE | Refills: 1 | Status: SHIPPED | OUTPATIENT
Start: 2021-02-19 | End: 2021-08-25 | Stop reason: SDUPTHER

## 2021-02-19 RX ORDER — LEVOTHYROXINE SODIUM 25 UG/1
25 TABLET ORAL DAILY
Qty: 90 TABLET | Refills: 3 | Status: SHIPPED | OUTPATIENT
Start: 2021-02-19 | End: 2021-11-17 | Stop reason: SDUPTHER

## 2021-02-19 RX ORDER — ATORVASTATIN CALCIUM 20 MG/1
20 TABLET, FILM COATED ORAL DAILY
Qty: 90 TABLET | Refills: 3 | Status: SHIPPED | OUTPATIENT
Start: 2021-02-19 | End: 2021-11-17 | Stop reason: SDUPTHER

## 2021-02-27 ENCOUNTER — PATIENT OUTREACH (OUTPATIENT)
Dept: ADMINISTRATIVE | Facility: OTHER | Age: 56
End: 2021-02-27

## 2021-03-01 DIAGNOSIS — E11.42 TYPE 2 DIABETES MELLITUS WITH DIABETIC POLYNEUROPATHY, WITH LONG-TERM CURRENT USE OF INSULIN: ICD-10-CM

## 2021-03-01 DIAGNOSIS — Z79.4 TYPE 2 DIABETES MELLITUS WITH DIABETIC POLYNEUROPATHY, WITH LONG-TERM CURRENT USE OF INSULIN: ICD-10-CM

## 2021-03-01 RX ORDER — INSULIN GLARGINE 100 [IU]/ML
40 INJECTION, SOLUTION SUBCUTANEOUS 2 TIMES DAILY
Qty: 15 ML | Refills: 11 | Status: SHIPPED | OUTPATIENT
Start: 2021-03-01 | End: 2021-08-25 | Stop reason: SDUPTHER

## 2021-03-30 ENCOUNTER — OFFICE VISIT (OUTPATIENT)
Dept: INTERNAL MEDICINE | Facility: CLINIC | Age: 56
End: 2021-03-30
Attending: INTERNAL MEDICINE
Payer: MEDICAID

## 2021-03-30 VITALS
SYSTOLIC BLOOD PRESSURE: 150 MMHG | HEART RATE: 72 BPM | OXYGEN SATURATION: 96 % | HEIGHT: 72 IN | WEIGHT: 222.88 LBS | BODY MASS INDEX: 30.19 KG/M2 | DIASTOLIC BLOOD PRESSURE: 98 MMHG

## 2021-03-30 DIAGNOSIS — E78.5 HYPERLIPIDEMIA, UNSPECIFIED HYPERLIPIDEMIA TYPE: ICD-10-CM

## 2021-03-30 DIAGNOSIS — E11.42 TYPE 2 DIABETES MELLITUS WITH DIABETIC POLYNEUROPATHY, WITH LONG-TERM CURRENT USE OF INSULIN: Primary | ICD-10-CM

## 2021-03-30 DIAGNOSIS — Z11.4 SCREENING FOR HIV (HUMAN IMMUNODEFICIENCY VIRUS): ICD-10-CM

## 2021-03-30 DIAGNOSIS — Z79.4 TYPE 2 DIABETES MELLITUS WITH DIABETIC POLYNEUROPATHY, WITH LONG-TERM CURRENT USE OF INSULIN: Primary | ICD-10-CM

## 2021-03-30 DIAGNOSIS — E03.4 HYPOTHYROIDISM DUE TO ACQUIRED ATROPHY OF THYROID: ICD-10-CM

## 2021-03-30 DIAGNOSIS — I10 ESSENTIAL HYPERTENSION: ICD-10-CM

## 2021-03-30 PROCEDURE — 99214 OFFICE O/P EST MOD 30 MIN: CPT | Mod: PBBFAC | Performed by: INTERNAL MEDICINE

## 2021-03-30 PROCEDURE — 99396 PR PREVENTIVE VISIT,EST,40-64: ICD-10-PCS | Mod: S$PBB,,, | Performed by: INTERNAL MEDICINE

## 2021-03-30 PROCEDURE — 99999 PR PBB SHADOW E&M-EST. PATIENT-LVL IV: ICD-10-PCS | Mod: PBBFAC,,, | Performed by: INTERNAL MEDICINE

## 2021-03-30 PROCEDURE — 99999 PR PBB SHADOW E&M-EST. PATIENT-LVL IV: CPT | Mod: PBBFAC,,, | Performed by: INTERNAL MEDICINE

## 2021-03-30 PROCEDURE — 99396 PREV VISIT EST AGE 40-64: CPT | Mod: S$PBB,,, | Performed by: INTERNAL MEDICINE

## 2021-03-30 RX ORDER — SILDENAFIL 100 MG/1
100 TABLET, FILM COATED ORAL DAILY PRN
Qty: 30 TABLET | Refills: 11 | Status: SHIPPED | OUTPATIENT
Start: 2021-03-30 | End: 2021-03-30

## 2021-03-30 RX ORDER — SILDENAFIL 25 MG/1
25 TABLET, FILM COATED ORAL DAILY PRN
Qty: 90 TABLET | Refills: 2 | Status: ON HOLD | OUTPATIENT
Start: 2021-03-30 | End: 2023-10-11

## 2021-03-30 RX ORDER — LOSARTAN POTASSIUM AND HYDROCHLOROTHIAZIDE 25; 100 MG/1; MG/1
1 TABLET ORAL DAILY
Qty: 90 TABLET | Refills: 3 | Status: SHIPPED | OUTPATIENT
Start: 2021-03-30 | End: 2022-05-02 | Stop reason: SDUPTHER

## 2021-03-31 ENCOUNTER — TELEPHONE (OUTPATIENT)
Dept: INTERNAL MEDICINE | Facility: CLINIC | Age: 56
End: 2021-03-31

## 2021-04-05 ENCOUNTER — PATIENT MESSAGE (OUTPATIENT)
Dept: ADMINISTRATIVE | Facility: HOSPITAL | Age: 56
End: 2021-04-05

## 2021-05-12 ENCOUNTER — LAB VISIT (OUTPATIENT)
Dept: LAB | Facility: OTHER | Age: 56
End: 2021-05-12
Attending: INTERNAL MEDICINE
Payer: MEDICAID

## 2021-05-12 DIAGNOSIS — Z11.4 SCREENING FOR HIV (HUMAN IMMUNODEFICIENCY VIRUS): ICD-10-CM

## 2021-05-12 DIAGNOSIS — Z79.4 TYPE 2 DIABETES MELLITUS WITH DIABETIC POLYNEUROPATHY, WITH LONG-TERM CURRENT USE OF INSULIN: ICD-10-CM

## 2021-05-12 DIAGNOSIS — E11.42 TYPE 2 DIABETES MELLITUS WITH DIABETIC POLYNEUROPATHY, WITH LONG-TERM CURRENT USE OF INSULIN: ICD-10-CM

## 2021-05-12 DIAGNOSIS — E03.9 HYPOTHYROIDISM, UNSPECIFIED TYPE: ICD-10-CM

## 2021-05-12 LAB
ANION GAP SERPL CALC-SCNC: 8 MMOL/L (ref 8–16)
BUN SERPL-MCNC: 16 MG/DL (ref 6–20)
CALCIUM SERPL-MCNC: 9.8 MG/DL (ref 8.7–10.5)
CHLORIDE SERPL-SCNC: 105 MMOL/L (ref 95–110)
CO2 SERPL-SCNC: 29 MMOL/L (ref 23–29)
CREAT SERPL-MCNC: 1.1 MG/DL (ref 0.5–1.4)
EST. GFR  (AFRICAN AMERICAN): >60 ML/MIN/1.73 M^2
EST. GFR  (NON AFRICAN AMERICAN): >60 ML/MIN/1.73 M^2
ESTIMATED AVG GLUCOSE: 237 MG/DL (ref 68–131)
GLUCOSE SERPL-MCNC: 155 MG/DL (ref 70–110)
HBA1C MFR BLD: 9.9 % (ref 4–5.6)
POTASSIUM SERPL-SCNC: 3.8 MMOL/L (ref 3.5–5.1)
SODIUM SERPL-SCNC: 142 MMOL/L (ref 136–145)
T4 FREE SERPL-MCNC: 0.96 NG/DL (ref 0.71–1.51)
TSH SERPL DL<=0.005 MIU/L-ACNC: 4.96 UIU/ML (ref 0.4–4)

## 2021-05-12 PROCEDURE — 84443 ASSAY THYROID STIM HORMONE: CPT | Performed by: INTERNAL MEDICINE

## 2021-05-12 PROCEDURE — 86703 HIV-1/HIV-2 1 RESULT ANTBDY: CPT | Performed by: INTERNAL MEDICINE

## 2021-05-12 PROCEDURE — 84439 ASSAY OF FREE THYROXINE: CPT | Performed by: INTERNAL MEDICINE

## 2021-05-12 PROCEDURE — 83036 HEMOGLOBIN GLYCOSYLATED A1C: CPT | Performed by: INTERNAL MEDICINE

## 2021-05-12 PROCEDURE — 36415 COLL VENOUS BLD VENIPUNCTURE: CPT | Performed by: INTERNAL MEDICINE

## 2021-05-12 PROCEDURE — 80048 BASIC METABOLIC PNL TOTAL CA: CPT | Performed by: INTERNAL MEDICINE

## 2021-05-13 ENCOUNTER — PATIENT MESSAGE (OUTPATIENT)
Dept: INTERNAL MEDICINE | Facility: CLINIC | Age: 56
End: 2021-05-13

## 2021-05-13 LAB — HIV 1+2 AB+HIV1 P24 AG SERPL QL IA: NEGATIVE

## 2021-05-18 ENCOUNTER — PATIENT OUTREACH (OUTPATIENT)
Dept: ADMINISTRATIVE | Facility: OTHER | Age: 56
End: 2021-05-18

## 2021-05-19 ENCOUNTER — OFFICE VISIT (OUTPATIENT)
Dept: ENDOCRINOLOGY | Facility: CLINIC | Age: 56
End: 2021-05-19
Payer: MEDICAID

## 2021-05-19 VITALS
DIASTOLIC BLOOD PRESSURE: 89 MMHG | WEIGHT: 216.94 LBS | SYSTOLIC BLOOD PRESSURE: 131 MMHG | HEIGHT: 72 IN | HEART RATE: 77 BPM | OXYGEN SATURATION: 99 % | BODY MASS INDEX: 29.38 KG/M2

## 2021-05-19 DIAGNOSIS — E11.65 TYPE 2 DIABETES MELLITUS WITH HYPERGLYCEMIA, WITH LONG-TERM CURRENT USE OF INSULIN: Primary | ICD-10-CM

## 2021-05-19 DIAGNOSIS — I10 ESSENTIAL HYPERTENSION: ICD-10-CM

## 2021-05-19 DIAGNOSIS — E11.42 DIABETIC POLYNEUROPATHY ASSOCIATED WITH TYPE 2 DIABETES MELLITUS: ICD-10-CM

## 2021-05-19 DIAGNOSIS — E03.4 HYPOTHYROIDISM DUE TO ACQUIRED ATROPHY OF THYROID: ICD-10-CM

## 2021-05-19 DIAGNOSIS — E03.9 HYPOTHYROIDISM, UNSPECIFIED TYPE: ICD-10-CM

## 2021-05-19 DIAGNOSIS — E78.2 MIXED HYPERLIPIDEMIA: ICD-10-CM

## 2021-05-19 DIAGNOSIS — Z79.4 TYPE 2 DIABETES MELLITUS WITH HYPERGLYCEMIA, WITH LONG-TERM CURRENT USE OF INSULIN: Primary | ICD-10-CM

## 2021-05-19 DIAGNOSIS — Z79.4 TYPE 2 DIABETES MELLITUS WITH DIABETIC POLYNEUROPATHY, WITH LONG-TERM CURRENT USE OF INSULIN: ICD-10-CM

## 2021-05-19 DIAGNOSIS — E11.42 TYPE 2 DIABETES MELLITUS WITH DIABETIC POLYNEUROPATHY, WITH LONG-TERM CURRENT USE OF INSULIN: ICD-10-CM

## 2021-05-19 PROCEDURE — 99214 PR OFFICE/OUTPT VISIT, EST, LEVL IV, 30-39 MIN: ICD-10-PCS | Mod: S$GLB,,, | Performed by: INTERNAL MEDICINE

## 2021-05-19 PROCEDURE — 99214 OFFICE O/P EST MOD 30 MIN: CPT | Mod: S$GLB,,, | Performed by: INTERNAL MEDICINE

## 2021-05-19 RX ORDER — REPAGLINIDE 1 MG/1
1 TABLET ORAL
Qty: 270 TABLET | Refills: 3 | Status: SHIPPED | OUTPATIENT
Start: 2021-05-19 | End: 2021-11-17 | Stop reason: SDUPTHER

## 2021-08-13 NOTE — PROGRESS NOTES
Diabetes Education  Author: Delmi Marina RD  Date: 9/14/2020    Diabetes Care Management Summary  Diabetes Education Record Assessment/Progress: Comprehensive/Group     Last A1c:   Lab Results   Component Value Date    HGBA1C 13.1 (H) 07/20/2020     Last Visit with Diabetes Educator: Last Education Visit: Not Found  Last OPCM Referral: : Not Found   Enrolled in OPCM: No      Diabetes Type  Diabetes Type : Type II         Health Maintenance was reviewed today with patient. Discussed with patient importance of routine eye exams, foot exams/foot care, blood work (i.e.: A1c, microalbumin, and lipid), dental visits, yearly flu vaccine, and pneumonia vaccine as indicated by PCP. Patient verbalized understanding.     Health Maintenance Topics with due status: Not Due       Topic Last Completion Date    Colorectal Cancer Screening 06/07/2016    TETANUS VACCINE 09/24/2018    Lipid Panel 07/20/2020    Hemoglobin A1c 07/20/2020    Low Dose Statin 08/03/2020     Health Maintenance Due   Topic Date Due    HIV Screening  06/05/1980    Shingles Vaccine (1 of 2) 06/05/2015    Eye Exam  02/01/2019    Foot Exam  04/02/2019    Influenza Vaccine (1) 08/01/2020       Nutrition  Meal Planning: 3 meals per day  What type of sweetener do you use?: sugar  What type of beverages do you drink?: water  Meal Plan 24 Hour Recall - Breakfast: egg sandwich with coffee w/1 Tbsp of sugar  Meal Plan 24 Hour Recall - Lunch: 2 chili cheese hot dogs and water  Meal Plan 24 Hour Recall - Dinner: cabbage with fried chicken with water  Meal Plan 24 Hour Recall - Snack: potato chips           Diabetes Education Assessment/Progress  Diabetes Disease Process (diabetes disease process and treatment options): Not Covered/Deferred    Nutrition (Incorporating nutritional management into one's lifestyle): Discussion, Needs Instruction, Individual Session, Instructed, Written Materials Provided(States he is no longer drinking any sodas or juice, Has cut  Vital signs:  Temp: 98.9  F (37.2  C) Temp src: (P) Oral BP: (P) 113/75 Pulse: (P) 109   Resp: (P) 20 SpO2: (P) 99 % O2 Device: (P) Nasal cannula Oxygen Delivery: 1.5 LPM Height: 182.9 cm (6') Weight: 107 kg (235 lb 14.3 oz)  Estimated body mass index is 31.99 kg/m  as calculated from the following:    Height as of this encounter: 1.829 m (6').    Weight as of this encounter: 107 kg (235 lb 14.3 oz).     Dimitrios continues to have abdominal discomfort and also has been complaining of a headache.  He has been using a pillow for pressure, dilaudid per PCA and On-Q pump.  Rizatriptan also given times one.  Tylenol is scheduled.   Heparin drip placed on hold due to low hgb.  Consent signed and RBC's are currently infusing - giving unit over three hours as PIV was tender at the beginning of the infusion.  Abdominal incision intact/dry with no redness or drainage noted.  MADAI drain also intact.  He is on a clear liquid diet and his wife is curious as to when he can try and eat.  Paged team and waiting for response.  Also, ask to have PT/OT ordered to help him with his strength.  Mepilex on sacrum preventatively.  Using suction on his own for thick secretions present in the back of his mouth.  Up in his chair currently with an assist of one.  He still has not urinated this shift but he states that he will try.    Resident TAIWO Richardson paged regarding urination.  Bladder scanned for 237cc.  White placed as per order.    back to 1 TBSP of sugar in his coffee. He is eating smaller portions of food and has removed the candy from his bedside table)    Physical Activity (incorporating physical activity into one's lifestyle): Not Covered/Deferred    Medications (states correct name, dose, onset, peak, duration, side effects & timing of meds): Discussion, Needs Instruction, Individual Session, Instructed(Taking 45 units at 6:30-7 am and 45 units between 5-7pm. Is taking Prandin before every meal. Does not take it if he does not eat. However, he does not take his full 45 units if he at the end on his pen. Stressed when short of units one pen open a new pen and take the remainder of his dose to equal the 45 units. He agrees. Ed on proper sharps disposal. Current throwing needles directly in the garbage.     Monitoring (monitoring blood glucose/other parameters & using results): Discussion, Needs Instruction, Individual Session, Instructed, Written Materials Provided(Issues getting supplies due to insurance coverage. He will check back with pharmacy today. Ed on target BG ranges, BG on clinic meter was 134 PP breakfast. Ed on target Bg ranges.    Acute Complications (preventing, detecting, and treating acute complications): Discussion, Individual Session, Needs Review(No low Bg since last apmt)    Chronic Complications (preventing, detecting, and treating chronic complications): Not Covered/Deferred    Clinical (diabetes, other pertinent medical history, and relevant comorbidities reviewed during visit): Not Covered/Deferred    Cognitive (knowledge of self-management skills, functional health literacy): Discussion, Needs Instruction, Individual Session, Instructed, Written Materials Provided(Ed on tight BG to promote high quality of life.)    Psychosocial (emotional response to diabetes): Discussion, Individual Session, Comprehends Key Points    Diabetes Distress and Support Systems: Discussion, Individual Session, Comprehends Key  Points    Behavioral (readiness for change, lifestyle practices, self-care behaviors): Discussion, Needs Review, Individual Session, Instructed, Written Materials Provided    Goals  Patient has selected/evaluated goals during today's session: Yes, evaluated  Healthy Eating: % Met  Met Percentage : 100%(has removed all candy from his bedside table)  Monitoring: Set(check back with pharmacist about meter supplies)  Start Date: 09/14/20  Target Date: 10/27/20         Diabetes Care Plan/Intervention  Education Plan/Intervention: Individual Follow-Up DSMT         Today's Self-Management Care Plan was developed with the patient's input and is based on barriers identified during today's assessment.    The long and short-term goals in the care plan were written with the patient/caregiver's input. The patient has agreed to work toward these goals to improve his overall diabetes control.      The patient received a copy of today's self-management plan and verbalized understanding of the care plan, goals, and all of today's instructions.      The patient was encouraged to communicate with his physician and care team regarding his condition(s) and treatment.  I provided the patient with my contact information today and encouraged him to contact me via phone or patient portal as needed.     Education Units of Time   Time Spent: 60 min

## 2021-08-25 ENCOUNTER — LAB VISIT (OUTPATIENT)
Dept: LAB | Facility: OTHER | Age: 56
End: 2021-08-25
Attending: INTERNAL MEDICINE
Payer: MEDICAID

## 2021-08-25 ENCOUNTER — OFFICE VISIT (OUTPATIENT)
Dept: ENDOCRINOLOGY | Facility: CLINIC | Age: 56
End: 2021-08-25
Payer: MEDICAID

## 2021-08-25 VITALS
SYSTOLIC BLOOD PRESSURE: 151 MMHG | WEIGHT: 216.94 LBS | HEART RATE: 87 BPM | DIASTOLIC BLOOD PRESSURE: 83 MMHG | HEIGHT: 72 IN | BODY MASS INDEX: 29.38 KG/M2

## 2021-08-25 DIAGNOSIS — E78.2 MIXED HYPERLIPIDEMIA: ICD-10-CM

## 2021-08-25 DIAGNOSIS — E11.65 TYPE 2 DIABETES MELLITUS WITH HYPERGLYCEMIA, WITH LONG-TERM CURRENT USE OF INSULIN: ICD-10-CM

## 2021-08-25 DIAGNOSIS — I10 ESSENTIAL HYPERTENSION: ICD-10-CM

## 2021-08-25 DIAGNOSIS — E11.42 TYPE 2 DIABETES MELLITUS WITH DIABETIC POLYNEUROPATHY, WITH LONG-TERM CURRENT USE OF INSULIN: ICD-10-CM

## 2021-08-25 DIAGNOSIS — Z79.4 TYPE 2 DIABETES MELLITUS WITH DIABETIC POLYNEUROPATHY, WITH LONG-TERM CURRENT USE OF INSULIN: ICD-10-CM

## 2021-08-25 DIAGNOSIS — E03.9 HYPOTHYROIDISM, UNSPECIFIED TYPE: ICD-10-CM

## 2021-08-25 DIAGNOSIS — E03.4 HYPOTHYROIDISM DUE TO ACQUIRED ATROPHY OF THYROID: ICD-10-CM

## 2021-08-25 DIAGNOSIS — E03.9 HYPOTHYROIDISM, UNSPECIFIED TYPE: Primary | ICD-10-CM

## 2021-08-25 DIAGNOSIS — Z79.4 TYPE 2 DIABETES MELLITUS WITH HYPERGLYCEMIA, WITH LONG-TERM CURRENT USE OF INSULIN: ICD-10-CM

## 2021-08-25 DIAGNOSIS — E11.42 DIABETIC POLYNEUROPATHY ASSOCIATED WITH TYPE 2 DIABETES MELLITUS: ICD-10-CM

## 2021-08-25 LAB
ANION GAP SERPL CALC-SCNC: 9 MMOL/L (ref 8–16)
BUN SERPL-MCNC: 12 MG/DL (ref 6–20)
CALCIUM SERPL-MCNC: 9.4 MG/DL (ref 8.7–10.5)
CHLORIDE SERPL-SCNC: 105 MMOL/L (ref 95–110)
CO2 SERPL-SCNC: 25 MMOL/L (ref 23–29)
CREAT SERPL-MCNC: 1.1 MG/DL (ref 0.5–1.4)
EST. GFR  (AFRICAN AMERICAN): >60 ML/MIN/1.73 M^2
EST. GFR  (NON AFRICAN AMERICAN): >60 ML/MIN/1.73 M^2
ESTIMATED AVG GLUCOSE: 214 MG/DL (ref 68–131)
GLUCOSE SERPL-MCNC: 218 MG/DL (ref 70–110)
HBA1C MFR BLD: 9.1 % (ref 4–5.6)
POTASSIUM SERPL-SCNC: 3.7 MMOL/L (ref 3.5–5.1)
SODIUM SERPL-SCNC: 139 MMOL/L (ref 136–145)
T4 FREE SERPL-MCNC: 0.92 NG/DL (ref 0.71–1.51)
TSH SERPL DL<=0.005 MIU/L-ACNC: 1.99 UIU/ML (ref 0.4–4)

## 2021-08-25 PROCEDURE — 80048 BASIC METABOLIC PNL TOTAL CA: CPT | Performed by: INTERNAL MEDICINE

## 2021-08-25 PROCEDURE — 84443 ASSAY THYROID STIM HORMONE: CPT | Performed by: INTERNAL MEDICINE

## 2021-08-25 PROCEDURE — 36415 COLL VENOUS BLD VENIPUNCTURE: CPT | Performed by: INTERNAL MEDICINE

## 2021-08-25 PROCEDURE — 99214 OFFICE O/P EST MOD 30 MIN: CPT | Mod: S$GLB,,, | Performed by: INTERNAL MEDICINE

## 2021-08-25 PROCEDURE — 99214 PR OFFICE/OUTPT VISIT, EST, LEVL IV, 30-39 MIN: ICD-10-PCS | Mod: S$GLB,,, | Performed by: INTERNAL MEDICINE

## 2021-08-25 PROCEDURE — 84439 ASSAY OF FREE THYROXINE: CPT | Performed by: INTERNAL MEDICINE

## 2021-08-25 PROCEDURE — 83036 HEMOGLOBIN GLYCOSYLATED A1C: CPT | Performed by: INTERNAL MEDICINE

## 2021-08-25 RX ORDER — GABAPENTIN 400 MG/1
400 CAPSULE ORAL 2 TIMES DAILY PRN
Qty: 180 CAPSULE | Refills: 2 | Status: SHIPPED | OUTPATIENT
Start: 2021-08-25 | End: 2022-10-10

## 2021-08-25 RX ORDER — INSULIN GLARGINE 100 [IU]/ML
40 INJECTION, SOLUTION SUBCUTANEOUS 2 TIMES DAILY
Qty: 30 ML | Refills: 11 | Status: SHIPPED | OUTPATIENT
Start: 2021-08-25 | End: 2022-08-08 | Stop reason: SDUPTHER

## 2021-08-26 ENCOUNTER — OFFICE VISIT (OUTPATIENT)
Dept: PODIATRY | Facility: CLINIC | Age: 56
End: 2021-08-26
Payer: MEDICAID

## 2021-08-26 ENCOUNTER — APPOINTMENT (OUTPATIENT)
Dept: RADIOLOGY | Facility: OTHER | Age: 56
End: 2021-08-26
Attending: PODIATRIST
Payer: MEDICAID

## 2021-08-26 VITALS
DIASTOLIC BLOOD PRESSURE: 91 MMHG | HEART RATE: 66 BPM | WEIGHT: 216 LBS | BODY MASS INDEX: 29.26 KG/M2 | SYSTOLIC BLOOD PRESSURE: 176 MMHG | HEIGHT: 72 IN

## 2021-08-26 DIAGNOSIS — E11.42 TYPE 2 DIABETES MELLITUS WITH DIABETIC POLYNEUROPATHY, WITH LONG-TERM CURRENT USE OF INSULIN: ICD-10-CM

## 2021-08-26 DIAGNOSIS — M79.672 PAIN IN LEFT FOOT: ICD-10-CM

## 2021-08-26 DIAGNOSIS — E11.42 TYPE 2 DIABETES MELLITUS WITH DIABETIC POLYNEUROPATHY, WITH LONG-TERM CURRENT USE OF INSULIN: Primary | ICD-10-CM

## 2021-08-26 DIAGNOSIS — Z79.4 TYPE 2 DIABETES MELLITUS WITH DIABETIC POLYNEUROPATHY, WITH LONG-TERM CURRENT USE OF INSULIN: ICD-10-CM

## 2021-08-26 DIAGNOSIS — T14.8XXA BLOOD BLISTER: ICD-10-CM

## 2021-08-26 DIAGNOSIS — T14.8XXA BLOOD BLISTER: Primary | ICD-10-CM

## 2021-08-26 DIAGNOSIS — Z79.4 TYPE 2 DIABETES MELLITUS WITH DIABETIC POLYNEUROPATHY, WITH LONG-TERM CURRENT USE OF INSULIN: Primary | ICD-10-CM

## 2021-08-26 DIAGNOSIS — E03.9 HYPOTHYROIDISM, UNSPECIFIED TYPE: ICD-10-CM

## 2021-08-26 PROCEDURE — 99214 OFFICE O/P EST MOD 30 MIN: CPT | Mod: PBBFAC,PN | Performed by: PODIATRIST

## 2021-08-26 PROCEDURE — 99999 PR PBB SHADOW E&M-EST. PATIENT-LVL IV: ICD-10-PCS | Mod: PBBFAC,,, | Performed by: PODIATRIST

## 2021-08-26 PROCEDURE — 99213 OFFICE O/P EST LOW 20 MIN: CPT | Mod: S$PBB,,, | Performed by: PODIATRIST

## 2021-08-26 PROCEDURE — 99999 PR PBB SHADOW E&M-EST. PATIENT-LVL IV: CPT | Mod: PBBFAC,,, | Performed by: PODIATRIST

## 2021-08-26 PROCEDURE — 73630 XR FOOT COMPLETE 3 VIEW LEFT: ICD-10-PCS | Mod: 26,LT,, | Performed by: RADIOLOGY

## 2021-08-26 PROCEDURE — 73630 X-RAY EXAM OF FOOT: CPT | Mod: 26,LT,, | Performed by: RADIOLOGY

## 2021-08-26 PROCEDURE — 73630 X-RAY EXAM OF FOOT: CPT | Mod: TC,LT

## 2021-08-26 PROCEDURE — 99213 PR OFFICE/OUTPT VISIT, EST, LEVL III, 20-29 MIN: ICD-10-PCS | Mod: S$PBB,,, | Performed by: PODIATRIST

## 2021-09-22 ENCOUNTER — IMMUNIZATION (OUTPATIENT)
Dept: INTERNAL MEDICINE | Facility: CLINIC | Age: 56
End: 2021-09-22
Payer: MEDICAID

## 2021-09-22 DIAGNOSIS — Z23 NEED FOR VACCINATION: Primary | ICD-10-CM

## 2021-09-22 PROCEDURE — 91300 COVID-19, MRNA, LNP-S, PF, 30 MCG/0.3 ML DOSE VACCINE: CPT | Mod: PBBFAC

## 2021-09-22 PROCEDURE — 0003A COVID-19, MRNA, LNP-S, PF, 30 MCG/0.3 ML DOSE VACCINE: CPT | Mod: PBBFAC

## 2021-10-04 ENCOUNTER — PATIENT MESSAGE (OUTPATIENT)
Dept: ADMINISTRATIVE | Facility: HOSPITAL | Age: 56
End: 2021-10-04

## 2021-10-22 ENCOUNTER — HOSPITAL ENCOUNTER (EMERGENCY)
Facility: OTHER | Age: 56
Discharge: HOME | End: 2021-10-22
Attending: EMERGENCY MEDICINE
Payer: MEDICAID

## 2021-10-22 VITALS
RESPIRATION RATE: 18 BRPM | HEART RATE: 74 BPM | WEIGHT: 220 LBS | BODY MASS INDEX: 29.8 KG/M2 | DIASTOLIC BLOOD PRESSURE: 86 MMHG | SYSTOLIC BLOOD PRESSURE: 168 MMHG | TEMPERATURE: 98 F | HEIGHT: 72 IN | OXYGEN SATURATION: 97 %

## 2021-10-22 DIAGNOSIS — R11.2 NON-INTRACTABLE VOMITING WITH NAUSEA, UNSPECIFIED VOMITING TYPE: Primary | ICD-10-CM

## 2021-10-22 LAB — POCT GLUCOSE: 168 MG/DL (ref 70–110)

## 2021-10-22 PROCEDURE — 82962 GLUCOSE BLOOD TEST: CPT

## 2021-10-22 PROCEDURE — 99284 EMERGENCY DEPT VISIT MOD MDM: CPT | Mod: 25

## 2021-10-22 PROCEDURE — 25000003 PHARM REV CODE 250: Performed by: EMERGENCY MEDICINE

## 2021-10-22 RX ORDER — ONDANSETRON 4 MG/1
4 TABLET, ORALLY DISINTEGRATING ORAL EVERY 6 HOURS PRN
Qty: 12 TABLET | Refills: 0 | Status: SHIPPED | OUTPATIENT
Start: 2021-10-22 | End: 2023-10-05

## 2021-10-22 RX ORDER — ONDANSETRON 8 MG/1
8 TABLET, ORALLY DISINTEGRATING ORAL
Status: COMPLETED | OUTPATIENT
Start: 2021-10-22 | End: 2021-10-22

## 2021-10-22 RX ORDER — ONDANSETRON 2 MG/ML
8 INJECTION INTRAMUSCULAR; INTRAVENOUS ONCE AS NEEDED
Status: CANCELLED | OUTPATIENT
Start: 2021-10-22 | End: 2033-03-20

## 2021-10-22 RX ORDER — METOCLOPRAMIDE HYDROCHLORIDE 5 MG/ML
10 INJECTION INTRAMUSCULAR; INTRAVENOUS ONCE
Status: CANCELLED | OUTPATIENT
Start: 2021-10-22

## 2021-10-22 RX ORDER — METOCLOPRAMIDE 10 MG/1
10 TABLET ORAL EVERY 6 HOURS PRN
Qty: 10 TABLET | Refills: 0 | Status: SHIPPED | OUTPATIENT
Start: 2021-10-22 | End: 2023-10-05

## 2021-10-22 RX ADMIN — ONDANSETRON 8 MG: 8 TABLET, ORALLY DISINTEGRATING ORAL at 08:10

## 2021-11-08 ENCOUNTER — LAB VISIT (OUTPATIENT)
Dept: LAB | Facility: OTHER | Age: 56
End: 2021-11-08
Attending: INTERNAL MEDICINE
Payer: MEDICAID

## 2021-11-08 DIAGNOSIS — E11.42 TYPE 2 DIABETES MELLITUS WITH DIABETIC POLYNEUROPATHY, WITH LONG-TERM CURRENT USE OF INSULIN: ICD-10-CM

## 2021-11-08 DIAGNOSIS — Z79.4 TYPE 2 DIABETES MELLITUS WITH DIABETIC POLYNEUROPATHY, WITH LONG-TERM CURRENT USE OF INSULIN: ICD-10-CM

## 2021-11-08 DIAGNOSIS — E03.9 HYPOTHYROIDISM, UNSPECIFIED TYPE: ICD-10-CM

## 2021-11-08 LAB
ANION GAP SERPL CALC-SCNC: 12 MMOL/L (ref 8–16)
BUN SERPL-MCNC: 18 MG/DL (ref 6–20)
CALCIUM SERPL-MCNC: 10.5 MG/DL (ref 8.7–10.5)
CHLORIDE SERPL-SCNC: 105 MMOL/L (ref 95–110)
CO2 SERPL-SCNC: 25 MMOL/L (ref 23–29)
CREAT SERPL-MCNC: 1.3 MG/DL (ref 0.5–1.4)
EST. GFR  (AFRICAN AMERICAN): >60 ML/MIN/1.73 M^2
EST. GFR  (NON AFRICAN AMERICAN): >60 ML/MIN/1.73 M^2
ESTIMATED AVG GLUCOSE: 200 MG/DL (ref 68–131)
GLUCOSE SERPL-MCNC: 200 MG/DL (ref 70–110)
HBA1C MFR BLD: 8.6 % (ref 4–5.6)
POTASSIUM SERPL-SCNC: 3.7 MMOL/L (ref 3.5–5.1)
SODIUM SERPL-SCNC: 142 MMOL/L (ref 136–145)
T4 FREE SERPL-MCNC: 0.95 NG/DL (ref 0.71–1.51)
TSH SERPL DL<=0.005 MIU/L-ACNC: 2.25 UIU/ML (ref 0.4–4)

## 2021-11-08 PROCEDURE — 84443 ASSAY THYROID STIM HORMONE: CPT | Performed by: INTERNAL MEDICINE

## 2021-11-08 PROCEDURE — 84439 ASSAY OF FREE THYROXINE: CPT | Performed by: INTERNAL MEDICINE

## 2021-11-08 PROCEDURE — 83036 HEMOGLOBIN GLYCOSYLATED A1C: CPT | Performed by: INTERNAL MEDICINE

## 2021-11-08 PROCEDURE — 36415 COLL VENOUS BLD VENIPUNCTURE: CPT | Performed by: INTERNAL MEDICINE

## 2021-11-08 PROCEDURE — 80048 BASIC METABOLIC PNL TOTAL CA: CPT | Performed by: INTERNAL MEDICINE

## 2021-11-16 ENCOUNTER — PATIENT OUTREACH (OUTPATIENT)
Dept: ADMINISTRATIVE | Facility: OTHER | Age: 56
End: 2021-11-16
Payer: MEDICAID

## 2021-11-17 ENCOUNTER — OFFICE VISIT (OUTPATIENT)
Dept: ENDOCRINOLOGY | Facility: CLINIC | Age: 56
End: 2021-11-17
Payer: MEDICAID

## 2021-11-17 VITALS
DIASTOLIC BLOOD PRESSURE: 103 MMHG | WEIGHT: 214.06 LBS | BODY MASS INDEX: 28.99 KG/M2 | HEIGHT: 72 IN | HEART RATE: 81 BPM | SYSTOLIC BLOOD PRESSURE: 184 MMHG

## 2021-11-17 DIAGNOSIS — E03.9 HYPOTHYROIDISM, UNSPECIFIED TYPE: ICD-10-CM

## 2021-11-17 DIAGNOSIS — E03.4 HYPOTHYROIDISM DUE TO ACQUIRED ATROPHY OF THYROID: ICD-10-CM

## 2021-11-17 DIAGNOSIS — E78.2 MIXED HYPERLIPIDEMIA: ICD-10-CM

## 2021-11-17 DIAGNOSIS — E11.65 TYPE 2 DIABETES MELLITUS WITH HYPERGLYCEMIA, WITH LONG-TERM CURRENT USE OF INSULIN: Primary | ICD-10-CM

## 2021-11-17 DIAGNOSIS — E78.5 HYPERLIPIDEMIA, UNSPECIFIED HYPERLIPIDEMIA TYPE: ICD-10-CM

## 2021-11-17 DIAGNOSIS — Z79.4 TYPE 2 DIABETES MELLITUS WITH HYPERGLYCEMIA, WITH LONG-TERM CURRENT USE OF INSULIN: Primary | ICD-10-CM

## 2021-11-17 DIAGNOSIS — I10 ESSENTIAL HYPERTENSION: ICD-10-CM

## 2021-11-17 DIAGNOSIS — G89.29 OTHER CHRONIC PAIN: ICD-10-CM

## 2021-11-17 DIAGNOSIS — E11.42 DIABETIC POLYNEUROPATHY ASSOCIATED WITH TYPE 2 DIABETES MELLITUS: ICD-10-CM

## 2021-11-17 PROCEDURE — 99214 PR OFFICE/OUTPT VISIT, EST, LEVL IV, 30-39 MIN: ICD-10-PCS | Mod: S$GLB,,, | Performed by: INTERNAL MEDICINE

## 2021-11-17 PROCEDURE — 99214 OFFICE O/P EST MOD 30 MIN: CPT | Mod: S$GLB,,, | Performed by: INTERNAL MEDICINE

## 2021-11-17 RX ORDER — LEVOTHYROXINE SODIUM 25 UG/1
25 TABLET ORAL DAILY
Qty: 90 TABLET | Refills: 3 | Status: SHIPPED | OUTPATIENT
Start: 2021-11-17 | End: 2022-01-03

## 2021-11-17 RX ORDER — ATORVASTATIN CALCIUM 20 MG/1
20 TABLET, FILM COATED ORAL DAILY
Qty: 90 TABLET | Refills: 3 | Status: SHIPPED | OUTPATIENT
Start: 2021-11-17 | End: 2022-01-03

## 2021-11-17 RX ORDER — REPAGLINIDE 2 MG/1
2 TABLET ORAL
Qty: 270 TABLET | Refills: 3 | Status: SHIPPED | OUTPATIENT
Start: 2021-11-17 | End: 2022-08-08 | Stop reason: SDUPTHER

## 2021-11-19 ENCOUNTER — PATIENT MESSAGE (OUTPATIENT)
Dept: ORTHOPEDICS | Facility: CLINIC | Age: 56
End: 2021-11-19
Payer: MEDICAID

## 2021-11-24 DIAGNOSIS — E11.69 TYPE 2 DIABETES MELLITUS WITH OTHER SPECIFIED COMPLICATION, UNSPECIFIED WHETHER LONG TERM INSULIN USE: Primary | ICD-10-CM

## 2021-12-14 ENCOUNTER — PATIENT MESSAGE (OUTPATIENT)
Dept: ADMINISTRATIVE | Facility: HOSPITAL | Age: 56
End: 2021-12-14
Payer: MEDICAID

## 2021-12-14 ENCOUNTER — PATIENT OUTREACH (OUTPATIENT)
Dept: ADMINISTRATIVE | Facility: HOSPITAL | Age: 56
End: 2021-12-14
Payer: MEDICAID

## 2022-03-17 ENCOUNTER — PATIENT OUTREACH (OUTPATIENT)
Dept: ADMINISTRATIVE | Facility: HOSPITAL | Age: 57
End: 2022-03-17
Payer: MEDICAID

## 2022-04-12 ENCOUNTER — LAB VISIT (OUTPATIENT)
Dept: LAB | Facility: OTHER | Age: 57
End: 2022-04-12
Attending: INTERNAL MEDICINE
Payer: MEDICAID

## 2022-04-12 DIAGNOSIS — Z79.4 TYPE 2 DIABETES MELLITUS WITH HYPERGLYCEMIA, WITH LONG-TERM CURRENT USE OF INSULIN: ICD-10-CM

## 2022-04-12 DIAGNOSIS — E78.2 MIXED HYPERLIPIDEMIA: ICD-10-CM

## 2022-04-12 DIAGNOSIS — E11.65 TYPE 2 DIABETES MELLITUS WITH HYPERGLYCEMIA, WITH LONG-TERM CURRENT USE OF INSULIN: ICD-10-CM

## 2022-04-12 LAB
ANION GAP SERPL CALC-SCNC: 13 MMOL/L (ref 8–16)
BUN SERPL-MCNC: 15 MG/DL (ref 6–20)
CALCIUM SERPL-MCNC: 9.6 MG/DL (ref 8.7–10.5)
CHLORIDE SERPL-SCNC: 104 MMOL/L (ref 95–110)
CHOLEST SERPL-MCNC: 132 MG/DL (ref 120–199)
CHOLEST/HDLC SERPL: 3.3 {RATIO} (ref 2–5)
CO2 SERPL-SCNC: 23 MMOL/L (ref 23–29)
CREAT SERPL-MCNC: 1.1 MG/DL (ref 0.5–1.4)
EST. GFR  (AFRICAN AMERICAN): >60 ML/MIN/1.73 M^2
EST. GFR  (NON AFRICAN AMERICAN): >60 ML/MIN/1.73 M^2
ESTIMATED AVG GLUCOSE: 318 MG/DL (ref 68–131)
GLUCOSE SERPL-MCNC: 212 MG/DL (ref 70–110)
HBA1C MFR BLD: 12.7 % (ref 4–5.6)
HDLC SERPL-MCNC: 40 MG/DL (ref 40–75)
HDLC SERPL: 30.3 % (ref 20–50)
LDLC SERPL CALC-MCNC: 70.8 MG/DL (ref 63–159)
NONHDLC SERPL-MCNC: 92 MG/DL
POTASSIUM SERPL-SCNC: 3.7 MMOL/L (ref 3.5–5.1)
SODIUM SERPL-SCNC: 140 MMOL/L (ref 136–145)
TRIGL SERPL-MCNC: 106 MG/DL (ref 30–150)

## 2022-04-12 PROCEDURE — 80061 LIPID PANEL: CPT | Performed by: INTERNAL MEDICINE

## 2022-04-12 PROCEDURE — 83036 HEMOGLOBIN GLYCOSYLATED A1C: CPT | Performed by: INTERNAL MEDICINE

## 2022-04-12 PROCEDURE — 80048 BASIC METABOLIC PNL TOTAL CA: CPT | Performed by: INTERNAL MEDICINE

## 2022-04-12 PROCEDURE — 36415 COLL VENOUS BLD VENIPUNCTURE: CPT | Performed by: INTERNAL MEDICINE

## 2022-04-27 ENCOUNTER — IMMUNIZATION (OUTPATIENT)
Dept: INTERNAL MEDICINE | Facility: CLINIC | Age: 57
End: 2022-04-27
Payer: MEDICAID

## 2022-04-27 DIAGNOSIS — Z23 NEED FOR VACCINATION: Primary | ICD-10-CM

## 2022-04-27 PROCEDURE — 91305 COVID-19, MRNA, LNP-S, PF, 30 MCG/0.3 ML DOSE VACCINE (PFIZER): CPT | Mod: PBBFAC

## 2022-05-02 ENCOUNTER — OFFICE VISIT (OUTPATIENT)
Dept: ENDOCRINOLOGY | Facility: CLINIC | Age: 57
End: 2022-05-02
Payer: MEDICAID

## 2022-05-02 VITALS
SYSTOLIC BLOOD PRESSURE: 196 MMHG | HEIGHT: 72 IN | HEART RATE: 86 BPM | BODY MASS INDEX: 28.99 KG/M2 | DIASTOLIC BLOOD PRESSURE: 101 MMHG | WEIGHT: 214 LBS

## 2022-05-02 DIAGNOSIS — Z79.4 TYPE 2 DIABETES MELLITUS WITH HYPERGLYCEMIA, WITH LONG-TERM CURRENT USE OF INSULIN: Primary | ICD-10-CM

## 2022-05-02 DIAGNOSIS — E78.2 MIXED HYPERLIPIDEMIA: ICD-10-CM

## 2022-05-02 DIAGNOSIS — I10 ESSENTIAL HYPERTENSION: ICD-10-CM

## 2022-05-02 DIAGNOSIS — E11.65 TYPE 2 DIABETES MELLITUS WITH HYPERGLYCEMIA, WITH LONG-TERM CURRENT USE OF INSULIN: Primary | ICD-10-CM

## 2022-05-02 DIAGNOSIS — E11.42 DIABETIC POLYNEUROPATHY ASSOCIATED WITH TYPE 2 DIABETES MELLITUS: ICD-10-CM

## 2022-05-02 DIAGNOSIS — E03.4 HYPOTHYROIDISM DUE TO ACQUIRED ATROPHY OF THYROID: ICD-10-CM

## 2022-05-02 PROCEDURE — 3077F SYST BP >= 140 MM HG: CPT | Mod: CPTII,S$GLB,, | Performed by: INTERNAL MEDICINE

## 2022-05-02 PROCEDURE — 3060F POS MICROALBUMINURIA REV: CPT | Mod: CPTII,S$GLB,, | Performed by: INTERNAL MEDICINE

## 2022-05-02 PROCEDURE — 1159F PR MEDICATION LIST DOCUMENTED IN MEDICAL RECORD: ICD-10-PCS | Mod: CPTII,S$GLB,, | Performed by: INTERNAL MEDICINE

## 2022-05-02 PROCEDURE — 1159F MED LIST DOCD IN RCRD: CPT | Mod: CPTII,S$GLB,, | Performed by: INTERNAL MEDICINE

## 2022-05-02 PROCEDURE — 3060F PR POS MICROALBUMINURIA RESULT DOCUMENTED/REVIEW: ICD-10-PCS | Mod: CPTII,S$GLB,, | Performed by: INTERNAL MEDICINE

## 2022-05-02 PROCEDURE — 3080F DIAST BP >= 90 MM HG: CPT | Mod: CPTII,S$GLB,, | Performed by: INTERNAL MEDICINE

## 2022-05-02 PROCEDURE — 99214 OFFICE O/P EST MOD 30 MIN: CPT | Mod: S$GLB,,, | Performed by: INTERNAL MEDICINE

## 2022-05-02 PROCEDURE — 3046F PR MOST RECENT HEMOGLOBIN A1C LEVEL > 9.0%: ICD-10-PCS | Mod: CPTII,S$GLB,, | Performed by: INTERNAL MEDICINE

## 2022-05-02 PROCEDURE — 1160F PR REVIEW ALL MEDS BY PRESCRIBER/CLIN PHARMACIST DOCUMENTED: ICD-10-PCS | Mod: CPTII,S$GLB,, | Performed by: INTERNAL MEDICINE

## 2022-05-02 PROCEDURE — 3066F PR DOCUMENTATION OF TREATMENT FOR NEPHROPATHY: ICD-10-PCS | Mod: CPTII,S$GLB,, | Performed by: INTERNAL MEDICINE

## 2022-05-02 PROCEDURE — 99214 PR OFFICE/OUTPT VISIT, EST, LEVL IV, 30-39 MIN: ICD-10-PCS | Mod: S$GLB,,, | Performed by: INTERNAL MEDICINE

## 2022-05-02 PROCEDURE — 1160F RVW MEDS BY RX/DR IN RCRD: CPT | Mod: CPTII,S$GLB,, | Performed by: INTERNAL MEDICINE

## 2022-05-02 PROCEDURE — 3080F PR MOST RECENT DIASTOLIC BLOOD PRESSURE >= 90 MM HG: ICD-10-PCS | Mod: CPTII,S$GLB,, | Performed by: INTERNAL MEDICINE

## 2022-05-02 PROCEDURE — 3046F HEMOGLOBIN A1C LEVEL >9.0%: CPT | Mod: CPTII,S$GLB,, | Performed by: INTERNAL MEDICINE

## 2022-05-02 PROCEDURE — 3077F PR MOST RECENT SYSTOLIC BLOOD PRESSURE >= 140 MM HG: ICD-10-PCS | Mod: CPTII,S$GLB,, | Performed by: INTERNAL MEDICINE

## 2022-05-02 PROCEDURE — 3008F PR BODY MASS INDEX (BMI) DOCUMENTED: ICD-10-PCS | Mod: CPTII,S$GLB,, | Performed by: INTERNAL MEDICINE

## 2022-05-02 PROCEDURE — 3008F BODY MASS INDEX DOCD: CPT | Mod: CPTII,S$GLB,, | Performed by: INTERNAL MEDICINE

## 2022-05-02 PROCEDURE — 3066F NEPHROPATHY DOC TX: CPT | Mod: CPTII,S$GLB,, | Performed by: INTERNAL MEDICINE

## 2022-05-02 RX ORDER — CARVEDILOL 12.5 MG/1
12.5 TABLET ORAL 2 TIMES DAILY WITH MEALS
Qty: 180 TABLET | Refills: 3 | Status: SHIPPED | OUTPATIENT
Start: 2022-05-02 | End: 2023-10-11

## 2022-05-02 RX ORDER — LOSARTAN POTASSIUM AND HYDROCHLOROTHIAZIDE 25; 100 MG/1; MG/1
1 TABLET ORAL DAILY
Qty: 90 TABLET | Refills: 3 | Status: SHIPPED | OUTPATIENT
Start: 2022-05-02 | End: 2022-10-10

## 2022-05-02 RX ORDER — DULAGLUTIDE 0.75 MG/.5ML
0.75 INJECTION, SOLUTION SUBCUTANEOUS
Qty: 4 PEN | Refills: 11 | Status: SHIPPED | OUTPATIENT
Start: 2022-05-02 | End: 2022-08-08

## 2022-05-02 NOTE — ASSESSMENT & PLAN NOTE
bp elevated today   was high last few times as well   - pt took meds already   not checking BP at home   continue losartan-HCTZ   add coreg   f/u with PCP, monitor BP. Also can check BP at home 1-2 times a week

## 2022-05-02 NOTE — ASSESSMENT & PLAN NOTE
Reviewed goals of therapy - to get the best control we can without hypoglycemia. Goal A1C <7.5 at least   - last a1c much worse, well above goal    Continue insulin. 50 units BID for now.   Continue 2 mg prandin with meals   continue metformin    Add trulicity 0.75 mg/week    Continue checking sugar. Patient encouraged to document glucose results and bring them to every clinic visit    -Close adherence to lifestyle changes recommended.    -Periodic follow ups for eye evaluations, foot care suggested.

## 2022-05-02 NOTE — PROGRESS NOTES
Subjective:      Chief Complaint: Diabetes    HPI: Juan Manuel Johnson is a 56 y.o. male who is here for a follow-up evaluation for diabetes. Last seen 2021    Has HTN:   BP high again today   was high last several times.    Medication:   Losartan-HCTZ 100-25    Took meds already  Doesn't check BP at home lately    For hypothyroidism:  Lab Results   Component Value Date    TSH 2.245 2021    FREET4 0.95 2021     Medication: Levothyroxine 25 mcg/day    With regards to the diabetes:  Diagnosed with T2DM since over 15 years ago     Known complications:     Retinopathy? No    Nephropathy? No    Neuropathy? Yes    CAD? No    CVA? No     Current regimen:   Lantus 50 units BID   prandin 2 mg BID with meals, doesn't take at lunch   metformin 500 mg BID     Missed doses? ran out of insulin a couple of days ago    Prior treatments:    Humalog 40 units BID    mixed insulin   Metformin   Glyburide   Tanzeum   Admelog    Self-Monitored blood glucose.  # times a day testin/day  Log reviewed: Yes, last few days with him    AM:104-315.  After breakfast: 170-237  Dinner: 112-305  QHS: 162-252    Hypoglycemic events? Not lately    Current Symptoms  No   Yes  []    [x]  Polydipsia  []    [x]  Polyuria  []    [x]  Vision changes, more in the morning.  [x]    []  Nausea  [x]    []  Diarrhea  [x]    []  Weight gain  []    [x]  Weight loss    Some pain, interested in pain referral.    Diabetes Management Status    Statin: Taking  ACE/ARB: Taking    Screening or Prevention Patient's value Goal Complete/Controlled?   HgA1C Testing and Control   Lab Results   Component Value Date    HGBA1C 12.7 (H) 2022      q6months/Less than 7.5% No   Lipid profile : 2022 Annually Yes   LDL control Lab Results   Component Value Date    LDLCALC 70.8 2022    Annually/Less than 100 mg/dl  Yes   Nephropathy screening Lab Results   Component Value Date    MICALBCREAT 246.6 (H) 2022     Lab Results   Component Value  Date    CREATININE 1.1 04/12/2022     Lab Results   Component Value Date    PROTEINUA Trace (A) 01/07/2019    Annually Yes   Blood pressure BP Readings from Last 1 Encounters:   05/02/22 (!) 196/101    Less than 140/90 Yes   Dilated retinal exam : 02/01/2018 Annually Yes   Foot exam   : 11/18/2020 Annually Yes     Reviewed past medical, family, social history and updated as appropriate.    Review of Systems  As above    Objective:     Vitals:    05/02/22 1436   BP: (!) 196/101   Pulse: 86     BP Readings from Last 5 Encounters:   05/02/22 (!) 196/101   11/17/21 (!) 184/103   10/22/21 (!) 168/86   08/26/21 (!) 176/91   08/25/21 (!) 151/83     Physical Exam  Vitals reviewed.   Constitutional:       General: He is not in acute distress.  Neck:      Thyroid: No thyromegaly.   Cardiovascular:      Heart sounds: Normal heart sounds.   Pulmonary:      Effort: Pulmonary effort is normal.       Wt Readings from Last 10 Encounters:   05/02/22 1436 97.1 kg (214 lb)   11/17/21 0853 97.1 kg (214 lb 1.1 oz)   10/22/21 0807 99.8 kg (220 lb)   08/26/21 0845 98 kg (216 lb)   08/25/21 0802 98.4 kg (216 lb 14.9 oz)   05/19/21 0837 98.4 kg (216 lb 14.9 oz)   03/30/21 1045 101.1 kg (222 lb 14.2 oz)   02/19/21 0842 97.5 kg (214 lb 15.2 oz)   01/28/21 0758 94.8 kg (209 lb)   01/25/21 0804 97.6 kg (215 lb 2.7 oz)     Lab Results   Component Value Date    HGBA1C 12.7 (H) 04/12/2022     Lab Results   Component Value Date    CHOL 132 04/12/2022    HDL 40 04/12/2022    LDLCALC 70.8 04/12/2022    TRIG 106 04/12/2022    CHOLHDL 30.3 04/12/2022     Lab Results   Component Value Date     04/12/2022    K 3.7 04/12/2022     04/12/2022    CO2 23 04/12/2022     (H) 04/12/2022    BUN 15 04/12/2022    CREATININE 1.1 04/12/2022    CALCIUM 9.6 04/12/2022    PROT 8.3 07/20/2020    ALBUMIN 4.2 07/20/2020    BILITOT 0.5 07/20/2020    ALKPHOS 80 07/20/2020    AST 19 07/20/2020    ALT 13 07/20/2020    ANIONGAP 13 04/12/2022    ESTGFRAFRICA  >60 04/12/2022    EGFRNONAA >60 04/12/2022    TSH 2.245 11/08/2021      Lab Results   Component Value Date    MICALBCREAT 246.6 (H) 04/12/2022     Assessment/Plan:     Type 2 diabetes mellitus with hyperglycemia, with long-term current use of insulin  Reviewed goals of therapy - to get the best control we can without hypoglycemia. Goal A1C <7.5 at least   - last a1c much worse, well above goal    Continue insulin. 50 units BID for now.   Continue 2 mg prandin with meals   continue metformin    Add trulicity 0.75 mg/week    Continue checking sugar. Patient encouraged to document glucose results and bring them to every clinic visit    -Close adherence to lifestyle changes recommended.    -Periodic follow ups for eye evaluations, foot care suggested.        Mixed hyperlipidemia  Lab Results   Component Value Date    LDLCALC 70.8 04/12/2022     Last LDL at goal   continue statin   monitor yearly      Essential hypertension  bp elevated today   was high last few times as well   - pt took meds already   not checking BP at home   continue losartan-HCTZ   add coreg   f/u with PCP, monitor BP. Also can check BP at home 1-2 times a week      Diabetic polyneuropathy associated with type 2 diabetes mellitus  Continue gabapentin PRN, f/u with podiatry   encourage glycemic control as above         Hypothyroidism due to acquired atrophy of thyroid  Lab Results   Component Value Date    TSH 2.245 11/08/2021    FREET4 0.95 11/08/2021     TSH normal, at goal   continue same dose   recheck once or twice a year  Goal is a normal TSH      Follow up in about 3 months (around 8/2/2022) for lab review, further monitoring.      Abdirahman Cerda MD  Endocrinology

## 2022-05-02 NOTE — ASSESSMENT & PLAN NOTE
Lab Results   Component Value Date    TSH 2.245 11/08/2021    FREET4 0.95 11/08/2021     TSH normal, at goal   continue same dose   recheck once or twice a year  Goal is a normal TSH

## 2022-05-02 NOTE — PATIENT INSTRUCTIONS
For blood pressure:   Continue losartan-HCTZ   Add coreg 12.5 mg twice a day      For diabetes:   Add trulicity 0.75 mg once a week   Continue metformin 500 mg twice a day   Continue prandin (repaglinide) 2 mg with meals   Continue insulin 50 units twice a day.    Basal Insulin  Take 50 units of lantus insulin.    Check your glucose in the morning before breakfast and keep a log.  Goal AM glucose:       After 3-5 days, if your average glucose is above 140, increase your dose by 2 units  If your AM glucose is under 90, decrease by 2 units.

## 2022-05-02 NOTE — LETTER
May 2, 2022        Zion Espinoza MD  [address]  [address]  [facility] [address]             [facility]  [address] [place] [id number]  Phone: [phone]  Fax: [phone]   Patient: Juan Manuel Johnson   MR Number: [id number]   YOB: 1965   Date of Visit: 5/2/2022       Dear Dr. Espinoza:    I saw your patient, Juan Manuel Johnson, today for evaluation. Attached you will find relevant portions of my assessment and plan of care.    He was wondering about if/when he was due for follow-up with you. Looks like it has been a while. He would likely benefit from additional people checking in on his BP and glucose since both seem to be rather high lately.    If you have questions, please do not hesitate to call me. I look forward to following Juan Manuel Johnson along with you.    Sincerely,      Abdirahman Cerda MD      CC  No Recipients    Enclosure

## 2022-05-02 NOTE — ASSESSMENT & PLAN NOTE
Lab Results   Component Value Date    LDLCALC 70.8 04/12/2022     Last LDL at goal   continue statin   monitor yearly

## 2022-05-12 ENCOUNTER — PATIENT OUTREACH (OUTPATIENT)
Dept: ADMINISTRATIVE | Facility: HOSPITAL | Age: 57
End: 2022-05-12
Payer: MEDICAID

## 2022-05-16 ENCOUNTER — PATIENT MESSAGE (OUTPATIENT)
Dept: ADMINISTRATIVE | Facility: OTHER | Age: 57
End: 2022-05-16
Payer: MEDICAID

## 2022-05-16 ENCOUNTER — PATIENT OUTREACH (OUTPATIENT)
Dept: ADMINISTRATIVE | Facility: OTHER | Age: 57
End: 2022-05-16
Payer: MEDICAID

## 2022-05-16 NOTE — PROGRESS NOTES
Care Everywhere: updated  Immunization: updated  Health Maintenance: updated  Media Review: review for outside eye exam report   Legacy Review:   DIS:  Order placed:   Upcoming appts:  EFAX:  Task Tickets:Scheduling ticket to schedule annual pcp visit sent to patient's portal   Referrals:Optometry 5.19.2021

## 2022-05-30 ENCOUNTER — TELEPHONE (OUTPATIENT)
Dept: ENDOCRINOLOGY | Facility: CLINIC | Age: 57
End: 2022-05-30
Payer: MEDICAID

## 2022-05-30 ENCOUNTER — PATIENT MESSAGE (OUTPATIENT)
Dept: ADMINISTRATIVE | Facility: HOSPITAL | Age: 57
End: 2022-05-30
Payer: MEDICAID

## 2022-06-07 ENCOUNTER — PATIENT OUTREACH (OUTPATIENT)
Dept: ADMINISTRATIVE | Facility: HOSPITAL | Age: 57
End: 2022-06-07
Payer: MEDICAID

## 2022-06-16 ENCOUNTER — PATIENT OUTREACH (OUTPATIENT)
Dept: ADMINISTRATIVE | Facility: HOSPITAL | Age: 57
End: 2022-06-16
Payer: MEDICAID

## 2022-06-16 DIAGNOSIS — E11.9 DIABETES MELLITUS WITHOUT COMPLICATION: Primary | ICD-10-CM

## 2022-07-14 ENCOUNTER — TELEPHONE (OUTPATIENT)
Dept: PAIN MEDICINE | Facility: CLINIC | Age: 57
End: 2022-07-14
Payer: MEDICAID

## 2022-07-14 NOTE — TELEPHONE ENCOUNTER
----- Message from Mamie Curtis sent at 7/14/2022 10:44 AM CDT -----  Who called?:PT      What is the request in detail:PT would like to schedule an appointment. Please advise         Can the clinic reply by [facility]?:No        Best Call Back Number: [phone]

## 2022-07-14 NOTE — TELEPHONE ENCOUNTER
Patient was contacted and informed that , does not see medicaid patient.      Verbalized undertsanding

## 2022-07-21 ENCOUNTER — PATIENT OUTREACH (OUTPATIENT)
Dept: ADMINISTRATIVE | Facility: HOSPITAL | Age: 57
End: 2022-07-21
Payer: MEDICAID

## 2022-07-25 ENCOUNTER — TELEPHONE (OUTPATIENT)
Dept: DIABETES | Facility: CLINIC | Age: 57
End: 2022-07-25
Payer: MEDICAID

## 2022-07-27 ENCOUNTER — PATIENT OUTREACH (OUTPATIENT)
Dept: ADMINISTRATIVE | Facility: HOSPITAL | Age: 57
End: 2022-07-27
Payer: MEDICAID

## 2022-07-27 NOTE — PROGRESS NOTES
Chart review for overdue eye exam screening. Outreach by pcp [facility] completed on 7.21.2022. Reminder for eye cam added to 8/2/2022 appt notes for Dr. Espinoza

## 2022-08-03 ENCOUNTER — LAB VISIT (OUTPATIENT)
Dept: LAB | Facility: OTHER | Age: 57
End: 2022-08-03
Attending: INTERNAL MEDICINE
Payer: MEDICAID

## 2022-08-03 DIAGNOSIS — E11.65 TYPE 2 DIABETES MELLITUS WITH HYPERGLYCEMIA, WITH LONG-TERM CURRENT USE OF INSULIN: ICD-10-CM

## 2022-08-03 DIAGNOSIS — Z79.4 TYPE 2 DIABETES MELLITUS WITH HYPERGLYCEMIA, WITH LONG-TERM CURRENT USE OF INSULIN: ICD-10-CM

## 2022-08-03 LAB
ANION GAP SERPL CALC-SCNC: 15 MMOL/L (ref 8–16)
BUN SERPL-MCNC: 25 MG/DL (ref 6–20)
CALCIUM SERPL-MCNC: 10.7 MG/DL (ref 8.7–10.5)
CHLORIDE SERPL-SCNC: 102 MMOL/L (ref 95–110)
CO2 SERPL-SCNC: 26 MMOL/L (ref 23–29)
CREAT SERPL-MCNC: 1.3 MG/DL (ref 0.5–1.4)
EST. GFR  (NO RACE VARIABLE): >60 ML/MIN/1.73 M^2
ESTIMATED AVG GLUCOSE: 341 MG/DL (ref 68–131)
GLUCOSE SERPL-MCNC: 85 MG/DL (ref 70–110)
HBA1C MFR BLD: 13.5 % (ref 4–5.6)
POTASSIUM SERPL-SCNC: 3.6 MMOL/L (ref 3.5–5.1)
SODIUM SERPL-SCNC: 143 MMOL/L (ref 136–145)

## 2022-08-03 PROCEDURE — 83036 HEMOGLOBIN GLYCOSYLATED A1C: CPT | Performed by: INTERNAL MEDICINE

## 2022-08-03 PROCEDURE — 36415 COLL VENOUS BLD VENIPUNCTURE: CPT | Performed by: INTERNAL MEDICINE

## 2022-08-03 PROCEDURE — 80048 BASIC METABOLIC PNL TOTAL CA: CPT | Performed by: INTERNAL MEDICINE

## 2022-08-08 ENCOUNTER — OFFICE VISIT (OUTPATIENT)
Dept: ENDOCRINOLOGY | Facility: CLINIC | Age: 57
End: 2022-08-08
Payer: MEDICAID

## 2022-08-08 VITALS
OXYGEN SATURATION: 99 % | WEIGHT: 218 LBS | HEART RATE: 76 BPM | HEIGHT: 72 IN | BODY MASS INDEX: 29.53 KG/M2 | SYSTOLIC BLOOD PRESSURE: 111 MMHG | DIASTOLIC BLOOD PRESSURE: 74 MMHG

## 2022-08-08 DIAGNOSIS — Z79.4 TYPE 2 DIABETES MELLITUS WITH DIABETIC POLYNEUROPATHY, WITH LONG-TERM CURRENT USE OF INSULIN: ICD-10-CM

## 2022-08-08 DIAGNOSIS — E03.4 HYPOTHYROIDISM DUE TO ACQUIRED ATROPHY OF THYROID: ICD-10-CM

## 2022-08-08 DIAGNOSIS — Z79.4 TYPE 2 DIABETES MELLITUS WITH HYPERGLYCEMIA, WITH LONG-TERM CURRENT USE OF INSULIN: Primary | ICD-10-CM

## 2022-08-08 DIAGNOSIS — I10 ESSENTIAL HYPERTENSION: ICD-10-CM

## 2022-08-08 DIAGNOSIS — E11.42 TYPE 2 DIABETES MELLITUS WITH DIABETIC POLYNEUROPATHY, WITH LONG-TERM CURRENT USE OF INSULIN: ICD-10-CM

## 2022-08-08 DIAGNOSIS — E11.42 DIABETIC POLYNEUROPATHY ASSOCIATED WITH TYPE 2 DIABETES MELLITUS: ICD-10-CM

## 2022-08-08 DIAGNOSIS — E78.2 MIXED HYPERLIPIDEMIA: ICD-10-CM

## 2022-08-08 DIAGNOSIS — E11.65 TYPE 2 DIABETES MELLITUS WITH HYPERGLYCEMIA, WITH LONG-TERM CURRENT USE OF INSULIN: Primary | ICD-10-CM

## 2022-08-08 PROCEDURE — 3060F PR POS MICROALBUMINURIA RESULT DOCUMENTED/REVIEW: ICD-10-PCS | Mod: CPTII,S$GLB,, | Performed by: INTERNAL MEDICINE

## 2022-08-08 PROCEDURE — 3078F PR MOST RECENT DIASTOLIC BLOOD PRESSURE < 80 MM HG: ICD-10-PCS | Mod: CPTII,S$GLB,, | Performed by: INTERNAL MEDICINE

## 2022-08-08 PROCEDURE — 3060F POS MICROALBUMINURIA REV: CPT | Mod: CPTII,S$GLB,, | Performed by: INTERNAL MEDICINE

## 2022-08-08 PROCEDURE — 3078F DIAST BP <80 MM HG: CPT | Mod: CPTII,S$GLB,, | Performed by: INTERNAL MEDICINE

## 2022-08-08 PROCEDURE — 3074F PR MOST RECENT SYSTOLIC BLOOD PRESSURE < 130 MM HG: ICD-10-PCS | Mod: CPTII,S$GLB,, | Performed by: INTERNAL MEDICINE

## 2022-08-08 PROCEDURE — 3074F SYST BP LT 130 MM HG: CPT | Mod: CPTII,S$GLB,, | Performed by: INTERNAL MEDICINE

## 2022-08-08 PROCEDURE — 3008F BODY MASS INDEX DOCD: CPT | Mod: CPTII,S$GLB,, | Performed by: INTERNAL MEDICINE

## 2022-08-08 PROCEDURE — 1160F PR REVIEW ALL MEDS BY PRESCRIBER/CLIN PHARMACIST DOCUMENTED: ICD-10-PCS | Mod: CPTII,S$GLB,, | Performed by: INTERNAL MEDICINE

## 2022-08-08 PROCEDURE — 1159F PR MEDICATION LIST DOCUMENTED IN MEDICAL RECORD: ICD-10-PCS | Mod: CPTII,S$GLB,, | Performed by: INTERNAL MEDICINE

## 2022-08-08 PROCEDURE — 99214 PR OFFICE/OUTPT VISIT, EST, LEVL IV, 30-39 MIN: ICD-10-PCS | Mod: S$GLB,,, | Performed by: INTERNAL MEDICINE

## 2022-08-08 PROCEDURE — 3008F PR BODY MASS INDEX (BMI) DOCUMENTED: ICD-10-PCS | Mod: CPTII,S$GLB,, | Performed by: INTERNAL MEDICINE

## 2022-08-08 PROCEDURE — 3066F NEPHROPATHY DOC TX: CPT | Mod: CPTII,S$GLB,, | Performed by: INTERNAL MEDICINE

## 2022-08-08 PROCEDURE — 3046F HEMOGLOBIN A1C LEVEL >9.0%: CPT | Mod: CPTII,S$GLB,, | Performed by: INTERNAL MEDICINE

## 2022-08-08 PROCEDURE — 1159F MED LIST DOCD IN RCRD: CPT | Mod: CPTII,S$GLB,, | Performed by: INTERNAL MEDICINE

## 2022-08-08 PROCEDURE — 3066F PR DOCUMENTATION OF TREATMENT FOR NEPHROPATHY: ICD-10-PCS | Mod: CPTII,S$GLB,, | Performed by: INTERNAL MEDICINE

## 2022-08-08 PROCEDURE — 1160F RVW MEDS BY RX/DR IN RCRD: CPT | Mod: CPTII,S$GLB,, | Performed by: INTERNAL MEDICINE

## 2022-08-08 PROCEDURE — 99214 OFFICE O/P EST MOD 30 MIN: CPT | Mod: S$GLB,,, | Performed by: INTERNAL MEDICINE

## 2022-08-08 PROCEDURE — 3046F PR MOST RECENT HEMOGLOBIN A1C LEVEL > 9.0%: ICD-10-PCS | Mod: CPTII,S$GLB,, | Performed by: INTERNAL MEDICINE

## 2022-08-08 RX ORDER — BLOOD-GLUCOSE TRANSMITTER
EACH MISCELLANEOUS
Qty: 1 EACH | Refills: 3 | Status: SHIPPED | OUTPATIENT
Start: 2022-08-08 | End: 2023-01-23 | Stop reason: SDUPTHER

## 2022-08-08 RX ORDER — METFORMIN HYDROCHLORIDE 500 MG/1
500 TABLET ORAL 2 TIMES DAILY WITH MEALS
Qty: 180 TABLET | Refills: 3 | Status: SHIPPED | OUTPATIENT
Start: 2022-08-08

## 2022-08-08 RX ORDER — BLOOD-GLUCOSE SENSOR
EACH MISCELLANEOUS
Qty: 3 EACH | Refills: 11 | Status: SHIPPED | OUTPATIENT
Start: 2022-08-08 | End: 2023-01-23 | Stop reason: SDUPTHER

## 2022-08-08 RX ORDER — REPAGLINIDE 2 MG/1
4 TABLET ORAL
Qty: 540 TABLET | Refills: 3 | Status: SHIPPED | OUTPATIENT
Start: 2022-08-08 | End: 2024-02-07

## 2022-08-08 RX ORDER — MUPIROCIN 20 MG/G
OINTMENT TOPICAL 3 TIMES DAILY
Qty: 15 G | Refills: 0 | Status: SHIPPED | OUTPATIENT
Start: 2022-08-08 | End: 2022-08-18

## 2022-08-08 RX ORDER — INSULIN GLARGINE 100 [IU]/ML
55 INJECTION, SOLUTION SUBCUTANEOUS 2 TIMES DAILY
Qty: 45 ML | Refills: 11 | Status: SHIPPED | OUTPATIENT
Start: 2022-08-08 | End: 2023-08-25 | Stop reason: SDUPTHER

## 2022-08-08 NOTE — ASSESSMENT & PLAN NOTE
Reviewed goals of therapy - to get the best control we can without hypoglycemia. Goal A1C <7.5 at least   - last a1c increased, well above goal    Did not tolerate GLP1.   discussed potential for humalog, pt not interested.    Increase insulin. 55 units BID for now.   increase prandin. Take 4 mg prandin with meals   continue metformin    Continue checking sugar. Patient encouraged to document glucose results and bring them to every clinic visit    -Close adherence to lifestyle changes recommended.    -Periodic follow ups for eye evaluations, foot care suggested.    Try for CGM. On 2 shots/day insulin, checking sugar several times, would benefit from CGM.

## 2022-08-08 NOTE — PROGRESS NOTES
Subjective:      Chief Complaint: Diabetes    HPI: Juan Manuel Johnson is a 57 y.o. male who is here for a follow-up evaluation for diabetes. Last seen 2022    Has HTN:   BP normal today    Medication:   Losartan-HCTZ 100-25    Took meds already  Doesn't check BP at home lately    For hypothyroidism:  Lab Results   Component Value Date    TSH 2.245 2021    FREET4 0.95 2021     Medication: Levothyroxine 25 mcg/day    With regards to the diabetes:  Diagnosed with T2DM since over 15 years ago     Known complications:     Retinopathy? No    Nephropathy? No    Neuropathy? Yes    CAD? No    CVA? No     Current regimen:   Lantus 50 units BID   prandin 2 mg BID with meals, doesn't take at lunch   metformin 500 mg BID    Missed doses? ran out of insulin a couple of days ago    Prior treatments:    Humalog 40 units BID    mixed insulin   Metformin   Glyburide   Tanzeum   Admelog    Trulicity 0.75 -> did not tolerate    Self-Monitored blood glucose.  # times a day testin/day sometimes  Log reviewed: Yes, last few days with him    AM:  After breakfast: 139-325  Dinner: 112-315  QHS: 115-432    Hypoglycemic events? Not lately    Current Symptoms  No   Yes  []    [x]  Polydipsia  []    [x]  Polyuria  [x]    []  Vision changes  [x]    []  Nausea  [x]    []  Diarrhea  []    [x]  Weight gain, few lbs  [x]    []  Weight loss    Diabetes Management Status    Statin: Taking  ACE/ARB: Taking    Screening or Prevention Patient's value Goal Complete/Controlled?   HgA1C Testing and Control   Lab Results   Component Value Date    HGBA1C 13.5 (H) 2022      q6months/Less than 7.5% No   Lipid profile : 2022 Annually Yes   LDL control Lab Results   Component Value Date    LDLCALC 70.8 2022    Annually/Less than 100 mg/dl  Yes   Nephropathy screening Lab Results   Component Value Date    MICALBCREAT 246.6 (H) 2022     Lab Results   Component Value Date    CREATININE 1.3 2022     Lab Results    Component Value Date    PROTEINUA Trace (A) 01/07/2019    Annually Yes   Blood pressure BP Readings from Last 1 Encounters:   08/08/22 111/74    Less than 140/90 No   Dilated retinal exam : 02/01/2018 Annually No   Foot exam   : 11/18/2020 Annually no     Reviewed past medical, family, social history and updated as appropriate.    Review of Systems  As above    Objective:     Vitals:    08/08/22 1100   BP: 111/74   Pulse: 76     BP Readings from Last 5 Encounters:   08/08/22 111/74   05/02/22 (!) 196/101   11/17/21 (!) 184/103   10/22/21 (!) 168/86   08/26/21 (!) 176/91     Physical Exam  Vitals reviewed.   Constitutional:       General: He is not in acute distress.  Neck:      Thyroid: No thyromegaly.   Cardiovascular:      Heart sounds: Normal heart sounds.   Pulmonary:      Effort: Pulmonary effort is normal.       Wt Readings from Last 10 Encounters:   08/08/22 1100 98.9 kg (218 lb)   05/02/22 1436 97.1 kg (214 lb)   11/17/21 0853 97.1 kg (214 lb 1.1 oz)   10/22/21 0807 99.8 kg (220 lb)   08/26/21 0845 98 kg (216 lb)   08/25/21 0802 98.4 kg (216 lb 14.9 oz)   05/19/21 0837 98.4 kg (216 lb 14.9 oz)   03/30/21 1045 101.1 kg (222 lb 14.2 oz)   02/19/21 0842 97.5 kg (214 lb 15.2 oz)   01/28/21 0758 94.8 kg (209 lb)     Lab Results   Component Value Date    HGBA1C 13.5 (H) 08/03/2022     Lab Results   Component Value Date    CHOL 132 04/12/2022    HDL 40 04/12/2022    LDLCALC 70.8 04/12/2022    TRIG 106 04/12/2022    CHOLHDL 30.3 04/12/2022     Lab Results   Component Value Date     08/03/2022    K 3.6 08/03/2022     08/03/2022    CO2 26 08/03/2022    GLU 85 08/03/2022    BUN 25 (H) 08/03/2022    CREATININE 1.3 08/03/2022    CALCIUM 10.7 (H) 08/03/2022    PROT 8.3 07/20/2020    ALBUMIN 4.2 07/20/2020    BILITOT 0.5 07/20/2020    ALKPHOS 80 07/20/2020    AST 19 07/20/2020    ALT 13 07/20/2020    ANIONGAP 15 08/03/2022    ESTGFRAFRICA >60 04/12/2022    EGFRNONAA >60 04/12/2022    TSH 2.245 11/08/2021       Lab Results   Component Value Date    MICALBCREAT 246.6 (H) 04/12/2022     Assessment/Plan:     Type 2 diabetes mellitus with hyperglycemia, with long-term current use of insulin  Reviewed goals of therapy - to get the best control we can without hypoglycemia. Goal A1C <7.5 at least   - last a1c increased, well above goal    Did not tolerate GLP1.   discussed potential for humalog, pt not interested.    Increase insulin. 55 units BID for now.   increase prandin. Take 4 mg prandin with meals   continue metformin    Continue checking sugar. Patient encouraged to document glucose results and bring them to every clinic visit    -Close adherence to lifestyle changes recommended.    -Periodic follow ups for eye evaluations, foot care suggested.    Try for CGM. On 2 shots/day insulin, checking sugar several times, would benefit from CGM.      Mixed hyperlipidemia  Lab Results   Component Value Date    LDLCALC 70.8 04/12/2022     Last LDL at goal   continue statin   monitor yearly      Essential hypertension  BP controlled today   continue regimen   f/u with PCP, monitor BP        Diabetic polyneuropathy associated with type 2 diabetes mellitus  Continue gabapentin PRN, f/u with podiatry   encourage glycemic control as above         Hypothyroidism due to acquired atrophy of thyroid  Lab Results   Component Value Date    TSH 2.245 11/08/2021    FREET4 0.95 11/08/2021     TSH normal, at goal   continue same dose   recheck once or twice a year  Goal is a normal TSH      Follow up in about 4 months (around 12/8/2022) for lab review, further monitoring.      Abdirahman Cerda MD  Endocrinology

## 2022-08-08 NOTE — PATIENT INSTRUCTIONS
For diabetes:   Continue metformin twice a day   Increase repaglinide to 4 mg (two tablets) before each meal   Increase insulin    Basal Insulin  Take 55 units of lantus insulin twice a day    Check your glucose in the morning before breakfast and keep a log.  Goal AM glucose:       After 3-5 days, if your average glucose is above 140, increase your dose by 2 units  If your AM glucose is under 90, decrease by 2 units.      If other medications needed, can consider pioglitazone vs humalog.

## 2022-08-09 ENCOUNTER — TELEPHONE (OUTPATIENT)
Dept: ADMINISTRATIVE | Facility: OTHER | Age: 57
End: 2022-08-09
Payer: MEDICAID

## 2022-08-09 NOTE — TELEPHONE ENCOUNTER
Left voice message for patient to return call to schedule appointment from referral to [employer] Diabetes Education department.  Taina BAUTISTA [phone]

## 2022-08-12 ENCOUNTER — TELEPHONE (OUTPATIENT)
Dept: DIABETES | Facility: CLINIC | Age: 57
End: 2022-08-12
Payer: MEDICAID

## 2022-09-09 ENCOUNTER — OFFICE VISIT (OUTPATIENT)
Dept: INTERNAL MEDICINE | Facility: CLINIC | Age: 57
End: 2022-09-09
Attending: INTERNAL MEDICINE
Payer: MEDICAID

## 2022-09-09 ENCOUNTER — LAB VISIT (OUTPATIENT)
Dept: LAB | Facility: OTHER | Age: 57
End: 2022-09-09
Attending: INTERNAL MEDICINE
Payer: MEDICAID

## 2022-09-09 VITALS
DIASTOLIC BLOOD PRESSURE: 82 MMHG | WEIGHT: 220.69 LBS | OXYGEN SATURATION: 98 % | BODY MASS INDEX: 30.9 KG/M2 | HEIGHT: 71 IN | SYSTOLIC BLOOD PRESSURE: 120 MMHG | HEART RATE: 72 BPM

## 2022-09-09 DIAGNOSIS — Z12.5 SCREENING FOR PROSTATE CANCER: ICD-10-CM

## 2022-09-09 DIAGNOSIS — E03.4 HYPOTHYROIDISM DUE TO ACQUIRED ATROPHY OF THYROID: ICD-10-CM

## 2022-09-09 DIAGNOSIS — Z79.4 TYPE 2 DIABETES MELLITUS WITH DIABETIC POLYNEUROPATHY, WITH LONG-TERM CURRENT USE OF INSULIN: ICD-10-CM

## 2022-09-09 DIAGNOSIS — I10 ESSENTIAL HYPERTENSION: ICD-10-CM

## 2022-09-09 DIAGNOSIS — E78.2 MIXED HYPERLIPIDEMIA: ICD-10-CM

## 2022-09-09 DIAGNOSIS — E11.42 TYPE 2 DIABETES MELLITUS WITH DIABETIC POLYNEUROPATHY, WITH LONG-TERM CURRENT USE OF INSULIN: Primary | ICD-10-CM

## 2022-09-09 DIAGNOSIS — E11.42 TYPE 2 DIABETES MELLITUS WITH DIABETIC POLYNEUROPATHY, WITH LONG-TERM CURRENT USE OF INSULIN: ICD-10-CM

## 2022-09-09 DIAGNOSIS — Z79.4 TYPE 2 DIABETES MELLITUS WITH DIABETIC POLYNEUROPATHY, WITH LONG-TERM CURRENT USE OF INSULIN: Primary | ICD-10-CM

## 2022-09-09 LAB
ALBUMIN SERPL BCP-MCNC: 4 G/DL (ref 3.5–5.2)
ALP SERPL-CCNC: 92 U/L (ref 55–135)
ALT SERPL W/O P-5'-P-CCNC: 12 U/L (ref 10–44)
ANION GAP SERPL CALC-SCNC: 8 MMOL/L (ref 8–16)
AST SERPL-CCNC: 14 U/L (ref 10–40)
BACTERIA #/AREA URNS AUTO: NORMAL /HPF
BILIRUB SERPL-MCNC: 0.5 MG/DL (ref 0.1–1)
BILIRUB UR QL STRIP: NEGATIVE
BUN SERPL-MCNC: 14 MG/DL (ref 6–20)
CALCIUM SERPL-MCNC: 10.1 MG/DL (ref 8.7–10.5)
CHLORIDE SERPL-SCNC: 103 MMOL/L (ref 95–110)
CLARITY UR REFRACT.AUTO: CLEAR
CO2 SERPL-SCNC: 26 MMOL/L (ref 23–29)
COLOR UR AUTO: YELLOW
COMPLEXED PSA SERPL-MCNC: 0.3 NG/ML (ref 0–4)
CREAT SERPL-MCNC: 1.3 MG/DL (ref 0.5–1.4)
EST. GFR  (NO RACE VARIABLE): >60 ML/MIN/1.73 M^2
GLUCOSE SERPL-MCNC: 295 MG/DL (ref 70–110)
GLUCOSE UR QL STRIP: ABNORMAL
HGB UR QL STRIP: NEGATIVE
KETONES UR QL STRIP: NEGATIVE
LEUKOCYTE ESTERASE UR QL STRIP: NEGATIVE
MICROSCOPIC COMMENT: NORMAL
NITRITE UR QL STRIP: NEGATIVE
PH UR STRIP: 6 [PH] (ref 5–8)
POTASSIUM SERPL-SCNC: 4 MMOL/L (ref 3.5–5.1)
PROT SERPL-MCNC: 8.1 G/DL (ref 6–8.4)
PROT UR QL STRIP: ABNORMAL
RBC #/AREA URNS AUTO: 1 /HPF (ref 0–4)
SODIUM SERPL-SCNC: 137 MMOL/L (ref 136–145)
SP GR UR STRIP: 1.03 (ref 1–1.03)
SQUAMOUS #/AREA URNS AUTO: 0 /HPF
URN SPEC COLLECT METH UR: ABNORMAL
WBC #/AREA URNS AUTO: 1 /HPF (ref 0–5)
YEAST UR QL AUTO: NORMAL

## 2022-09-09 PROCEDURE — 3066F PR DOCUMENTATION OF TREATMENT FOR NEPHROPATHY: ICD-10-PCS | Mod: CPTII,,, | Performed by: INTERNAL MEDICINE

## 2022-09-09 PROCEDURE — 3079F PR MOST RECENT DIASTOLIC BLOOD PRESSURE 80-89 MM HG: ICD-10-PCS | Mod: CPTII,,, | Performed by: INTERNAL MEDICINE

## 2022-09-09 PROCEDURE — 99215 OFFICE O/P EST HI 40 MIN: CPT | Mod: PBBFAC | Performed by: INTERNAL MEDICINE

## 2022-09-09 PROCEDURE — 99999 PR PBB SHADOW E&M-EST. PATIENT-LVL V: CPT | Mod: PBBFAC,,, | Performed by: INTERNAL MEDICINE

## 2022-09-09 PROCEDURE — 3066F NEPHROPATHY DOC TX: CPT | Mod: CPTII,,, | Performed by: INTERNAL MEDICINE

## 2022-09-09 PROCEDURE — 3060F PR POS MICROALBUMINURIA RESULT DOCUMENTED/REVIEW: ICD-10-PCS | Mod: CPTII,,, | Performed by: INTERNAL MEDICINE

## 2022-09-09 PROCEDURE — 3074F SYST BP LT 130 MM HG: CPT | Mod: CPTII,,, | Performed by: INTERNAL MEDICINE

## 2022-09-09 PROCEDURE — 3074F PR MOST RECENT SYSTOLIC BLOOD PRESSURE < 130 MM HG: ICD-10-PCS | Mod: CPTII,,, | Performed by: INTERNAL MEDICINE

## 2022-09-09 PROCEDURE — 99214 PR OFFICE/OUTPT VISIT, EST, LEVL IV, 30-39 MIN: ICD-10-PCS | Mod: S$PBB,,, | Performed by: INTERNAL MEDICINE

## 2022-09-09 PROCEDURE — 99999 PR PBB SHADOW E&M-EST. PATIENT-LVL V: ICD-10-PCS | Mod: PBBFAC,,, | Performed by: INTERNAL MEDICINE

## 2022-09-09 PROCEDURE — 84153 ASSAY OF PSA TOTAL: CPT | Performed by: INTERNAL MEDICINE

## 2022-09-09 PROCEDURE — 99214 OFFICE O/P EST MOD 30 MIN: CPT | Mod: S$PBB,,, | Performed by: INTERNAL MEDICINE

## 2022-09-09 PROCEDURE — 81001 URINALYSIS AUTO W/SCOPE: CPT | Performed by: INTERNAL MEDICINE

## 2022-09-09 PROCEDURE — 80053 COMPREHEN METABOLIC PANEL: CPT | Performed by: INTERNAL MEDICINE

## 2022-09-09 PROCEDURE — 3079F DIAST BP 80-89 MM HG: CPT | Mod: CPTII,,, | Performed by: INTERNAL MEDICINE

## 2022-09-09 PROCEDURE — 36415 COLL VENOUS BLD VENIPUNCTURE: CPT | Performed by: INTERNAL MEDICINE

## 2022-09-09 PROCEDURE — 1159F MED LIST DOCD IN RCRD: CPT | Mod: CPTII,,, | Performed by: INTERNAL MEDICINE

## 2022-09-09 PROCEDURE — 3008F BODY MASS INDEX DOCD: CPT | Mod: CPTII,,, | Performed by: INTERNAL MEDICINE

## 2022-09-09 PROCEDURE — 3046F PR MOST RECENT HEMOGLOBIN A1C LEVEL > 9.0%: ICD-10-PCS | Mod: CPTII,,, | Performed by: INTERNAL MEDICINE

## 2022-09-09 PROCEDURE — 1160F PR REVIEW ALL MEDS BY PRESCRIBER/CLIN PHARMACIST DOCUMENTED: ICD-10-PCS | Mod: CPTII,,, | Performed by: INTERNAL MEDICINE

## 2022-09-09 PROCEDURE — 1159F PR MEDICATION LIST DOCUMENTED IN MEDICAL RECORD: ICD-10-PCS | Mod: CPTII,,, | Performed by: INTERNAL MEDICINE

## 2022-09-09 PROCEDURE — 3008F PR BODY MASS INDEX (BMI) DOCUMENTED: ICD-10-PCS | Mod: CPTII,,, | Performed by: INTERNAL MEDICINE

## 2022-09-09 PROCEDURE — 3060F POS MICROALBUMINURIA REV: CPT | Mod: CPTII,,, | Performed by: INTERNAL MEDICINE

## 2022-09-09 PROCEDURE — 1160F RVW MEDS BY RX/DR IN RCRD: CPT | Mod: CPTII,,, | Performed by: INTERNAL MEDICINE

## 2022-09-09 PROCEDURE — 3046F HEMOGLOBIN A1C LEVEL >9.0%: CPT | Mod: CPTII,,, | Performed by: INTERNAL MEDICINE

## 2022-09-09 NOTE — PROGRESS NOTES
Subjective:       Patient ID: Juan Manuel Johnson is a 57 y.o. male.    Chief Complaint: Annual Exam    Here for annual exam    Grand daughter passed recently at age 37.     Sinus congestion for 3 days. Patient denies F/C, SOB, chest tightness, eye pain, severe headache, inability to maintain adequate PO intake, significant loss of taste or smell, abdominal pain, nausea/vomiting, or diarrhea.    ### DM ###  Dr Hayden in endocrine. [facility] exam at [facility]. Will get records. No neuropathy. Amenable to digital DM  does not have [facility] set [facility] yet due to not having smart phone. Wife uses [facility] so he will on using hers for access.           HGBA1C                   13.5 (H)            08/03/2022 09:23 AM        HGBA1C                   12.7 (H)            04/12/2022 09:30 AM        HGBA1C                   8.6 (H)             11/08/2021 04:29 PM        HGBA1C                   9.1 (H)             08/25/2021 09:42 AM        HGBA1C                   9.9 (H)             05/12/2021 08:47 AM        HGBA1C                   9.7 (H)             02/11/2021 08:00 AM        HGBA1C                   7.8 (H)             10/27/2020 08:08 AM        HGBA1C                   13.1 (H)            07/20/2020 03:03 PM        HGBA1C                   10.7 (H)            06/18/2019 10:06 AM        HGBA1C                   10.8 (H)            09/24/2018 10:10 AM        HGBA1C                   >14.0 (H)           07/02/2018 10:48 AM        HGBA1C                   >14.0 (H)           02/08/2018 07:35 AM        HGBA1C                   12.4 (H)            06/04/2013 08:58 PM     ### hypothyroidism ###               TSH                      2.245               11/08/2021 04:29 PM        TSH                      1.994               08/25/2021 09:42 AM        TSH                      4.957 (H)           05/12/2021 08:47 AM        TSH                      1.674               07/20/2020 03:03 PM        TSH                      5.476 (H)            "02/08/2018 07:35 AM     ### HTN ###  Coreg losartan 100 hctz 25      Review of Systems   Constitutional:  Negative for appetite change, chills, fever and unexpected weight change.   HENT:  Negative for hearing loss, sore throat and trouble swallowing.    Eyes:  Negative for visual disturbance.   Respiratory:  Negative for cough, chest tightness and shortness of breath.    Cardiovascular:  Negative for chest pain and leg swelling.   Gastrointestinal:  Negative for abdominal pain, blood in stool, constipation, diarrhea, nausea and vomiting.   Endocrine: Negative for polydipsia and polyuria.   Genitourinary:  Negative for decreased urine volume, difficulty urinating, dysuria, frequency and urgency.   Musculoskeletal:  Negative for gait problem.   Skin:  Negative for rash.   Neurological:  Negative for dizziness and numbness.   Psychiatric/Behavioral:  The patient is not nervous/anxious.      Objective:      Vitals:    09/09/22 0930   BP: 120/82   Pulse: 72   SpO2: 98%   Weight: 100.1 kg (220 lb 10.9 oz)   Height: 5' 11" (1.803 m)      Physical Exam  Vitals and nursing note reviewed.   Constitutional:       General: He is not in acute distress.     Appearance: Normal appearance. He is well-developed.   HENT:      Head: Normocephalic and atraumatic.      Mouth/Throat:      Pharynx: No oropharyngeal exudate.   Eyes:      General: No scleral icterus.     Conjunctiva/sclera: Conjunctivae normal.      Pupils: Pupils are equal, round, and reactive to light.   Neck:      Thyroid: No thyromegaly.   Cardiovascular:      Rate and Rhythm: Normal rate and regular rhythm.      Heart sounds: Normal heart sounds. No murmur heard.  Pulmonary:      Effort: Pulmonary effort is normal.      Breath sounds: Normal breath sounds. No wheezing or rales.   Abdominal:      General: There is no distension.   Musculoskeletal:         General: No tenderness.   Lymphadenopathy:      Cervical: No cervical adenopathy.   Skin:     General: Skin is warm " and dry.   Neurological:      Mental Status: He is alert and oriented to person, place, and time.   Psychiatric:         Behavior: Behavior normal.       Assessment:       1. Type 2 diabetes mellitus with diabetic polyneuropathy, with long-term current use of insulin    2. Essential hypertension    3. Hypothyroidism due to acquired atrophy of thyroid    4. Mixed hyperlipidemia    5. Screening for prostate cancer        Plan:       Juan Manuel was seen today for annual exam.    Diagnoses and all orders for this visit:    Type 2 diabetes mellitus with diabetic polyneuropathy, with long-term current use of insulin   Amenable to digital DM bu does not have [facility] set [facility] yet due to not having smart phone. Wife uses [facility] so he will on using hers for access.      -     Ambulatory referral/consult to Diabetes Education; Future  -     Comprehensive Metabolic Panel; Future  -     Urinalysis, Reflex to Urine Culture Urine, Clean Catch    Essential hypertension   Controlled and asymptomatic.  Continue current Rx regimen.    Hypothyroidism due to acquired atrophy of thyroid   Clinically euthyroid. Update TSH.    Mixed hyperlipidemia   Tolerating statin. Continue this.     Screening for prostate cancer  -     PSA, Screening; Future         Zion Damian MD  Internal Medicine-[facility]        Side effects of medication(s) were discussed in detail and patient voiced understanding.  Patient will call back for any issues or complications.

## 2022-09-12 ENCOUNTER — TELEPHONE (OUTPATIENT)
Dept: ADMINISTRATIVE | Facility: OTHER | Age: 57
End: 2022-09-12
Payer: MEDICAID

## 2022-09-12 NOTE — TELEPHONE ENCOUNTER
Left voice message for patient to return call to schedule appointment from referral to [employer] Diabetes Education department.  Taina BAUTISTA [phone]

## 2022-09-15 ENCOUNTER — TELEPHONE (OUTPATIENT)
Dept: INTERNAL MEDICINE | Facility: CLINIC | Age: 57
End: 2022-09-15
Payer: MEDICAID

## 2022-09-15 NOTE — TELEPHONE ENCOUNTER
Called pt to ask what [facility] location should we send his LATRICE to..the patient said [place] location    Faxed to fax# [phone]/office# [phone]. I  Have attached a confirmation sheet to this fax located in my file cabinet.

## 2022-10-07 DIAGNOSIS — E11.42 TYPE 2 DIABETES MELLITUS WITH DIABETIC POLYNEUROPATHY, WITH LONG-TERM CURRENT USE OF INSULIN: Primary | ICD-10-CM

## 2022-10-07 DIAGNOSIS — Z79.4 TYPE 2 DIABETES MELLITUS WITH DIABETIC POLYNEUROPATHY, WITH LONG-TERM CURRENT USE OF INSULIN: Primary | ICD-10-CM

## 2022-10-10 ENCOUNTER — PATIENT MESSAGE (OUTPATIENT)
Dept: ADMINISTRATIVE | Facility: HOSPITAL | Age: 57
End: 2022-10-10
Payer: MEDICAID

## 2022-10-10 RX ORDER — LOSARTAN POTASSIUM AND HYDROCHLOROTHIAZIDE 25; 100 MG/1; MG/1
1 TABLET ORAL DAILY
Qty: 90 TABLET | Refills: 3 | Status: SHIPPED | OUTPATIENT
Start: 2022-10-10 | End: 2024-02-11

## 2022-10-10 RX ORDER — GABAPENTIN 400 MG/1
CAPSULE ORAL
Qty: 180 CAPSULE | Refills: 2 | Status: SHIPPED | OUTPATIENT
Start: 2022-10-10

## 2022-10-21 ENCOUNTER — TELEPHONE (OUTPATIENT)
Dept: INTERNAL MEDICINE | Facility: CLINIC | Age: 57
End: 2022-10-21
Payer: MEDICAID

## 2022-10-21 NOTE — TELEPHONE ENCOUNTER
Faxed LATRICE to [facility] for optometry records fax# [phone]/office# [phone]. I  Have attached a confirmation sheet to this fax located in my file cabinet.

## 2022-11-01 ENCOUNTER — LAB VISIT (OUTPATIENT)
Dept: LAB | Facility: OTHER | Age: 57
End: 2022-11-01
Attending: INTERNAL MEDICINE
Payer: MEDICAID

## 2022-11-01 DIAGNOSIS — E03.4 HYPOTHYROIDISM DUE TO ACQUIRED ATROPHY OF THYROID: ICD-10-CM

## 2022-11-01 DIAGNOSIS — Z79.4 TYPE 2 DIABETES MELLITUS WITH HYPERGLYCEMIA, WITH LONG-TERM CURRENT USE OF INSULIN: ICD-10-CM

## 2022-11-01 DIAGNOSIS — E11.65 TYPE 2 DIABETES MELLITUS WITH HYPERGLYCEMIA, WITH LONG-TERM CURRENT USE OF INSULIN: ICD-10-CM

## 2022-11-01 LAB
ALBUMIN SERPL BCP-MCNC: 3.8 G/DL (ref 3.5–5.2)
ALP SERPL-CCNC: 100 U/L (ref 55–135)
ALT SERPL W/O P-5'-P-CCNC: 16 U/L (ref 10–44)
ANION GAP SERPL CALC-SCNC: 7 MMOL/L (ref 8–16)
AST SERPL-CCNC: 15 U/L (ref 10–40)
BILIRUB SERPL-MCNC: 0.3 MG/DL (ref 0.1–1)
BUN SERPL-MCNC: 12 MG/DL (ref 6–20)
CALCIUM SERPL-MCNC: 10 MG/DL (ref 8.7–10.5)
CHLORIDE SERPL-SCNC: 103 MMOL/L (ref 95–110)
CO2 SERPL-SCNC: 28 MMOL/L (ref 23–29)
CREAT SERPL-MCNC: 1.2 MG/DL (ref 0.5–1.4)
EST. GFR  (NO RACE VARIABLE): >60 ML/MIN/1.73 M^2
ESTIMATED AVG GLUCOSE: 321 MG/DL (ref 68–131)
GLUCOSE SERPL-MCNC: 319 MG/DL (ref 70–110)
HBA1C MFR BLD: 12.8 % (ref 4–5.6)
POTASSIUM SERPL-SCNC: 4 MMOL/L (ref 3.5–5.1)
PROT SERPL-MCNC: 8 G/DL (ref 6–8.4)
SODIUM SERPL-SCNC: 138 MMOL/L (ref 136–145)
T4 FREE SERPL-MCNC: 1 NG/DL (ref 0.71–1.51)
TSH SERPL DL<=0.005 MIU/L-ACNC: 5.62 UIU/ML (ref 0.4–4)

## 2022-11-01 PROCEDURE — 82985 ASSAY OF GLYCATED PROTEIN: CPT | Performed by: INTERNAL MEDICINE

## 2022-11-01 PROCEDURE — 84443 ASSAY THYROID STIM HORMONE: CPT | Performed by: INTERNAL MEDICINE

## 2022-11-01 PROCEDURE — 83036 HEMOGLOBIN GLYCOSYLATED A1C: CPT | Mod: 59 | Performed by: INTERNAL MEDICINE

## 2022-11-01 PROCEDURE — 84439 ASSAY OF FREE THYROXINE: CPT | Performed by: INTERNAL MEDICINE

## 2022-11-01 PROCEDURE — 80053 COMPREHEN METABOLIC PANEL: CPT | Performed by: INTERNAL MEDICINE

## 2022-11-02 ENCOUNTER — IMMUNIZATION (OUTPATIENT)
Dept: INTERNAL MEDICINE | Facility: CLINIC | Age: 57
End: 2022-11-02
Payer: MEDICAID

## 2022-11-02 DIAGNOSIS — Z23 NEED FOR VACCINATION: Primary | ICD-10-CM

## 2022-11-02 PROCEDURE — 0124A COVID-19, MRNA, LNP-S, BIVALENT BOOSTER, PF, 30 MCG/0.3 ML DOSE: CPT | Mod: PBBFAC

## 2022-11-02 PROCEDURE — 91312 COVID-19, MRNA, LNP-S, BIVALENT BOOSTER, PF, 30 MCG/0.3 ML DOSE: CPT | Mod: PBBFAC

## 2022-11-04 LAB — FRUCTOSAMINE SERPL-SCNC: 644 UMOL /L (ref 151–300)

## 2022-11-09 ENCOUNTER — OFFICE VISIT (OUTPATIENT)
Dept: ENDOCRINOLOGY | Facility: CLINIC | Age: 57
End: 2022-11-09
Payer: MEDICAID

## 2022-11-09 VITALS
DIASTOLIC BLOOD PRESSURE: 88 MMHG | BODY MASS INDEX: 31.45 KG/M2 | OXYGEN SATURATION: 98 % | HEIGHT: 71 IN | WEIGHT: 224.63 LBS | SYSTOLIC BLOOD PRESSURE: 150 MMHG | HEART RATE: 84 BPM

## 2022-11-09 DIAGNOSIS — E03.9 HYPOTHYROIDISM, UNSPECIFIED TYPE: ICD-10-CM

## 2022-11-09 DIAGNOSIS — E11.42 DIABETIC POLYNEUROPATHY ASSOCIATED WITH TYPE 2 DIABETES MELLITUS: ICD-10-CM

## 2022-11-09 DIAGNOSIS — Z79.4 TYPE 2 DIABETES MELLITUS WITH HYPERGLYCEMIA, WITH LONG-TERM CURRENT USE OF INSULIN: Primary | ICD-10-CM

## 2022-11-09 DIAGNOSIS — I10 ESSENTIAL HYPERTENSION: ICD-10-CM

## 2022-11-09 DIAGNOSIS — E78.2 MIXED HYPERLIPIDEMIA: ICD-10-CM

## 2022-11-09 DIAGNOSIS — E11.65 TYPE 2 DIABETES MELLITUS WITH HYPERGLYCEMIA, WITH LONG-TERM CURRENT USE OF INSULIN: Primary | ICD-10-CM

## 2022-11-09 DIAGNOSIS — E03.4 HYPOTHYROIDISM DUE TO ACQUIRED ATROPHY OF THYROID: ICD-10-CM

## 2022-11-09 PROCEDURE — 3060F POS MICROALBUMINURIA REV: CPT | Mod: CPTII,S$GLB,, | Performed by: INTERNAL MEDICINE

## 2022-11-09 PROCEDURE — 3066F PR DOCUMENTATION OF TREATMENT FOR NEPHROPATHY: ICD-10-PCS | Mod: CPTII,S$GLB,, | Performed by: INTERNAL MEDICINE

## 2022-11-09 PROCEDURE — 99214 PR OFFICE/OUTPT VISIT, EST, LEVL IV, 30-39 MIN: ICD-10-PCS | Mod: S$GLB,,, | Performed by: INTERNAL MEDICINE

## 2022-11-09 PROCEDURE — 1160F PR REVIEW ALL MEDS BY PRESCRIBER/CLIN PHARMACIST DOCUMENTED: ICD-10-PCS | Mod: CPTII,S$GLB,, | Performed by: INTERNAL MEDICINE

## 2022-11-09 PROCEDURE — 1160F RVW MEDS BY RX/DR IN RCRD: CPT | Mod: CPTII,S$GLB,, | Performed by: INTERNAL MEDICINE

## 2022-11-09 PROCEDURE — 3079F PR MOST RECENT DIASTOLIC BLOOD PRESSURE 80-89 MM HG: ICD-10-PCS | Mod: CPTII,S$GLB,, | Performed by: INTERNAL MEDICINE

## 2022-11-09 PROCEDURE — 3060F PR POS MICROALBUMINURIA RESULT DOCUMENTED/REVIEW: ICD-10-PCS | Mod: CPTII,S$GLB,, | Performed by: INTERNAL MEDICINE

## 2022-11-09 PROCEDURE — 99214 OFFICE O/P EST MOD 30 MIN: CPT | Mod: S$GLB,,, | Performed by: INTERNAL MEDICINE

## 2022-11-09 PROCEDURE — 1159F MED LIST DOCD IN RCRD: CPT | Mod: CPTII,S$GLB,, | Performed by: INTERNAL MEDICINE

## 2022-11-09 PROCEDURE — 3046F PR MOST RECENT HEMOGLOBIN A1C LEVEL > 9.0%: ICD-10-PCS | Mod: CPTII,S$GLB,, | Performed by: INTERNAL MEDICINE

## 2022-11-09 PROCEDURE — 1159F PR MEDICATION LIST DOCUMENTED IN MEDICAL RECORD: ICD-10-PCS | Mod: CPTII,S$GLB,, | Performed by: INTERNAL MEDICINE

## 2022-11-09 PROCEDURE — 3046F HEMOGLOBIN A1C LEVEL >9.0%: CPT | Mod: CPTII,S$GLB,, | Performed by: INTERNAL MEDICINE

## 2022-11-09 PROCEDURE — 3079F DIAST BP 80-89 MM HG: CPT | Mod: CPTII,S$GLB,, | Performed by: INTERNAL MEDICINE

## 2022-11-09 PROCEDURE — 3008F BODY MASS INDEX DOCD: CPT | Mod: CPTII,S$GLB,, | Performed by: INTERNAL MEDICINE

## 2022-11-09 PROCEDURE — 3077F SYST BP >= 140 MM HG: CPT | Mod: CPTII,S$GLB,, | Performed by: INTERNAL MEDICINE

## 2022-11-09 PROCEDURE — 3077F PR MOST RECENT SYSTOLIC BLOOD PRESSURE >= 140 MM HG: ICD-10-PCS | Mod: CPTII,S$GLB,, | Performed by: INTERNAL MEDICINE

## 2022-11-09 PROCEDURE — 3066F NEPHROPATHY DOC TX: CPT | Mod: CPTII,S$GLB,, | Performed by: INTERNAL MEDICINE

## 2022-11-09 PROCEDURE — 3008F PR BODY MASS INDEX (BMI) DOCUMENTED: ICD-10-PCS | Mod: CPTII,S$GLB,, | Performed by: INTERNAL MEDICINE

## 2022-11-09 RX ORDER — LEVOTHYROXINE SODIUM 50 UG/1
50 TABLET ORAL DAILY
Qty: 90 TABLET | Refills: 3 | Status: SHIPPED | OUTPATIENT
Start: 2022-11-09 | End: 2024-02-11 | Stop reason: SDUPTHER

## 2022-11-09 NOTE — ASSESSMENT & PLAN NOTE
Lab Results   Component Value Date    TSH 5.615 (H) 11/01/2022    FREET4 1.00 11/01/2022     TSH high   increase to 50 mcg/day levothyroxine   recheck next time  Goal is a normal TSH

## 2022-11-09 NOTE — PATIENT INSTRUCTIONS
For diabetes:   Start ozempic. 0.25 mg/week for 4 weeks, then 0.5 mg/week after that    Continue insulin.   55 units twice a day lantus    Continue repaglinide (prandin) 2 tablets with meals.    Continue metformin twice a day

## 2022-11-09 NOTE — ASSESSMENT & PLAN NOTE
Reviewed goals of therapy - to get the best control we can without hypoglycemia. Goal A1C <7.5 at least   - last a1c remains well above goal    Did not tolerate trulicity.    Discussed with sugar remaining this high, current regimen is not working.   - either needs prandial insulin or try another GLP1 to see if that is tolerated any better.   pt open to GLP1 again    Continue insulin. 55 units BID for now.   continue prandin. Take 4 mg prandin with meals   continue metformin  Add ozempic. 0.25 mg/week for 4 weeks, then 0.5 mg/week after that.    Continue checking sugar. Patient encouraged to document glucose results and bring them to every clinic visit    -Close adherence to lifestyle changes recommended.    -Periodic follow ups for eye evaluations, foot care suggested.    Tried for CGM, not covered

## 2022-11-18 ENCOUNTER — PATIENT OUTREACH (OUTPATIENT)
Dept: ADMINISTRATIVE | Facility: HOSPITAL | Age: 57
End: 2022-11-18
Payer: MEDICAID

## 2022-11-29 ENCOUNTER — PATIENT OUTREACH (OUTPATIENT)
Dept: INTERNAL MEDICINE | Facility: CLINIC | Age: 57
End: 2022-11-29
Payer: MEDICAID

## 2022-11-29 ENCOUNTER — PATIENT MESSAGE (OUTPATIENT)
Dept: INTERNAL MEDICINE | Facility: CLINIC | Age: 57
End: 2022-11-29
Payer: MEDICAID

## 2022-12-19 ENCOUNTER — PATIENT OUTREACH (OUTPATIENT)
Dept: ADMINISTRATIVE | Facility: HOSPITAL | Age: 57
End: 2022-12-19
Payer: MEDICAID

## 2022-12-21 ENCOUNTER — PATIENT OUTREACH (OUTPATIENT)
Dept: ADMINISTRATIVE | Facility: HOSPITAL | Age: 57
End: 2022-12-21
Payer: MEDICAID

## 2022-12-21 ENCOUNTER — CLINICAL SUPPORT (OUTPATIENT)
Dept: INTERNAL MEDICINE | Facility: CLINIC | Age: 57
End: 2022-12-21
Attending: INTERNAL MEDICINE
Payer: MEDICAID

## 2022-12-21 ENCOUNTER — CLINICAL SUPPORT (OUTPATIENT)
Dept: DIABETES | Facility: CLINIC | Age: 57
End: 2022-12-21
Attending: INTERNAL MEDICINE
Payer: MEDICAID

## 2022-12-21 VITALS — WEIGHT: 217.81 LBS | HEIGHT: 71 IN | BODY MASS INDEX: 30.49 KG/M2

## 2022-12-21 DIAGNOSIS — E11.9 DIABETES MELLITUS WITHOUT COMPLICATION: Primary | ICD-10-CM

## 2022-12-21 DIAGNOSIS — H40.013 OPEN ANGLE WITH BORDERLINE FINDINGS AND LOW GLAUCOMA RISK IN BOTH EYES: Primary | ICD-10-CM

## 2022-12-21 DIAGNOSIS — E11.9 DIABETES MELLITUS WITHOUT COMPLICATION: ICD-10-CM

## 2022-12-21 DIAGNOSIS — Z79.4 TYPE 2 DIABETES MELLITUS WITH DIABETIC POLYNEUROPATHY, WITH LONG-TERM CURRENT USE OF INSULIN: ICD-10-CM

## 2022-12-21 DIAGNOSIS — E11.3392 MODERATE NONPROLIFERATIVE DIABETIC RETINOPATHY OF LEFT EYE WITHOUT MACULAR EDEMA ASSOCIATED WITH TYPE 2 DIABETES MELLITUS: ICD-10-CM

## 2022-12-21 DIAGNOSIS — E11.42 TYPE 2 DIABETES MELLITUS WITH DIABETIC POLYNEUROPATHY, WITH LONG-TERM CURRENT USE OF INSULIN: ICD-10-CM

## 2022-12-21 PROCEDURE — 99213 OFFICE O/P EST LOW 20 MIN: CPT | Mod: PBBFAC

## 2022-12-21 PROCEDURE — 92228 DIABETIC EYE SCREENING PHOTO: ICD-10-PCS | Mod: 26,S$PBB,, | Performed by: OPHTHALMOLOGY

## 2022-12-21 PROCEDURE — 92228 IMG RTA DETC/MNTR DS PHY/QHP: CPT | Mod: 59,PBBFAC

## 2022-12-21 PROCEDURE — 99999 PR PBB SHADOW E&M-EST. PATIENT-LVL III: ICD-10-PCS | Mod: PBBFAC,,,

## 2022-12-21 PROCEDURE — 92228 IMG RTA DETC/MNTR DS PHY/QHP: CPT | Mod: 26,S$PBB,, | Performed by: OPHTHALMOLOGY

## 2022-12-21 PROCEDURE — 99999 PR PBB SHADOW E&M-EST. PATIENT-LVL III: CPT | Mod: PBBFAC,,,

## 2022-12-21 PROCEDURE — G0108 DIAB MANAGE TRN  PER INDIV: HCPCS | Mod: PBBFAC

## 2022-12-21 NOTE — PROGRESS NOTES
Juan Manuel Johnson is a 57 y.o. male here for a diabetic eye screening with non-dilated fundus photos per Olga.    Patient cooperative?: Yes  Small pupils?: Yes  Last eye exam: 1 year    For exam results, see Encounter Report.

## 2022-12-21 NOTE — PROGRESS NOTES
Diabetes Care Specialist Progress Note  Author: Manuela Espinal RN, CDE  Date: 12/21/2022    Program Intake  Reason for Diabetes Program Visit:: Initial Diabetes Assessment  Current diabetes risk level:: moderate    Lab Results   Component Value Date    HGBA1C 12.8 (H) 11/01/2022     Clinical    Patient Health Rating  Compared to other people your age, how would you rate your health?: Excellent    Problem Review  Reviewed Problem List with Patient: yes  Active comorbidities affecting diabetes self-care.: yes  Comorbidities: Hypertension  Reviewed health maintenance: yes    Clinical Assessment  Current Diabetes Treatment: Oral Medication, Insulin    Nutritional Status  Meal Plan 24 Hour Recall: Breakfast, Lunch, Dinner, Snack  Meal Plan 24 Hour Recall - Breakfast: 2 pieces fried chicken - water  Meal Plan 24 Hour Recall - Lunch: ham sandwich, 2 slices of bread - regular coke  Meal Plan 24 Hour Recall - Dinner: fried chicken, salad - regular coke  Meal Plan 24 Hour Recall - Snack: praline  Change in appetite?: No    Additional Social History    Support  Does anyone support you with your diabetes care?: yes  Who supports you?: spouse  Who takes you to your medical appointments?: self    Access to Mass Media & Technology  Does the patient have access to any of the following devices or technologies?: Smart phone, Internet Access  Media or technology needs impacting ability to self-manage diabetes?: No    Cognitive/Behavioral Health  Alert and Oriented: Yes  Difficulty Thinking: No  Requires Prompting: No  Requires assistance for routine expression?: No  Cognitive or behavioral barriers impacting ability to self-manage diabetes?: No    Culture/[place]  Culture or Yarsanism beliefs that may impact ability to access healthcare: No    Communication  Language preference: English  Hearing Problems: No  Vision Problems: No  Communication needs impacting ability to self-manage diabetes?: No    Health Literacy  Preferred Learning  Method: Face to Face  How often do you need to have someone help you read instructions, pamphlets, or written material from your doctor or pharmacy?: Rarely  Health literacy needs impacting ability to self-manage diabetes?: No    Diabetes Self-Management Skills Assessment    Diabetes Disease Process/Treatment Options  Patient/caregiver able to state what happens when someone has diabetes.: somewhat  Patient/caregiver knows what type of diabetes they have.: yes  Diabetes Type : Type II  Patient/caregiver able to identify at least three signs and symptoms of diabetes.: no  Patient able to identify at least three risk factors for diabetes.: no  Diabetes Disease Process/Treatment Options: Skills Assessment Completed: Yes  Assessment indicates:: Knowledge deficit, Instruction Needed  Area of need?: Yes    Nutrition/Healthy Eating  Challenges to healthy eating:: lack of will power, portion control  Method of carbohydrate measurement:: no method  Patient can identify foods that impact blood sugar.: yes  Patient-identified foods:: fruit/fruit juice, soda, sweets  Nutrition/Healthy Eating Skills Assessment Completed:: Yes  Assessment indicates:: Knowledge deficit, Instruction Needed  Area of need?: Yes    Physical Activity/Exercise  Patient's daily activity level:: moderately active  Patient formally exercises outside of work.: yes  Exercise Type: walking  Intensity: Low  Frequency: four or more times a week  Duration:  (getting ~ 8,000 steps a few times per week)  Physical Activity/Exercise Skills Assessment Completed: : Yes  Assessment indicates:: Knowledge deficit, Instruction Needed  Area of need?: Yes    Medications  Patient is able to describe current diabetes management routine.: yes  Diabetes management routine:: insulin, oral medications  Patient is able to identify current diabetes medications, dosages, and appropriate timing of medications.: yes  Patient understands the purpose of the medications taken for  diabetes.: yes  Patient reports problems or concerns with current medication regimen.: no  Medication Skills Assessment Completed:: Yes  Assessment indicates:: Knowledge deficit, Instruction Needed  Area of need?: Yes    Home Blood Glucose Monitoring  Patient states that blood sugar is checked at home daily.: yes  Monitoring Method:: (S) home glucometer, personal continuous glucose monitor (has dexcom - needs cell phone to use regina)  How often do you check your blood sugar?: Twice a day  When you check what is your typical blood sugar range? : 230's AM fasting - highest 2 days ago 423  Blood glucose logs:: no, encouraged to keep logs, encouraged to bring logs to provider visits  Blood glucose logs reviewed today?: no  Home Blood Glucose Monitoring Skills Assessment Completed: : Yes  Assessment indicates:: Knowledge deficit, Instruction Needed  Area of need?: Yes    Acute Complications  Patient is able to identify types of acute complications: Yes  Patient Identified:: Hypoglycemia  Patient is able to state the basic meaning of hypoglycemia?: Yes  Able to state the blood sugar range for hypoglycemia?: yes  Patient stated range:: 60's or less  Patient can identify general symptoms of hypoglycemia: yes  Patient identified:: other (see comments), dizziness (vision changes)  Able to state proper treatment of hypoglycemia?: yes  Patient identified:: 1/2 can soda/fruit juice, 5-6 pieces of hard candy  Acute Complications Skills Assessment Completed: : Yes  Assessment indicates:: Adequate understanding  Area of need?: No    Chronic Complications  Patient can identify major chronic complications of diabetes.: no  Patient can identify ways to prevent or delay diabetes complications.: no  Patient is aware that having diabetes increases risk of heart disease?: No  Patient is aware that heart disease is the leading cause of death and disability in people with diabetes?: No  Patient able to state risk factors for heart disease?:  No  Patient is taking statin?: Yes  Chronic Complications Skills Assessment Completed: : Yes  Assessment indicates:: Knowledge deficit, Instruction Needed  Area of need?: Yes    Psychosocial/Coping  Psychosocial/Coping Skills Assessment Completed: : No  Deffered due to:: Time    Diabetes Self Support Plan     Assessment Summary and Plan    Based on today's diabetes care assessment, the following areas of need were identified:      Social 12/21/2022   Access to Mass Media/Tech No   Cognitive/Behavioral Health No   Culture/Roman Catholic No   Communication No   Health Literacy No      Clinical 1/25/2021   Medication Adherence No   Lab Compliance No      Diabetes Self-Management Skills 12/21/2022   Diabetes Disease Process/Treatment Options Yes:     Ed on what DM is, insulin resistance, beta cell burnout and probable need for additional insulin if body unable to keep up  Ed on importance of using lifestyle changes + appropriate medications to control BG and slow disease progression      Nutrition/Healthy Eating Yes: see care planning    Physical Activity/Exercise Yes: pt no longer has vehicle and has been walking more - sometimes getting ~8,000 steps a day   Medication Yes: has not started ozempic yet. Endorses taking lantus BID, metformin and prandin. Will discuss medications more in depth once pt has dexcom and can eval patterns    Home Blood Glucose Monitoring Yes: see care planning    Acute Complications No   Chronic Complications Yes: pt sent for eye camera exam today. Podiatry appointment scheduled for April (soonest avail).    Psychosocial/Coping -      Today's interventions were provided through individual discussion, instruction, and written materials were provided.      Patient verbalized understanding of instruction and written materials.  Pt was able to return back demonstration of instructions today. Patient understood key points, needs reinforcement and further instruction.     Diabetes Self-Management Care  Plan:    Today's Diabetes Self-Management Care Plan was developed with Juan Manuel's input. Juan Manuel has agreed to work toward the following goal(s) to improve his/her overall diabetes control.      Care Plan: Diabetes Management   Updates made since 11/21/2022 12:00 AM        Problem: Healthy Eating         Goal: Pt will increase intake of nonstarchy vegetabels to 1/2 of the plate with dinner time meal.    Start Date: 1/25/2021   Expected End Date: 3/21/2023   This Visit's Progress: No change   Priority: Medium   Barriers: Lack of Motivation to Change   Note:    12/21/22 - Pt did have salad with dinner, has been getting more veggies w/ dinner meal.        Goal: Pt will omit all sweet foods and beverges from diet.    Start Date: 1/25/2021   Expected End Date: 3/21/2023   This Visit's Progress: No change   Priority: High   Barriers: Lack of Motivation to Change   Note:    12/21/22 - Pt has reduced sweet drinks in diet. Is now drinking mini cans of coke instead of large bottles. Still drinking twice per day. Discussed SF options to try to find something that he likes (flavored water, crystal light, etc.) Agrees to cut down to 1 sweet drink per day.        Problem: Blood Glucose Self-Monitoring         Goal: Pt will bring cell phone and dexcom supplies to appointment tomorrow 12/22/22 to start dexcom CGM.    Start Date: 12/21/2022   Expected End Date: 12/22/2022   Priority: High   Barriers: No Barriers Identified   Note:    12/21/22 - Pt brought dexcom sensor and transmitter but does not have a , and forgot cell phone at home. Reports having an iPhone, so should be compatible with dexcom regina. Briefly reviewed components of dexcom, how it works and refilling supplies. To bring cell phone tomorrow and supplies for complete Dexcom start.        Task: Reviewed the importance of self-monitoring blood glucose and keeping logs. Completed 12/21/2022        Task: Provided patient with blood glucose logs, reviewed appropriate  timing and frequency to SMBG, education on parameters on when to notify provider and advised patient to bring logs to all appts with PCP/Endocrinologist/Diabetes Care Specialist. Completed 12/21/2022        Task: Discussed ways to minimize pain when monitoring blood glucose. Completed 12/21/2022        Follow Up Plan     Follow up in about 1 day (around 12/22/2022) for Dexcom start. .    Today's care plan and follow up schedule was discussed with patient.  Juan Manuel verbalized understanding of the care plan, goals, and agrees to follow up plan.        The patient was encouraged to communicate with his/her health care provider/physician and care team regarding his/her condition(s) and treatment.  I provided the patient with my contact information today and encouraged to contact me via phone or [facility]'s Patient Portal as needed.     Length of Visit   Total Time: 30 Minutes

## 2022-12-22 ENCOUNTER — CLINICAL SUPPORT (OUTPATIENT)
Dept: DIABETES | Facility: CLINIC | Age: 57
End: 2022-12-22
Payer: MEDICAID

## 2022-12-22 DIAGNOSIS — Z79.4 TYPE 2 DIABETES MELLITUS WITH DIABETIC POLYNEUROPATHY, WITH LONG-TERM CURRENT USE OF INSULIN: Primary | ICD-10-CM

## 2022-12-22 DIAGNOSIS — E11.42 TYPE 2 DIABETES MELLITUS WITH DIABETIC POLYNEUROPATHY, WITH LONG-TERM CURRENT USE OF INSULIN: Primary | ICD-10-CM

## 2022-12-22 PROCEDURE — G0108 DIAB MANAGE TRN  PER INDIV: HCPCS | Mod: PBBFAC | Performed by: DIETITIAN, REGISTERED

## 2022-12-22 NOTE — PROGRESS NOTES
Diabetes Care Specialist Progress Note  Author: Lela Bonds RD  Date: 12/22/2022    Program Intake  Reason for Diabetes Program Visit:: Intervention  Type of Intervention:: Individual  Individual: Education  Education: Other (Pt was here for CGM device training but does not have  and phone not compatible with regina.)  Current diabetes risk level:: moderate  Permission to speak with others about care:: yes (Alex)    Lab Results   Component Value Date    HGBA1C 12.8 (H) 11/01/2022       Diabetes Self-Management Skills Assessment    Diabetes Disease Process/Treatment Options  Diabetes Disease Process/Treatment Options: Skills Assessment Completed: No  Deferred due to:: Time    Nutrition/Healthy Eating  Nutrition/Healthy Eating Skills Assessment Completed:: No  Deffered due to:: Time    Physical Activity/Exercise  Physical Activity/Exercise Skills Assessment Completed: : No  Deffered due to:: Time    Medications  Patient is able to describe current diabetes management routine.: yes  Diabetes management routine:: insulin, oral medications  Patient is able to identify current diabetes medications, dosages, and appropriate timing of medications.: yes  Patient understands the purpose of the medications taken for diabetes.: yes  Patient reports problems or concerns with current medication regimen.: no  Medication Skills Assessment Completed:: Yes  Assessment indicates:: Instruction Needed  Area of need?: Yes    Home Blood Glucose Monitoring  Patient states that blood sugar is checked at home daily.: yes  Monitoring Method:: (S) home glucometer, personal continuous glucose monitor (Cell phone not compatible with Dexcom G6 regina - needs )  How often do you check your blood sugar?: Other (comment) (every other day - pt is running low on strips, has requested refill, but [facility] does not have them in stock - awaiting shipment to his [facility])  When do you check your blood sugar?: Before breakfast, Before  bedtime  Blood glucose logs:: no, encouraged to keep logs, encouraged to bring logs to provider visits  Blood glucose logs reviewed today?: no  Assessment indicates:: Knowledge deficit, Instruction Needed  Area of need?: Yes    Acute Complications  Acute Complications Skills Assessment Completed: : No  Deffered due to:: Time    Chronic Complications  Chronic Complications Skills Assessment Completed: : No  Deferred due to:: Time    Psychosocial/Coping  Psychosocial/Coping Skills Assessment Completed: : No  Deffered due to:: Time      Assessment Summary and Plan    Based on today's diabetes care assessment, the following areas of need were identified:      Social 12/21/2022   Access to Mass Media/Tech No   Cognitive/Behavioral Health No   Culture/Scientology No   Communication No   Health Literacy No        Clinical 1/25/2021   Medication Adherence No   Lab Compliance No        Diabetes Self-Management Skills 12/22/2022   Diabetes Disease Process/Treatment Options -   Nutrition/Healthy Eating -   Physical Activity/Exercise -   Medication Yes - pt has not started Ozempic yet. Encouraged taking all dm meds as prescribed and reviewed timing dosages.    Home Blood Glucose Monitoring Yes - see care planning   Acute Complications -   Chronic Complications -   Psychosocial/Coping -          Today's interventions were provided through individual discussion, instruction, and written materials were provided.      Patient verbalized understanding of instruction and written materials.  Pt was able to return back demonstration of instructions today. Patient understood key points, needs reinforcement and further instruction.     Diabetes Self-Management Care Plan:    Today's Diabetes Self-Management Care Plan was developed with Juan Manuel's input. Juan Manuel has agreed to work toward the following goal(s) to improve his/her overall diabetes control.      Care Plan: Diabetes Management   Updates made since 11/22/2022 12:00 AM        Problem:  Healthy Eating         Goal: Pt will increase intake of nonstarchy vegetabels to 1/2 of the plate with dinner time meal.    Start Date: 1/25/2021   Expected End Date: 3/21/2023   This Visit's Progress: No change   Priority: Medium   Barriers: Lack of Motivation to Change   Note:    12/21/22 - Pt did have salad with dinner, has been getting more veggies w/ dinner meal.        Goal: Pt will omit all sweet foods and beverges from diet.    Start Date: 1/25/2021   Expected End Date: 3/21/2023   This Visit's Progress: No change   Priority: High   Barriers: Lack of Motivation to Change   Note:    12/21/22 - Pt has reduced sweet drinks in diet. Is now drinking mini cans of coke instead of large bottles. Still drinking twice per day. Discussed SF options to try to find something that he likes (flavored water, crystal light, etc.) Agrees to cut down to 1 sweet drink per day.        Problem: Blood Glucose Self-Monitoring         Goal: Pt will return for training with all Dexcom supplies once he gets .    Start Date: 12/21/2022   Expected End Date: 12/22/2022   Priority: High   Barriers: No Barriers Identified   Note:    12/21/22 - Pt brought dexcom sensor and transmitter but does not have a , and forgot cell phone at home. Reports having an iPhone, so should be compatible with dexcom regina. Briefly reviewed components of dexcom, how it works and refilling supplies. To bring cell phone tomorrow and supplies for complete Dexcom start.     12/22/22 - Pt returned today for Dexcom training. However, his [employer] G Pure phone is not compatible with the Dexcom G6 regina. Discussed he will need the  in order to start the CGM. Sent Rx to [facility] for signing and sending to his pharmacy where he was able to fill the sensors and transmitter. Discussed will need to return for training after he gets the . Scheduled follow-up for next week. Pt is running short on test strips - reports [facility] does not have them in  yet. Can have pharmacy check with other [facility] in the area for supply. Pt concerned filling rx for strips would mess up filling Dexcom - explained should not affect filling Dexcom, can always use regular glucometer for back up.       Task: Reviewed the importance of self-monitoring blood glucose and keeping logs. Completed 12/21/2022        Task: Provided patient with blood glucose logs, reviewed appropriate timing and frequency to SMBG, education on parameters on when to notify provider and advised patient to bring logs to all appts with PCP/Endocrinologist/Diabetes Care Specialist. Completed 12/21/2022        Task: Discussed ways to minimize pain when monitoring blood glucose. Completed 12/21/2022          Follow Up Plan     Follow up in about 8 days (around 12/30/2022) for dexcom start.    Today's care plan and follow up schedule was discussed with patient.  Juan Manuel verbalized understanding of the care plan, goals, and agrees to follow up plan.        The patient was encouraged to communicate with his/her health care provider/physician and care team regarding his/her condition(s) and treatment.  I provided the patient with my contact information today and encouraged to contact me via phone or [facility]'s Patient Portal as needed.     Length of Visit   Total Time: 25 Minutes

## 2023-01-03 ENCOUNTER — LAB VISIT (OUTPATIENT)
Dept: LAB | Facility: OTHER | Age: 58
End: 2023-01-03
Attending: INTERNAL MEDICINE
Payer: MEDICAID

## 2023-01-03 ENCOUNTER — CLINICAL SUPPORT (OUTPATIENT)
Dept: DIABETES | Facility: CLINIC | Age: 58
End: 2023-01-03
Payer: MEDICAID

## 2023-01-03 VITALS — BODY MASS INDEX: 30.38 KG/M2 | HEIGHT: 71 IN

## 2023-01-03 DIAGNOSIS — Z79.4 TYPE 2 DIABETES MELLITUS WITH DIABETIC POLYNEUROPATHY, WITH LONG-TERM CURRENT USE OF INSULIN: Primary | ICD-10-CM

## 2023-01-03 DIAGNOSIS — E03.9 HYPOTHYROIDISM, UNSPECIFIED TYPE: ICD-10-CM

## 2023-01-03 DIAGNOSIS — Z79.4 TYPE 2 DIABETES MELLITUS WITH HYPERGLYCEMIA, WITH LONG-TERM CURRENT USE OF INSULIN: ICD-10-CM

## 2023-01-03 DIAGNOSIS — E11.42 TYPE 2 DIABETES MELLITUS WITH DIABETIC POLYNEUROPATHY, WITH LONG-TERM CURRENT USE OF INSULIN: Primary | ICD-10-CM

## 2023-01-03 DIAGNOSIS — E11.65 TYPE 2 DIABETES MELLITUS WITH HYPERGLYCEMIA, WITH LONG-TERM CURRENT USE OF INSULIN: ICD-10-CM

## 2023-01-03 LAB
ANION GAP SERPL CALC-SCNC: 9 MMOL/L (ref 8–16)
BUN SERPL-MCNC: 9 MG/DL (ref 6–20)
CALCIUM SERPL-MCNC: 10.2 MG/DL (ref 8.7–10.5)
CHLORIDE SERPL-SCNC: 103 MMOL/L (ref 95–110)
CO2 SERPL-SCNC: 24 MMOL/L (ref 23–29)
CREAT SERPL-MCNC: 1.2 MG/DL (ref 0.5–1.4)
EST. GFR  (NO RACE VARIABLE): >60 ML/MIN/1.73 M^2
ESTIMATED AVG GLUCOSE: ABNORMAL MG/DL (ref 68–131)
GLUCOSE SERPL-MCNC: 386 MG/DL (ref 70–110)
HBA1C MFR BLD: >14 % (ref 4–5.6)
POTASSIUM SERPL-SCNC: 3.8 MMOL/L (ref 3.5–5.1)
SODIUM SERPL-SCNC: 136 MMOL/L (ref 136–145)
T4 FREE SERPL-MCNC: 1.16 NG/DL (ref 0.71–1.51)
TSH SERPL DL<=0.005 MIU/L-ACNC: 2.98 UIU/ML (ref 0.4–4)

## 2023-01-03 PROCEDURE — 83036 HEMOGLOBIN GLYCOSYLATED A1C: CPT | Performed by: INTERNAL MEDICINE

## 2023-01-03 PROCEDURE — 84443 ASSAY THYROID STIM HORMONE: CPT | Performed by: INTERNAL MEDICINE

## 2023-01-03 PROCEDURE — G0108 DIAB MANAGE TRN  PER INDIV: HCPCS | Mod: PBBFAC | Performed by: DIETITIAN, REGISTERED

## 2023-01-03 PROCEDURE — 36415 COLL VENOUS BLD VENIPUNCTURE: CPT | Performed by: INTERNAL MEDICINE

## 2023-01-03 PROCEDURE — 80048 BASIC METABOLIC PNL TOTAL CA: CPT | Performed by: INTERNAL MEDICINE

## 2023-01-03 PROCEDURE — 84439 ASSAY OF FREE THYROXINE: CPT | Performed by: INTERNAL MEDICINE

## 2023-01-03 NOTE — PROGRESS NOTES
Diabetes Care Specialist Progress Note  Author: Lela Bonds RD  Date: 1/3/2023    Program Intake  Reason for Diabetes Program Visit:: Intervention  Type of Intervention:: Individual  Individual: Device Training  Device Training: Personal CGM  Current diabetes risk level:: moderate    Lab Results   Component Value Date    HGBA1C 12.8 (H) 11/01/2022       Diabetes Self-Management Skills Assessment    Diabetes Disease Process/Treatment Options  Patient/caregiver able to state what happens when someone has diabetes.: somewhat  Patient/caregiver knows what type of diabetes they have.: yes  Diabetes Type : Type II  Patient/caregiver able to identify at least three signs and symptoms of diabetes.: no  Patient able to identify at least three risk factors for diabetes.: no  Diabetes Disease Process/Treatment Options: Skills Assessment Completed: Yes  Assessment indicates:: Knowledge deficit, Instruction Needed  Area of need?: Yes    Nutrition/Healthy Eating  Nutrition/Healthy Eating Skills Assessment Completed:: No  Deffered due to:: Time    Physical Activity/Exercise  Physical Activity/Exercise Skills Assessment Completed: : No  Deffered due to:: Time    Medications  Patient is able to describe current diabetes management routine.: yes  Diabetes management routine:: insulin, oral medications  Patient is able to identify current diabetes medications, dosages, and appropriate timing of medications.: yes  Patient understands the purpose of the medications taken for diabetes.: yes  Patient reports problems or concerns with current medication regimen.: no  Medication Skills Assessment Completed:: Yes  Assessment indicates:: Instruction Needed  Area of need?: Yes    Home Blood Glucose Monitoring  Patient states that blood sugar is checked at home daily.: yes  Monitoring Method:: home glucometer, personal continuous glucose monitor (has dexcom  and training on cgm today)  How often do you check your blood sugar?:  Twice a day  When do you check your blood sugar?: Before breakfast, Before bedtime  Blood glucose logs:: no, encouraged to keep logs, encouraged to bring logs to provider visits  Blood glucose logs reviewed today?: no  Personal CGM type:: dexcom  Home Blood Glucose Monitoring Skills Assessment Completed: : Yes  Assessment indicates:: Knowledge deficit, Instruction Needed  Area of need?: Yes    Acute Complications  Acute Complications Skills Assessment Completed: : No  Deffered due to:: Time    Chronic Complications  Chronic Complications Skills Assessment Completed: : No  Deferred due to:: Time    Psychosocial/Coping  Psychosocial/Coping Skills Assessment Completed: : No  Deffered due to:: Time      Assessment Summary and Plan    Based on today's diabetes care assessment, the following areas of need were identified:      Social 12/21/2022   Access to Mass Media/Tech No   Cognitive/Behavioral Health No   Culture/Adventism No   Communication No   Health Literacy No        Clinical 1/25/2021   Medication Adherence No   Lab Compliance No        Diabetes Self-Management Skills 1/3/2023   Diabetes Disease Process/Treatment Options Yes - Pt voiced doesn't understand why BG has trended up - used to be easily controlled. Discussed progression on diabetes, worsening insulin resistance vs beta cell burnout. Reviewed importance of combination of medication, monitoring, consistent and balanced eating habits, and activity.   Nutrition/Healthy Eating -   Physical Activity/Exercise -   Medication Yes - Pt reports missing insulin shots, particularly in the morning. Discussed strategies for avoiding missed doses. Encouraged setting alert on phone as a reminder.    Home Blood Glucose Monitoring Yes - see care planning   Acute Complications -   Chronic Complications -   Psychosocial/Coping -          Today's interventions were provided through individual discussion, instruction, and written materials were provided.      Patient  verbalized understanding of instruction and written materials.  Pt was able to return back demonstration of instructions today. Patient understood key points, needs reinforcement and further instruction.     Diabetes Self-Management Care Plan:    Today's Diabetes Self-Management Care Plan was developed with Juan Manuel's input. Juan Manuel has agreed to work toward the following goal(s) to improve his/her overall diabetes control.      Care Plan: Diabetes Management   Updates made since 12/4/2022 12:00 AM        Problem: Healthy Eating         Goal: Pt will increase intake of nonstarchy vegetabels to 1/2 of the plate with dinner time meal.    Start Date: 1/25/2021   Expected End Date: 3/21/2023   This Visit's Progress: Deferred   Recent Progress: No change   Priority: Medium   Barriers: Lack of Motivation to Change   Note:    12/21/22 - Pt did have salad with dinner, has been getting more veggies w/ dinner meal.        Goal: Pt will omit all sweet foods and beverges from diet.    Start Date: 1/25/2021   Expected End Date: 3/21/2023   This Visit's Progress: Deferred   Recent Progress: No change   Priority: High   Barriers: Lack of Motivation to Change   Note:    12/21/22 - Pt has reduced sweet drinks in diet. Is now drinking mini cans of coke instead of large bottles. Still drinking twice per day. Discussed SF options to try to find something that he likes (flavored water, crystal light, etc.) Agrees to cut down to 1 sweet drink per day.        Problem: Blood Glucose Self-Monitoring         Goal: Pt will return for training with all Dexcom supplies once he gets .    Start Date: 12/21/2022   Expected End Date: 12/22/2022   This Visit's Progress: Met   Priority: High   Barriers: No Barriers Identified   Note:    12/21/22 - Pt brought dexcom sensor and transmitter but does not have a , and forgot cell phone at home. Reports having an iPhone, so should be compatible with dexcom regina. Briefly reviewed components of  "dexcom, how it works and refilling supplies. To bring cell phone tomorrow and supplies for complete Dexcom start.     12/22/22 - Pt returned today for Dexcom training. However, his [employer] G Pure phone is not compatible with the Dexcom G6 yisel. Discussed he will need the  in order to start the CGM. Sent Rx to MD for signing and sending to his pharmacy where he was able to fill the sensors and transmitter. Discussed will need to return for training after he gets the . Scheduled follow-up for next week. Pt is running short on test strips - reports [facility] does not have them in yet. Can have pharmacy [facility] the area for supply. Pt concerned filling rx for strips would mess up filling Dexcom - explained should not affect filling Dexcom, can always use regular glucometer for back up.    1/3/23 - Provided Dexcom training and pt successfully started sensor in office today.  DEXCOM G6 SENSOR START     Patient referred to clinic today for Dexcom G6 continuous glucose sensor system training.  Education provided using "Quick Start Guide" per Dexcom protocol.    Yisel/ Overview:  5 min glucose reading updates, trending arrows, BG graph screens, battery life indicator, Blue Tooth Symbol.  Yisel/ Menus: trend Graph, start sensor, enter BG, events, Alerts, Settings, Shutdown, Stop Sensor.   Yisel/ Screens and prompts  Alarm Settings:    * Low alert: 70    * High alert: 400    * Rise rate: off    * Fall Rate: off    Transmitter and Sensor Codes entered per prompts    Reviewed sensor site selection. Site selected and prepped using aseptic technique Inserted to right abdomen. Transmitter placed in pod and secured.  Practiced sensor pod/transmitter removal from site, and removal of transmitter from sensor pod.  Patient able to demonstrate without difficulty.  Encouraged to review manual or yisel video prior to starting another sensor.   Reviewed problem-solving aspects of sensor " transmission/ variables that can disrupt transmission. /regina range 20 feet, but the first 3 hrs keep within 3 feet of transmitter.  Pt instructed on lag time of interstitial fluid from CBG and was advised to tx hypoglycemia and dose insulin based on SMBG values.  Dexcom technical support contact number given and examples of when to contact them discussed.   Clarity regina and website reviewed w/ pt. Assisted pt w/ connecting to Clarity account for automatic uploads.         Task: Reviewed the importance of self-monitoring blood glucose and keeping logs. Completed 12/21/2022        Task: Provided patient with blood glucose logs, reviewed appropriate timing and frequency to SMBG, education on parameters on when to notify provider and advised patient to bring logs to all appts with PCP/Endocrinologist/Diabetes Care Specialist. Completed 12/21/2022        Task: Discussed ways to minimize pain when monitoring blood glucose. Completed 12/21/2022        Goal: Pt will wear Dexcom continuously for glucose monitoring and keep  with him at all times.    Start Date: 1/3/2023   Expected End Date: 1/13/2023   Priority: High   Barriers: No Barriers Identified   Note:    1/3/23 - Pt motivated to use Dexcom. Discussed readings, directional arrows, and entering insulin doses and events. Discussed benefits of continuous readings/graphs in making more effective treatment decisions. Pt will return in 10 days for dexcom download and review.          Follow Up Plan     Follow up in about 10 days (around 1/13/2023) for dexcom follow-up/download.    Today's care plan and follow up schedule was discussed with patient.  Juan Manuel verbalized understanding of the care plan, goals, and agrees to follow up plan.        The patient was encouraged to communicate with his/her health care provider/physician and care team regarding his/her condition(s) and treatment.  I provided the patient with my contact information today and encouraged to  contact me via phone or [facility] Patient Portal as needed.     Length of Visit   Total Time: 30 Minutes

## 2023-01-04 ENCOUNTER — TELEPHONE (OUTPATIENT)
Dept: INTERNAL MEDICINE | Facility: CLINIC | Age: 58
End: 2023-01-04
Payer: MEDICAID

## 2023-01-04 NOTE — TELEPHONE ENCOUNTER
----- Message from Lela Bonds RD sent at 1/3/2023  3:33 PM CST -----  Regarding: Pt requesting appt  Hi!    Mr. Johnson requested a follow-up appt with Dr. Espinoza. He last saw him in Sept 2022. Unfortunately, I was unable to find any available openings. Could you please assist?    Many thanks!  Lela

## 2023-01-05 NOTE — TELEPHONE ENCOUNTER
LVM that tentative appointment with Dr. Espinoza will be 01/27/2023 at 0940. Asked patient to call  to discuss. Will send request to overbooking

## 2023-01-05 NOTE — TELEPHONE ENCOUNTER
2ND ATTEMPT:  LVM that tentative appointment with Dr. Espinoza will be 01/27/2023 at 0940. Asked patient to call  to discuss. Will send request to overbooking    REROUTING FOR 3RD ATTEMPT

## 2023-01-06 NOTE — TELEPHONE ENCOUNTER
Spoke with the pt and informed him of date of appointment. Pt stated that works for him. Pt Is scheduled for 1/27/23

## 2023-01-13 ENCOUNTER — PATIENT OUTREACH (OUTPATIENT)
Dept: ADMINISTRATIVE | Facility: HOSPITAL | Age: 58
End: 2023-01-13
Payer: MEDICAID

## 2023-01-13 ENCOUNTER — CLINICAL SUPPORT (OUTPATIENT)
Dept: DIABETES | Facility: CLINIC | Age: 58
End: 2023-01-13
Payer: MEDICAID

## 2023-01-13 VITALS — WEIGHT: 216.19 LBS | HEIGHT: 71 IN | BODY MASS INDEX: 30.27 KG/M2

## 2023-01-13 DIAGNOSIS — E11.42 TYPE 2 DIABETES MELLITUS WITH DIABETIC POLYNEUROPATHY, WITH LONG-TERM CURRENT USE OF INSULIN: Primary | ICD-10-CM

## 2023-01-13 DIAGNOSIS — Z79.4 TYPE 2 DIABETES MELLITUS WITH DIABETIC POLYNEUROPATHY, WITH LONG-TERM CURRENT USE OF INSULIN: Primary | ICD-10-CM

## 2023-01-13 PROCEDURE — G0108 DIAB MANAGE TRN  PER INDIV: HCPCS | Mod: PBBFAC | Performed by: DIETITIAN, REGISTERED

## 2023-01-13 NOTE — PROGRESS NOTES
Diabetes Care Specialist Progress Note  Author: Lela Bonds RD  Date: 1/13/2023    Program Intake  Reason for Diabetes Program Visit:: Intervention  Type of Intervention:: Individual  Individual: Education  Education: Self-Management Skill Review  Current diabetes risk level:: moderate    Lab Results   Component Value Date    HGBA1C >14.0 (H) 01/03/2023         Diabetes Self-Management Skills Assessment    Diabetes Disease Process/Treatment Options  Diabetes Disease Process/Treatment Options: Skills Assessment Completed: No  Deferred due to:: Time    Nutrition/Healthy Eating  Nutrition/Healthy Eating Skills Assessment Completed:: No  Deffered due to:: Time    Physical Activity/Exercise  Physical Activity/Exercise Skills Assessment Completed: : No  Deffered due to:: Time    Medications  Patient is able to describe current diabetes management routine.: yes  Diabetes management routine:: insulin, oral medications, injectable medications  Patient is able to identify current diabetes medications, dosages, and appropriate timing of medications.: yes  Patient understands the purpose of the medications taken for diabetes.: yes  Patient reports problems or concerns with current medication regimen.: yes  Medication regimen problems/concerns:: concerned about side effects, other (see comments) (pt reports had vomiting after 1st shot of ozempic and never tried it again - has been over a month ago)  Medication Skills Assessment Completed:: Yes  Assessment indicates:: Instruction Needed  Area of need?: Yes    Home Blood Glucose Monitoring  Patient states that blood sugar is checked at home daily.: yes  Monitoring Method:: personal continuous glucose monitor  Blood glucose logs reviewed today?: yes  Personal CGM type:: dexcom  Patient is able to use personal CGM appropriately.: yes  CGM Report reviewed?: yes  Home Blood Glucose Monitoring Skills Assessment Completed: : Yes  Assessment indicates:: Adequate understanding  Area  of need?: No    Acute Complications  Acute Complications Skills Assessment Completed: : No  Deffered due to:: Time    Chronic Complications  Chronic Complications Skills Assessment Completed: : No  Deferred due to:: Time    Psychosocial/Coping  Psychosocial/Coping Skills Assessment Completed: : No  Deffered due to:: Time      Assessment Summary and Plan    Based on today's diabetes care assessment, the following areas of need were identified:      Social 12/21/2022   Access to Mass Media/Tech No   Cognitive/Behavioral Health No   Culture/Gnosticist No   Communication No   Health Literacy No        Clinical 1/25/2021   Medication Adherence No   Lab Compliance No        Diabetes Self-Management Skills 1/13/2023   Diabetes Disease Process/Treatment Options -   Nutrition/Healthy Eating -   Physical Activity/Exercise -   Medication Yes - see care planning   Home Blood Glucose Monitoring No   Acute Complications -   Chronic Complications -   Psychosocial/Coping -          Today's interventions were provided through individual discussion, instruction, and written materials were provided.      Patient verbalized understanding of instruction and written materials.  Pt was able to return back demonstration of instructions today. Patient understood key points, needs reinforcement and further instruction.     Diabetes Self-Management Care Plan:    Today's Diabetes Self-Management Care Plan was developed with Juan Manuel's input. Juan Manuel has agreed to work toward the following goal(s) to improve his/her overall diabetes control.      Care Plan: Diabetes Management   Updates made since 12/14/2022 12:00 AM        Problem: Healthy Eating         Goal: Pt will increase intake of nonstarchy vegetabels to 1/2 of the plate with dinner time meal.    Start Date: 1/25/2021   Expected End Date: 3/21/2023   This Visit's Progress: On track   Recent Progress: Deferred   Priority: Medium   Barriers: Lack of Motivation to Change   Note:    12/21/22 -  Pt did have salad with dinner, has been getting more veggies w/ dinner meal.     1/13/23 - Has been eating more vegetables at dinner - had turkey necks, green beans and small portion (~1/3 cup) rice for dinner last night. Praised great changes and encouraged continuing.          Goal: Pt will omit all sweet foods and beverges from diet.    Start Date: 1/25/2021   Expected End Date: 3/21/2023   This Visit's Progress: On track   Recent Progress: Deferred   Priority: High   Barriers: Lack of Motivation to Change   Note:    12/21/22 - Pt has reduced sweet drinks in diet. Is now drinking mini cans of coke instead of large bottles. Still drinking twice per day. Discussed SF options to try to find something that he likes (flavored water, crystal light, etc.) Agrees to cut down to 1 sweet drink per day.     1/13/23 - Pt has worked on reducing sugary beverages. Is down to 1/2 mini can of coke every other day and working toward getting off of them all together. He is trying a few different SF beverages but has not found ones he likes yet. Reports drinking mostly water at this time.        Problem: Blood Glucose Self-Monitoring         Goal: Pt will return for training with all Dexcom supplies once he gets . Completed 1/13/2023   Start Date: 12/21/2022   Expected End Date: 12/22/2022   Recent Progress: Met   Priority: High   Barriers: No Barriers Identified   Note:    12/21/22 - Pt brought dexcom sensor and transmitter but does not have a , and forgot cell phone at home. Reports having an iPhone, so should be compatible with dexcom regina. Briefly reviewed components of dexcom, how it works and refilling supplies. To bring cell phone tomorrow and supplies for complete Dexcom start.     12/22/22 - Pt returned today for Dexcom training. However, his [employer] G Pure phone is not compatible with the Dexcom G6 regina. Discussed he will need the  in order to start the CGM. Sent Rx to MD for signing and sending to  "his pharmacy where he was able to fill the sensors and transmitter. Discussed will need to return for training after he gets the . Scheduled follow-up for next week. Pt is running short on test strips - reports [facility] not [facility] them [facility] yet. [facility] for supply. Pt concerned filling rx for strips would mess up filling Dexcom - explained should not affect filling Dexcom, can always use regular glucometer for back up.    1/3/23 - Provided Dexcom training and pt successfully started sensor in office today.  DEXCOM G6 SENSOR START     Patient referred to clinic today for Dexcom G6 continuous glucose sensor system training.  Education provided using "Quick Start Guide" per Dexcom protocol.    Yisel/ Overview:  5 min glucose reading updates, trending arrows, BG graph screens, battery life indicator, Blue Tooth Symbol.  Yisel/ Menus: trend Graph, start sensor, enter BG, events, Alerts, Settings, Shutdown, Stop Sensor.   Yisel/ Screens and prompts  Alarm Settings:    * Low alert: 70    * High alert: 400    * Rise rate: off    * Fall Rate: off    Transmitter and Sensor Codes entered per prompts    Reviewed sensor site selection. Site selected and prepped using aseptic technique Inserted to right abdomen. Transmitter placed in pod and secured.  Practiced sensor pod/transmitter removal from site, and removal of transmitter from sensor pod.  Patient able to demonstrate without difficulty.  Encouraged to review manual or yisel video prior to starting another sensor.   Reviewed problem-solving aspects of sensor transmission/ variables that can disrupt transmission. /yisel range 20 feet, but the first 3 hrs keep within 3 feet of transmitter.  Pt instructed on lag time of interstitial fluid from CBG and was advised to tx hypoglycemia and dose insulin based on SMBG values.  Dexcom technical support contact number given and examples of when to contact " them discussed.   Clarity regina and website reviewed w/ pt. Assisted pt w/ connecting to Clarity account for automatic uploads.         Task: Reviewed the importance of self-monitoring blood glucose and keeping logs. Completed 12/21/2022        Task: Provided patient with blood glucose logs, reviewed appropriate timing and frequency to SMBG, education on parameters on when to notify provider and advised patient to bring logs to all appts with PCP/Endocrinologist/Diabetes Care Specialist. Completed 12/21/2022        Task: Discussed ways to minimize pain when monitoring blood glucose. Completed 12/21/2022        Goal: Pt will wear Dexcom continuously for glucose monitoring and keep  with him at all times.    Start Date: 1/3/2023   Expected End Date: 1/13/2023   This Visit's Progress: On track   Priority: High   Barriers: No Barriers Identified   Note:    1/3/23 - Pt motivated to use Dexcom. Discussed readings, directional arrows, and entering insulin doses and events. Discussed benefits of continuous readings/graphs in making more effective treatment decisions. Pt will return in 10 days for dexcom download and review.     1/13/23 - Pt has done well with wearing Dexcom and using  for continuous monitoring. Due for sensor change today. Pt did not bring sensor to visit today and denies need for review of changing sensor. He plans to go home and change it right after visit. Reports  was going off a lot last night d/t high glucose alert. Alert is set to 400. Downloaded and reviewed report with pt - 99% of time staying in very high range and avg glucose 338. Reviewed medication with pt.        Problem: Medications         Goal: Pt will titrate up on Lantus as directed by Dr. Cerda's rx instructions and try taking Ozempic again.    Start Date: 1/13/2023   Expected End Date: 1/20/2023   Priority: High   Barriers: No Barriers Identified   Note:    1/13/23 - Pt reports took 55 units of Lantus last night  and took metformin and prandin this morning. Has not yet taken Lantus dose this morning. Denies missing any doses of Lantus, prandin or metformin. Reports appropriately rotating sites, proper storage of insulin, and denies any problems with taking Lantus - of note, did have significantly abnormal eye cam results (has optometry appt scheduled but not until May) - he is using vial and syringe, which is more difficult to see. He is not taking Ozempic - reports took once and caused vomiting. He has not titrated up on Lantus as was directed by Dr. Cerda. Reviewed long-acting titration. Pt will go up to 57 units today and will increase by 2 units in 3-5 days if remaining high. Discussed risk of N/V with Ozempic greater if eating large portions, eating beyond point of feeling full, and if eating higher fat meals (fried foods, cream sauces, larger amts of butter/fats/oils, etc). Encouraged limiting high fat meals and stop eating when he feels full. Pt is willing to try Ozempic again. Advised try starting Ozempic again today, starting with 0.25mg dose weekly.        Task: Reviewed with patient all current diabetes medications and provided basic review of the purpose, dosage, frequency, side effects, and storage of both oral and injectable diabetes medications. Completed 1/13/2023        Task: Discussed guidelines for preventing, detecting and treating hypoglycemia and hyperglycemia and reviewed the importance of meal and medication timing with diabetes mediations for prevention of hypoglycemia and maximum drug benefit. Completed 1/13/2023          Follow Up Plan     Follow up in about 1 week (around 1/20/2023) for dexcom download and diabetes education follow-up.    Today's care plan and follow up schedule was discussed with patient.  Juan Manuel verbalized understanding of the care plan, goals, and agrees to follow up plan.        The patient was encouraged to communicate with his/her health care provider/physician and care  team regarding his/her condition(s) and treatment.  I provided the patient with my contact information today and encouraged to contact me via phone or [facility]'s Patient Portal as needed.     Length of Visit   Total Time: 30 Minutes

## 2023-01-20 ENCOUNTER — PATIENT MESSAGE (OUTPATIENT)
Dept: SLEEP MEDICINE | Facility: CLINIC | Age: 58
End: 2023-01-20
Payer: MEDICAID

## 2023-01-20 ENCOUNTER — CLINICAL SUPPORT (OUTPATIENT)
Dept: DIABETES | Facility: CLINIC | Age: 58
End: 2023-01-20
Payer: MEDICAID

## 2023-01-20 VITALS — BODY MASS INDEX: 29.98 KG/M2 | HEIGHT: 71 IN | WEIGHT: 214.19 LBS

## 2023-01-20 DIAGNOSIS — E11.42 TYPE 2 DIABETES MELLITUS WITH DIABETIC POLYNEUROPATHY, WITH LONG-TERM CURRENT USE OF INSULIN: Primary | ICD-10-CM

## 2023-01-20 DIAGNOSIS — Z79.4 TYPE 2 DIABETES MELLITUS WITH DIABETIC POLYNEUROPATHY, WITH LONG-TERM CURRENT USE OF INSULIN: Primary | ICD-10-CM

## 2023-01-20 PROCEDURE — G0108 DIAB MANAGE TRN  PER INDIV: HCPCS | Mod: PBBFAC | Performed by: DIETITIAN, REGISTERED

## 2023-01-20 NOTE — PROGRESS NOTES
Diabetes Care Specialist Progress Note  Author: Lela Bonds RD  Date: 1/20/2023         Lab Results   Component Value Date    HGBA1C >14.0 (H) 01/03/2023       Diabetes Self-Management Skills Assessment    Diabetes Disease Process/Treatment Options  Diabetes Disease Process/Treatment Options: Skills Assessment Completed: No  Deferred due to:: Time    Nutrition/Healthy Eating  Nutrition/Healthy Eating Skills Assessment Completed:: No  Deffered due to:: Time    Physical Activity/Exercise  Physical Activity/Exercise Skills Assessment Completed: : No  Deffered due to:: Time    Medications  Patient is able to describe current diabetes management routine.: yes  Diabetes management routine:: insulin, oral medications, injectable medications (taking Prandin AC, Lantus (vial) 58 units BID, but is not taking Ozempic)  Patient is able to identify current diabetes medications, dosages, and appropriate timing of medications.: yes  Patient understands the purpose of the medications taken for diabetes.: yes (does not understand purpose of Ozempic but understands other meds)  Patient reports problems or concerns with current medication regimen.: yes  Medication regimen problems/concerns:: concerned about side effects, other (see comments) (Had nausea with Ozempic and did not take again. Discussed trying again at last visit, but pt has not - stated he was waiting until he was out of Lantus to start Ozempic. Explained and educated on differences in meds, purpose and taking both.)  Medication Skills Assessment Completed:: Yes  Assessment indicates:: Instruction Needed  Area of need?: Yes    Home Blood Glucose Monitoring  Patient states that blood sugar is checked at home daily.: yes  Monitoring Method:: home glucometer, personal continuous glucose monitor  Home glucometer meter type:: TrueMetrix  How often do you check your blood sugar?: Twice a day  When do you check your blood sugar?: Before breakfast, Before bedtime  When you  check what is your typical blood sugar range? : 300s-350s  Blood glucose logs:: yes, encouraged to keep logs, encouraged to bring logs to provider visits  Blood glucose logs reviewed today?: yes  Personal CGM type:: dexcom  Patient is able to use personal CGM appropriately.: no  CGM Report reviewed?: no  Unable to download personal CGM: Pt tried to change sensor at home and reports went through 3 sensors unsuccessfully, has been using fingersticks for past week and a half. Started new sensor today in visit. Encouraged pt to bring new sensor again to next visit in 10 days to review again.  Home Blood Glucose Monitoring Skills Assessment Completed: : Yes  Assessment indicates:: Instruction Needed  Area of need?: Yes    Acute Complications  Acute Complications Skills Assessment Completed: : No  Deffered due to:: Time    Chronic Complications  Chronic Complications Skills Assessment Completed: : No  Deferred due to:: Time    Psychosocial/Coping  Psychosocial/Coping Skills Assessment Completed: : No  Deffered due to:: Time        Assessment Summary and Plan    Based on today's diabetes care assessment, the following areas of need were identified:      Social 12/21/2022   Access to Mass Media/Tech No   Cognitive/Behavioral Health No   Culture/Jew No   Communication No   Health Literacy No        Clinical 1/25/2021   Medication Adherence No   Lab Compliance No        Diabetes Self-Management Skills 1/20/2023   Diabetes Disease Process/Treatment Options -   Nutrition/Healthy Eating -   Physical Activity/Exercise -   Medication Yes - see care planning   Home Blood Glucose Monitoring Yes - see care planning   Acute Complications -   Chronic Complications -   Psychosocial/Coping -          Today's interventions were provided through individual discussion, instruction, and written materials were provided.      Patient verbalized understanding of instruction and written materials.  Pt was able to return back demonstration  of instructions today. Patient understood key points, needs reinforcement and further instruction.     Diabetes Self-Management Care Plan:    Today's Diabetes Self-Management Care Plan was developed with Juan Manuel's input. Juan Manuel has agreed to work toward the following goal(s) to improve his/her overall diabetes control.      Care Plan: Diabetes Management   Updates made since 12/21/2022 12:00 AM        Problem: Healthy Eating         Goal: Pt will increase intake of nonstarchy vegetabels to 1/2 of the plate with dinner time meal.    Start Date: 1/25/2021   Expected End Date: 3/21/2023   This Visit's Progress: Deferred   Recent Progress: On track   Priority: Medium   Barriers: Lack of Motivation to Change   Note:    12/21/22 - Pt did have salad with dinner, has been getting more veggies w/ dinner meal.     1/13/23 - Has been eating more vegetables at dinner - had turkey necks, green beans and small portion (~1/3 cup) rice for dinner last night. Praised great changes and encouraged continuing.          Goal: Pt will omit all sweet foods and beverges from diet.    Start Date: 1/25/2021   Expected End Date: 3/21/2023   This Visit's Progress: Deferred   Recent Progress: On track   Priority: High   Barriers: Lack of Motivation to Change   Note:    12/21/22 - Pt has reduced sweet drinks in diet. Is now drinking mini cans of coke instead of large bottles. Still drinking twice per day. Discussed SF options to try to find something that he likes (flavored water, crystal light, etc.) Agrees to cut down to 1 sweet drink per day.     1/13/23 - Pt has worked on reducing sugary beverages. Is down to 1/2 mini can of coke every other day and working toward getting off of them all together. He is trying a few different SF beverages but has not found ones he likes yet. Reports drinking mostly water at this time.        Problem: Blood Glucose Self-Monitoring         Goal: Pt will return for training with all Dexcom supplies once he gets  ". Completed 1/13/2023   Start Date: 12/21/2022   Expected End Date: 12/22/2022   Recent Progress: Met   Priority: High   Barriers: No Barriers Identified   Note:    12/21/22 - Pt brought dexcom sensor and transmitter but does not have a , and forgot cell phone at home. Reports having an iPhone, so should be compatible with dexcom yisel. Briefly reviewed components of dexcom, how it works and refilling supplies. To bring cell phone tomorrow and supplies for complete Dexcom start.     12/22/22 - Pt returned today for Dexcom training. However, his [employer] G Pure phone is not compatible with the Dexcom G6 yisel. Discussed he will need the  in order to start the CGM. Sent Rx to MD for signing and sending to his pharmacy where he was able to fill the sensors and transmitter. Discussed will need to return for training after he gets the . Scheduled follow-up for next week. Pt is running short on test strips - reports [facility] does not have them in yet. Can have pharmacy check with other [facility] in the area for supply. Pt concerned filling rx for strips would mess up filling Dexcom - explained should not affect filling Dexcom, can always use regular glucometer for back up.    1/3/23 - Provided Dexcom training and pt successfully started sensor in office today.  DEXCOM G6 SENSOR START     Patient referred to clinic today for Dexcom G6 continuous glucose sensor system training.  Education provided using "Quick Start Guide" per Dexcom protocol.    Yisel/ Overview:  5 min glucose reading updates, trending arrows, BG graph screens, battery life indicator, Blue Tooth Symbol.  Yisel/ Menus: trend Graph, start sensor, enter BG, events, Alerts, Settings, Shutdown, Stop Sensor.   Yisel/ Screens and prompts  Alarm Settings:    * Low alert: 70    * High alert: 400    * Rise rate: off    * Fall Rate: off    Transmitter and Sensor Codes entered per prompts    Reviewed sensor site " selection. Site selected and prepped using aseptic technique Inserted to right abdomen. Transmitter placed in pod and secured.  Practiced sensor pod/transmitter removal from site, and removal of transmitter from sensor pod.  Patient able to demonstrate without difficulty.  Encouraged to review manual or regina video prior to starting another sensor.   Reviewed problem-solving aspects of sensor transmission/ variables that can disrupt transmission. /regina range 20 feet, but the first 3 hrs keep within 3 feet of transmitter.  Pt instructed on lag time of interstitial fluid from CBG and was advised to tx hypoglycemia and dose insulin based on SMBG values.  Dexcom technical support contact number given and examples of when to contact them discussed.   Clarity regina and website reviewed w/ pt. Assisted pt w/ connecting to Clarity account for automatic uploads.         Task: Reviewed the importance of self-monitoring blood glucose and keeping logs. Completed 12/21/2022        Task: Provided patient with blood glucose logs, reviewed appropriate timing and frequency to SMBG, education on parameters on when to notify provider and advised patient to bring logs to all appts with PCP/Endocrinologist/Diabetes Care Specialist. Completed 12/21/2022        Task: Discussed ways to minimize pain when monitoring blood glucose. Completed 12/21/2022        Goal: Pt will wear Dexcom continuously for glucose monitoring and keep  with him at all times.    Start Date: 1/3/2023   Expected End Date: 1/13/2023   This Visit's Progress: No change   Recent Progress: On track   Priority: High   Barriers: No Barriers Identified   Note:    1/3/23 - Pt motivated to use Dexcom. Discussed readings, directional arrows, and entering insulin doses and events. Discussed benefits of continuous readings/graphs in making more effective treatment decisions. Pt will return in 10 days for dexcom download and review.     1/13/23 - Pt has done well with  wearing Dexcom and using  for continuous monitoring. Due for sensor change today. Pt did not bring sensor to visit today and denies need for review of changing sensor. He plans to go home and change it right after visit. Reports  was going off a lot last night d/t high glucose alert. Alert is set to 400. Downloaded and reviewed report with pt - 99% of time staying in very high range and avg glucose 338. Reviewed medication with pt.     1/20/23 - Pt was unable to successfully apply and start new sensor at home. Brought new sensor and transmitter to visit today to restart. Transmitter he brought is different SN compared to one saved in . Explained transmitter is good for 90 days and to reuse the same one for 3 months. Pt performed all steps appropriately with little prompting (mainly to change transmitter SN) and successfully started new sensor. Reviewed change sensor every 10 days and transmitter he is using now will need to be changed in March. Pt verbalized understanding. Scheduled f/u in 10 days when he will be due to change sensor.         Problem: Medications         Goal: Pt will take Lantus as prescribed and try taking Ozempic again.    Start Date: 1/13/2023   Expected End Date: 1/20/2023   Priority: High   Barriers: No Barriers Identified   Note:    1/13/23 - Pt reports took 55 units of Lantus last night and took metformin and prandin this morning. Has not yet taken Lantus dose this morning. Denies missing any doses of Lantus, prandin or metformin. Reports appropriately rotating sites, proper storage of insulin, and denies any problems with taking Lantus - of note, did have significantly abnormal eye cam results (has optometry appt scheduled but not until May) - he is using vial and syringe, which is more difficult to see. He is not taking Ozempic - reports took once and caused vomiting. He has not titrated up on Lantus as was directed by Dr. Cerda. Reviewed long-acting titration. Pt  "will go up to 57 units today and will increase by 2 units in 3-5 days if remaining high. Discussed risk of N/V with Ozempic greater if eating large portions, eating beyond point of feeling full, and if eating higher fat meals (fried foods, cream sauces, larger amts of butter/fats/oils, etc). Encouraged limiting high fat meals and stop eating when he feels full. Pt is willing to try Ozempic again. Advised try starting Ozempic again today, starting with 0.25mg dose weekly.     1/20/23 - Pt confusing medications. He states he was going to start Ozempic when he finished his Lantus. Explained Ozempic is not insulin, reviewed MOA, and to use in addition to Lantus. Pt has not titrated up on Lantus since last visit. Discussed per Dr. Cerda's instructions he can titrate up on Lantus every 3-5 days if fasting BG remaining >140 - pt will go up to 60 units BID today. Reviewed DM medication schedule: Ozempic 0.25mg once weekly, Lantus BID, Metformin with breakfast and dinner, and Prandin before each meal. Pt then stated "so I will use this (Ozempic) for a week, then go back on the Lantus?" Reinforced taking Lantus twice a day, every day and take Ozempic once a week on Fridays. Pt verbalized understanding and acceptance. Also, pt reports he has been using Lantus vial and syringe and vial almost empty. States he has plenty of Lantus pens and pen needles at home and will start using those. Offered to review insulin pen use. Pt states he knows how to use the pens and does not need review.        Task: Reviewed with patient all current diabetes medications and provided basic review of the purpose, dosage, frequency, side effects, and storage of both oral and injectable diabetes medications. Completed 1/13/2023        Task: Discussed guidelines for preventing, detecting and treating hypoglycemia and hyperglycemia and reviewed the importance of meal and medication timing with diabetes mediations for prevention of hypoglycemia and " maximum drug benefit. Completed 1/13/2023          Follow Up Plan     Follow up in about 10 days (around 1/30/2023) for dexcom review/follow-up.    Today's care plan and follow up schedule was discussed with patient.  Juan Manuel verbalized understanding of the care plan, goals, and agrees to follow up plan.        The patient was encouraged to communicate with his/her health care provider/physician and care team regarding his/her condition(s) and treatment.  I provided the patient with my contact information today and encouraged to contact me via phone or [facility] Patient Portal as needed.     Length of Visit   Total Time: 40 Minutes

## 2023-01-23 DIAGNOSIS — N47.1 PHIMOSIS: Primary | ICD-10-CM

## 2023-01-23 NOTE — TELEPHONE ENCOUNTER
----- Message from Kirstin Salgado sent at 1/23/2023  4:10 PM CST -----  Regarding: Refill Request  Type: RX Refill Request      Who Called:   Self     Refill or New Rx: refill       RX Name and Strength: He said that he needs a cream that he puts on his private but doesn't know the name of it. I didn't see it in his list.         Preferred Pharmacy with phone number: .  [id number] - [place], [place] - [address] AT [facility] [address]  [address]  [facility] [address]  Phone: [phone] Fax: [phone]        Would the patient rather a call back or a response via [facility]? Call       Best Call Back Number: [phone]

## 2023-01-24 RX ORDER — CLOTRIMAZOLE 1 %
CREAM (GRAM) TOPICAL 2 TIMES DAILY
Qty: 45 G | Refills: 1 | Status: SHIPPED | OUTPATIENT
Start: 2023-01-24 | End: 2023-07-27 | Stop reason: SDUPTHER

## 2023-01-27 ENCOUNTER — OFFICE VISIT (OUTPATIENT)
Dept: INTERNAL MEDICINE | Facility: CLINIC | Age: 58
End: 2023-01-27
Attending: INTERNAL MEDICINE
Payer: MEDICAID

## 2023-01-27 ENCOUNTER — TELEPHONE (OUTPATIENT)
Dept: INTERNAL MEDICINE | Facility: CLINIC | Age: 58
End: 2023-01-27

## 2023-01-27 ENCOUNTER — TELEPHONE (OUTPATIENT)
Dept: SPINE | Facility: CLINIC | Age: 58
End: 2023-01-27
Payer: MEDICAID

## 2023-01-27 VITALS
SYSTOLIC BLOOD PRESSURE: 160 MMHG | HEIGHT: 71 IN | DIASTOLIC BLOOD PRESSURE: 98 MMHG | BODY MASS INDEX: 30.92 KG/M2 | OXYGEN SATURATION: 96 % | HEART RATE: 79 BPM | WEIGHT: 220.88 LBS

## 2023-01-27 DIAGNOSIS — E78.2 MIXED HYPERLIPIDEMIA: ICD-10-CM

## 2023-01-27 DIAGNOSIS — Z98.890 HISTORY OF ELBOW SURGERY: ICD-10-CM

## 2023-01-27 DIAGNOSIS — G89.29 CHRONIC ELBOW PAIN, RIGHT: ICD-10-CM

## 2023-01-27 DIAGNOSIS — Z79.4 TYPE 2 DIABETES MELLITUS WITH HYPERGLYCEMIA, WITH LONG-TERM CURRENT USE OF INSULIN: ICD-10-CM

## 2023-01-27 DIAGNOSIS — I10 ESSENTIAL HYPERTENSION: Primary | ICD-10-CM

## 2023-01-27 DIAGNOSIS — M25.521 CHRONIC ELBOW PAIN, RIGHT: ICD-10-CM

## 2023-01-27 DIAGNOSIS — E11.65 TYPE 2 DIABETES MELLITUS WITH HYPERGLYCEMIA, WITH LONG-TERM CURRENT USE OF INSULIN: ICD-10-CM

## 2023-01-27 DIAGNOSIS — G89.29 CHRONIC BILATERAL LOW BACK PAIN WITHOUT SCIATICA: ICD-10-CM

## 2023-01-27 DIAGNOSIS — M54.50 CHRONIC BILATERAL LOW BACK PAIN WITHOUT SCIATICA: ICD-10-CM

## 2023-01-27 DIAGNOSIS — E03.4 HYPOTHYROIDISM DUE TO ACQUIRED ATROPHY OF THYROID: ICD-10-CM

## 2023-01-27 PROCEDURE — 1159F MED LIST DOCD IN RCRD: CPT | Mod: CPTII,,, | Performed by: INTERNAL MEDICINE

## 2023-01-27 PROCEDURE — 99999 PR PBB SHADOW E&M-EST. PATIENT-LVL V: CPT | Mod: PBBFAC,,, | Performed by: INTERNAL MEDICINE

## 2023-01-27 PROCEDURE — 3046F PR MOST RECENT HEMOGLOBIN A1C LEVEL > 9.0%: ICD-10-PCS | Mod: CPTII,,, | Performed by: INTERNAL MEDICINE

## 2023-01-27 PROCEDURE — 99214 OFFICE O/P EST MOD 30 MIN: CPT | Mod: S$PBB,,, | Performed by: INTERNAL MEDICINE

## 2023-01-27 PROCEDURE — 3080F PR MOST RECENT DIASTOLIC BLOOD PRESSURE >= 90 MM HG: ICD-10-PCS | Mod: CPTII,,, | Performed by: INTERNAL MEDICINE

## 2023-01-27 PROCEDURE — 99999 PR PBB SHADOW E&M-EST. PATIENT-LVL V: ICD-10-PCS | Mod: PBBFAC,,, | Performed by: INTERNAL MEDICINE

## 2023-01-27 PROCEDURE — 3008F BODY MASS INDEX DOCD: CPT | Mod: CPTII,,, | Performed by: INTERNAL MEDICINE

## 2023-01-27 PROCEDURE — 3008F PR BODY MASS INDEX (BMI) DOCUMENTED: ICD-10-PCS | Mod: CPTII,,, | Performed by: INTERNAL MEDICINE

## 2023-01-27 PROCEDURE — 3080F DIAST BP >= 90 MM HG: CPT | Mod: CPTII,,, | Performed by: INTERNAL MEDICINE

## 2023-01-27 PROCEDURE — 99215 OFFICE O/P EST HI 40 MIN: CPT | Mod: PBBFAC | Performed by: INTERNAL MEDICINE

## 2023-01-27 PROCEDURE — 99214 PR OFFICE/OUTPT VISIT, EST, LEVL IV, 30-39 MIN: ICD-10-PCS | Mod: S$PBB,,, | Performed by: INTERNAL MEDICINE

## 2023-01-27 PROCEDURE — 3046F HEMOGLOBIN A1C LEVEL >9.0%: CPT | Mod: CPTII,,, | Performed by: INTERNAL MEDICINE

## 2023-01-27 PROCEDURE — 1160F RVW MEDS BY RX/DR IN RCRD: CPT | Mod: CPTII,,, | Performed by: INTERNAL MEDICINE

## 2023-01-27 PROCEDURE — 1159F PR MEDICATION LIST DOCUMENTED IN MEDICAL RECORD: ICD-10-PCS | Mod: CPTII,,, | Performed by: INTERNAL MEDICINE

## 2023-01-27 PROCEDURE — 3077F PR MOST RECENT SYSTOLIC BLOOD PRESSURE >= 140 MM HG: ICD-10-PCS | Mod: CPTII,,, | Performed by: INTERNAL MEDICINE

## 2023-01-27 PROCEDURE — 1160F PR REVIEW ALL MEDS BY PRESCRIBER/CLIN PHARMACIST DOCUMENTED: ICD-10-PCS | Mod: CPTII,,, | Performed by: INTERNAL MEDICINE

## 2023-01-27 PROCEDURE — 3077F SYST BP >= 140 MM HG: CPT | Mod: CPTII,,, | Performed by: INTERNAL MEDICINE

## 2023-01-27 RX ORDER — NIFEDIPINE 60 MG/1
60 TABLET, EXTENDED RELEASE ORAL DAILY
Qty: 90 TABLET | Refills: 0 | Status: SHIPPED | OUTPATIENT
Start: 2023-01-27 | End: 2023-04-24

## 2023-01-27 NOTE — TELEPHONE ENCOUNTER
----- Message from Nevaeh Nix MA sent at 1/27/2023 10:18 AM CST -----  Regarding: apwpt needed  Good morning,  Please schedule this pt an appt for Chronic bilateral low back pain without sciatica [M54.50, G89.29]. Referred/recommended by Dr. Espinoza.    Thanks in advance,  ELIJAH Herring

## 2023-01-27 NOTE — TELEPHONE ENCOUNTER
Staff reached out to patient. Staff informed patient that we are not accepting new patients at the moment. Patient was escalated to the Medicaid expansion line. AE.

## 2023-01-27 NOTE — PROGRESS NOTES
Subjective:       Patient ID: Juan Manuel Johnson is a 57 y.o. male.    Chief Complaint: Hypertension    Here for routine f/u of HTn and DM    DM uncontrolled. Working with endocrine. Tolerating ozempic so far.    HTN uncontrolled. Start nifedipine and lose weight.     Chronic right elbow pain. Fell from 3 story rooff during his youth and had 12 pins placed. Chronic pain and swelling and reduced ROM.    Chronic low back pain. Patient denies radiation of pain, numbness/tingling of lower ext, weakness of LE, saddle anesthesia, bowel/bladder incontinence, F/C, unexplained weight loss, or consistent,sheet drenching night sweats.       ### DM ###  Dr Hayden in endocrine. Eye exam at [facility].  No neuropathy. Amenable to digital DM  does not have [facility] yet due to not having smart phone. Wife uses [facility] so he will on using hers for access.           HGBA1C                   >14.0 (H)           01/03/2023 09:45 AM        HGBA1C                   12.8 (H)            11/01/2022 07:39 AM        HGBA1C                   13.5 (H)            08/03/2022 09:23 AM        HGBA1C                   12.7 (H)            04/12/2022 09:30 AM        HGBA1C                   8.6 (H)             11/08/2021 04:29 PM        HGBA1C                   9.1 (H)             08/25/2021 09:42 AM        HGBA1C                   9.9 (H)             05/12/2021 08:47 AM        HGBA1C                   9.7 (H)             02/11/2021 08:00 AM        HGBA1C                   7.8 (H)             10/27/2020 08:08 AM        HGBA1C                   13.1 (H)            07/20/2020 03:03 PM       ### HTN ###  Coreg 12.5; losartan 100 hctz 25    ### hypothyroidism ###   Levothyroxine 50mcg      Review of Systems   Constitutional:  Negative for appetite change, chills, fever and unexpected weight change.   HENT:  Negative for hearing loss, sore throat and trouble swallowing.    Eyes:  Negative for visual disturbance.   Respiratory:  Negative for cough, chest  "tightness and shortness of breath.    Cardiovascular:  Negative for chest pain and leg swelling.   Gastrointestinal:  Negative for abdominal pain, blood in stool, constipation, diarrhea, nausea and vomiting.   Endocrine: Negative for polydipsia and polyuria.   Genitourinary:  Negative for decreased urine volume, difficulty urinating, dysuria, frequency and urgency.   Musculoskeletal:  Positive for arthralgias and back pain. Negative for gait problem.   Skin:  Negative for rash.   Neurological:  Negative for dizziness and numbness.   Psychiatric/Behavioral:  The patient is not nervous/anxious.      Objective:      Vitals:    01/27/23 0934   BP: (!) 160/98   Pulse: 79   SpO2: 96%   Weight: 100.2 kg (220 lb 14.4 oz)   Height: 5' 11" (1.803 m)      Physical Exam  Vitals and nursing note reviewed.   Constitutional:       General: He is not in acute distress.     Appearance: Normal appearance. He is well-developed.   HENT:      Head: Normocephalic and atraumatic.      Mouth/Throat:      Pharynx: No oropharyngeal exudate.   Eyes:      General: No scleral icterus.     Conjunctiva/sclera: Conjunctivae normal.      Pupils: Pupils are equal, round, and reactive to light.   Neck:      Thyroid: No thyromegaly.   Cardiovascular:      Rate and Rhythm: Normal rate and regular rhythm.      Heart sounds: Normal heart sounds. No murmur heard.  Pulmonary:      Effort: Pulmonary effort is normal.      Breath sounds: Normal breath sounds. No wheezing or rales.   Abdominal:      General: There is no distension.   Musculoskeletal:         General: No tenderness.   Lymphadenopathy:      Cervical: No cervical adenopathy.   Skin:     General: Skin is warm and dry.   Neurological:      Mental Status: He is alert and oriented to person, place, and time.   Psychiatric:         Behavior: Behavior normal.       Assessment:       1. Essential hypertension    2. Type 2 diabetes mellitus with hyperglycemia, with long-term current use of insulin    3. " History of elbow surgery    4. Chronic bilateral low back pain without sciatica    5. Hypothyroidism due to acquired atrophy of thyroid    6. Mixed hyperlipidemia    7. Chronic elbow pain, right          Plan:       Juan Manuel was seen today for hypertension.    Diagnoses and all orders for this visit:    Essential hypertension   START -     NIFEdipine (PROCARDIA-XL) 60 MG (OSM) 24 hr tablet; Take 1 tablet (60 mg total) by mouth once daily.  f/u in 7-10 days for nurse visit for BP check    Type 2 diabetes mellitus with hyperglycemia, with long-term current use of insulin   Titrate ozmpic with endocrine. Office and Emergency Department prompts discussed.    History of elbow surgery  -     Ambulatory referral/consult to Orthopedics; Future    Chronic bilateral low back pain without sciatica  -     Ambulatory referral/consult to [facility]; Future    Hypothyroidism due to acquired atrophy of thyroid   Clinically euthyroid. Update TSH.    Mixed hyperlipidemia   Tolerating statin. Continue this.     Chronic elbow pain, right    RTC in 6 months or sooner anastasia Damian MD  Internal Medicine[facility]        Side effects of medication(s) were discussed in detail and patient voiced understanding.  Patient will call back for any issues or complications.

## 2023-02-08 ENCOUNTER — CLINICAL SUPPORT (OUTPATIENT)
Dept: INTERNAL MEDICINE | Facility: CLINIC | Age: 58
End: 2023-02-08
Payer: MEDICAID

## 2023-02-08 ENCOUNTER — TELEPHONE (OUTPATIENT)
Dept: INTERNAL MEDICINE | Facility: CLINIC | Age: 58
End: 2023-02-08

## 2023-02-08 VITALS — OXYGEN SATURATION: 98 % | DIASTOLIC BLOOD PRESSURE: 82 MMHG | SYSTOLIC BLOOD PRESSURE: 134 MMHG | HEART RATE: 82 BPM

## 2023-02-08 PROCEDURE — 99999 PR PBB SHADOW E&M-EST. PATIENT-LVL III: ICD-10-PCS | Mod: PBBFAC,,,

## 2023-02-08 PROCEDURE — 99213 OFFICE O/P EST LOW 20 MIN: CPT | Mod: PBBFAC

## 2023-02-08 PROCEDURE — 99999 PR PBB SHADOW E&M-EST. PATIENT-LVL III: CPT | Mod: PBBFAC,,,

## 2023-02-08 NOTE — TELEPHONE ENCOUNTER
"Juan Manuel Johnson 57 y.o. male is here for Blood Pressure check. in person    Manual Blood pressure reading was  134/82, Pulse 82. (Checked at the end of the visit)    If high, was it repeated after 15 minutes? no    Pt's Home blood pressure machine read in office NO Pulse N/A.     Diagnosed with Hypertension yes.    Patient took blood pressure medication today yes.  Last dose of blood pressure medication was taken at 0830. Patient took 1.Carvidilol 12.5 mg  BID 2.Losartan hydrochlorothiazide  100-25 mg  daily 3. Nifedipine 60 mg daily    All Medications and OTC medication updated yes    Does patient have record of home blood pressure readings / Blood Pressure Log yes. "160 something over 60 something"    Does the pt have any complaints today in regards to their blood pressure medication? yes. Complains of " too many pills". Patient is asymptomatic.   Were you sitting still for 5-10 minutes prior to taking your Blood pressure? yes   Has your blood pressure monitor ever been checked? no When was last time we checked your blood pressure monitor? No  Updated vitals yes  Given BP Log sheet with information on BP ranges  Follow up date is 07/27/2023.   Dr. Espinoza  notified.     Creatinine   Date Value Ref Range Status   01/03/2023 1.2 0.5 - 1.4 mg/dL Final     Sodium   Date Value Ref Range Status   01/03/2023 136 136 - 145 mmol/L Final     Potassium   Date Value Ref Range Status   01/03/2023 3.8 3.5 - 5.1 mmol/L Final     "

## 2023-02-08 NOTE — TELEPHONE ENCOUNTER
Called and spoke to Mr. Johnson. The listed message from Dr. Espinoza was given to him. States does not want a [place] date , but will call and make an appointment if his BP goes above 140/90    MD KOTA Cruz Staff  Caller: Unspecified (Today, 10:06 AM)  Please continue to monitor BP with a goal of keeping the average of the readings < 130/80. Looks like you are very close. Keep at it. Please let us know if your blood pressure is consistently >140/90. Best ways to lower your blood pressure: Consume less than 2 grams of sodium in a day. Count it. Regular exercise: 30-45 minutes a day, 4-5 times a week, moderate paced walking. A diet that is predominately fruits and vegetables, low fat dairy, minimal saturated fat, whole grains (emphasis on whole), and weight loss. The more weight you lose the more benefit you get from each pound lost going forward. Good luck. You can do it

## 2023-02-23 ENCOUNTER — HOSPITAL ENCOUNTER (EMERGENCY)
Facility: OTHER | Age: 58
Discharge: HOME | End: 2023-02-23
Attending: EMERGENCY MEDICINE
Payer: MEDICAID

## 2023-02-23 VITALS
RESPIRATION RATE: 18 BRPM | TEMPERATURE: 99 F | WEIGHT: 214 LBS | OXYGEN SATURATION: 95 % | BODY MASS INDEX: 29.96 KG/M2 | HEIGHT: 71 IN | DIASTOLIC BLOOD PRESSURE: 101 MMHG | HEART RATE: 87 BPM | SYSTOLIC BLOOD PRESSURE: 129 MMHG

## 2023-02-23 DIAGNOSIS — K59.00 CONSTIPATION: ICD-10-CM

## 2023-02-23 PROCEDURE — 25000003 PHARM REV CODE 250: Performed by: NURSE PRACTITIONER

## 2023-02-23 PROCEDURE — 99283 EMERGENCY DEPT VISIT LOW MDM: CPT

## 2023-02-23 RX ORDER — AMOXICILLIN 250 MG
1 CAPSULE ORAL DAILY
Qty: 30 TABLET | Refills: 0 | Status: ON HOLD | OUTPATIENT
Start: 2023-02-23 | End: 2023-10-11

## 2023-02-23 RX ORDER — AMOXICILLIN 250 MG
1 CAPSULE ORAL ONCE
Status: COMPLETED | OUTPATIENT
Start: 2023-02-23 | End: 2023-02-23

## 2023-02-23 RX ORDER — POLYETHYLENE GLYCOL 3350 17 G/17G
17 POWDER, FOR SOLUTION ORAL DAILY
Status: DISCONTINUED | OUTPATIENT
Start: 2023-02-23 | End: 2023-02-23

## 2023-02-23 RX ORDER — POLYETHYLENE GLYCOL 3350 17 G/17G
17 POWDER, FOR SOLUTION ORAL ONCE
Status: COMPLETED | OUTPATIENT
Start: 2023-02-23 | End: 2023-02-23

## 2023-02-23 RX ORDER — LACTULOSE 10 G/15ML
15 SOLUTION ORAL ONCE
Status: COMPLETED | OUTPATIENT
Start: 2023-02-23 | End: 2023-02-23

## 2023-02-23 RX ORDER — LACTULOSE 10 G/15ML
10 SOLUTION ORAL EVERY 6 HOURS PRN
Qty: 200 ML | Refills: 1 | Status: ON HOLD | OUTPATIENT
Start: 2023-02-23 | End: 2023-10-11

## 2023-02-23 RX ORDER — AMOXICILLIN 250 MG
1 CAPSULE ORAL DAILY PRN
Status: DISCONTINUED | OUTPATIENT
Start: 2023-02-23 | End: 2023-02-23

## 2023-02-23 RX ORDER — SYRING-NEEDL,DISP,INSUL,0.3 ML 29 G X1/2"
296 SYRINGE, EMPTY DISPOSABLE MISCELLANEOUS
Status: DISCONTINUED | OUTPATIENT
Start: 2023-02-23 | End: 2023-02-23

## 2023-02-23 RX ORDER — POLYETHYLENE GLYCOL 3350 17 G/17G
17 POWDER, FOR SOLUTION ORAL DAILY
Qty: 30 EACH | Refills: 1 | Status: ON HOLD | OUTPATIENT
Start: 2023-02-23 | End: 2023-10-11

## 2023-02-23 RX ADMIN — POLYETHYLENE GLYCOL 3350 17 G: 17 POWDER, FOR SOLUTION ORAL at 08:02

## 2023-02-23 RX ADMIN — LACTULOSE 15 G: 20 SOLUTION ORAL at 08:02

## 2023-02-23 RX ADMIN — DOCUSATE SODIUM AND SENNOSIDES 1 TABLET: 8.6; 5 TABLET, FILM COATED ORAL at 08:02

## 2023-02-23 NOTE — Clinical Note
"Juan Manuel Johnson (Darren) was seen and treated in our emergency department on 2/23/2023.  He may return to work on 02/25/2023.       If you have any questions or concerns, please don't hesitate to call.      Joshua Strauss NP"

## 2023-02-23 NOTE — ED PROVIDER NOTES
"     Source of History:  Patient    Chief complaint:  Constipation (Pt states he has been unable to have a bm since Friday. Pt states he took some exlax last night and an otc liquid on Saturday but has had no relief. Pt reports rectal pain.)      HPI:  Juan Manuel Johnson is a 57 y.o. male presenting with states that he has been constipated since Friday.  He has tried some home remedies including Ex-Lax last night as well as a powder given to him by his wife.  States he had a small hard BM on Tuesday and another on Wednesday but reports that they were small and he still feels constipated.  No blood noted in toilet or on toilet paper, no rectal pain.  No abdominal pain.  No nausea or vomiting.    This is the extent to the patients complaints today here in the emergency department.    ROS:   See HPI.    Review of patient's allergies indicates:  No Known Allergies    PMH:  As per HPI and below:  Past Medical History:   Diagnosis Date    Diabetes mellitus     Diabetes mellitus, type 2     Hepatitis C, chronic 2013    Hyperlipidemia     Hypertension     Hypothyroidism      Past Surgical History:   Procedure Laterality Date    ANKLE SURGERY Left     COLONOSCOPY      Normal by patient reporting     ELBOW SURGERY Right     EXTERNAL EAR SURGERY Left     WRIST SURGERY Left        Social History     Tobacco Use    Smoking status: Former     Types: Cigarettes     Quit date: 1995     Years since quittin.7    Smokeless tobacco: Never   Substance Use Topics    Alcohol use: Not Currently    Drug use: Not Currently     Types: Cocaine       Physical Exam:    BP (!) 129/101   Pulse 87   Temp 98.6 °F (37 °C)   Resp 18   Ht 5' 11" (1.803 m)   Wt 97.1 kg (214 lb)   SpO2 95%   BMI 29.85 kg/m²   Nursing note and vital signs reviewed.  Constitutional: No acute distress.  Nontoxic  Cardiovascular: Regular rate and rhythm.  No murmurs. No gallops. No rubs  Respiratory: Clear to auscultation bilaterally.  Good air movement. "  No wheezes.  No rhonchi. No rales. No accessory muscle use.  Abdomen:  Protuberant abdomen. Soft, nontender, no guarding, no rebound, non peritoneal  Neuro: Alert. No focal deficits.  Skin: No rashes seen.         MDM/ Differential Dx:   Urgent evaluation of 57-year-old male presenting with constipation.  Patient is afebrile, not toxic appearing hemodynamically stable.  On exam patient an abdomen that is soft and nontender.  He feels like he is not had a full bowel movement since Friday but he is had 2 small hard bowel movements for the past 2 days.  KUB without evidence of obstruction, consistent with constipation.  No rectal pain or rectal bleeding. Patient treated with senna, Colace, MiraLax and lactulose and discharged home with the same medications.  Instructed to keep taking then medications until he has sufficient BM movement and to follow-up with his PCP.  Counseled he can start taking MiraLax daily to help prevent constipation once he is had a sufficient movement. Patient educated on signs and symptoms to monitor for and when to return to ED. Patient verbalized understanding agrees with treatment plan. All questions and concerns addressed.       Differential Diagnosis includes, but is not limited to:  SBO/volvulus, constipation,     ED Course as of 02/23/23 0919   Thu Feb 23, 2023   0758 X-Ray Abdomen AP 1 View  KUB independently interpreted by myself shows no air-fluid levels or findings suggestive of bowel obstruction.  Moderate amount of stool noted in the colon.  Otherwise no acute disease. [SM]      ED Course User Index  [SM] Basil Moreno DO               Diagnostic Impression:    1. Constipation         ED Disposition Condition    Discharge Good            ED Prescriptions       Medication Sig Dispense Start Date End Date Auth. Provider    senna-docusate 8.6-50 mg (PERICOLACE) 8.6-50 mg per tablet Take 1 tablet by mouth once daily. 30 tablet 2/23/2023 -- Joshua Strauss, CAS    lactulose  (CHRONULAC) 20 gram/30 mL Soln Take 15 mLs (10 g total) by mouth every 6 (six) hours as needed (constipation). 200 mL 2/23/2023 -- Joshua Strauss NP    polyethylene glycol (GLYCOLAX) 17 gram PwPk Take 17 g by mouth once daily. 30 each 2/23/2023 -- Joshua Strauss NP          Follow-up Information       Follow up With Specialties Details Why Contact Info    Zion Espinoza MD Internal Medicine In 3 days  [address] [address]  [phone]               Joshua Strauss NP  02/23/23 0919

## 2023-03-31 DIAGNOSIS — M25.521 PAIN IN RIGHT ELBOW: Primary | ICD-10-CM

## 2023-04-03 ENCOUNTER — TELEPHONE (OUTPATIENT)
Dept: ORTHOPEDICS | Facility: CLINIC | Age: 58
End: 2023-04-03
Payer: MEDICAID

## 2023-04-03 NOTE — TELEPHONE ENCOUNTER
----- Message from Leyda Suresh sent at 4/3/2023  9:13 AM CDT -----  Contact: DOMENICO MOSS [[id number]] [phone]  Type: Appointment Request    Name of Caller: DOMENICO MOSS [[id number]]  When is the first available appointment? Do not have access  Reason for Visit:  Reschedule 4/4/23 appointment  Best Call Back Number: [phone]  Additional Information: Patient cancelled visit on accident; thought he was going to [facility]. He asks to get his 4/4/23 9:30am appointment in [facility] back on the books, please.  Epic would not load the opening.

## 2023-04-03 NOTE — PROGRESS NOTES
"Subjective:      Patient ID: Juan Manuel Johnson is a 57 y.o. male.    Chief Complaint: Pain and Swelling of the Right Elbow (Patient presents today as a new patient stating he has been having pain in his right elbow for years with swelling sometimes. )      ELENA  (Mexican)    Fell from 3 story roof years ago and had surgery on right elbow with pins. He's had chronic pain and intermittent swelling since that time along with decreased ROM.     He has intermittent pain and swelling in right elbow that is worse with using his arm. He is right hand dominant. He has intermittent tingling in right hand this is worse at night. He is not sleeping due to pain/numbness/tingling. He rates his pain as a 6 on a scale of 1-10. Pain in elbow feels like pins and needles.     No recent OT, injections, or bracing. Surgery was back in 1996 as above.       Past Medical History:   Diagnosis Date    Diabetes mellitus     Diabetes mellitus, type 2     Hepatitis C, chronic 6/5/2013    Hyperlipidemia     Hypertension     Hypothyroidism          Current Outpatient Medications:     atorvastatin (LIPITOR) 20 MG tablet, TAKE 1 TABLET(20 MG) BY MOUTH EVERY DAY, Disp: 90 tablet, Rfl: 3    BD CHELSY 2ND GEN PEN NEEDLE 32 gauge x 5/32" Ndle, AS DIRECTED FOUR TIMES DAILY WITH MEALS AND NIGHTLY, Disp: 200 each, Rfl: 11    blood sugar diagnostic (ONETOUCH ULTRA TEST) Strp, USE THREE TIMES DAILY, Disp: 400 strip, Rfl: 3    blood-glucose meter (ONETOUCH ULTRA2 METER) Misc, USE TO CHECK BLOOD SUGAR FOUR TIMES DAILY, Disp: 1 each, Rfl: 0    carvediloL (COREG) 12.5 MG tablet, Take 1 tablet (12.5 mg total) by mouth 2 (two) times daily with meals., Disp: 180 tablet, Rfl: 3    clotrimazole (LOTRIMIN) 1 % cream, Apply topically 2 (two) times daily., Disp: 45 g, Rfl: 1    DEXCOM G6 SENSOR Susan, Use 1 sensor every 10 days to monitor glucose, Disp: 3 each, Rfl: 11    DEXCOM G6 TRANSMITTER Susan, Use 1 transmitter every 90 days to monitor glucose, Disp: 1 each, Rfl: 3    " gabapentin (NEURONTIN) 400 MG capsule, TAKE 1 CAPSULE(400 MG) BY MOUTH TWICE DAILY AS NEEDED FOR NERVE PAIN, Disp: 180 capsule, Rfl: 2    insulin (LANTUS SOLOSTAR U-100 INSULIN) glargine 100 units/mL SubQ pen, Inject 55 Units into the skin 2 (two) times daily. Adjust dose as directed for goal AM glucose . Max daily dose 130 units/day, Disp: 45 mL, Rfl: 11    lactulose (CHRONULAC) 20 gram/30 mL Soln, Take 15 mLs (10 g total) by mouth every 6 (six) hours as needed (constipation)., Disp: 200 mL, Rfl: 1    lancets Misc, Check blood sugar 4 times daily, Disp: 200 each, Rfl: 5    levothyroxine (SYNTHROID) 50 MCG tablet, Take 1 tablet (50 mcg total) by mouth once daily., Disp: 90 tablet, Rfl: 3    losartan-hydrochlorothiazide 100-25 mg (HYZAAR) 100-25 mg per tablet, Take 1 tablet by mouth once daily., Disp: 90 tablet, Rfl: 3    metFORMIN (GLUCOPHAGE) 500 MG tablet, Take 1 tablet (500 mg total) by mouth 2 (two) times daily with meals., Disp: 180 tablet, Rfl: 3    metoclopramide HCl (REGLAN) 10 MG tablet, Take 1 tablet (10 mg total) by mouth every 6 (six) hours as needed (headache, nausea or vomiting)., Disp: 10 tablet, Rfl: 0    NIFEdipine (PROCARDIA-XL) 60 MG (OSM) 24 hr tablet, Take 1 tablet (60 mg total) by mouth once daily., Disp: 90 tablet, Rfl: 0    ondansetron (ZOFRAN-ODT) 4 MG TbDL, Take 1 tablet (4 mg total) by mouth every 6 (six) hours as needed (nausea or vomiting)., Disp: 12 tablet, Rfl: 0    ONETOUCH ULTRA BLUE TEST STRIP Strp, TEST THREE TIMES DAILY WITH MEALS, Disp: 100 strip, Rfl: 11    polyethylene glycol (GLYCOLAX) 17 gram PwPk, Take 17 g by mouth once daily., Disp: 30 each, Rfl: 1    repaglinide (PRANDIN) 2 MG tablet, Take 2 tablets (4 mg total) by mouth 3 (three) times daily before meals., Disp: 540 tablet, Rfl: 3    semaglutide (OZEMPIC) 0.25 mg or 0.5 mg(2 mg/1.5 mL) pen injector, Inject 0.5 mg into the skin every 7 days. Start at 0.25 mg for 4 weeks, then increase to 0.5 mg weekly after that.,  "Disp: 1 pen, Rfl: 5    senna-docusate 8.6-50 mg (PERICOLACE) 8.6-50 mg per tablet, Take 1 tablet by mouth once daily., Disp: 30 tablet, Rfl: 0    aspirin 81 MG Chew, Take 1 tablet (81 mg total) by mouth once daily., Disp: , Rfl: 0    blood-glucose meter,continuous (DEXCOM G6 ) Misc, 1 Device by Misc.(Non-Drug; Combo Route) route once. for 1 dose, Disp: 1 each, Rfl: 0    meloxicam (MOBIC) 15 MG tablet, Take 1 tablet (15 mg total) by mouth once daily. Take with food., Disp: 30 tablet, Rfl: 2    sildenafiL (VIAGRA) 25 MG tablet, Take 1 tablet (25 mg total) by mouth daily as needed for Erectile Dysfunction., Disp: 90 tablet, Rfl: 2    Review of patient's allergies indicates:  No Known Allergies    Review of Systems   Constitutional: Negative for chills, fever, night sweats and weight gain.   Gastrointestinal:  Negative for bowel incontinence, nausea and vomiting.   Genitourinary:  Negative for bladder incontinence.   Neurological:  Negative for disturbances in coordination and loss of balance.         Objective:        Ht 5' 11" (1.803 m)   Wt 94.5 kg (208 lb 6.4 oz)   BMI 29.07 kg/m²     General    Vitals reviewed.  Constitutional: He is oriented to person, place, and time. He appears well-developed and well-nourished.   Pulmonary/Chest: Effort normal.   Abdominal: He exhibits no distension.   Neurological: He is alert and oriented to person, place, and time.   Psychiatric: He has a normal mood and affect. His behavior is normal. Judgment and thought content normal.         RIGHT ELBOW EXAM:  Well healed incision.   Limited ROM of elbow. He lacks full extension (this is chronic). He has reasonable flexion.     He has swelling of olecranon bursa with tenderness. No signs of infection.     He has some tenderness at distal humerus.     No tenderness medial/lateral epicondyle.   No gross instability of elbow.     LEFT Hand/Wrist " Examination:    Observation/Inspection:  Swelling  none    Deformity  none  Discoloration  none     Scars   none    Atrophy  none    HAND/WRIST EXAMINATION:  Finkelstein's Test   Neg  WHAT Test    Neg  Snuff box tenderness   Neg  Hook of Hamate Tenderness  Neg  CMC grind    Neg    Neurovascular Exam:  Digits WWP, brisk CR < 3s throughout  NVI motor/LTS to M/R/U nerves, radial pulse 2+  Tinel's Test - Carpal Tunnel  Neg  Tinel's Test - Cubital Tunnel  positive  Phalen's Test    positive  Median Nerve Compression Test positive      XRAY INTERPRETATION:   X-rays of right elbow dated 4/4/23 are personally reviewed and show multiple screws in place. Two of screws in distal humerus are broken.         Assessment:       Encounter Diagnoses   Name Primary?    Right elbow pain Yes    History of elbow surgery     Numbness and tingling in right hand           Plan:       Juan Manuel was seen today for pain and swelling.    Diagnoses and all orders for this visit:    Right elbow pain  -     meloxicam (MOBIC) 15 MG tablet; Take 1 tablet (15 mg total) by mouth once daily. Take with food.    History of elbow surgery  -     meloxicam (MOBIC) 15 MG tablet; Take 1 tablet (15 mg total) by mouth once daily. Take with food.    Numbness and tingling in right hand  -     meloxicam (MOBIC) 15 MG tablet; Take 1 tablet (15 mg total) by mouth once daily. Take with food.      Fell from 3 story roof years ago and had surgery on right elbow in 1996.     He's had chronic pain and intermittent swelling since that time along with decreased ROM. Pain is worse with using his arm. He is right hand dominant. He has intermittent tingling in right hand this is worse at night. He is not sleeping due to pain/numbness/tingling.     XRs of right elbow show multiple screws in place. Two of screws in distal humerus are broken. Exam suspicious for ulnar neuropathy and/or carpal tunnel syndrome.     Treatment options reviewed with patient along with above right elbow  xrays. Following plan made:     - New prescription for mobic. Reviewed dosing and side effects. Take with food.   - Given gel wrist brace to use at night. Can use prn during the day.   - Will review with Dr. Burrows regarding further treatment options. May consider EMG (would prefer to do at [facility] or [facility] if possible).   - Will message him with Dr. Burrows's recommendations.   - Of note, he is DM and currently not well controlled. Last HgbA1c on 1/3/23 was >14. He is working on this and says sugars have been better.     Follow up if symptoms worsen or fail to improve.         ADDENDUM 4/4/23:   Patient reviewed with Dr. Burrows. Pain in elbow likely due to underlying arthritis. Nothing to be done about broken screws. He recommends EMG. The screws in the olecranon may be prominent and could be removed depending on EMG results. Patient sent message and EMG ordered.

## 2023-04-04 ENCOUNTER — OFFICE VISIT (OUTPATIENT)
Dept: ORTHOPEDICS | Facility: CLINIC | Age: 58
End: 2023-04-04
Payer: MEDICAID

## 2023-04-04 ENCOUNTER — PATIENT MESSAGE (OUTPATIENT)
Dept: ORTHOPEDICS | Facility: CLINIC | Age: 58
End: 2023-04-04

## 2023-04-04 VITALS — BODY MASS INDEX: 29.17 KG/M2 | HEIGHT: 71 IN | WEIGHT: 208.38 LBS

## 2023-04-04 DIAGNOSIS — R20.0 NUMBNESS AND TINGLING IN RIGHT HAND: ICD-10-CM

## 2023-04-04 DIAGNOSIS — Z98.890 HISTORY OF ELBOW SURGERY: ICD-10-CM

## 2023-04-04 DIAGNOSIS — M25.521 RIGHT ELBOW PAIN: Primary | ICD-10-CM

## 2023-04-04 DIAGNOSIS — R20.2 NUMBNESS AND TINGLING IN RIGHT HAND: ICD-10-CM

## 2023-04-04 PROCEDURE — 3008F PR BODY MASS INDEX (BMI) DOCUMENTED: ICD-10-PCS | Mod: CPTII,,, | Performed by: PHYSICIAN ASSISTANT

## 2023-04-04 PROCEDURE — 1159F PR MEDICATION LIST DOCUMENTED IN MEDICAL RECORD: ICD-10-PCS | Mod: CPTII,,, | Performed by: PHYSICIAN ASSISTANT

## 2023-04-04 PROCEDURE — 3046F HEMOGLOBIN A1C LEVEL >9.0%: CPT | Mod: CPTII,,, | Performed by: PHYSICIAN ASSISTANT

## 2023-04-04 PROCEDURE — 99999 PR PBB SHADOW E&M-EST. PATIENT-LVL IV: ICD-10-PCS | Mod: PBBFAC,,, | Performed by: PHYSICIAN ASSISTANT

## 2023-04-04 PROCEDURE — 1159F MED LIST DOCD IN RCRD: CPT | Mod: CPTII,,, | Performed by: PHYSICIAN ASSISTANT

## 2023-04-04 PROCEDURE — 99214 OFFICE O/P EST MOD 30 MIN: CPT | Mod: PBBFAC,PN | Performed by: PHYSICIAN ASSISTANT

## 2023-04-04 PROCEDURE — 99999 PR PBB SHADOW E&M-EST. PATIENT-LVL IV: CPT | Mod: PBBFAC,,, | Performed by: PHYSICIAN ASSISTANT

## 2023-04-04 PROCEDURE — 99203 PR OFFICE/OUTPT VISIT, NEW, LEVL III, 30-44 MIN: ICD-10-PCS | Mod: S$PBB,,, | Performed by: PHYSICIAN ASSISTANT

## 2023-04-04 PROCEDURE — 3008F BODY MASS INDEX DOCD: CPT | Mod: CPTII,,, | Performed by: PHYSICIAN ASSISTANT

## 2023-04-04 PROCEDURE — 1160F PR REVIEW ALL MEDS BY PRESCRIBER/CLIN PHARMACIST DOCUMENTED: ICD-10-PCS | Mod: CPTII,,, | Performed by: PHYSICIAN ASSISTANT

## 2023-04-04 PROCEDURE — 1160F RVW MEDS BY RX/DR IN RCRD: CPT | Mod: CPTII,,, | Performed by: PHYSICIAN ASSISTANT

## 2023-04-04 PROCEDURE — 3046F PR MOST RECENT HEMOGLOBIN A1C LEVEL > 9.0%: ICD-10-PCS | Mod: CPTII,,, | Performed by: PHYSICIAN ASSISTANT

## 2023-04-04 PROCEDURE — 99203 OFFICE O/P NEW LOW 30 MIN: CPT | Mod: S$PBB,,, | Performed by: PHYSICIAN ASSISTANT

## 2023-04-04 RX ORDER — MELOXICAM 15 MG/1
15 TABLET ORAL DAILY
Qty: 30 TABLET | Refills: 2 | Status: SHIPPED | OUTPATIENT
Start: 2023-04-04 | End: 2023-09-19 | Stop reason: SDUPTHER

## 2023-04-04 NOTE — PATIENT INSTRUCTIONS
It was nice to meet you today! I am sorry that you are hurting so much.     I can see the hardware in your elbow- I want to review this with Dr. Burrows to see what he thinks.     Wear the wrist brace at night to help with numbness and tingling. Can wear during the day as needed.     I sent meloxicam to your pharmacy to help with pain/inflammation. Take as directed with food.     I will message you back once I talk with Dr. Burrows.     Please stay in touch and call me if you need anything. You can also send me a message in [facility].     [place]   [phone]

## 2023-04-18 ENCOUNTER — PATIENT MESSAGE (OUTPATIENT)
Dept: ADMINISTRATIVE | Facility: HOSPITAL | Age: 58
End: 2023-04-18
Payer: MEDICAID

## 2023-04-19 ENCOUNTER — OFFICE VISIT (OUTPATIENT)
Dept: PODIATRY | Facility: CLINIC | Age: 58
End: 2023-04-19
Payer: MEDICAID

## 2023-04-19 ENCOUNTER — PROCEDURE VISIT (OUTPATIENT)
Dept: NEUROLOGY | Facility: CLINIC | Age: 58
End: 2023-04-19
Payer: MEDICAID

## 2023-04-19 VITALS
HEIGHT: 71 IN | HEART RATE: 91 BPM | DIASTOLIC BLOOD PRESSURE: 83 MMHG | BODY MASS INDEX: 29.12 KG/M2 | SYSTOLIC BLOOD PRESSURE: 131 MMHG | WEIGHT: 208 LBS

## 2023-04-19 DIAGNOSIS — E11.9 TYPE 2 DIABETES MELLITUS WITHOUT COMPLICATION: ICD-10-CM

## 2023-04-19 DIAGNOSIS — Z98.890 HISTORY OF ELBOW SURGERY: ICD-10-CM

## 2023-04-19 DIAGNOSIS — Z79.4 TYPE 2 DIABETES MELLITUS WITH HYPERGLYCEMIA, WITH LONG-TERM CURRENT USE OF INSULIN: Primary | ICD-10-CM

## 2023-04-19 DIAGNOSIS — R20.0 NUMBNESS AND TINGLING IN RIGHT HAND: ICD-10-CM

## 2023-04-19 DIAGNOSIS — R20.2 NUMBNESS AND TINGLING IN RIGHT HAND: ICD-10-CM

## 2023-04-19 DIAGNOSIS — M20.42 HAMMER TOES OF BOTH FEET: ICD-10-CM

## 2023-04-19 DIAGNOSIS — M20.41 HAMMER TOES OF BOTH FEET: ICD-10-CM

## 2023-04-19 DIAGNOSIS — E11.65 TYPE 2 DIABETES MELLITUS WITH HYPERGLYCEMIA, WITH LONG-TERM CURRENT USE OF INSULIN: Primary | ICD-10-CM

## 2023-04-19 DIAGNOSIS — M25.521 RIGHT ELBOW PAIN: ICD-10-CM

## 2023-04-19 PROCEDURE — 95886 MUSC TEST DONE W/N TEST COMP: CPT | Mod: 26,S$PBB,, | Performed by: PHYSICAL MEDICINE & REHABILITATION

## 2023-04-19 PROCEDURE — 95911 NRV CNDJ TEST 9-10 STUDIES: CPT | Mod: PBBFAC | Performed by: PHYSICAL MEDICINE & REHABILITATION

## 2023-04-19 PROCEDURE — 99213 PR OFFICE/OUTPT VISIT, EST, LEVL III, 20-29 MIN: ICD-10-PCS | Mod: S$PBB,,, | Performed by: PODIATRIST

## 2023-04-19 PROCEDURE — 95911 PR NERVE CONDUCTION STUDY; 9-10 STUDIES: ICD-10-PCS | Mod: 26,S$PBB,, | Performed by: PHYSICAL MEDICINE & REHABILITATION

## 2023-04-19 PROCEDURE — 3008F BODY MASS INDEX DOCD: CPT | Mod: CPTII,,, | Performed by: PODIATRIST

## 2023-04-19 PROCEDURE — 95886 MUSC TEST DONE W/N TEST COMP: CPT | Mod: PBBFAC | Performed by: PHYSICAL MEDICINE & REHABILITATION

## 2023-04-19 PROCEDURE — 3075F PR MOST RECENT SYSTOLIC BLOOD PRESS GE 130-139MM HG: ICD-10-PCS | Mod: CPTII,,, | Performed by: PODIATRIST

## 2023-04-19 PROCEDURE — 1160F PR REVIEW ALL MEDS BY PRESCRIBER/CLIN PHARMACIST DOCUMENTED: ICD-10-PCS | Mod: CPTII,,, | Performed by: PODIATRIST

## 2023-04-19 PROCEDURE — 3075F SYST BP GE 130 - 139MM HG: CPT | Mod: CPTII,,, | Performed by: PODIATRIST

## 2023-04-19 PROCEDURE — 95911 NRV CNDJ TEST 9-10 STUDIES: CPT | Mod: 26,S$PBB,, | Performed by: PHYSICAL MEDICINE & REHABILITATION

## 2023-04-19 PROCEDURE — 3046F HEMOGLOBIN A1C LEVEL >9.0%: CPT | Mod: CPTII,,, | Performed by: PODIATRIST

## 2023-04-19 PROCEDURE — 1159F MED LIST DOCD IN RCRD: CPT | Mod: CPTII,,, | Performed by: PODIATRIST

## 2023-04-19 PROCEDURE — 95886 PR EMG COMPLETE, W/ NERVE CONDUCTION STUDIES, 5+ MUSCLES: ICD-10-PCS | Mod: 26,S$PBB,, | Performed by: PHYSICAL MEDICINE & REHABILITATION

## 2023-04-19 PROCEDURE — 3046F PR MOST RECENT HEMOGLOBIN A1C LEVEL > 9.0%: ICD-10-PCS | Mod: CPTII,,, | Performed by: PODIATRIST

## 2023-04-19 PROCEDURE — 3079F PR MOST RECENT DIASTOLIC BLOOD PRESSURE 80-89 MM HG: ICD-10-PCS | Mod: CPTII,,, | Performed by: PODIATRIST

## 2023-04-19 PROCEDURE — 99213 OFFICE O/P EST LOW 20 MIN: CPT | Mod: S$PBB,,, | Performed by: PODIATRIST

## 2023-04-19 PROCEDURE — 99999 PR PBB SHADOW E&M-EST. PATIENT-LVL V: CPT | Mod: PBBFAC,,, | Performed by: PODIATRIST

## 2023-04-19 PROCEDURE — 1160F RVW MEDS BY RX/DR IN RCRD: CPT | Mod: CPTII,,, | Performed by: PODIATRIST

## 2023-04-19 PROCEDURE — 3079F DIAST BP 80-89 MM HG: CPT | Mod: CPTII,,, | Performed by: PODIATRIST

## 2023-04-19 PROCEDURE — 99999 PR PBB SHADOW E&M-EST. PATIENT-LVL V: ICD-10-PCS | Mod: PBBFAC,,, | Performed by: PODIATRIST

## 2023-04-19 PROCEDURE — 3008F PR BODY MASS INDEX (BMI) DOCUMENTED: ICD-10-PCS | Mod: CPTII,,, | Performed by: PODIATRIST

## 2023-04-19 PROCEDURE — 99215 OFFICE O/P EST HI 40 MIN: CPT | Mod: PBBFAC,PN | Performed by: PODIATRIST

## 2023-04-19 PROCEDURE — 1159F PR MEDICATION LIST DOCUMENTED IN MEDICAL RECORD: ICD-10-PCS | Mod: CPTII,,, | Performed by: PODIATRIST

## 2023-04-19 NOTE — PROCEDURES
Test Date:  2023    Patient: kimber johnson : 1965 Physician: Harjit Lozano D.O.   ID#: [id number] SEX: Male Ref. Phys: Siomara Capps PA-C     HPI: Kimber Johnson is a 57 y.o.male who presents for NCS/EMG to evaluate for right ulnar neuropathy at the elbow.  Pt with pins and needles sensation at the elbow with radiation down to the 4th/5th digits.  Exam with prominent olecranon bursitis, no noteable atrophy of hand.        NCV & EMG Findings:  Evaluation of the right Ulnar (ADM) motor nerve showed reduced amplitude, decreased conduction velocity (Bel Elbow-Wrist), decreased conduction velocity (Abv Elbow-Bel Elbow), decreased conduction velocity (Bel Elbow-Wrist), and decreased conduction velocity (Abv Elbow-Bel Elbow).  The right median sensory nerve showed prolonged distal peak latency and decreased conduction velocity.  The right ulnar sensory nerve showed no response.  All remaining nerves (as indicated in the following tables) were within normal limits.  Needle evaluation of the right First Dorsal Interosseous muscle showed increased insertional activity, slightly increased spontaneous activity, increased motor unit amplitude, and diminished recruitment.  The right Abductor Digiti Minimi muscle showed increased motor unit amplitude, increased motor unit duration, and diminished recruitment.  All remaining muscles (as indicated in the following table) showed no evidence of electrical instability.    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  There is focal slowing across the elbow, absent ulnar sensory nerve response, motor axonal loss, and active and chronic changes in the ulnar hand muscles.  This is graded as severe in severity.  Note that there is also milder slowing in the forearm segment, though not as prominent as across the elbow.    There is electrophysiologic evidence of a right sensory  median mononeuropathy across the wrist (I.e. Carpal tunnel syndrome).  There is no motor axonal loss.  There is no active denervation.  This is graded as Mild in severity on the right.     ___________________________  Harjit Lozano D.O.        NCS+  Motor Nerve Results      Latency Amplitude F-Lat Segment Distance CV Comment   Site (ms) Norm (mV) Norm (ms)  (cm) (m/s) Norm    Left Median (APB)   Wrist 3.8  < 4.7 4.6  > 4.2  Wrist-Palm - - -    Elbow 9.2 - 4.2 -  Elbow-Wrist 27 50  > 47    Right Median (APB)   Wrist 4.2  < 4.7 6.2  > 4.2  Wrist-Palm - - -    Elbow 9.5 - 6.7 -  Elbow-Wrist 27 51  > 47    Left Ulnar (ADM)   Wrist 3.4  < 3.7 7.5  > 3.0         Bel Elbow 8.8 - 5.5 -  Bel Elbow-Wrist 29 54  > 52    Abv Elbow 10.8 - 7.3 -  Abv Elbow-Bel Elbow 12 60  > 43    Right Ulnar (ADM)   Wrist 3.5  < 3.7 *1.08  > 3.0         Bel Elbow 9.8 - 0.79 -  Bel Elbow-Wrist 27 *43  > 52    Abv Elbow 13.2 - 0.88 -  Abv Elbow-Bel Elbow 8 *24  > 43    Right Ulnar (FDI)   Wrist 3.6 - 1.03 -         Bel Elbow 10.5 - 1.16 -  Bel Elbow-Wrist 27 *39  > 52    Abv Elbow 14.0 - 1.41 -  Abv Elbow-Bel Elbow 8 *23  > 43      Sensory Nerve Results      Latency (Peak) Amplitude (P-P) Segment Distance CV Comment   Site (ms) Norm (µV) Norm  (cm) (m/s) Norm    Left Median   Wrist-Dig II 3.7  < 4.0 27  > 8 Wrist-Dig II 15 41  > 39    Right Median   Wrist-Dig II *4.1  < 4.0 11  > 8 Wrist-Dig II 14 *34  > 39    Left Ulnar   Wrist-Dig V 3.8  < 4.0 27  > 4 Wrist-Dig V 15 39  > 38    Right Ulnar   Wrist-Dig V *NR  < 4.0 *NR  > 4 Wrist-Dig V 14 *NR  > 38    Right Radial   Forearm-Wrist 2.0  < 2.8 11  > 11 Forearm-Wrist 10 50 -      EMG+     Side Muscle Nerve Root Ins Act Fibs Psw Amp Dur Poly Recrt Int Pat Comment   Right Deltoid Axillary C5-C6 Nml Nml Nml Nml Nml 0 Nml Nml    Right Biceps Musculocut C5-C6 Nml Nml Nml Nml Nml 0 Nml Nml    Right Triceps Radial C6-C8 Nml Nml Nml Nml Nml 0 Nml Nml    Right FDI Ulnar C8-T1 *Incr *1+ *1+ *Incr Nml 0  *Reduced Nml    Right ADM Ulnar C8-T1 Nml Nml Nml *Incr *>12ms 0 *Reduced Nml    Right APB Median C8-T1 Nml Nml Nml Nml Nml 0 Nml Nml            Waveforms:    Motor              Sensory

## 2023-04-19 NOTE — PROGRESS NOTES
Subjective:      Patient ID: Juan Manuel Johnson is a 57 y.o. male.    Chief Complaint: Diabetes Mellitus (Foot Exam/PCP Zion Espinoza MD/Esha Cerda MD  11/09/23)    Diabetes, increased risk amputation needing evaluation/management/optomization of foot care.    Chief Complaint   Patient presents with    Diabetes Mellitus     Foot Exam/PCP Zion Espinoza MD/Esha Cerda MD  11/09/23    Casual shoes blateral today.    Review of Systems   Constitutional: Negative for chills, diaphoresis, fever, malaise/fatigue and night sweats.   Cardiovascular:  Negative for claudication, cyanosis, leg swelling and syncope.   Skin:  Negative for color change, dry skin, nail changes, rash, suspicious lesions and unusual hair distribution.   Musculoskeletal:  Negative for falls, joint pain, joint swelling, muscle cramps, muscle weakness and stiffness.   Gastrointestinal:  Negative for constipation, diarrhea, nausea and vomiting.   Neurological:  Positive for paresthesias and sensory change. Negative for brief paralysis, disturbances in coordination, focal weakness, numbness and tremors.         Objective:      Physical Exam  Constitutional:       General: He is not in acute distress.     Appearance: He is well-developed. He is not diaphoretic.   Cardiovascular:      Pulses:           Popliteal pulses are 2+ on the right side and 2+ on the left side.        Dorsalis pedis pulses are 2+ on the right side and 2+ on the left side.        Posterior tibial pulses are 2+ on the right side and 2+ on the left side.      Comments: Capillary refill 3 seconds all toes/distal feet, all toes/both feet warm to touch.      Negative lymphadenopathy bilateral popliteal fossa and tarsal tunnel.      Negavie lower extremity edema bilateral.    Musculoskeletal:      Right ankle: No swelling, deformity, ecchymosis or lacerations. Normal range of motion. Normal pulse.      Right Achilles Tendon: Normal. No defects. Rapp's  test negative.      Comments:  Patient has hammertoe contracture 5th digit bilateral which is partially reducible otherwise without symptoms today.      Otherwise, Normal angle, base, station of gait. All ten toes without clubbing, cyanosis, or signs of ischemia.  No pain to palpation bilateral lower extremities.  Range of motion, stability, muscle strength, and muscle tone normal bilateral feet and legs.    Lymphadenopathy:      Lower Body: No right inguinal adenopathy. No left inguinal adenopathy.      Comments: Negative lymphadenopathy bilateral popliteal fossa and tarsal tunnel.    Negative lymphangitic streaking bilateral feet/ankles/legs.   Skin:     General: Skin is warm and dry.      Capillary Refill: Capillary refill takes 2 to 3 seconds.      Coloration: Skin is not pale.      Findings: No abrasion, bruising, burn, ecchymosis, erythema, laceration, lesion or rash.      Nails: There is no clubbing.      Comments:   Skin is normal age and health appropriate color, turgor, texture, and temperature bilateral lower extremities without ulceration, hyperpigmentation, discoloration, masses nodules or cords palpated.  No ecchymosis, erythema, edema, or cardinal signs of infection bilateral lower extremities.     Nails all in good condition normal trophic qualities well trimmed.   Neurological:      Mental Status: He is alert and oriented to person, place, and time.      Sensory: No sensory deficit.      Motor: No tremor, atrophy or abnormal muscle tone.      Gait: Gait normal.      Comments: Paresthesias, and burning bilateral feet with no clearly identified trigger or source.     Psychiatric:         Behavior: Behavior is cooperative.           Assessment:       Encounter Diagnoses   Name Primary?    Type 2 diabetes mellitus with hyperglycemia, with long-term current use of insulin Yes    Hammer toes of both feet          Plan:       Juan Manuel was seen today for diabetes mellitus.    Diagnoses and all orders for  this visit:    Type 2 diabetes mellitus with hyperglycemia, with long-term current use of insulin  -     Ambulatory referral/consult to Podiatry  -      DIABETES FOOT EXAM  -     DIABETIC SHOES FOR HOME USE    Hammer toes of both feet  -      DIABETES FOOT EXAM  -     DIABETIC SHOES FOR HOME USE      I counseled the patient on his conditions, their implications and medical management.      The patient has received literature on basic diabetic foot care.  Patient will inspect feet daily, wear protective shoe gear when ambulatory, and apply moisturizer to skin as needed to maintain elasticity and help prevent ulceration.    Discussed conservative treatment with shoes of adequate dimensions, material, and style to alleviate symptoms and delay or prevent surgical intervention.    Rx DM shoes, inserts    Inspect feet multiple times daily for signs of occurrence/recurrence ulceration.            Follow up in about 1 year (around 4/19/2024).

## 2023-05-01 ENCOUNTER — CLINICAL SUPPORT (OUTPATIENT)
Dept: DIABETES | Facility: CLINIC | Age: 58
End: 2023-05-01
Payer: MEDICAID

## 2023-05-01 DIAGNOSIS — Z79.4 TYPE 2 DIABETES MELLITUS WITH DIABETIC POLYNEUROPATHY, WITH LONG-TERM CURRENT USE OF INSULIN: Primary | ICD-10-CM

## 2023-05-01 DIAGNOSIS — E11.42 TYPE 2 DIABETES MELLITUS WITH DIABETIC POLYNEUROPATHY, WITH LONG-TERM CURRENT USE OF INSULIN: Primary | ICD-10-CM

## 2023-05-01 PROCEDURE — G0108 DIAB MANAGE TRN  PER INDIV: HCPCS | Mod: PBBFAC | Performed by: DIETITIAN, REGISTERED

## 2023-05-01 NOTE — PROGRESS NOTES
Diabetes Care Specialist Progress Note  Author: Delmi Marina RD  Date: 5/1/2023    Program Intake  Reason for Diabetes Program Visit:: Intervention  Type of Intervention:: Individual  Individual: Education  Education: Self-Management Skill Review    Lab Results   Component Value Date    HGBA1C >14.0 (H) 01/03/2023       Clinical                                  Additional Social History                                    Diabetes Self-Management Skills Assessment         Nutrition/Healthy Eating  Area of need?: Yes         Medications  Area of need?: Yes    Home Blood Glucose Monitoring  Area of need?: Yes                     Diabetes Self Support Plan         Assessment Summary and Plan    Based on today's diabetes care assessment, the following areas of need were identified:      Social 12/21/2022   Access to [employer]Tech No   Cognitive/Behavioral Health No   Culture/Latter-day No   Communication No   Health Literacy No        Clinical 1/25/2021   Medication Adherence No   Lab Compliance No        Diabetes Self-Management Skills 5/1/2023   Diabetes Disease Process/Treatment Options -   Nutrition/Healthy Eating Yes-see care plan   Physical Activity/Exercise -   Medication Yes-see care plan   Home Blood Glucose Monitoring Yes-see care plan   Acute Complications -   Chronic Complications -   Psychosocial/Coping -          Today's interventions were provided through individual discussion, instruction, and written materials were provided.      Patient verbalized understanding of instruction and written materials.  Pt was able to return back demonstration of instructions today. Patient understood key points, needs reinforcement and further instruction.     Diabetes Self-Management Care Plan:    Today's Diabetes Self-Management Care Plan was developed with Juan Manuel's input. Juan Manuel has agreed to work toward the following goal(s) to improve his/her overall diabetes control.      Care Plan: Diabetes Management  "  Updates made since 4/1/2023 12:00 AM        Problem: Healthy Eating         Goal: Pt will omit all sweet foods and beverges from diet.    Start Date: 1/25/2021   Expected End Date: 3/21/2023   This Visit's Progress: No change   Recent Progress: Deferred   Priority: High   Barriers: Lack of Motivation to Change   Note:    12/21/22 - Pt has reduced sweet drinks in diet. Is now drinking mini cans of coke instead of large bottles. Still drinking twice per day. Discussed SF options to try to find something that he likes (flavored water, crystal light, etc.) Agrees to cut down to 1 sweet drink per day.     1/13/23 - Pt has worked on reducing sugary beverages. Is down to 1/2 mini can of coke every other day and working toward getting off of them all together. He is trying a few different SF beverages but has not found ones he likes yet. Reports drinking mostly water at this time.     5/1/23-Cont to "cut back on cold drinks."  8 oz soda with dinner daily. Agrees to try a sprite zero.        Problem: Blood Glucose Self-Monitoring         Goal: Pt will wear Dexcom continuously for glucose monitoring and keep  with him at all times.    Start Date: 1/3/2023   Expected End Date: 1/13/2023   This Visit's Progress: On track   Recent Progress: No change   Priority: High   Barriers: No Barriers Identified   Note:    1/3/23 - Pt motivated to use Dexcom. Discussed readings, directional arrows, and entering insulin doses and events. Discussed benefits of continuous readings/graphs in making more effective treatment decisions. Pt will return in 10 days for dexcom download and review.     1/13/23 - Pt has done well with wearing Dexcom and using  for continuous monitoring. Due for sensor change today. Pt did not bring sensor to visit today and denies need for review of changing sensor. He plans to go home and change it right after visit. Reports  was going off a lot last night d/t high glucose alert. Alert is set " to 400. Downloaded and reviewed report with pt - 99% of time staying in very high range and avg glucose 338. Reviewed medication with pt.     1/20/23 - Pt was unable to successfully apply and start new sensor at home. Brought new sensor and transmitter to visit today to restart. Transmitter he brought is different SN compared to one saved in . Explained transmitter is good for 90 days and to reuse the same one for 3 months. Pt performed all steps appropriately with little prompting (mainly to change transmitter SN) and successfully started new sensor. Reviewed change sensor every 10 days and transmitter he is using now will need to be changed in March. Pt verbalized understanding. Scheduled f/u in 10 days when he will be due to change sensor.    5/1/23-Presents today for a dexcom restart. Has not worn his dexcom in 4 months. Pt able to attach Sensor to his left abdomen and insert transmitter. REviewed different b/t sensor and transmitter. Was confused and thought his sensor was good for 3 months. Agrees to change his sensor every 10 days and transmitter every 90 days. States his blood sugar has been high but wont elaborate.          Problem: Medications         Goal: Pt will take Lantus as prescribed and try taking Ozempic again.    Start Date: 1/13/2023   Expected End Date: 1/20/2023   This Visit's Progress: On track   Priority: High   Barriers: No Barriers Identified   Note:    1/13/23 - Pt reports took 55 units of Lantus last night and took metformin and prandin this morning. Has not yet taken Lantus dose this morning. Denies missing any doses of Lantus, prandin or metformin. Reports appropriately rotating sites, proper storage of insulin, and denies any problems with taking Lantus - of note, did have significantly abnormal eye cam results (has optometry appt scheduled but not until May) - he is using vial and syringe, which is more difficult to see. He is not taking Ozempic - reports took once and caused  "vomiting. He has not titrated up on Lantus as was directed by Dr. Cerda. Reviewed long-acting titration. Pt will go up to 57 units today and will increase by 2 units in 3-5 days if remaining high. Discussed risk of N/V with Ozempic greater if eating large portions, eating beyond point of feeling full, and if eating higher fat meals (fried foods, cream sauces, larger amts of butter/fats/oils, etc). Encouraged limiting high fat meals and stop eating when he feels full. Pt is willing to try Ozempic again. Advised try starting Ozempic again today, starting with 0.25mg dose weekly.     1/20/23 - Pt confusing medications. He states he was going to start Ozempic when he finished his Lantus. Explained Ozempic is not insulin, reviewed MOA, and to use in addition to Lantus. Pt has not titrated up on Lantus since last visit. Discussed per Dr. Cerda's instructions he can titrate up on Lantus every 3-5 days if fasting BG remaining >140 - pt will go up to 60 units BID today. Reviewed DM medication schedule: Ozempic 0.25mg once weekly, Lantus BID, Metformin with breakfast and dinner, and Prandin before each meal. Pt then stated "so I will use this (Ozempic) for a week, then go back on the Lantus?" Reinforced taking Lantus twice a day, every day and take Ozempic once a week on Fridays. Pt verbalized understanding and acceptance. Also, pt reports he has been using Lantus vial and syringe and vial almost empty. States he has plenty of Lantus pens and pen needles at home and will start using those. Offered to review insulin pen use. Pt states he knows how to use the pens and does not need review.     5/1/23-Is taking 52 units of Lantus in the mornig and evening, states does not skip. States is out of Ozempic and has not had "it in a while." Agrees to go to the pharmacy today and ask about filling this prescription. Is taking metformin daily.           Follow Up Plan     No follow-ups on file.    Today's care plan and follow up " schedule was discussed with patient.  Juan Manuel verbalized understanding of the care plan, goals, and agrees to follow up plan.        The patient was encouraged to communicate with his/her health care provider/physician and care team regarding his/her condition(s) and treatment.  I provided the patient with my contact information today and encouraged to contact me via phone or [facility] Patient Portal as needed.     Length of Visit   Total Time: 30 Minutes

## 2023-05-10 ENCOUNTER — OFFICE VISIT (OUTPATIENT)
Dept: OPTOMETRY | Facility: CLINIC | Age: 58
End: 2023-05-10
Payer: MEDICAID

## 2023-05-10 DIAGNOSIS — H52.203 HYPEROPIA OF BOTH EYES WITH ASTIGMATISM AND PRESBYOPIA: ICD-10-CM

## 2023-05-10 DIAGNOSIS — H52.4 HYPEROPIA OF BOTH EYES WITH ASTIGMATISM AND PRESBYOPIA: ICD-10-CM

## 2023-05-10 DIAGNOSIS — H52.03 HYPEROPIA OF BOTH EYES WITH ASTIGMATISM AND PRESBYOPIA: ICD-10-CM

## 2023-05-10 DIAGNOSIS — E11.9 DIABETES MELLITUS WITHOUT COMPLICATION: Primary | ICD-10-CM

## 2023-05-10 PROCEDURE — 99999 PR PBB SHADOW E&M-EST. PATIENT-LVL IV: ICD-10-PCS | Mod: PBBFAC,,, | Performed by: OPTOMETRIST

## 2023-05-10 PROCEDURE — 92015 PR REFRACTION: ICD-10-PCS | Mod: ,,, | Performed by: OPTOMETRIST

## 2023-05-10 PROCEDURE — 3046F HEMOGLOBIN A1C LEVEL >9.0%: CPT | Mod: CPTII,,, | Performed by: OPTOMETRIST

## 2023-05-10 PROCEDURE — 2023F PR DILATED RETINAL EXAM W/O EVID OF RETINOPATHY: ICD-10-PCS | Mod: CPTII,,, | Performed by: OPTOMETRIST

## 2023-05-10 PROCEDURE — 2023F DILAT RTA XM W/O RTNOPTHY: CPT | Mod: CPTII,,, | Performed by: OPTOMETRIST

## 2023-05-10 PROCEDURE — 3046F PR MOST RECENT HEMOGLOBIN A1C LEVEL > 9.0%: ICD-10-PCS | Mod: CPTII,,, | Performed by: OPTOMETRIST

## 2023-05-10 PROCEDURE — 1159F PR MEDICATION LIST DOCUMENTED IN MEDICAL RECORD: ICD-10-PCS | Mod: CPTII,,, | Performed by: OPTOMETRIST

## 2023-05-10 PROCEDURE — 1159F MED LIST DOCD IN RCRD: CPT | Mod: CPTII,,, | Performed by: OPTOMETRIST

## 2023-05-10 PROCEDURE — 99999 PR PBB SHADOW E&M-EST. PATIENT-LVL IV: CPT | Mod: PBBFAC,,, | Performed by: OPTOMETRIST

## 2023-05-10 PROCEDURE — 92004 PR EYE EXAM, NEW PATIENT,COMPREHESV: ICD-10-PCS | Mod: S$PBB,,, | Performed by: OPTOMETRIST

## 2023-05-10 PROCEDURE — 92015 DETERMINE REFRACTIVE STATE: CPT | Mod: ,,, | Performed by: OPTOMETRIST

## 2023-05-10 PROCEDURE — 99214 OFFICE O/P EST MOD 30 MIN: CPT | Mod: PBBFAC | Performed by: OPTOMETRIST

## 2023-05-10 PROCEDURE — 92004 COMPRE OPH EXAM NEW PT 1/>: CPT | Mod: S$PBB,,, | Performed by: OPTOMETRIST

## 2023-05-10 NOTE — PROGRESS NOTES
HPI    57 year old male   Last eye exam x 1 year  Type 2 diabetes  Hemoglobin A1C       Date                     Value               Ref Range             Status              01/03/2023               >14.0 (H)           4.0 - 5.6 %         Final                 11/01/2022               12.8 (H)            4.0 - 5.6 %         Final                 08/03/2022               13.5 (H)            4.0 - 5.6 %         Final                  Last edited by Allyssa Marques MA on 5/10/2023  3:21 PM.            Assessment /Plan     For exam results, see Encounter Report.    Diabetes mellitus without complication  -     Ambulatory referral/consult to Optometry  -No retinopathy noted today.  Continued control with primary care physician and annual comprehensive eye exam.    Hyperopia of both eyes with astigmatism and presbyopia  Eyeglass Final Rx       Eyeglass Final Rx         Sphere Cylinder Axis Dist VA Add    Right +1.50 +1.00 015 20/20 +2.00    Left +1.25 +0.50 140 20/20 +2.00      Type: PAL    Expiration Date: 5/10/2024                      RTC 1 yr

## 2023-06-16 ENCOUNTER — PATIENT MESSAGE (OUTPATIENT)
Dept: PODIATRY | Facility: CLINIC | Age: 58
End: 2023-06-16
Payer: MEDICAID

## 2023-06-22 DIAGNOSIS — E11.9 TYPE 2 DIABETES MELLITUS WITHOUT COMPLICATION: ICD-10-CM

## 2023-06-26 ENCOUNTER — TELEPHONE (OUTPATIENT)
Dept: INTERNAL MEDICINE | Facility: CLINIC | Age: 58
End: 2023-06-26
Payer: MEDICAID

## 2023-06-26 ENCOUNTER — CLINICAL SUPPORT (OUTPATIENT)
Dept: DIABETES | Facility: CLINIC | Age: 58
End: 2023-06-26
Payer: MEDICAID

## 2023-06-26 VITALS — BODY MASS INDEX: 30.59 KG/M2 | WEIGHT: 218.5 LBS | HEIGHT: 71 IN

## 2023-06-26 DIAGNOSIS — E11.65 TYPE 2 DIABETES MELLITUS WITH HYPERGLYCEMIA, WITH LONG-TERM CURRENT USE OF INSULIN: ICD-10-CM

## 2023-06-26 DIAGNOSIS — E11.42 TYPE 2 DIABETES MELLITUS WITH DIABETIC POLYNEUROPATHY, WITH LONG-TERM CURRENT USE OF INSULIN: Primary | ICD-10-CM

## 2023-06-26 DIAGNOSIS — Z79.4 TYPE 2 DIABETES MELLITUS WITH HYPERGLYCEMIA, WITH LONG-TERM CURRENT USE OF INSULIN: ICD-10-CM

## 2023-06-26 DIAGNOSIS — Z79.4 TYPE 2 DIABETES MELLITUS WITH DIABETIC POLYNEUROPATHY, WITH LONG-TERM CURRENT USE OF INSULIN: Primary | ICD-10-CM

## 2023-06-26 PROCEDURE — G0108 DIAB MANAGE TRN  PER INDIV: HCPCS | Mod: PBBFAC | Performed by: DIETITIAN, REGISTERED

## 2023-06-26 PROCEDURE — 99999 PR PBB SHADOW E&M-EST. PATIENT-LVL I: CPT | Mod: PBBFAC,,,

## 2023-06-26 PROCEDURE — 99211 OFF/OP EST MAY X REQ PHY/QHP: CPT | Mod: PBBFAC | Performed by: DIETITIAN, REGISTERED

## 2023-06-26 PROCEDURE — 99999 PR PBB SHADOW E&M-EST. PATIENT-LVL I: ICD-10-PCS | Mod: PBBFAC,,,

## 2023-06-26 NOTE — TELEPHONE ENCOUNTER
Called pt and appt is set for 08/28 @ 1430.  He agreed to look for info in his portal.    ----- Message from Lela Bonds RD, CDE sent at 6/26/2023  9:32 AM CDT -----  Good morning!    Mr. Johnson was a pt of Dr. Cerda and hoping that he could possibly get scheduled with Irielle? He's seeing a little progress but really needs additional help. Any assistance is greatly appreciated.    Many thanks!  Lela

## 2023-06-26 NOTE — PROGRESS NOTES
Diabetes Care Specialist Progress Note  Author: Lela Bonds RD, CDE  Date: 6/26/2023    Program Intake  Reason for Diabetes Program Visit:: Intervention  Type of Intervention:: Individual  Individual: Education  Education: Self-Management Skill Review  Current diabetes risk level:: high  In the last 12 months, have you:: used emergency room services  Was the ER or hospital admission related to diabetes?: No    Lab Results   Component Value Date    HGBA1C >14.0 (H) 01/03/2023       Clinical    Nutritional Status  Meal Plan 24 Hour Recall: Breakfast, Lunch, Dinner, Snack  Meal Plan 24 Hour Recall - Breakfast: honey nut cheerios (smaller bowl than he used to eat out of), water  Meal Plan 24 Hour Recall - Lunch: sandwich, water  Meal Plan 24 Hour Recall - Dinner: 2 dinners: 5pm- ~1c spaghetti and meatballs; 10pm: smothered pork chop with ~1/2 cup rice and ~1/2 cup corn, water  Meal Plan 24 Hour Recall - Snack: nilson candy (taffy)      Diabetes Self-Management Skills Assessment         Nutrition/Healthy Eating  Challenges to healthy eating:: snacking between meals and at night, portion control  Method of carbohydrate measurement:: eyeballing/guessing, other (see comments) (using smaller plate and bowl)  Patient can identify foods that impact blood sugar.: yes  Patient-identified foods:: fruit/fruit juice, soda, sweets, starches (bread, pasta, rice, cereal)  Nutrition/Healthy Eating Skills Assessment Completed:: Yes  Assessment indicates:: Instruction Needed  Area of need?: Yes         Medications  Patient is able to describe current diabetes management routine.: yes  Diabetes management routine:: insulin, oral medications, injectable medications (taking Metformin BID, Prandin AC, Lantus (vial) 52 units BID, but is not taking Ozempic)  Patient is able to identify current diabetes medications, dosages, and appropriate timing of medications.: yes  Patient understands the purpose of the medications taken for diabetes.:  yes  Patient reports problems or concerns with current medication regimen.: yes  Medication regimen problems/concerns:: other (see comments) (reports he took Ozempic until ran out of refills and didn't contact MD)  Medication Skills Assessment Completed:: Yes  Assessment indicates:: Instruction Needed  Area of need?: Yes    Home Blood Glucose Monitoring  Patient states that blood sugar is checked at home daily.: yes  Monitoring Method:: personal continuous glucose monitor  Personal CGM type:: dexcom  Patient is able to use personal CGM appropriately.: yes  CGM Report reviewed?: yes (report attached to note in media - only 5% time in range, rest of the time is high or very high. Avg 311 with GMI 10.7%)  Home Blood Glucose Monitoring Skills Assessment Completed: : Yes  Assessment indicates:: Adequate understanding  Area of need?: No              Psychosocial/Coping  Patient can identify ways of coping with chronic disease.: yes  Patient-stated ways of coping with chronic disease:: support from loved ones  Psychosocial/Coping Skills Assessment Completed: : Yes  Assessment indicates:: Adequate understanding  Area of need?: No      Diabetes Self Support Plan         Assessment Summary and Plan    Based on today's diabetes care assessment, the following areas of need were identified:      Social 12/21/2022   Access to [employer]Tech No   Cognitive/Behavioral Health No   Culture/Jew No   Communication No   Health Literacy No        Clinical 1/25/2021   Medication Adherence No   Lab Compliance No        Diabetes Self-Management Skills 6/26/2023   Diabetes Disease Process/Treatment Options -   Nutrition/Healthy Eating Yes - see care planning   Physical Activity/Exercise -   Medication Yes - see care planning   Home Blood Glucose Monitoring No   Acute Complications -   Chronic Complications -   Psychosocial/Coping No          Today's interventions were provided through individual discussion, instruction, and written  materials were provided.      Patient verbalized understanding of instruction and written materials.  Pt was able to return back demonstration of instructions today. Patient understood key points, needs reinforcement and further instruction.     Diabetes Self-Management Care Plan:    Today's Diabetes Self-Management Care Plan was developed with Juan Manuel's input. Juan Manuel has agreed to work toward the following goal(s) to improve his/her overall diabetes control.      Care Plan: Diabetes Management   Updates made since 5/27/2023 12:00 AM        Problem: Healthy Eating         Goal: Pt will increase intake of nonstarchy vegetabels to 1/2 of the plate with dinner time meal.    Start Date: 1/25/2021   Expected End Date: 3/21/2023   This Visit's Progress: Not met   Recent Progress: Deferred   Priority: Medium   Barriers: Lack of Motivation to Change   Note:    12/21/22 - Pt did have salad with dinner, has been getting more veggies w/ dinner meal.     1/13/23 - Has been eating more vegetables at dinner - had turkey necks, green beans and small portion (~1/3 cup) rice for dinner last night. Praised great changes and encouraged continuing.     6/26/23 - Not eating any non-starchy vegetables. Pt reports ate corn at dinner. Reviewed corn, beans, peas, and potatoes are all starchy vegetables. Reviewed non-starchy vegetables to incorporate at meals and explained benefits - can help him feel [place] and increases fiber and vitamin/minerals without much impact on BG.        Goal: Pt will omit sugary beverges and limit sweet treats to 2x/week.    Start Date: 1/25/2021   Expected End Date: 3/21/2023   This Visit's Progress: On track   Recent Progress: No change   Priority: High   Barriers: Lack of Motivation to Change   Note:    12/21/22 - Pt has reduced sweet drinks in diet. Is now drinking mini cans of coke instead of large bottles. Still drinking twice per day. Discussed SF options to try to find something that he likes (flavored  "water, crystal light, etc.) Agrees to cut down to 1 sweet drink per day.     1/13/23 - Pt has worked on reducing sugary beverages. Is down to 1/2 mini can of coke every other day and working toward getting off of them all together. He is trying a few different SF beverages but has not found ones he likes yet. Reports drinking mostly water at this time.     5/1/23-Cont to "cut back on cold drinks."  8 oz soda with dinner daily. Agrees to try a sprite zero.     6/26/23 - Reports has mostly cut out sugary beverages - drinking mostly water. Did purchase a sweet tea this morning but hasn't drank it. Again encouraged trying zero sugar choices when wanting something other than water. He does still eat a candy or dessert once daily. Has cut down on amount (used to eat more than 1 daily). Encouraged swapping candy or dessert with a whole piece of fruit some days and cutting down on sweets to ~2x/week.       Problem: Blood Glucose Self-Monitoring         Goal: Pt will wear Dexcom continuously for glucose monitoring and keep  with him at all times.    Start Date: 1/3/2023   Expected End Date: 1/13/2023   This Visit's Progress: Met   Recent Progress: On track   Priority: High   Barriers: No Barriers Identified   Note:    1/3/23 - Pt motivated to use Dexcom. Discussed readings, directional arrows, and entering insulin doses and events. Discussed benefits of continuous readings/graphs in making more effective treatment decisions. Pt will return in 10 days for dexcom download and review.     1/13/23 - Pt has done well with wearing Dexcom and using  for continuous monitoring. Due for sensor change today. Pt did not bring sensor to visit today and denies need for review of changing sensor. He plans to go home and change it right after visit. Reports  was going off a lot last night d/t high glucose alert. Alert is set to 400. Downloaded and reviewed report with pt - 99% of time staying in very high range and " avg glucose 338. Reviewed medication with pt.     1/20/23 - Pt was unable to successfully apply and start new sensor at home. Brought new sensor and transmitter to visit today to restart. Transmitter he brought is different SN compared to one saved in . Explained transmitter is good for 90 days and to reuse the same one for 3 months. Pt performed all steps appropriately with little prompting (mainly to change transmitter SN) and successfully started new sensor. Reviewed change sensor every 10 days and transmitter he is using now will need to be changed in March. Pt verbalized understanding. Scheduled f/u in 10 days when he will be due to change sensor.    5/1/23-Presents today for a dexcom restart. Has not worn his dexcom in 4 months. Pt able to attach Sensor to his left abdomen and insert transmitter. REviewed different b/t sensor and transmitter. Was confused and thought his sensor was good for 3 months. Agrees to change his sensor every 10 days and transmitter every 90 days. States his blood sugar has been high but wont elaborate.     6/26/23 - Doing much better with changing sensor himself and wearing Dexcom for SMBG. Staying quite high. Avg 311 with GMI 10.7% and only 5% time in range. Reviewed BG goal ranges and working on progressing toward goal.        Problem: Medications         Goal: Pt will take Lantus as prescribed and try taking Ozempic again.    Start Date: 1/13/2023   Expected End Date: 1/20/2023   This Visit's Progress: No change   Recent Progress: On track   Priority: High   Barriers: No Barriers Identified   Note:    1/13/23 - Pt reports took 55 units of Lantus last night and took metformin and prandin this morning. Has not yet taken Lantus dose this morning. Denies missing any doses of Lantus, prandin or metformin. Reports appropriately rotating sites, proper storage of insulin, and denies any problems with taking Lantus - of note, did have significantly abnormal eye cam results (has  "optometry appt scheduled but not until May) - he is using vial and syringe, which is more difficult to see. He is not taking Ozempic - reports took once and caused vomiting. He has not titrated up on Lantus as was directed by Dr. Cerda. Reviewed long-acting titration. Pt will go up to 57 units today and will increase by 2 units in 3-5 days if remaining high. Discussed risk of N/V with Ozempic greater if eating large portions, eating beyond point of feeling full, and if eating higher fat meals (fried foods, cream sauces, larger amts of butter/fats/oils, etc). Encouraged limiting high fat meals and stop eating when he feels full. Pt is willing to try Ozempic again. Advised try starting Ozempic again today, starting with 0.25mg dose weekly.     1/20/23 - Pt confusing medications. He states he was going to start Ozempic when he finished his Lantus. Explained Ozempic is not insulin, reviewed MOA, and to use in addition to Lantus. Pt has not titrated up on Lantus since last visit. Discussed per Dr. Cerda's instructions he can titrate up on Lantus every 3-5 days if fasting BG remaining >140 - pt will go up to 60 units BID today. Reviewed DM medication schedule: Ozempic 0.25mg once weekly, Lantus BID, Metformin with breakfast and dinner, and Prandin before each meal. Pt then stated "so I will use this (Ozempic) for a week, then go back on the Lantus?" Reinforced taking Lantus twice a day, every day and take Ozempic once a week on Fridays. Pt verbalized understanding and acceptance. Also, pt reports he has been using Lantus vial and syringe and vial almost empty. States he has plenty of Lantus pens and pen needles at home and will start using those. Offered to review insulin pen use. Pt states he knows how to use the pens and does not need review.     5/1/23-Is taking 52 units of Lantus in the mornig and evening, states does not skip. States is out of Ozempic and has not had "it in a while." Agrees to go to the pharmacy " today and ask about filling this prescription. Is taking metformin daily.      6/26/23 - Still not taking Lantus dosage as prescribed. Continues to take 52 units BID. Reviewed dexcom report with pt and explained glucose staying high - need for increasing Lantus bring glucose down. Verbalized he will increase back to 55 units BID. Also reviewed timing Lantus doses appropriately ~12 hr apart. He is still not back on Ozempic. Reports out of refills. But did not contact MD. Sent Rx request to Dr. Espinoza.          Follow Up Plan     Follow up in about 9 weeks (around 8/28/2023) for dexcom report review and follow-up.    Today's care plan and follow up schedule was discussed with patient.  Juan Manuel verbalized understanding of the care plan, goals, and agrees to follow up plan.        The patient was encouraged to communicate with his/her health care provider/physician and care team regarding his/her condition(s) and treatment.  I provided the patient with my contact information today and encouraged to contact me via phone or [facility]'s Patient Portal as needed.     Length of Visit   Total Time: 60 Minutes

## 2023-07-13 ENCOUNTER — PATIENT OUTREACH (OUTPATIENT)
Dept: ADMINISTRATIVE | Facility: HOSPITAL | Age: 58
End: 2023-07-13
Payer: MEDICAID

## 2023-07-17 ENCOUNTER — TELEPHONE (OUTPATIENT)
Dept: ORTHOPEDICS | Facility: CLINIC | Age: 58
End: 2023-07-17
Payer: MEDICAID

## 2023-07-17 NOTE — TELEPHONE ENCOUNTER
----- Message from Nanda Merchant sent at 7/17/2023 12:25 PM CDT -----  Regarding: Call back  Contact: [phone]  Who Called: PT     Patient is calling to talk to nurse in regards to see if his appointment on August 9 can be scheduled via virtual.

## 2023-07-19 ENCOUNTER — LAB VISIT (OUTPATIENT)
Dept: LAB | Facility: OTHER | Age: 58
End: 2023-07-19
Attending: INTERNAL MEDICINE
Payer: MEDICAID

## 2023-07-19 DIAGNOSIS — E11.9 TYPE 2 DIABETES MELLITUS WITHOUT COMPLICATION: ICD-10-CM

## 2023-07-19 LAB
CHOLEST SERPL-MCNC: 109 MG/DL (ref 120–199)
CHOLEST/HDLC SERPL: 3 {RATIO} (ref 2–5)
ESTIMATED AVG GLUCOSE: 249 MG/DL (ref 68–131)
HBA1C MFR BLD: 10.3 % (ref 4–5.6)
HDLC SERPL-MCNC: 36 MG/DL (ref 40–75)
HDLC SERPL: 33 % (ref 20–50)
LDLC SERPL CALC-MCNC: 56.8 MG/DL (ref 63–159)
NONHDLC SERPL-MCNC: 73 MG/DL
TRIGL SERPL-MCNC: 81 MG/DL (ref 30–150)

## 2023-07-19 PROCEDURE — 36415 COLL VENOUS BLD VENIPUNCTURE: CPT | Performed by: INTERNAL MEDICINE

## 2023-07-19 PROCEDURE — 80061 LIPID PANEL: CPT | Performed by: INTERNAL MEDICINE

## 2023-07-19 PROCEDURE — 83036 HEMOGLOBIN GLYCOSYLATED A1C: CPT | Performed by: INTERNAL MEDICINE

## 2023-07-25 NOTE — PROGRESS NOTES
Established Patient - Audio Only Telehealth Visit     The patient location is: home  The chief complaint leading to consultation is: EMG results  Visit type: Virtual visit with audio only (telephone)  Total time spent with patient: 10 minutes       The reason for the audio only service rather than synchronous audio and video virtual visit was related to technical difficulties or patient preference/necessity.     Each patient to whom I provide medical services by telemedicine is:  (1) informed of the relationship between the physician and patient and the respective role of any other health care provider with respect to management of the patient; and (2) notified that they may decline to receive medical services by telemedicine and may withdraw from such care at any time. Patient verbally consented to receive this service via voice-only telephone call.       HPI: Juan Manuel Johnson has a h/o of chronic right elbow pain with associated numbness and tingling. He does have previous ORIF right elbow with screws s/p fall from roof many years ago.  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 His numbness and tingling is worse at night and disrupting sleep. EMG was ordered to evaluate the numbness.      Of note, he does have broken screws and screws that are backing out/ prominent that could be considered for removal.     EMG significant for severe RIGHT ulnar neuropathy (cubital tunnel), absent ulnar sensory nerve response, motor axonal loss, and active and chronic changes in the ulnar hand muscles.  There is also mild RIGHT carpal tunnel syndrome    Assessment and plan:  I went over his EMG results in detail.  All questions were answered.  We discussed his treatment options going forward including conservative treatment with corticosteroid injection of the carpal tunnel, bracing or surgical decompression/release of the elbow and carpal tunnel.    Patient is interested in surgical treatment.  He also does  have prominent screws from previous ORIF right elbow.     Consider combination surgery: hardware removal, ulnar nerve decompression, carpal tunnel release.    I did explain patient will continue to have elbow joint pain as it relates to posttraumatic osteoarthritis.  He was wondering if there is any treatment/surgery to remove the arthritis in his elbow.  I recommend the patient discuss possible surgical options (joint replacement) with Dr. Burrows.    Patient will return to clinic to discuss his surgical options in person with Dr. Burrows, signed consents, and pick a surgery date.            This service was not originating from a related E/M service provided within the previous 7 days nor will  to an E/M service or procedure within the next 24 hours or my soonest available appointment.  Prevailing standard of care was able to be met in this audio-only visit.

## 2023-07-26 ENCOUNTER — OFFICE VISIT (OUTPATIENT)
Dept: ORTHOPEDICS | Facility: CLINIC | Age: 58
End: 2023-07-26
Payer: MEDICAID

## 2023-07-26 DIAGNOSIS — G56.21 CUBITAL TUNNEL SYNDROME ON RIGHT: Primary | ICD-10-CM

## 2023-07-26 DIAGNOSIS — Z98.890 HISTORY OF ELBOW SURGERY: ICD-10-CM

## 2023-07-26 DIAGNOSIS — G56.01 RIGHT CARPAL TUNNEL SYNDROME: ICD-10-CM

## 2023-07-26 PROCEDURE — 3046F HEMOGLOBIN A1C LEVEL >9.0%: CPT | Mod: CPTII,95,, | Performed by: ORTHOPAEDIC SURGERY

## 2023-07-26 PROCEDURE — 3066F PR DOCUMENTATION OF TREATMENT FOR NEPHROPATHY: ICD-10-PCS | Mod: CPTII,95,, | Performed by: ORTHOPAEDIC SURGERY

## 2023-07-26 PROCEDURE — 99213 PR OFFICE/OUTPT VISIT, EST, LEVL III, 20-29 MIN: ICD-10-PCS | Mod: 95,,, | Performed by: ORTHOPAEDIC SURGERY

## 2023-07-26 PROCEDURE — 99213 OFFICE O/P EST LOW 20 MIN: CPT | Mod: 95,,, | Performed by: ORTHOPAEDIC SURGERY

## 2023-07-26 PROCEDURE — 3060F PR POS MICROALBUMINURIA RESULT DOCUMENTED/REVIEW: ICD-10-PCS | Mod: CPTII,95,, | Performed by: ORTHOPAEDIC SURGERY

## 2023-07-26 PROCEDURE — 3046F PR MOST RECENT HEMOGLOBIN A1C LEVEL > 9.0%: ICD-10-PCS | Mod: CPTII,95,, | Performed by: ORTHOPAEDIC SURGERY

## 2023-07-26 PROCEDURE — 3060F POS MICROALBUMINURIA REV: CPT | Mod: CPTII,95,, | Performed by: ORTHOPAEDIC SURGERY

## 2023-07-26 PROCEDURE — 3066F NEPHROPATHY DOC TX: CPT | Mod: CPTII,95,, | Performed by: ORTHOPAEDIC SURGERY

## 2023-07-27 ENCOUNTER — TELEPHONE (OUTPATIENT)
Dept: ENDOCRINOLOGY | Facility: CLINIC | Age: 58
End: 2023-07-27
Payer: MEDICAID

## 2023-07-27 ENCOUNTER — OFFICE VISIT (OUTPATIENT)
Dept: INTERNAL MEDICINE | Facility: CLINIC | Age: 58
End: 2023-07-27
Attending: INTERNAL MEDICINE
Payer: MEDICAID

## 2023-07-27 VITALS
WEIGHT: 229.5 LBS | OXYGEN SATURATION: 96 % | DIASTOLIC BLOOD PRESSURE: 78 MMHG | SYSTOLIC BLOOD PRESSURE: 114 MMHG | HEIGHT: 71 IN | BODY MASS INDEX: 32.13 KG/M2 | HEART RATE: 72 BPM

## 2023-07-27 DIAGNOSIS — N47.1 PHIMOSIS: ICD-10-CM

## 2023-07-27 DIAGNOSIS — E03.4 HYPOTHYROIDISM DUE TO ACQUIRED ATROPHY OF THYROID: ICD-10-CM

## 2023-07-27 DIAGNOSIS — E66.09 CLASS 1 OBESITY DUE TO EXCESS CALORIES WITH SERIOUS COMORBIDITY AND BODY MASS INDEX (BMI) OF 33.0 TO 33.9 IN ADULT: ICD-10-CM

## 2023-07-27 DIAGNOSIS — E11.65 TYPE 2 DIABETES MELLITUS WITH HYPERGLYCEMIA, WITH LONG-TERM CURRENT USE OF INSULIN: ICD-10-CM

## 2023-07-27 DIAGNOSIS — Z79.4 TYPE 2 DIABETES MELLITUS WITH HYPERGLYCEMIA, WITH LONG-TERM CURRENT USE OF INSULIN: ICD-10-CM

## 2023-07-27 DIAGNOSIS — I10 ESSENTIAL HYPERTENSION: Primary | ICD-10-CM

## 2023-07-27 DIAGNOSIS — G63 POLYNEUROPATHY ASSOCIATED WITH UNDERLYING DISEASE: ICD-10-CM

## 2023-07-27 DIAGNOSIS — E78.2 MIXED HYPERLIPIDEMIA: ICD-10-CM

## 2023-07-27 PROCEDURE — 3008F PR BODY MASS INDEX (BMI) DOCUMENTED: ICD-10-PCS | Mod: CPTII,,, | Performed by: INTERNAL MEDICINE

## 2023-07-27 PROCEDURE — 3078F DIAST BP <80 MM HG: CPT | Mod: CPTII,,, | Performed by: INTERNAL MEDICINE

## 2023-07-27 PROCEDURE — 3060F POS MICROALBUMINURIA REV: CPT | Mod: CPTII,,, | Performed by: INTERNAL MEDICINE

## 2023-07-27 PROCEDURE — 1160F RVW MEDS BY RX/DR IN RCRD: CPT | Mod: CPTII,,, | Performed by: INTERNAL MEDICINE

## 2023-07-27 PROCEDURE — 3066F PR DOCUMENTATION OF TREATMENT FOR NEPHROPATHY: ICD-10-PCS | Mod: CPTII,,, | Performed by: INTERNAL MEDICINE

## 2023-07-27 PROCEDURE — 1160F PR REVIEW ALL MEDS BY PRESCRIBER/CLIN PHARMACIST DOCUMENTED: ICD-10-PCS | Mod: CPTII,,, | Performed by: INTERNAL MEDICINE

## 2023-07-27 PROCEDURE — 99214 PR OFFICE/OUTPT VISIT, EST, LEVL IV, 30-39 MIN: ICD-10-PCS | Mod: S$PBB,,, | Performed by: INTERNAL MEDICINE

## 2023-07-27 PROCEDURE — 3078F PR MOST RECENT DIASTOLIC BLOOD PRESSURE < 80 MM HG: ICD-10-PCS | Mod: CPTII,,, | Performed by: INTERNAL MEDICINE

## 2023-07-27 PROCEDURE — 3008F BODY MASS INDEX DOCD: CPT | Mod: CPTII,,, | Performed by: INTERNAL MEDICINE

## 2023-07-27 PROCEDURE — 3074F PR MOST RECENT SYSTOLIC BLOOD PRESSURE < 130 MM HG: ICD-10-PCS | Mod: CPTII,,, | Performed by: INTERNAL MEDICINE

## 2023-07-27 PROCEDURE — 3066F NEPHROPATHY DOC TX: CPT | Mod: CPTII,,, | Performed by: INTERNAL MEDICINE

## 2023-07-27 PROCEDURE — 3074F SYST BP LT 130 MM HG: CPT | Mod: CPTII,,, | Performed by: INTERNAL MEDICINE

## 2023-07-27 PROCEDURE — 99999 PR PBB SHADOW E&M-EST. PATIENT-LVL V: ICD-10-PCS | Mod: PBBFAC,,, | Performed by: INTERNAL MEDICINE

## 2023-07-27 PROCEDURE — 3046F HEMOGLOBIN A1C LEVEL >9.0%: CPT | Mod: CPTII,,, | Performed by: INTERNAL MEDICINE

## 2023-07-27 PROCEDURE — 1159F PR MEDICATION LIST DOCUMENTED IN MEDICAL RECORD: ICD-10-PCS | Mod: CPTII,,, | Performed by: INTERNAL MEDICINE

## 2023-07-27 PROCEDURE — 3046F PR MOST RECENT HEMOGLOBIN A1C LEVEL > 9.0%: ICD-10-PCS | Mod: CPTII,,, | Performed by: INTERNAL MEDICINE

## 2023-07-27 PROCEDURE — 99214 OFFICE O/P EST MOD 30 MIN: CPT | Mod: S$PBB,,, | Performed by: INTERNAL MEDICINE

## 2023-07-27 PROCEDURE — 99215 OFFICE O/P EST HI 40 MIN: CPT | Mod: PBBFAC | Performed by: INTERNAL MEDICINE

## 2023-07-27 PROCEDURE — 3060F PR POS MICROALBUMINURIA RESULT DOCUMENTED/REVIEW: ICD-10-PCS | Mod: CPTII,,, | Performed by: INTERNAL MEDICINE

## 2023-07-27 PROCEDURE — 99999 PR PBB SHADOW E&M-EST. PATIENT-LVL V: CPT | Mod: PBBFAC,,, | Performed by: INTERNAL MEDICINE

## 2023-07-27 PROCEDURE — 1159F MED LIST DOCD IN RCRD: CPT | Mod: CPTII,,, | Performed by: INTERNAL MEDICINE

## 2023-07-27 RX ORDER — LACTULOSE 10 G/15ML
SOLUTION ORAL; RECTAL
COMMUNITY
Start: 2023-02-23 | End: 2023-10-05

## 2023-07-27 RX ORDER — CLOTRIMAZOLE 1 %
CREAM (GRAM) TOPICAL 2 TIMES DAILY
Qty: 45 G | Refills: 1 | Status: SHIPPED | OUTPATIENT
Start: 2023-07-27 | End: 2023-10-22 | Stop reason: SDUPTHER

## 2023-07-27 RX ORDER — BLOOD-GLUCOSE TRANSMITTER
EACH MISCELLANEOUS
Qty: 1 EACH | Refills: 3 | Status: SHIPPED | OUTPATIENT
Start: 2023-07-27 | End: 2023-10-22 | Stop reason: SDUPTHER

## 2023-07-27 NOTE — TELEPHONE ENCOUNTER
Called patient, no answer. No appointments available at [facility].      ----- Message from Mahesh Quintero MA sent at 7/27/2023  9:36 AM CDT -----  Regarding: FW: appt needed    ----- Message -----  From: Nevaeh Nix MA  Sent: 7/27/2023   9:34 AM CDT  To: , #  Subject: appt needed                                      Hello,  Please schedule this est'd pt an appointment for diabetic diet. His previous provider Dr. Paul is no longer at [facility]. Referred by Dr. Espinoza.    Thanks,    ELIJAH Herring

## 2023-07-27 NOTE — PROGRESS NOTES
Subjective:       Patient ID: Juan Manuel Johnson is a 58 y.o. male.    Chief Complaint: Hypertension    Here for annual    DM uncontrolled. Was working with endocrine. Has not had ozempic in approx 4-6 weeks.      Chronic right elbow pain. Fell from 3 story rooff during his youth and had 12 pins placed. Chronic pain and swelling and reduced ROM.     Chronic low back pain. Patient denies radiation of pain, numbness/tingling of lower ext, weakness of LE, saddle anesthesia, bowel/bladder incontinence, F/C, unexplained weight loss, or consistent,sheet drenching night sweats.         ### DM ###  Dr Hayden in endocrine. Eye exam at [facility].  No neuropathy. Amenable to digital DM  does not have [facility] set up yet due to not having smart phone. Wife uses [facility] so he will on using hers for access.           HGBA1C                   >14.0 (H)           01/03/2023 09:45 AM        HGBA1C                   12.8 (H)            11/01/2022 07:39 AM        HGBA1C                   13.5 (H)            08/03/2022 09:23 AM        HGBA1C                   12.7 (H)            04/12/2022 09:30 AM        HGBA1C                   8.6 (H)             11/08/2021 04:29 PM        HGBA1C                   9.1 (H)             08/25/2021 09:42 AM        HGBA1C                   9.9 (H)             05/12/2021 08:47 AM        HGBA1C                   9.7 (H)             02/11/2021 08:00 AM        HGBA1C                   7.8 (H)             10/27/2020 08:08 AM        HGBA1C                   13.1 (H)            07/20/2020 03:03 PM         ### HTN ###  Coreg 12.5; losartan 100 hctz 25; nifedipine      ### hypothyroidism ###   Levothyroxine 50mcg             Review of Systems   Constitutional:  Negative for appetite change, chills, fever and unexpected weight change.   HENT:  Negative for hearing loss, sore throat and trouble swallowing.    Eyes:  Negative for visual disturbance.   Respiratory:  Negative for cough, chest tightness and shortness of  "breath.    Cardiovascular:  Negative for chest pain and leg swelling.   Gastrointestinal:  Negative for abdominal pain, blood in stool, constipation, diarrhea, nausea and vomiting.   Endocrine: Negative for polydipsia and polyuria.   Genitourinary:  Negative for decreased urine volume, difficulty urinating, dysuria, frequency and urgency.   Musculoskeletal:  Negative for gait problem.   Skin:  Negative for rash.   Neurological:  Negative for dizziness and numbness.   Psychiatric/Behavioral:  The patient is not nervous/anxious.      Objective:      Vitals:    07/27/23 0851   BP: 114/78   Pulse: 72   SpO2: 96%   Weight: 104.1 kg (229 lb 8 oz)   Height: 5' 11" (1.803 m)      Physical Exam  Vitals and nursing note reviewed.   Constitutional:       General: He is not in acute distress.     Appearance: Normal appearance. He is well-developed.   HENT:      Head: Normocephalic and atraumatic.      Mouth/Throat:      Pharynx: No oropharyngeal exudate.   Eyes:      General: No scleral icterus.     Conjunctiva/sclera: Conjunctivae normal.      Pupils: Pupils are equal, round, and reactive to light.   Neck:      Thyroid: No thyromegaly.   Cardiovascular:      Rate and Rhythm: Normal rate and regular rhythm.      Heart sounds: Normal heart sounds. No murmur heard.  Pulmonary:      Effort: Pulmonary effort is normal.      Breath sounds: Normal breath sounds. No wheezing or rales.   Abdominal:      General: There is no distension.   Musculoskeletal:         General: No tenderness.   Lymphadenopathy:      Cervical: No cervical adenopathy.   Skin:     General: Skin is warm and dry.   Neurological:      Mental Status: He is alert and oriented to person, place, and time.   Psychiatric:         Behavior: Behavior normal.       Assessment:       1. Essential hypertension    2. Type 2 diabetes mellitus with hyperglycemia, with long-term current use of insulin    3. Phimosis    4. Hypothyroidism due to acquired atrophy of thyroid    5. " Polyneuropathy associated with underlying disease    6. Mixed hyperlipidemia    7. Class 1 obesity due to excess calories with serious comorbidity and body mass index (BMI) of 33.0 to 33.9 in adult        Plan:       Juan Manuel was seen today for hypertension.    Diagnoses and all orders for this visit:    Essential hypertension   Controlled and asymptomatic.  Continue current Rx regimen.    Type 2 diabetes mellitus with hyperglycemia, with long-term current use of insulin  Stressed adherence of and complications related to untreated diabetes.  -     DEXCOM G6 TRANSMITTER Susan; Use 1 transmitter every 90 days to monitor glucose  -     semaglutide (OZEMPIC) 0.25 mg or 0.5 mg(2 mg/1.5 mL) pen injector; Inject 0.5 mg into the skin every 7 days. Start at 0.25 mg for 4 weeks, then increase to 0.5 mg weekly after that.  -     Ambulatory referral/consult to Diabetes Education; Future    Phimosis  -     clotrimazole (LOTRIMIN) 1 % cream; Apply topically 2 (two) times daily.    Hypothyroidism due to acquired atrophy of thyroid   .njd  Polyneuropathy associated with underlying disease    Mixed hyperlipidemia   Tolerating statin. Continue this.     Class 1 obesity due to excess calories with serious comorbidity and body mass index (BMI) of 33.0 to 33.9 in adult   Patient should eat food, not too much, and most should be vegetables and lean protein. Discussed goal of weight loss to be accomplished by calorie restriction to approx 8137-3135 calories/day. Cumulative psychical activity throughout your day does increase weight loss.  Vary sources in diet (ie different colored vegetables) along with adequate fiber discussed. Consistent and sustainable practices are key. Will review diet periodically to scan for potential for vitamin deficiencies.   Discussed a minimum exercise goal of moderate paced walking or similar level of activity for 30-45 consecutive minutes 4-5 times a week for cardiovascular benefit and overall reduction in  morbidity and mortality.           Zion Damian MD  Internal Medicine-[facility]        Side effects of medication(s) were discussed in detail and patient voiced understanding.  Patient will call back for any issues or complications.

## 2023-08-02 ENCOUNTER — OFFICE VISIT (OUTPATIENT)
Dept: ORTHOPEDICS | Facility: CLINIC | Age: 58
End: 2023-08-02
Payer: MEDICAID

## 2023-08-02 VITALS — BODY MASS INDEX: 32.01 KG/M2 | HEIGHT: 71 IN

## 2023-08-02 DIAGNOSIS — M25.521 CHRONIC ELBOW PAIN, RIGHT: Primary | ICD-10-CM

## 2023-08-02 DIAGNOSIS — G89.29 CHRONIC ELBOW PAIN, RIGHT: Primary | ICD-10-CM

## 2023-08-02 PROCEDURE — 99214 PR OFFICE/OUTPT VISIT, EST, LEVL IV, 30-39 MIN: ICD-10-PCS | Mod: S$PBB,,, | Performed by: ORTHOPAEDIC SURGERY

## 2023-08-02 PROCEDURE — 1159F MED LIST DOCD IN RCRD: CPT | Mod: CPTII,,, | Performed by: ORTHOPAEDIC SURGERY

## 2023-08-02 PROCEDURE — 99214 OFFICE O/P EST MOD 30 MIN: CPT | Mod: S$PBB,,, | Performed by: ORTHOPAEDIC SURGERY

## 2023-08-02 PROCEDURE — 3046F PR MOST RECENT HEMOGLOBIN A1C LEVEL > 9.0%: ICD-10-PCS | Mod: CPTII,,, | Performed by: ORTHOPAEDIC SURGERY

## 2023-08-02 PROCEDURE — 3060F PR POS MICROALBUMINURIA RESULT DOCUMENTED/REVIEW: ICD-10-PCS | Mod: CPTII,,, | Performed by: ORTHOPAEDIC SURGERY

## 2023-08-02 PROCEDURE — 99999 PR PBB SHADOW E&M-EST. PATIENT-LVL I: ICD-10-PCS | Mod: PBBFAC,,, | Performed by: ORTHOPAEDIC SURGERY

## 2023-08-02 PROCEDURE — 3008F BODY MASS INDEX DOCD: CPT | Mod: CPTII,,, | Performed by: ORTHOPAEDIC SURGERY

## 2023-08-02 PROCEDURE — 1159F PR MEDICATION LIST DOCUMENTED IN MEDICAL RECORD: ICD-10-PCS | Mod: CPTII,,, | Performed by: ORTHOPAEDIC SURGERY

## 2023-08-02 PROCEDURE — 3046F HEMOGLOBIN A1C LEVEL >9.0%: CPT | Mod: CPTII,,, | Performed by: ORTHOPAEDIC SURGERY

## 2023-08-02 PROCEDURE — 3066F NEPHROPATHY DOC TX: CPT | Mod: CPTII,,, | Performed by: ORTHOPAEDIC SURGERY

## 2023-08-02 PROCEDURE — 3008F PR BODY MASS INDEX (BMI) DOCUMENTED: ICD-10-PCS | Mod: CPTII,,, | Performed by: ORTHOPAEDIC SURGERY

## 2023-08-02 PROCEDURE — 3060F POS MICROALBUMINURIA REV: CPT | Mod: CPTII,,, | Performed by: ORTHOPAEDIC SURGERY

## 2023-08-02 PROCEDURE — 99211 OFF/OP EST MAY X REQ PHY/QHP: CPT | Mod: PBBFAC,PN | Performed by: ORTHOPAEDIC SURGERY

## 2023-08-02 PROCEDURE — 99999 PR PBB SHADOW E&M-EST. PATIENT-LVL I: CPT | Mod: PBBFAC,,, | Performed by: ORTHOPAEDIC SURGERY

## 2023-08-02 PROCEDURE — 3066F PR DOCUMENTATION OF TREATMENT FOR NEPHROPATHY: ICD-10-PCS | Mod: CPTII,,, | Performed by: ORTHOPAEDIC SURGERY

## 2023-08-02 RX ORDER — CEFAZOLIN SODIUM 2 G/50ML
2 SOLUTION INTRAVENOUS
Status: CANCELLED | OUTPATIENT
Start: 2023-08-02

## 2023-08-02 NOTE — PROGRESS NOTES
CC:  Right carpal tunnel syndrome, right cubital tunnel syndrome, and retained hardware right elbow olecranon        HPI:  Juan Manuel Johnson is a very pleasant 58 y.o. male seen for multiple problems in the right arm   He is reporting numbness tingling in the right hand for the past several years recent nerve conduction study confirms right cubital tunnel syndrome, right carpal tunnel syndrome and peripheral neuropathy   I went over that with the patient today   Also has pain and swelling of the right elbow related to posttraumatic arthritis with retained hardware  He is referred for possible surgery         PAST MEDICAL HISTORY:   Past Medical History:   Diagnosis Date    Diabetes mellitus     Diabetes mellitus, type 2     Hepatitis C, chronic 2013    Hyperlipidemia     Hypertension     Hypothyroidism      PAST SURGICAL HISTORY:   Past Surgical History:   Procedure Laterality Date    ANKLE SURGERY Left     COLONOSCOPY      Normal by patient reporting     ELBOW SURGERY Right     EXTERNAL EAR SURGERY Left     WRIST SURGERY Left      FAMILY HISTORY:   Family History   Problem Relation Age of Onset    Diabetes Mother     Hypertension Mother      SOCIAL HISTORY:   Social History     Socioeconomic History    Marital status:    Tobacco Use    Smoking status: Former     Current packs/day: 0.00     Types: Cigarettes     Quit date: 1995     Years since quittin.1    Smokeless tobacco: Never   Substance and Sexual Activity    Alcohol use: Not Currently    Drug use: Not Currently     Types: Cocaine   Social History Narrative    The patient typically spends most of his time at home and watches TV.  He does walk daily.  He tries to stay fairly active.       MEDICATIONS:   Current Outpatient Medications:     aspirin 81 MG Chew, Take 1 tablet (81 mg total) by mouth once daily., Disp: , Rfl: 0    atorvastatin (LIPITOR) 20 MG tablet, TAKE 1 TABLET(20 MG) BY MOUTH EVERY DAY, Disp: 90 tablet, Rfl: 3    BD CHELSY 2ND  "GEN PEN NEEDLE 32 gauge x 5/32" Ndle, AS DIRECTED FOUR TIMES DAILY WITH MEALS AND NIGHTLY, Disp: 200 each, Rfl: 11    blood sugar diagnostic (ONETOUCH ULTRA TEST) Strp, USE THREE TIMES DAILY, Disp: 400 strip, Rfl: 3    blood-glucose meter (ONETOUCH ULTRA2 METER) Misc, USE TO CHECK BLOOD SUGAR FOUR TIMES DAILY, Disp: 1 each, Rfl: 0    blood-glucose meter,continuous (DEXCOM G6 ) Misc, 1 Device by Misc.(Non-Drug; Combo Route) route once. for 1 dose, Disp: 1 each, Rfl: 0    carvediloL (COREG) 12.5 MG tablet, Take 1 tablet (12.5 mg total) by mouth 2 (two) times daily with meals., Disp: 180 tablet, Rfl: 3    clotrimazole (LOTRIMIN) 1 % cream, Apply topically 2 (two) times daily., Disp: 45 g, Rfl: 1    DEXCOM G6 SENSOR Susan, Use 1 sensor every 10 days to monitor glucose, Disp: 3 each, Rfl: 11    DEXCOM G6 TRANSMITTER Susan, Use 1 transmitter every 90 days to monitor glucose, Disp: 1 each, Rfl: 3    gabapentin (NEURONTIN) 400 MG capsule, TAKE 1 CAPSULE(400 MG) BY MOUTH TWICE DAILY AS NEEDED FOR NERVE PAIN, Disp: 180 capsule, Rfl: 2    insulin (LANTUS SOLOSTAR U-100 INSULIN) glargine 100 units/mL SubQ pen, Inject 55 Units into the skin 2 (two) times daily. Adjust dose as directed for goal AM glucose . Max daily dose 130 units/day, Disp: 45 mL, Rfl: 11    lactulose (CHRONULAC) 10 gram/15 mL solution, SMARTSIG:15 Milliliter(s) By Mouth Every 6 Hours PRN, Disp: , Rfl:     lactulose (CHRONULAC) 20 gram/30 mL Soln, Take 15 mLs (10 g total) by mouth every 6 (six) hours as needed (constipation)., Disp: 200 mL, Rfl: 1    lancets Misc, Check blood sugar 4 times daily, Disp: 200 each, Rfl: 5    levothyroxine (SYNTHROID) 50 MCG tablet, Take 1 tablet (50 mcg total) by mouth once daily., Disp: 90 tablet, Rfl: 3    losartan-hydrochlorothiazide 100-25 mg (HYZAAR) 100-25 mg per tablet, Take 1 tablet by mouth once daily., Disp: 90 tablet, Rfl: 3    meloxicam (MOBIC) 15 MG tablet, Take 1 tablet (15 mg total) by mouth once daily. " Take with food., Disp: 30 tablet, Rfl: 2    metFORMIN (GLUCOPHAGE) 500 MG tablet, Take 1 tablet (500 mg total) by mouth 2 (two) times daily with meals., Disp: 180 tablet, Rfl: 3    metoclopramide HCl (REGLAN) 10 MG tablet, Take 1 tablet (10 mg total) by mouth every 6 (six) hours as needed (headache, nausea or vomiting)., Disp: 10 tablet, Rfl: 0    NIFEdipine (PROCARDIA-XL) 60 MG (OSM) 24 hr tablet, TAKE 1 TABLET(60 MG) BY MOUTH EVERY DAY, Disp: 90 tablet, Rfl: 2    ondansetron (ZOFRAN-ODT) 4 MG TbDL, Take 1 tablet (4 mg total) by mouth every 6 (six) hours as needed (nausea or vomiting)., Disp: 12 tablet, Rfl: 0    ONETOUCH ULTRA BLUE TEST STRIP Strp, TEST THREE TIMES DAILY WITH MEALS, Disp: 100 strip, Rfl: 11    polyethylene glycol (GLYCOLAX) 17 gram PwPk, Take 17 g by mouth once daily., Disp: 30 each, Rfl: 1    repaglinide (PRANDIN) 2 MG tablet, Take 2 tablets (4 mg total) by mouth 3 (three) times daily before meals., Disp: 540 tablet, Rfl: 3    semaglutide (OZEMPIC) 0.25 mg or 0.5 mg(2 mg/1.5 mL) pen injector, Inject 0.5 mg into the skin every 7 days. Start at 0.25 mg for 4 weeks, then increase to 0.5 mg weekly after that., Disp: 3 each, Rfl: 0    senna-docusate 8.6-50 mg (PERICOLACE) 8.6-50 mg per tablet, Take 1 tablet by mouth once daily., Disp: 30 tablet, Rfl: 0    sildenafiL (VIAGRA) 25 MG tablet, Take 1 tablet (25 mg total) by mouth daily as needed for Erectile Dysfunction. (Patient not taking: Reported on 7/27/2023), Disp: 90 tablet, Rfl: 2  ALLERGIES: Review of patient's allergies indicates:  No Known Allergies    Review of Systems:  Constitutional: no fever or chills  ENT: no nasal congestion or sore throat  Respiratory: no cough or shortness of breath  Cardiovascular: no chest pain or palpitations  Gastrointestinal: no nausea or vomiting, PUD, GERD, NSAID intolerance  Genitourinary: no hematuria or dysuria  Integument/Breast: no rash or pruritis  Hematologic/Lymphatic: no easy bruising or  "lymphadenopathy  Musculoskeletal: see HPI  Neurological: no seizures or tremors  Behavioral/Psych: no auditory or visual hallucinations      Physical Exam   Vitals:    08/02/23 1448   Height: 5' 11" (1.803 m)   PainSc:   7   PainLoc: Elbow       Constitutional: Oriented to person, place, and time. Appears well-developed and well-nourished.   HENT:   Head: Normocephalic and atraumatic.   Nose: Nose normal.   Eyes: No scleral icterus.   Neck: Normal range of motion.   Cardiovascular: Normal rate and regular rhythm.    Pulses:       Radial pulses are 2+ on the right side, and 2+ on the left side.   Pulmonary/Chest: Effort normal and breath sounds normal.   Abdominal: Soft.   Neurological: Alert and oriented to person, place, and time.   Skin: Skin is warm.   Psychiatric: Normal mood and affect.     MUSCULOSKELETAL UPPER EXTREMITY:  Examination right arm there is some swelling of the right elbow over the olecranon bursa no evidence of infection mild tenderness   Screw heads are palpable over the proximal ulna   Range of motion elbow slightly decreased due to stiffness   No instability  Tinel sign positive at the cubital tunnel  Also positive at the right wrist   There is some slight weakness in the right hand decreased sensation ring and small finger            Diagnostic Studies:  Nerve conduction studies noted above        Assessment:  1. Retained hardware right elbow olecranon symptomatic.      2. Chronic olecranon bursitis right elbow.      3. Cubital tunnel syndrome right elbow.      4. Right carpal tunnel syndrome.      5. Diabetic neuropathy    Plan:  I explained the nature of these problems the patient   The patient would like to consider surgery for the right arm this would be a combined procedure for ulnar nerve decompression right elbow, right carpal tunnel release, hardware removal right elbow and olecranon bursectomy   The patient would like to set this up as an outpatient surgery   I did explain however " "that he would still have some symptoms related to right elbow arthritis as well as diabetic neuropathy and he understands      The risks and benefits of surgery were discussed with the patient today and they understand.  The consent was signed in the office for surgery.      Rancho Burrows MD (Jay)  [facility]  Orthopedic Upper Extremity Surgery       "

## 2023-08-24 ENCOUNTER — IMMUNIZATION (OUTPATIENT)
Dept: INTERNAL MEDICINE | Facility: CLINIC | Age: 58
End: 2023-08-24
Payer: MEDICAID

## 2023-08-24 ENCOUNTER — TELEPHONE (OUTPATIENT)
Dept: INTERNAL MEDICINE | Facility: CLINIC | Age: 58
End: 2023-08-24

## 2023-08-24 DIAGNOSIS — Z23 NEED FOR VACCINATION: Primary | ICD-10-CM

## 2023-08-24 DIAGNOSIS — Z79.4 TYPE 2 DIABETES MELLITUS WITH HYPERGLYCEMIA, WITH LONG-TERM CURRENT USE OF INSULIN: Primary | ICD-10-CM

## 2023-08-24 DIAGNOSIS — E11.65 TYPE 2 DIABETES MELLITUS WITH HYPERGLYCEMIA, WITH LONG-TERM CURRENT USE OF INSULIN: Primary | ICD-10-CM

## 2023-08-24 PROCEDURE — 91312 COVID-19, MRNA, LNP-S, BIVALENT BOOSTER, PF, 30 MCG/0.3 ML DOSE: CPT | Mod: PBBFAC

## 2023-08-24 PROCEDURE — 99999PBSHW COVID-19, MRNA, LNP-S, BIVALENT BOOSTER, PF, 30 MCG/0.3 ML DOSE: ICD-10-PCS | Mod: PBBFAC,,,

## 2023-08-24 PROCEDURE — 99999PBSHW COVID-19, MRNA, LNP-S, BIVALENT BOOSTER, PF, 30 MCG/0.3 ML DOSE: Mod: PBBFAC,,,

## 2023-08-24 PROCEDURE — 0124A COVID-19, MRNA, LNP-S, BIVALENT BOOSTER, PF, 30 MCG/0.3 ML DOSE: CPT | Mod: PBBFAC,CV19

## 2023-08-24 NOTE — TELEPHONE ENCOUNTER
----- Message from Carmen Acosta sent at 8/24/2023 12:52 PM CDT -----  Contact: Mobile [phone]  Mrs. Johnson would like for you to call the patient to reschedule his appointment that was scheduled on August twenty eight.

## 2023-08-25 DIAGNOSIS — E11.65 TYPE 2 DIABETES MELLITUS WITH HYPERGLYCEMIA, WITH LONG-TERM CURRENT USE OF INSULIN: ICD-10-CM

## 2023-08-25 DIAGNOSIS — Z79.4 TYPE 2 DIABETES MELLITUS WITH HYPERGLYCEMIA, WITH LONG-TERM CURRENT USE OF INSULIN: ICD-10-CM

## 2023-08-25 RX ORDER — INSULIN GLARGINE 100 [IU]/ML
55 INJECTION, SOLUTION SUBCUTANEOUS 2 TIMES DAILY
Qty: 45 ML | Refills: 0 | Status: SHIPPED | OUTPATIENT
Start: 2023-08-25 | End: 2023-09-13 | Stop reason: SDUPTHER

## 2023-08-25 NOTE — TELEPHONE ENCOUNTER
----- Message from Halley Venegas sent at 8/25/2023  1:38 PM CDT -----  Contact: pt's wife Nanda [phone]  Pt was a pt of Dr Damian and is needing a refill on his insulin (LANTUS SOLOSTAR U-100 INSULIN) glargine 100 units/mL SubQ pen 45 mL. Pt is leaving to go out of town tomorrow because his mom is sick. Pt would like to know if there is anyway possible he can get a refill on this medication.  Pt had an appt scheduled for 08/28 but had to cancel due to the emergency.     Pt is using   [id number] - [place] - [address] [facility] [address]  [address]  [facility] [place] [address]  Phone: [phone] Fax: [phone]            Thank you

## 2023-08-25 NOTE — TELEPHONE ENCOUNTER
No care due was identified.  [facility] Embedded Care Due Messages. Reference number: [id number].   8/25/2023 1:51:50 PM CDT

## 2023-08-29 NOTE — TELEPHONE ENCOUNTER
----- Message from Jazmine Barrett sent at 6/25/2018  9:49 AM CDT -----  Contact: self [phone]  Patient was seen in the ED yesterday because of his sugar which was in the 600, patient was seen and treated and his sugar went down to 228. He is coming to 7/2 to be seen. He would like to see if he can be seen at the [facility] because is closer to his house.    Thank you   Azelaic Acid Counseling: Patient counseled that medicine may cause skin irritation and to avoid applying near the eyes.  In the event of skin irritation, the patient was advised to reduce the amount of the drug applied or use it less frequently.   The patient verbalized understanding of the proper use and possible adverse effects of azelaic acid.  All of the patient's questions and concerns were addressed.

## 2023-09-13 DIAGNOSIS — E11.65 TYPE 2 DIABETES MELLITUS WITH HYPERGLYCEMIA, WITH LONG-TERM CURRENT USE OF INSULIN: ICD-10-CM

## 2023-09-13 DIAGNOSIS — Z79.4 TYPE 2 DIABETES MELLITUS WITH HYPERGLYCEMIA, WITH LONG-TERM CURRENT USE OF INSULIN: ICD-10-CM

## 2023-09-13 NOTE — TELEPHONE ENCOUNTER
No care due was identified.  [facility] Embedded Care Due Messages. Reference number: [id number].   9/13/2023 6:32:22 PM CDT

## 2023-09-14 RX ORDER — INSULIN GLARGINE 100 [IU]/ML
55 INJECTION, SOLUTION SUBCUTANEOUS 2 TIMES DAILY
Qty: 45 ML | Refills: 0 | Status: SHIPPED | OUTPATIENT
Start: 2023-09-14 | End: 2023-10-22 | Stop reason: SDUPTHER

## 2023-09-14 NOTE — TELEPHONE ENCOUNTER
Refill Routing Note   Medication(s) are not appropriate for processing by [facility] for the following reason(s):      Medication outside of protocol    [facility] action(s):  Route Care Due:  None identified     Medication Therapy Plan: Sliding scale out of scope for orc      Appointments  past 12m or future 3m with PCP    Date Provider   Last Visit   7/27/2023 Zion Espinoza MD   Next Visit   1/29/2024 Zion Espinoza MD   ED visits in past 90 days: 0        Note composed:9:32 AM 09/14/2023

## 2023-09-19 DIAGNOSIS — M25.521 RIGHT ELBOW PAIN: ICD-10-CM

## 2023-09-19 DIAGNOSIS — R20.0 NUMBNESS AND TINGLING IN RIGHT HAND: ICD-10-CM

## 2023-09-19 DIAGNOSIS — R20.2 NUMBNESS AND TINGLING IN RIGHT HAND: ICD-10-CM

## 2023-09-19 DIAGNOSIS — Z98.890 HISTORY OF ELBOW SURGERY: ICD-10-CM

## 2023-09-19 RX ORDER — MELOXICAM 15 MG/1
15 TABLET ORAL DAILY
Qty: 30 TABLET | Refills: 2 | Status: SHIPPED | OUTPATIENT
Start: 2023-09-19 | End: 2023-10-22 | Stop reason: SDUPTHER

## 2023-09-19 NOTE — TELEPHONE ENCOUNTER
----- Message from Mindy العراقي sent at 9/19/2023 12:28 PM CDT -----  Who Called:DOMENICO MOSS [[id number]]       New Prescription or Refill : Refill      RX Name and Strength:  meloxicam (MOBIC) 15 MG tablet         30 day or 90 day RX: 30           Local or Mail Order : Local   Mail Order          Preferred Pharmacy:[id number] [place] [address] AT [facility] [address]      Would the patient rather a call back or a response via [facility]? Yes        Best Call Back Number:  [phone]        Additional Information: None

## 2023-09-19 NOTE — TELEPHONE ENCOUNTER
Talked to pt @ 1:39 pm  Patient call for a refill for Meloxicam 15 mg  Pt states that he is out of the medication    Pt confirm medication, dosage and frequency     Pt also confirm the pharmacy that he wanted the medication sent to/

## 2023-10-03 ENCOUNTER — TELEPHONE (OUTPATIENT)
Dept: ORTHOPEDICS | Facility: CLINIC | Age: 58
End: 2023-10-03
Payer: MEDICAID

## 2023-10-03 NOTE — TELEPHONE ENCOUNTER
Patient aware to stop Ozempic injection one week prior to surgery with Dr Burrows on 10/11. Patient stated he last administered the injection on Blair 10/1.

## 2023-10-04 ENCOUNTER — TELEPHONE (OUTPATIENT)
Dept: ORTHOPEDICS | Facility: CLINIC | Age: 58
End: 2023-10-04
Payer: MEDICAID

## 2023-10-04 NOTE — TELEPHONE ENCOUNTER
Spoke with Mrs. Johnson, pt wife, she was informed surgery team will be reaching out re: time of surgery the day before- however, an estimated arrival time was given of 6:30 a.m as pt is scheduled- per schedule at 9:55. She v/u.

## 2023-10-04 NOTE — TELEPHONE ENCOUNTER
----- Message from Stephani Beltran sent at 10/4/2023  4:34 PM CDT -----  Type:  Procedure Time     Who Called: Pt's wife   Would the patient rather a call back or a response via [facility]? Call back   Best Call Back Number: [phone]  Additional Information: Please be advised, called stated they need to know the procedure time in order to schedule transportation to location, latest they can know is 3 days before procedure

## 2023-10-11 PROBLEM — G56.21 CUBITAL TUNNEL SYNDROME ON RIGHT: Status: ACTIVE | Noted: 2023-10-11

## 2023-10-22 DIAGNOSIS — R20.2 NUMBNESS AND TINGLING IN RIGHT HAND: ICD-10-CM

## 2023-10-22 DIAGNOSIS — N47.1 PHIMOSIS: ICD-10-CM

## 2023-10-22 DIAGNOSIS — M25.521 RIGHT ELBOW PAIN: ICD-10-CM

## 2023-10-22 DIAGNOSIS — Z98.890 HISTORY OF ELBOW SURGERY: ICD-10-CM

## 2023-10-22 DIAGNOSIS — Z79.4 TYPE 2 DIABETES MELLITUS WITH DIABETIC POLYNEUROPATHY, WITH LONG-TERM CURRENT USE OF INSULIN: ICD-10-CM

## 2023-10-22 DIAGNOSIS — E11.65 TYPE 2 DIABETES MELLITUS WITH HYPERGLYCEMIA, WITH LONG-TERM CURRENT USE OF INSULIN: ICD-10-CM

## 2023-10-22 DIAGNOSIS — E11.42 TYPE 2 DIABETES MELLITUS WITH DIABETIC POLYNEUROPATHY, WITH LONG-TERM CURRENT USE OF INSULIN: ICD-10-CM

## 2023-10-22 DIAGNOSIS — Z79.4 TYPE 2 DIABETES MELLITUS WITH HYPERGLYCEMIA, WITH LONG-TERM CURRENT USE OF INSULIN: ICD-10-CM

## 2023-10-22 DIAGNOSIS — R20.0 NUMBNESS AND TINGLING IN RIGHT HAND: ICD-10-CM

## 2023-10-22 NOTE — TELEPHONE ENCOUNTER
Care Due:                  Date            Visit Type   Department     Provider  --------------------------------------------------------------------------------                                EP -                              PRIMARY      [facility] INTERNAL  Last Visit: 07-      CARE ([facility])   MEDICINE       Zion Espinoza                              EP -                              PRIMARY      [facility] INTERNAL  Next Visit: 01-      CARE ([facility])   MEDICINE       Zion Espinoza                                                            Last  Test          Frequency    Reason                     Performed    Due Date  --------------------------------------------------------------------------------    CBC.........  12 months..  meloxicam................  Not Found    Overdue    CMP.........  12 months..  insulin, meloxicam.......  11-   10-    HBA1C.......  6 months...  insulin, semaglutide.....  07-   01-    [facility] Embedded Care Due Messages. Reference number: [id number].   10/22/2023 1:22:38 PM CDT

## 2023-10-23 ENCOUNTER — PATIENT MESSAGE (OUTPATIENT)
Dept: DIABETES | Facility: CLINIC | Age: 58
End: 2023-10-23

## 2023-10-23 ENCOUNTER — OFFICE VISIT (OUTPATIENT)
Dept: ORTHOPEDICS | Facility: CLINIC | Age: 58
End: 2023-10-23
Payer: MEDICAID

## 2023-10-23 ENCOUNTER — CLINICAL SUPPORT (OUTPATIENT)
Dept: DIABETES | Facility: CLINIC | Age: 58
End: 2023-10-23
Attending: INTERNAL MEDICINE
Payer: MEDICAID

## 2023-10-23 ENCOUNTER — LAB VISIT (OUTPATIENT)
Dept: LAB | Facility: OTHER | Age: 58
End: 2023-10-23
Attending: NURSE PRACTITIONER
Payer: MEDICAID

## 2023-10-23 VITALS — BODY MASS INDEX: 31.28 KG/M2 | WEIGHT: 223.44 LBS | HEIGHT: 71 IN

## 2023-10-23 DIAGNOSIS — Z79.4 TYPE 2 DIABETES MELLITUS WITH HYPERGLYCEMIA, WITH LONG-TERM CURRENT USE OF INSULIN: Primary | ICD-10-CM

## 2023-10-23 DIAGNOSIS — E11.65 TYPE 2 DIABETES MELLITUS WITH HYPERGLYCEMIA, WITH LONG-TERM CURRENT USE OF INSULIN: ICD-10-CM

## 2023-10-23 DIAGNOSIS — Z09 FOLLOW-UP EXAMINATION AFTER ORTHOPEDIC SURGERY: Primary | ICD-10-CM

## 2023-10-23 DIAGNOSIS — Z79.4 TYPE 2 DIABETES MELLITUS WITH HYPERGLYCEMIA, WITH LONG-TERM CURRENT USE OF INSULIN: ICD-10-CM

## 2023-10-23 DIAGNOSIS — Z98.890 S/P CARPAL TUNNEL RELEASE: ICD-10-CM

## 2023-10-23 DIAGNOSIS — Z98.890 S/P DECOMPRESSION OF ULNAR NERVE AT ELBOW: ICD-10-CM

## 2023-10-23 DIAGNOSIS — E11.65 TYPE 2 DIABETES MELLITUS WITH HYPERGLYCEMIA, WITH LONG-TERM CURRENT USE OF INSULIN: Primary | ICD-10-CM

## 2023-10-23 LAB
ESTIMATED AVG GLUCOSE: 183 MG/DL (ref 68–131)
HBA1C MFR BLD: 8 % (ref 4–5.6)

## 2023-10-23 PROCEDURE — 3060F PR POS MICROALBUMINURIA RESULT DOCUMENTED/REVIEW: ICD-10-PCS | Mod: CPTII,,, | Performed by: PHYSICIAN ASSISTANT

## 2023-10-23 PROCEDURE — 3052F PR MOST RECENT HEMOGLOBIN A1C LEVEL 8.0 - < 9.0%: ICD-10-PCS | Mod: CPTII,,, | Performed by: PHYSICIAN ASSISTANT

## 2023-10-23 PROCEDURE — 99024 PR POST-OP FOLLOW-UP VISIT: ICD-10-PCS | Mod: ,,, | Performed by: PHYSICIAN ASSISTANT

## 2023-10-23 PROCEDURE — 1159F MED LIST DOCD IN RCRD: CPT | Mod: CPTII,,, | Performed by: PHYSICIAN ASSISTANT

## 2023-10-23 PROCEDURE — 99999 PR PBB SHADOW E&M-EST. PATIENT-LVL IV: CPT | Mod: PBBFAC,,, | Performed by: PHYSICIAN ASSISTANT

## 2023-10-23 PROCEDURE — 98960 PR SELF-MGMT EDUC & TRAIN, 1 PT, EA 30 MIN: ICD-10-PCS | Mod: 95,,, | Performed by: DIETITIAN, REGISTERED

## 2023-10-23 PROCEDURE — 3060F POS MICROALBUMINURIA REV: CPT | Mod: CPTII,,, | Performed by: PHYSICIAN ASSISTANT

## 2023-10-23 PROCEDURE — 36415 COLL VENOUS BLD VENIPUNCTURE: CPT | Performed by: NURSE PRACTITIONER

## 2023-10-23 PROCEDURE — 99999 PR PBB SHADOW E&M-EST. PATIENT-LVL IV: ICD-10-PCS | Mod: PBBFAC,,, | Performed by: PHYSICIAN ASSISTANT

## 2023-10-23 PROCEDURE — 99024 POSTOP FOLLOW-UP VISIT: CPT | Mod: ,,, | Performed by: PHYSICIAN ASSISTANT

## 2023-10-23 PROCEDURE — 3066F PR DOCUMENTATION OF TREATMENT FOR NEPHROPATHY: ICD-10-PCS | Mod: CPTII,,, | Performed by: PHYSICIAN ASSISTANT

## 2023-10-23 PROCEDURE — 98960 EDU&TRN PT SELF-MGMT NQHP 1: CPT | Mod: 95,,, | Performed by: DIETITIAN, REGISTERED

## 2023-10-23 PROCEDURE — 83036 HEMOGLOBIN GLYCOSYLATED A1C: CPT | Performed by: NURSE PRACTITIONER

## 2023-10-23 PROCEDURE — 3066F NEPHROPATHY DOC TX: CPT | Mod: CPTII,,, | Performed by: PHYSICIAN ASSISTANT

## 2023-10-23 PROCEDURE — 1160F RVW MEDS BY RX/DR IN RCRD: CPT | Mod: CPTII,,, | Performed by: PHYSICIAN ASSISTANT

## 2023-10-23 PROCEDURE — 1159F PR MEDICATION LIST DOCUMENTED IN MEDICAL RECORD: ICD-10-PCS | Mod: CPTII,,, | Performed by: PHYSICIAN ASSISTANT

## 2023-10-23 PROCEDURE — 99214 OFFICE O/P EST MOD 30 MIN: CPT | Mod: PBBFAC,PN | Performed by: PHYSICIAN ASSISTANT

## 2023-10-23 PROCEDURE — 1160F PR REVIEW ALL MEDS BY PRESCRIBER/CLIN PHARMACIST DOCUMENTED: ICD-10-PCS | Mod: CPTII,,, | Performed by: PHYSICIAN ASSISTANT

## 2023-10-23 PROCEDURE — 3052F HG A1C>EQUAL 8.0%<EQUAL 9.0%: CPT | Mod: CPTII,,, | Performed by: PHYSICIAN ASSISTANT

## 2023-10-23 RX ORDER — BLOOD-GLUCOSE SENSOR
1 EACH MISCELLANEOUS
Qty: 3 EACH | Refills: 11 | Status: SHIPPED | OUTPATIENT
Start: 2023-10-23 | End: 2023-12-21 | Stop reason: SDUPTHER

## 2023-10-23 RX ORDER — HYDROCODONE BITARTRATE AND ACETAMINOPHEN 5; 325 MG/1; MG/1
1 TABLET ORAL EVERY 8 HOURS PRN
Qty: 12 TABLET | Refills: 0 | Status: SHIPPED | OUTPATIENT
Start: 2023-10-23

## 2023-10-23 RX ORDER — INSULIN GLARGINE 100 [IU]/ML
55 INJECTION, SOLUTION SUBCUTANEOUS 2 TIMES DAILY
Qty: 45 ML | Refills: 0 | Status: SHIPPED | OUTPATIENT
Start: 2023-10-23 | End: 2024-01-02 | Stop reason: SDUPTHER

## 2023-10-23 RX ORDER — CLOTRIMAZOLE 1 %
CREAM (GRAM) TOPICAL 2 TIMES DAILY
Qty: 45 G | Refills: 1 | Status: SHIPPED | OUTPATIENT
Start: 2023-10-23 | End: 2024-01-02 | Stop reason: SDUPTHER

## 2023-10-23 RX ORDER — BLOOD-GLUCOSE TRANSMITTER
EACH MISCELLANEOUS
Qty: 1 EACH | Refills: 3 | Status: SHIPPED | OUTPATIENT
Start: 2023-10-23 | End: 2024-01-02 | Stop reason: SDUPTHER

## 2023-10-23 RX ORDER — BLOOD-GLUCOSE,RECEIVER,CONT
1 EACH MISCELLANEOUS ONCE
Qty: 1 EACH | Refills: 0 | Status: SHIPPED | OUTPATIENT
Start: 2023-10-23 | End: 2023-10-25

## 2023-10-23 RX ORDER — MELOXICAM 15 MG/1
15 TABLET ORAL DAILY
Qty: 30 TABLET | Refills: 2 | Status: SHIPPED | OUTPATIENT
Start: 2023-10-23 | End: 2024-01-02 | Stop reason: SDUPTHER

## 2023-10-23 RX ORDER — NIFEDIPINE 60 MG/1
60 TABLET, EXTENDED RELEASE ORAL DAILY
Qty: 90 TABLET | Refills: 2 | Status: SHIPPED | OUTPATIENT
Start: 2023-10-23 | End: 2024-01-02 | Stop reason: SDUPTHER

## 2023-10-23 RX ORDER — BLOOD-GLUCOSE,RECEIVER,CONT
1 EACH MISCELLANEOUS ONCE
Qty: 1 EACH | Refills: 0 | Status: SHIPPED | OUTPATIENT
Start: 2023-10-23 | End: 2023-10-23

## 2023-10-23 NOTE — TELEPHONE ENCOUNTER
LOV: 7/27/2023  Upcoming appointment set for 10/25/2023 with Dr FLORENCE Osborne as well as upcoming appointment 1/29/2024 with Olga [place]    Allergies confirmed, medications pended, routed to PCP.

## 2023-10-23 NOTE — PROGRESS NOTES
Pt presents for post-op evaluation. He is 12 days s/p     1.  Ulnar nerve decompression right elbow.    2. Excision olecranon bursa (bursectomy) right elbow.     3. Removal of hardware right elbow (3 screws).    4. Right carpal tunnel release    with Dr. Burrows. Pt reports that he is still having some pain to elbow. Pt is taking pain meds as needed, requesting refill today. Denies fever, chills, discharge from wound site, and N/V.     Right elbow/wrist:  Incisions healing well, splint and staples removed from elbow in clinic today  No erythema, warmth, swelling, drainage, or any other signs of infection  Neurovascular status intact in extremity  Decreased elbow extension (pt reports that this is similar to pre operative ROM)  FROM wrist and all fingers        A/P:  S/p   1.  Ulnar nerve decompression right elbow.    2. Excision olecranon bursa (bursectomy) right elbow.    3. Removal of hardware right elbow (3 screws).    4. Right carpal tunnel release     1. Keep incision clean and dry.  2. Referral to occupational therapy.  3. Advance activity as tolerated, no heavy lifting or repetitive activities, sling as needed.  4. Return to clinic in 4 weeks for follow up, sooner if needed.    Patient voices understanding of and agreement with treatment plan. All of the patient's questions were answered, and the patient will contact us if he has any questions or concerns in the interim.

## 2023-10-23 NOTE — PROGRESS NOTES
Diabetes Care Specialist Virtual Visit Note       The patient location is: [place]  The chief complaint leading to consultation is: Diabetes  Visit type: audiovisual  Total time spent with patient: 30 min   Each patient to whom he or she provides medical services by telemedicine is:  (1) informed of the relationship between the physician and patient and the respective role of any other health care provider with respect to management of the patient; and (2) notified that he or she may decline to receive medical services by telemedicine and may withdraw from such care at any time.     Diabetes Care Specialist Progress Note  Author: Lela Bonds RD, CDE  Date: 10/23/2023    Program Intake  Reason for Diabetes Program Visit:: Intervention  Type of Intervention:: Individual  Individual: Education  Education: Self-Management Skill Review  Current diabetes risk level:: high    Lab Results   Component Value Date    HGBA1C 8.0 (H) 10/23/2023       Clinical    Current DM meds:  Metformin 500mg BID  Ozpemic 0.5mg weekly  Lantus 55 units BID    Monitoring:  Previously on Dexcom G6 - reports has been out of stock, out of sensors since Sept    Fingerstick readings verbally reported:  21st - FBG 95, 2hr , before dinner 179, after dinner 209  15th - , 2hr , before dinner 120, after dinner 136  13th - FBG 77, 2hr , before dinner 93, after dinner 118  10th - , 2hr , before dinner 107, after dinner 116  8th - FBG 68, 2hr , before dinner 102, after dinner 120    Assessment Summary and Plan    Based on today's diabetes care assessment, the following areas of need were identified:          12/21/2022    12:01 AM   Social   Access to Mass Media/Tech No   Cognitive/Behavioral Health No   Culture/Islam No   Communication No   Health Literacy No            1/25/2021    12:00 AM   Clinical   Medication Adherence No   Lab Compliance No            6/26/2023    12:01 AM   Diabetes  Self-Management Skills   Nutrition/Healthy Eating Yes - see care planning   Medication Yes - see care planning   Home Blood Glucose Monitoring Yes - see care planning   Psychosocial/Coping No          Today's interventions were provided through individual discussion, instruction, and written materials were provided.      Patient verbalized understanding of instruction and written materials.  Pt was able to return back demonstration of instructions today. Patient understood key points, needs reinforcement and further instruction.     Diabetes Self-Management Care Plan:    Today's Diabetes Self-Management Care Plan was developed with Juan Manuel's input. Juan Manuel has agreed to work toward the following goal(s) to improve his/her overall diabetes control.      Care Plan: Diabetes Management   Updates made since 9/23/2023 12:00 AM        Problem: Healthy Eating         Goal: Pt will increase intake of nonstarchy vegetabels to 1/2 of the plate with dinner time meal.    Start Date: 1/25/2021   Expected End Date: 3/21/2023   This Visit's Progress: Not met   Recent Progress: Not met   Priority: Medium   Barriers: Lack of Motivation to Change   Note:    12/21/22 - Pt did have salad with dinner, has been getting more veggies w/ dinner meal.     1/13/23 - Has been eating more vegetables at dinner - had turkey necks, green beans and small portion (~1/3 cup) rice for dinner last night. Praised great changes and encouraged continuing.     6/26/23 - Not eating any non-starchy vegetables. Pt reports ate corn at dinner. Reviewed corn, beans, peas, and potatoes are all starchy vegetables. Reviewed non-starchy vegetables to incorporate at meals and explained benefits - can help him feel [place] and increases fiber and vitamin/minerals without much impact on BG.     10/23/23 - Sometimes has non-starchy vegetables with dinner and sometimes doesn't. Has not met goal of 1/2 plate vegetables most days but has made some progress on increasing  "vegetable intake overall.       Goal: Pt will omit sugary beverges and limit sweet treats to 2x/week.    Start Date: 1/25/2021   Expected End Date: 3/21/2023   This Visit's Progress: Met   Recent Progress: On track   Priority: High   Barriers: Lack of Motivation to Change   Note:    12/21/22 - Pt has reduced sweet drinks in diet. Is now drinking mini cans of coke instead of large bottles. Still drinking twice per day. Discussed SF options to try to find something that he likes (flavored water, crystal light, etc.) Agrees to cut down to 1 sweet drink per day.     1/13/23 - Pt has worked on reducing sugary beverages. Is down to 1/2 mini can of coke every other day and working toward getting off of them all together. He is trying a few different SF beverages but has not found ones he likes yet. Reports drinking mostly water at this time.     5/1/23-Cont to "cut back on cold drinks."  8 oz soda with dinner daily. Agrees to try a sprite zero.     6/26/23 - Reports has mostly cut out sugary beverages - drinking mostly water. Did purchase a sweet tea this morning but hasn't drank it. Again encouraged trying zero sugar choices when wanting something other than water. He does still eat a candy or dessert once daily. Has cut down on amount (used to eat more than 1 daily). Encouraged swapping candy or dessert with a whole piece of fruit some days and cutting down on sweets to ~2x/week.    10/23/23 - Has been staying away from sugary beverages and sticking to mostly a piece of fruit for snacks.        Problem: Blood Glucose Self-Monitoring         Goal: Pt will wear Dexcom continuously for glucose monitoring and keep  with him at all times.    Start Date: 1/3/2023   Expected End Date: 1/13/2023   Recent Progress: Met   Priority: High   Barriers: No Barriers Identified   Note:    1/3/23 - Pt motivated to use Dexcom. Discussed readings, directional arrows, and entering insulin doses and events. Discussed benefits of " continuous readings/graphs in making more effective treatment decisions. Pt will return in 10 days for dexcom download and review.     1/13/23 - Pt has done well with wearing Dexcom and using  for continuous monitoring. Due for sensor change today. Pt did not bring sensor to visit today and denies need for review of changing sensor. He plans to go home and change it right after visit. Reports  was going off a lot last night d/t high glucose alert. Alert is set to 400. Downloaded and reviewed report with pt - 99% of time staying in very high range and avg glucose 338. Reviewed medication with pt.     1/20/23 - Pt was unable to successfully apply and start new sensor at home. Brought new sensor and transmitter to visit today to restart. Transmitter he brought is different SN compared to one saved in . Explained transmitter is good for 90 days and to reuse the same one for 3 months. Pt performed all steps appropriately with little prompting (mainly to change transmitter SN) and successfully started new sensor. Reviewed change sensor every 10 days and transmitter he is using now will need to be changed in March. Pt verbalized understanding. Scheduled f/u in 10 days when he will be due to change sensor.    5/1/23-Presents today for a dexcom restart. Has not worn his dexcom in 4 months. Pt able to attach Sensor to his left abdomen and insert transmitter. REviewed different b/t sensor and transmitter. Was confused and thought his sensor was good for 3 months. Agrees to change his sensor every 10 days and transmitter every 90 days. States his blood sugar has been high but wont elaborate.     6/26/23 - Doing much better with changing sensor himself and wearing Dexcom for SMBG. Staying quite high. Avg 311 with GMI 10.7% and only 5% time in range. Reviewed BG goal ranges and working on progressing toward goal.     10/23/23 - Reports has been without sensor since September. States has been out of stock.  Has been over 3 months since last transmitter. May be able to upgrade to Dexcom G7 and get through pharmacy with his Medicaid plan. Will send Rx request to provider. He has been SMBG with fingersticks since then. Majority of readings he reported were within goal (both FBG and 2hr PP readings). Had new A1C drawn today - discussed based on these readings suspect he will see improvement in A1C.       Problem: Medications         Goal: Pt will take Lantus as prescribed and try taking Ozempic again.    Start Date: 1/13/2023   Expected End Date: 1/20/2023   This Visit's Progress: On track   Recent Progress: No change   Priority: High   Barriers: No Barriers Identified   Note:    1/13/23 - Pt reports took 55 units of Lantus last night and took metformin and prandin this morning. Has not yet taken Lantus dose this morning. Denies missing any doses of Lantus, prandin or metformin. Reports appropriately rotating sites, proper storage of insulin, and denies any problems with taking Lantus - of note, did have significantly abnormal eye cam results (has optometry appt scheduled but not until May) - he is using vial and syringe, which is more difficult to see. He is not taking Ozempic - reports took once and caused vomiting. He has not titrated up on Lantus as was directed by Dr. Cerda. Reviewed long-acting titration. Pt will go up to 57 units today and will increase by 2 units in 3-5 days if remaining high. Discussed risk of N/V with Ozempic greater if eating large portions, eating beyond point of feeling full, and if eating higher fat meals (fried foods, cream sauces, larger amts of butter/fats/oils, etc). Encouraged limiting high fat meals and stop eating when he feels full. Pt is willing to try Ozempic again. Advised try starting Ozempic again today, starting with 0.25mg dose weekly.     1/20/23 - Pt confusing medications. He states he was going to start Ozempic when he finished his Lantus. Explained Ozempic is not insulin,  "reviewed MOA, and to use in addition to Lantus. Pt has not titrated up on Lantus since last visit. Discussed per Dr. Cerda's instructions he can titrate up on Lantus every 3-5 days if fasting BG remaining >140 - pt will go up to 60 units BID today. Reviewed DM medication schedule: Ozempic 0.25mg once weekly, Lantus BID, Metformin with breakfast and dinner, and Prandin before each meal. Pt then stated "so I will use this (Ozempic) for a week, then go back on the Lantus?" Reinforced taking Lantus twice a day, every day and take Ozempic once a week on Fridays. Pt verbalized understanding and acceptance. Also, pt reports he has been using Lantus vial and syringe and vial almost empty. States he has plenty of Lantus pens and pen needles at home and will start using those. Offered to review insulin pen use. Pt states he knows how to use the pens and does not need review.     5/1/23-Is taking 52 units of Lantus in the mornig and evening, states does not skip. States is out of Ozempic and has not had "it in a while." Agrees to go to the pharmacy today and ask about filling this prescription. Is taking metformin daily.      6/26/23 - Still not taking Lantus dosage as prescribed. Continues to take 52 units BID. Reviewed dexcom report with pt and explained glucose staying high - need for increasing Lantus bring glucose down. Verbalized he will increase back to 55 units BID. Also reviewed timing Lantus doses appropriately ~12 hr apart. He is still not back on Ozempic. Reports out of refills. But did not contact MD. Sent Rx request to Dr. Espinoza.    10/23/23 - Has been tolerating Ozempic 0.5mg weekly much better. Reports taking consistently and took last dose this week - in need of renewal. Sending communication to provider; however, he is seeing Santosh Bowie NP later this week.         Follow Up Plan     Follow up in about 4 weeks (around 11/20/2023) for dexcom G7 upgrade training.    Today's care plan and follow up " schedule was discussed with patient.  Juan Manuel verbalized understanding of the care plan, goals, and agrees to follow up plan.        The patient was encouraged to communicate with his/her health care provider/physician and care team regarding his/her condition(s) and treatment.  I provided the patient with my contact information today and encouraged to contact me via phone or [facility] Patient Portal as needed.     Length of Visit   Total Time: 30 Minutes

## 2023-10-24 NOTE — PROGRESS NOTES
CHIEF COMPLAINT: Type 2 Diabetes     HPI: Mr. Juan Manuel Johnson is a 58 y.o. male who was diagnosed with Type 2 DM in [id number].   Previously seen by Dr. Cerda.  FREDIS Bonds RD, CDE.  Lab Results   Component Value Date    HGBA1C 8.0 (H) 10/23/2023     No issues with ozempic.    BG highest 289, 300 mg/dl  Lowest 63 mg/dl    10/11/23- ulnar nerve decompression  Stitches removed 10/25/23    The patient location is: home   The chief complaint leading to consultation is: type 2 dm     Visit type: audiovisual    Face to Face time with patient: 15, 20   minutes of total time spent on the encounter, which includes face to face time and non-face to face time preparing to see the patient (eg, review of tests), Obtaining and/or reviewing separately obtained history, Documenting clinical information in the electronic or other health record, Independently interpreting results (not separately reported) and communicating results to the patient/family/caregiver, or Care coordination (not separately reported).     Each patient to whom he or she provides medical services by telemedicine is:  (1) informed of the relationship between the physician and patient and the respective role of any other health care provider with respect to management of the patient; and (2) notified that he or she may decline to receive medical services by telemedicine and may withdraw from such care at any time.    Notes:    PREVIOUS DIABETES MEDICATIONS TRIED  Lantus  Metformin  Ozempic     CURRENT DIABETIC MEDS: ozempic 0.5 mg weekly, lantus 55 units bid, metformin 500 mg bid, prandin 4 mg bid w/ meals    On MDI (injections 4 x a day) or pump  Makes frequent changes to his/her insulin regimen on the basis of blood glucose data  Testing 4 x a day  Patient is willing and able to use the device  Demonstrated an understanding of the technology and is motivated to use CGM  Patient expected to adhere to a comprehensive diabetes treatment plan and  patient has adequate medical supervision  Patient experiences recurrent, unexplained, severe (lows of <50 mg/dl) hypoglycemia   Has multiple impaired awareness of hypoglycemia (hypoglycemia unawareness)  30 day log show at least 2 lows 50.    Diabetes Management Status    Statin: Taking  ACE/ARB: Not taking    Screening or Prevention Patient's value Goal Complete/Controlled?   HgA1C Testing and Control   Lab Results   Component Value Date    HGBA1C 8.0 (H) 10/23/2023      Annually/Less than 8% No   Lipid profile : 07/19/2023 Annually Yes   LDL control Lab Results   Component Value Date    LDLCALC 56.8 (L) 07/19/2023    Annually/Less than 100 mg/dl  Yes   Nephropathy screening Lab Results   Component Value Date    LABMICR 143.0 07/19/2023     Lab Results   Component Value Date    PROTEINUA Trace (A) 09/09/2022    Annually Yes   Blood pressure BP Readings from Last 1 Encounters:   10/11/23 (!) 145/93    Less than 140/90 No   Dilated retinal exam : 05/10/2023 Annually Yes   Foot exam   : 04/19/2023 Annually Yes     REVIEW OF SYSTEMS  General: no weakness, fatigue, or weight changes.   Eyes: no double or blurred vision, eye pain, or redness  Cardiovascular: no chest pain, palpitations, edema, or murmurs.   Respiratory: no cough or dyspnea.   GI: no heartburn, nausea, or changes in bowel patterns; good appetite.   Skin: no rashes, dryness, itching, or reactions at insulin injection sites.  Neuro: no numbness, tingling, tremors, or vertigo.   Endocrine: no polyuria, polydipsia, polyphagia, heat or cold intolerance.     Vital Signs  There were no vitals taken for this visit.    Hemoglobin A1C   Date Value Ref Range Status   10/23/2023 8.0 (H) 4.0 - 5.6 % Final     Comment:     ADA Screening Guidelines:  5.7-6.4%  Consistent with prediabetes  >or=6.5%  Consistent with diabetes    High levels of fetal hemoglobin interfere with the HbA1C  assay. Heterozygous hemoglobin variants (HbS, HgC, etc)do  not significantly interfere  with this assay.   However, presence of multiple variants may affect accuracy.     07/19/2023 10.3 (H) 4.0 - 5.6 % Final     Comment:     ADA Screening Guidelines:  5.7-6.4%  Consistent with prediabetes  >or=6.5%  Consistent with diabetes    High levels of fetal hemoglobin interfere with the HbA1C  assay. Heterozygous hemoglobin variants (HbS, HgC, etc)do  not significantly interfere with this assay.   However, presence of multiple variants may affect accuracy.     01/03/2023 >14.0 (H) 4.0 - 5.6 % Final     Comment:     ADA Screening Guidelines:  5.7-6.4%  Consistent with prediabetes  >or=6.5%  Consistent with diabetes    High levels of fetal hemoglobin interfere with the HbA1C  assay. Heterozygous hemoglobin variants (HbS, HgC, etc)do  not significantly interfere with this assay.   However, presence of multiple variants may affect accuracy.          Chemistry        Component Value Date/Time     01/03/2023 0945    K 3.8 01/03/2023 0945     01/03/2023 0945    CO2 24 01/03/2023 0945    BUN 9 01/03/2023 0945    CREATININE 1.2 01/03/2023 0945     (H) 01/03/2023 0945        Component Value Date/Time    CALCIUM 10.2 01/03/2023 0945    ALKPHOS 100 11/01/2022 0739    AST 15 11/01/2022 0739    ALT 16 11/01/2022 0739    BILITOT 0.3 11/01/2022 0739    ESTGFRAFRICA >60 04/12/2022 0930    EGFRNONAA >60 04/12/2022 0930           Lab Results   Component Value Date    TSH 2.977 01/03/2023      Lab Results   Component Value Date    CHOL 109 (L) 07/19/2023    CHOL 132 04/12/2022    CHOL 100 (L) 02/11/2021     Lab Results   Component Value Date    HDL 36 (L) 07/19/2023    HDL 40 04/12/2022    HDL 31 (L) 02/11/2021     Lab Results   Component Value Date    LDLCALC 56.8 (L) 07/19/2023    LDLCALC 70.8 04/12/2022    LDLCALC 50.8 (L) 02/11/2021     Lab Results   Component Value Date    TRIG 81 07/19/2023    TRIG 106 04/12/2022    TRIG 91 02/11/2021     Lab Results   Component Value Date    CHOLHDL 33.0 07/19/2023     CHOLHDL 30.3 04/12/2022    CHOLHDL 31.0 02/11/2021         PHYSICAL EXAMINATION  Constitutional: Appears well, no distress     Diabetes Foot Exam: deferred     Assessment/Plan    1. Type 2 diabetes mellitus with hyperglycemia, with long-term current use of insulin  Hemoglobin A1C    Hemoglobin A1C    Ambulatory referral/consult to Diabetes Education      2. 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 Diabetic polyneuropathy associated with type 2 diabetes mellitus        4. Essential hypertension        5. Hypothyroidism due to acquired atrophy of thyroid          1-5. Follow up in 3 months w/ me  A1c in 5 weeks  A1c in 3 months   A1c goal less than 7%  Discussed prandial insulin   Increase ozempic 1 mg weekly   F/u with FAISAL Bonds RD, CDE for dexcom g7 start  Using dexcom g6 currently   Continue meds other than ozempic increase   Bp managed per pcp  Lab Results   Component Value Date    TSH 2.977 01/03/2023     At goal  On lt4    FOLLOW UP  No follow-ups on file.     Time: 30 mins            Answers submitted by the patient for this visit:  Review of Systems Questionnaire (Submitted on 10/19/2023)  activity change: No  unexpected weight change: No  neck pain: No  hearing loss: No  trouble swallowing: No  eye discharge: No  visual disturbance: No  chest tightness: No  wheezing: No  chest pain: No  palpitations: No  blood in stool: No  constipation: No  vomiting: No  diarrhea: No  polydipsia: No  difficulty urinating: No  urgency: No  hematuria: No  joint swelling: No  arthralgias: No  headaches: No  weakness: No  confusion: No  dysphoric mood: No

## 2023-10-25 ENCOUNTER — OFFICE VISIT (OUTPATIENT)
Dept: INTERNAL MEDICINE | Facility: CLINIC | Age: 58
End: 2023-10-25
Payer: MEDICAID

## 2023-10-25 DIAGNOSIS — E03.4 HYPOTHYROIDISM DUE TO ACQUIRED ATROPHY OF THYROID: ICD-10-CM

## 2023-10-25 DIAGNOSIS — E11.65 TYPE 2 DIABETES MELLITUS WITH HYPERGLYCEMIA, WITH LONG-TERM CURRENT USE OF INSULIN: Primary | ICD-10-CM

## 2023-10-25 DIAGNOSIS — Z79.4 TYPE 2 DIABETES MELLITUS WITH HYPERGLYCEMIA, WITH LONG-TERM CURRENT USE OF INSULIN: Primary | ICD-10-CM

## 2023-10-25 DIAGNOSIS — E11.3392 MODERATE NONPROLIFERATIVE DIABETIC RETINOPATHY OF LEFT EYE WITHOUT MACULAR EDEMA ASSOCIATED WITH TYPE 2 DIABETES MELLITUS: ICD-10-CM

## 2023-10-25 DIAGNOSIS — I10 ESSENTIAL HYPERTENSION: ICD-10-CM

## 2023-10-25 DIAGNOSIS — E11.42 DIABETIC POLYNEUROPATHY ASSOCIATED WITH TYPE 2 DIABETES MELLITUS: ICD-10-CM

## 2023-10-25 PROCEDURE — 3066F PR DOCUMENTATION OF TREATMENT FOR NEPHROPATHY: ICD-10-PCS | Mod: CPTII,95,, | Performed by: NURSE PRACTITIONER

## 2023-10-25 PROCEDURE — 1159F MED LIST DOCD IN RCRD: CPT | Mod: CPTII,95,, | Performed by: NURSE PRACTITIONER

## 2023-10-25 PROCEDURE — 1159F PR MEDICATION LIST DOCUMENTED IN MEDICAL RECORD: ICD-10-PCS | Mod: CPTII,95,, | Performed by: NURSE PRACTITIONER

## 2023-10-25 PROCEDURE — 1160F RVW MEDS BY RX/DR IN RCRD: CPT | Mod: CPTII,95,, | Performed by: NURSE PRACTITIONER

## 2023-10-25 PROCEDURE — 3052F HG A1C>EQUAL 8.0%<EQUAL 9.0%: CPT | Mod: CPTII,95,, | Performed by: NURSE PRACTITIONER

## 2023-10-25 PROCEDURE — 99214 PR OFFICE/OUTPT VISIT, EST, LEVL IV, 30-39 MIN: ICD-10-PCS | Mod: 95,,, | Performed by: NURSE PRACTITIONER

## 2023-10-25 PROCEDURE — 3066F NEPHROPATHY DOC TX: CPT | Mod: CPTII,95,, | Performed by: NURSE PRACTITIONER

## 2023-10-25 PROCEDURE — 1160F PR REVIEW ALL MEDS BY PRESCRIBER/CLIN PHARMACIST DOCUMENTED: ICD-10-PCS | Mod: CPTII,95,, | Performed by: NURSE PRACTITIONER

## 2023-10-25 PROCEDURE — 3052F PR MOST RECENT HEMOGLOBIN A1C LEVEL 8.0 - < 9.0%: ICD-10-PCS | Mod: CPTII,95,, | Performed by: NURSE PRACTITIONER

## 2023-10-25 PROCEDURE — 3060F POS MICROALBUMINURIA REV: CPT | Mod: CPTII,95,, | Performed by: NURSE PRACTITIONER

## 2023-10-25 PROCEDURE — 3060F PR POS MICROALBUMINURIA RESULT DOCUMENTED/REVIEW: ICD-10-PCS | Mod: CPTII,95,, | Performed by: NURSE PRACTITIONER

## 2023-10-25 PROCEDURE — 99214 OFFICE O/P EST MOD 30 MIN: CPT | Mod: 95,,, | Performed by: NURSE PRACTITIONER

## 2023-10-25 RX ORDER — SEMAGLUTIDE 1.34 MG/ML
1 INJECTION, SOLUTION SUBCUTANEOUS
Qty: 1 EACH | Refills: 5 | Status: SHIPPED | OUTPATIENT
Start: 2023-10-25 | End: 2024-01-02 | Stop reason: SDUPTHER

## 2023-10-25 NOTE — Clinical Note
Increasing ozgibson Pennyy-pt needs DE for dexcom g7 Karina -reach out for f/u in 3-4 months, a1c prior -3 months, a1c in 5 weeks Thanks IB

## 2023-10-30 ENCOUNTER — PATIENT MESSAGE (OUTPATIENT)
Dept: INTERNAL MEDICINE | Facility: CLINIC | Age: 58
End: 2023-10-30
Payer: MEDICAID

## 2023-11-06 ENCOUNTER — CLINICAL SUPPORT (OUTPATIENT)
Dept: REHABILITATION | Facility: OTHER | Age: 58
End: 2023-11-06
Payer: MEDICAID

## 2023-11-06 DIAGNOSIS — M25.521 RIGHT ELBOW PAIN: ICD-10-CM

## 2023-11-06 DIAGNOSIS — Z98.890 S/P CARPAL TUNNEL RELEASE: ICD-10-CM

## 2023-11-06 DIAGNOSIS — Z98.890 S/P DECOMPRESSION OF ULNAR NERVE AT ELBOW: ICD-10-CM

## 2023-11-06 PROCEDURE — 97110 THERAPEUTIC EXERCISES: CPT | Mod: PN

## 2023-11-06 PROCEDURE — 97165 OT EVAL LOW COMPLEX 30 MIN: CPT | Mod: PN

## 2023-11-06 NOTE — PROGRESS NOTES
[facility]  Occupational Therapy Initial Evaluation    Date: 11/6/2023  Name: Juan Manuel Johnson  [facility] Number: [id number]    Therapy Diagnosis:   Encounter Diagnoses   Name Primary?    S/P decompression of ulnar nerve at elbow     S/P carpal tunnel release      Physician: Radha Alegre PA-C    Physician Orders: OT Eval Treat  Medical Diagnosis:   Z98.890 (ICD-10-CM) - S/P decompression of ulnar nerve at elbow   Z98.890 (ICD-10-CM) - S/P carpal tunnel release       Surgical Procedure and Date:               Date of Procedure: 10/11/2023      Procedure: Procedure(s) (LRB):  DECOMPRESSION, NERVE, ULNAR (Right)  RELEASE, CARPAL TUNNEL (Right)  REMOVAL,ORTHOPEDIC HARDWARE,UPPER EXTREMITY (Right)         Evaluation Date: 11/6/23  Insurance Authorization Period Expiration: 11/2/24  Plan of Care Expiration: 2/6/24  Date of Return to MD: TBD  Visit # / Visits authorized: 1 / 1  FOTO Completion: Survey on eval    Precautions:  Standard    Time In: 1:15pm  Time Out: 2:00pm  Total Appointment Time (timed & untimed codes): 45 minutes      SUBJECTIVE     Date of Onset/ History of Current Condition/Mechanism of Injury: Juan Manuel reports: In 1996, patient fell off 3 story building and broke his arms.  He had  and ORIF as the time, but over the years developed cubital and CTS symptoms. After years of conservative treatment, surgery was rec. And performed.    Falls: NA    Dominant Side: Right  Involved Side: R  Imaging:  Reviewed  Prior Therapy: NA  Occupation and work duties:  Live with wife    Functional Limitations/Social History:    Previous functional status includes: Independent with all ADLs.           Limitation of Functional Status as follows:   ADLs/IADLs:     - Feeding: MOD I    - Bathing: MOD I    - Dressing/Grooming: MOD I    - Driving: MOD I        Pain:  Functional Pain Scale Rating 0-10 and Location:  UN from elbow to hand  Current 7/10, multiple times a day, worse in the morning  worst  10/10  best 0/10     Description: pins and needles  Aggravating and Easing Factors: impact, sleeping      Patient's Goals for Therapy: decrease pain    Medical History:   Past Medical History:   Diagnosis Date    Carpal tunnel syndrome, right     Diabetes mellitus, type 2     Hepatitis C, chronic 06/05/2013    Hyperlipidemia     Hypertension     Hypothyroidism     Painful orthopaedic hardware     Ulnar nerve compression, right        Surgical History:    has a past surgical history that includes External ear surgery (Left); Elbow surgery (Right); Wrist surgery (Left); Ankle surgery (Left); Colonoscopy; decompression, nerve, ulnar (Right, 10/11/2023); Carpal tunnel release (Right, 10/11/2023); and removal,orthopedic hardware,upper extremity (Right, 10/11/2023).    Medications:   has a current medication list which includes the following prescription(s): aspirin, atorvastatin, bd christiano 2nd gen pen needle, onetouch ultra test, blood-glucose meter, dexcom g7 sensor, carvedilol, clotrimazole, dexcom g6 sensor, dexcom g6 transmitter, gabapentin, hydrocodone-acetaminophen, insulin, lancets, levothyroxine, losartan-hydrochlorothiazide 100-25 mg, meloxicam, metformin, nifedipine, onetouch ultra blue test strip, repaglinide, ozempic, and [DISCONTINUED] dexcom g6 , and the following Facility-Administered Medications: fentanyl and midazolam.    Allergies:   Review of patient's allergies indicates:  No Known Allergies       OBJECTIVE     Observation/Appearance: Pt. Has a long standing flexion contracture of elbow that has caused lack of full extension.Surgery sites appear healthy and non concerning for infection.  Slight adherences and hypertrophic tissue noted on elbow incision. CTR appears non problematic, but slightly dense, which is to be expected.  Possible HO or bony overgrowth from hx of fracture/ORIF at elbow.    Edema. Moderate surrounding elbow. Mild at CT site.        Elbow and Wrist ROM. Measured in degrees.    11/6/2023 11/6/2023        Elbow Ext/Flex 52/137    Supination/Pronation 55/95    Wrist Ext/Flex 41/40    Wrist RD/UD wfl      Hand ROM. wnl    NT:  Strength (Dynamometer) and Pinch Strength (Pinch Gauge)  Measured in pounds.   11/6/2023 11/6/2023        Rung II     Perez Pinch     3pt Pinch     2pt Pinch       Sensation   11/6/2023 11/6/2023    R L   [place] Jasmin     Normal 1.65-2.83     Diminished Light Touch 3.22-3.61 X X    Diminished Protective 3.84-4.31 X SF only    Loss of Protective 4.56-6.65     Untestable >6.65     2 Point Discrimination     Static     Dynamic       Manual Muscle Test: NT   11/6/2023 11/6/2023        Wrist Extension      Wrist Flexion     Radial Deviation     Ulnar Deviation     Supination     Pronation     Elbow Extension     Elbow Flexion       Special Tests  Thumb CMC Grind Test    Finkelstein's Test    Phalen's Test    Tinel's Test    Chuy's Test    [place]-Littler Test    Digital Collateral Stress Test    ORL Test    Froment's Sign    Pinch OK Sign    Betty's Sign     Egawa Sign  pos   Clamp Sign     SL Ballottement Test    LT Ballottement Test    Scaphoid/WatsonTest    Linscheid's Test    Metacarpal Stress Test    Piano Key Test    ECU Synergy Test    Ulnar Compression Test    TFCC Load Test    Ulnocarpal Stress Test    Midcarpal Shift Test    Pisiform Boost Test    Elbow Flexion Test    Scratch Collapse Test    Tennis Elbow Test    Resisted Middle Finger Extension Test    Mills Test    Chair Test    Biceps Squeeze Test    Biceps Hook Test    Milking Test    Press Up Manuever    PLRI Test    Valgus Stress Test    Varus Stress Test    Spurling Test    Cervical Distraction Test    ULNTT - General    ULNTT - Median Nerve    ULNTT - Radial Nerve    ULNTT - Ulnar Nerve         Limitation/Restriction for FOTO initial Survey    Therapist reviewed FOTO scores for Juan Manuel Johnson on 11/6/2023.   FOTO documents entered into [employer] - see Media section.    Limitation Score: 47.5%          Treatment   Total Treatment time (time-based codes) separate from Evaluation: 25 minutes    Juan Manuel received the treatments listed below:     Supervised modalities after being cleared for contradictions: Paraffin bath - with MHP x 10 min      Manual therapy techniques: scar massages following by effleurage massage x 5 min    Therapeutic exercises to develop ROM for 10 minutes, including:  Exercise Reps/Time   Elbow ROM (3 ways) X 10   S/P X 10   Wrist AROM: e/f, RD/UD x 10   TGE: Wave, Straight Fist, Hook, Lifts, Spreads, Squeezes, lifts X 10               Pt. Educated at length on sleeping postures                    Patient Education and Home Exercises      Education provided:   - Role of OT, HEP, and POC, sleeping posture    Written Home Exercises Provided: yes.  Exercises were reviewed and Juan Manuel was able to demonstrate them prior to the end of the session.  Juan Manuel demonstrated fair  understanding of the education provided. See EMR under Patient Instructions for exercises provided during therapy sessions.     Pt was advised to perform these exercises free of pain, and to stop performing them if pain occurs.    Patient/Family Education: role of OT, goals for OT, scheduling/cancellations - pt verbalized understanding. Discussed insurance limitations with patient.    ASSESSMENT     Juan Manuel Johnson is a 58 y.o. male referred to outpatient occupational therapy and presents with a medical diagnosis of   Z98.890 (ICD-10-CM) - S/P decompression of ulnar nerve at elbow   Z98.890 (ICD-10-CM) - S/P carpal tunnel release   .      Assessment of Occupational Performance   Performance Deficits    Physical:  Pain    Cognitive:  No Deficits    Psychosocial:    No Deficits     Following medical record review it is determined that pt will benefit from occupational therapy services in order to maximize pain free and/or functional use of right elbow. The following goals were discussed with the patient and patient is in  agreement with them as to be addressed in the treatment plan. The patient's rehab potential is Good.     Anticipated barriers to occupational therapy: long standing elbow contracture    Plan of care discussed with patient: Yes  Patient's spiritual, cultural and educational needs considered and patient is agreeable to the plan of care and goals as stated below:     Medical Necessity is demonstrated by the following  Occupational Profile/History  Co-morbidities and personal factors that may impact the plan of care [x] LOW: Brief chart review  [] MODERATE: Expanded chart review   [] HIGH: Extensive chart review    Moderate / High Support Documentation:      Examination  Performance deficits relating to physical, cognitive or psychosocial skills that result in activity limitations and/or participation restrictions  [x] LOW: addressing 1-3 Performance deficits  [] MODERATE: 3-5 Performance deficits  [] HIGH: 5+ Performance deficits (please support below)    Moderate / High Support Documentation:      Treatment Options [x] LOW: Limited options  [] MODERATE: Several options  [] HIGH: Multiple options      Decision Making/ Complexity Score: low       The following goals were discussed with the patient and patient is in agreement with them as to be addressed in the treatment plan.       Goals:   The following goals were discussed with the patient and patient is in agreement with them as to be addressed in the treatment plan.     Long Term Goals (LTGs); to be met by discharge.  LTG #1: Pt will report a pain level of 2 out of 10 with heavy lifting   LTG #2: Pt will demo improved FOTO score by 20 points.   LTG #3: Pt will return to prior level of function for ADLs and household management.     Short Term Goals (STGs); to be met within 6 weeks.  STG #1: Pt will report 4 out of 10 pain level with in clinic exercises.  STG #2: Pt will report/demo Modified [place] with cutting food.  STG #3: Pt. Will report increased FOTO  score by 5 points.  STG #4: Pt will demonstrate independence with issued HEP.         PLAN   Plan of Care Certification: 11/6/2023 to 2/5/2024.     Outpatient Occupational Therapy 2 times weekly for 12 weeks to include the following interventions: Paraffin, Fluidotherapy, and Manual therapy/joint mobilizations, therapeutic exercises, therapeutic activities, Ultrasound, modalities, splinting      Opal Hansen, OTR/L, CHT      I CERTIFY THE NEED FOR THESE SERVICES FURNISHED UNDER THIS PLAN OF TREATMENT AND WHILE UNDER MY CARE  Physician's comments:      Physician's Signature: ___________________________________________________

## 2023-11-06 NOTE — PATIENT INSTRUCTIONS
[facility], OCCUPATIONAL THERAPY  HOME EXERCISE PROGRAM               Complete massage 2-3 minutes 4 times a day:        Complete 10 repetitions of each exercise 4-6 times a day:            Copyright © I. All rights reserved.     Therapist: GABY Reynoso/ZANDRA, CHT

## 2023-11-08 PROBLEM — M25.521 RIGHT ELBOW PAIN: Status: ACTIVE | Noted: 2023-11-08

## 2023-11-10 ENCOUNTER — CLINICAL SUPPORT (OUTPATIENT)
Dept: REHABILITATION | Facility: OTHER | Age: 58
End: 2023-11-10
Payer: MEDICAID

## 2023-11-10 DIAGNOSIS — M25.521 RIGHT ELBOW PAIN: Primary | ICD-10-CM

## 2023-11-10 PROCEDURE — 97110 THERAPEUTIC EXERCISES: CPT | Mod: PN

## 2023-11-10 NOTE — PROGRESS NOTES
"  [facility]  Occupational Therapy Treatment Note    Date: 11/10/2023  Name: Juan Manuel Johnson  [facility] Number: [id number]    Therapy Diagnosis:   Encounter Diagnosis   Name Primary?    Right elbow pain Yes     Physician: Radha Alegre PA-C    Physician Orders: OT Eval Treat  Medical Diagnosis:   Z98.890 (ICD-10-CM) - S/P decompression of ulnar nerve at elbow   Z98.890 (ICD-10-CM) - S/P carpal tunnel release         Surgical Procedure and Date:               Date of Procedure: 10/11/2023      Procedure: Procedure(s) (LRB):  DECOMPRESSION, NERVE, ULNAR (Right)  RELEASE, CARPAL TUNNEL (Right)  REMOVAL,ORTHOPEDIC HARDWARE,UPPER EXTREMITY (Right)            Evaluation Date: 11/6/23  Insurance Authorization Period Expiration: 11/2/24  Plan of Care Expiration: 2/6/24  Date of Return to MD: TBD    Time In: 1:00pm  Time Out: 1:45pm  Total Billable Time: 45 minutes      SUBJECTIVE     Pt reports: "Gilda been dong what you told me to do  He was compliant with home exercise program given last session.   Response to previous treatment and functional change:compliance to HEP      Pain: 3/10  Location: elbow UN site    OBJECTIVE   Objective Measures updated at progress report unless specified.    Observation/Appearance: Pt. Has a long standing flexion contracture of elbow that has caused lack of full extension.Surgery sites appear healthy and non concerning for infection.  Slight adherences and hypertrophic tissue noted on elbow incision. CTR appears non problematic, but slightly dense, which is to be expected.  Possible HO or bony overgrowth from hx of fracture/ORIF at elbow.     Edema. Moderate surrounding elbow. Mild at CT site.           Elbow and Wrist ROM. Measured in degrees.    11/6/2023 11/6/2023           Elbow Ext/Flex 52/137     Supination/Pronation 55/95     Wrist Ext/Flex 41/40     Wrist RD/UD wfl        Hand ROM. wnl     NT:  Strength (Dynamometer) and Pinch Strength (Pinch " Gauge)  Measured in pounds.    11/6/2023 11/6/2023           Rung II       Perez Pinch       3pt Pinch       2pt Pinch          Sensation    11/6/2023 11/6/2023     R L   [place] Jasmin       Normal 1.65-2.83       Diminished Light Touch 3.22-3.61 X X    Diminished Protective 3.84-4.31 X SF only     Loss of Protective 4.56-6.65       Untestable >6.65       2 Point Discrimination       Static       Dynamic          Manual Muscle Test: NT    11/6/2023 11/6/2023           Wrist Extension        Wrist Flexion       Radial Deviation       Ulnar Deviation       Supination       Pronation       Elbow Extension       Elbow Flexion          Special Tests  Thumb CMC Grind Test     Finkelstein's Test     Phalen's Test     Tinel's Test     Chuy's Test     [place]-Littler Test     Digital Collateral Stress Test     ORL Test     Froment's Sign     Pinch OK Sign     Betty's Sign      Egawa Sign  pos   Clamp Sign      SL Ballottement Test     LT Ballottement Test     Scaphoid/WatsonTest     Linscheid's Test     Metacarpal Stress Test     Piano Key Test     ECU Synergy Test     Ulnar Compression Test     TFCC Load Test     Ulnocarpal Stress Test     Midcarpal Shift Test     Pisiform Boost Test     Elbow Flexion Test     Scratch Collapse Test     Tennis Elbow Test     Resisted Middle Finger Extension Test     Mills Test     Chair Test     Biceps Squeeze Test     Biceps Hook Test     Milking Test     Press Up Manuever     PLRI Test     Valgus Stress Test     Varus Stress Test     Spurling Test     Cervical Distraction Test     ULNTT - General     ULNTT - Median Nerve     ULNTT - Radial Nerve     ULNTT - Ulnar Nerve            Limitation/Restriction for FOTO initial Survey     Therapist reviewed FOTO scores for Juan Manuel Johnson on 11/6/2023.   FOTO documents entered into [employer] - see Media section.     Limitation Score: 47.5%            Treatment        Juan Manuel received the treatments listed below:         ALL BILLED AS THERAPEUTIC  ACTIVITIES: x 3 units     Supervised modalities after being cleared for contradictions: Paraffin bath - with MHP x 10 min        Manual therapy techniques: scar massages following by  scar massager, effleurage massage x 15 min     Therapeutic exercises to develop ROM for 20 minutes, including:  Exercise Reps/Time   Elbow ROM (3 ways) 3 X 10 with 5 sec holds every 5th rep   S/P 3 X 10   3lb walk Abouts  X 3 laps   Desensitization X 3 min                          Reviewed HEP and instructions                         Patient Education and Home Exercises      Education provided:   - scar pain/regeneration after surgery, healing times  - Progress towards goals     Written Home Exercises Provided: yes.  Exercises were reviewed and Juan Manuel was able to demonstrate them prior to the end of the session.  Juan Manuel demonstrated good  understanding of the HEP provided. See EMR under Patient Instructions for exercises provided during therapy sessions.      ASSESSMENT      Pt. Tolerated first full OT session well.       Juan Manuel is progressing well towards his goals and there are no updates to goals at this time. Pt prognosis is Good.     Pt will continue to benefit from skilled outpatient occupational therapy to address the deficits listed in the problem list on initial evaluation, provide pt/family education and to maximize pt's level of independence in the home and community environment.     Pt's spiritual, cultural and educational needs considered and pt agreeable to plan of care and goals.    Anticipated barriers to occupational therapy: long standing elbow contracture    Goals:  Goals:   The following goals were discussed with the patient and patient is in agreement with them as to be addressed in the treatment plan.     Long Term Goals (LTGs); to be met by discharge.  LTG #1: Pt will report a pain level of 2 out of 10 with heavy lifting   LTG #2: Pt will demo improved FOTO score by 20 points.   LTG #3: Pt will return to prior  level of function for ADLs and household management.     Short Term Goals (STGs); to be met within 6 weeks.  STG #1: Pt will report 4 out of 10 pain level with in clinic exercises.  STG #2: Pt will report/demo Modified [place] with cutting food.  STG #3: Pt. Will report increased FOTO score by 5 points.  STG #4: Pt will demonstrate independence with issued HEP.     PLAN     Continue skilled occupational therapy with individualized plan of care focusing on restoring previous elbow extension and decrease  post surgical Ulnar nerve pain    Updates/Grading for next session: SHARA Hansen, OTR/L, CHT

## 2023-12-21 DIAGNOSIS — Z79.4 TYPE 2 DIABETES MELLITUS WITH HYPERGLYCEMIA, WITH LONG-TERM CURRENT USE OF INSULIN: ICD-10-CM

## 2023-12-21 DIAGNOSIS — E11.65 TYPE 2 DIABETES MELLITUS WITH HYPERGLYCEMIA, WITH LONG-TERM CURRENT USE OF INSULIN: ICD-10-CM

## 2023-12-21 NOTE — TELEPHONE ENCOUNTER
No care due was identified.  [facility] Embedded Care Due Messages. Reference number: [id number].   12/21/2023 10:02:09 AM CST

## 2023-12-22 RX ORDER — BLOOD-GLUCOSE SENSOR
EACH MISCELLANEOUS
Qty: 9 EACH | Refills: 3 | Status: SHIPPED | OUTPATIENT
Start: 2023-12-22 | End: 2024-01-02 | Stop reason: SDUPTHER

## 2023-12-22 NOTE — TELEPHONE ENCOUNTER
Refill Routing Note   Medication(s) are not appropriate for processing by [facility] for the following reason(s):        No active prescription written by provider:     [facility] action(s):  Defer      Medication Therapy Plan:         Appointments  past 12m or future 3m with PCP    Date Provider   Last Visit   7/27/2023 Zion Espinoza MD   Next Visit   1/29/2024 Zion Espinoza MD   ED visits in past 90 days: 0        Note composed:9:01 PM 12/21/2023

## 2024-01-02 ENCOUNTER — TELEPHONE (OUTPATIENT)
Dept: INTERNAL MEDICINE | Facility: CLINIC | Age: 59
End: 2024-01-02
Payer: MEDICAID

## 2024-01-02 DIAGNOSIS — R20.0 NUMBNESS AND TINGLING IN RIGHT HAND: ICD-10-CM

## 2024-01-02 DIAGNOSIS — M25.521 RIGHT ELBOW PAIN: ICD-10-CM

## 2024-01-02 DIAGNOSIS — E11.65 TYPE 2 DIABETES MELLITUS WITH HYPERGLYCEMIA, WITH LONG-TERM CURRENT USE OF INSULIN: ICD-10-CM

## 2024-01-02 DIAGNOSIS — Z79.4 TYPE 2 DIABETES MELLITUS WITH HYPERGLYCEMIA, WITH LONG-TERM CURRENT USE OF INSULIN: ICD-10-CM

## 2024-01-02 DIAGNOSIS — Z98.890 HISTORY OF ELBOW SURGERY: ICD-10-CM

## 2024-01-02 DIAGNOSIS — N47.1 PHIMOSIS: ICD-10-CM

## 2024-01-02 DIAGNOSIS — R20.2 NUMBNESS AND TINGLING IN RIGHT HAND: ICD-10-CM

## 2024-01-02 RX ORDER — INSULIN GLARGINE 100 [IU]/ML
55 INJECTION, SOLUTION SUBCUTANEOUS 2 TIMES DAILY
Qty: 45 ML | Refills: 0 | Status: SHIPPED | OUTPATIENT
Start: 2024-01-02 | End: 2024-02-07

## 2024-01-02 RX ORDER — BLOOD-GLUCOSE TRANSMITTER
EACH MISCELLANEOUS
Qty: 1 EACH | Refills: 3 | Status: SHIPPED | OUTPATIENT
Start: 2024-01-02 | End: 2024-02-07

## 2024-01-02 RX ORDER — SEMAGLUTIDE 1.34 MG/ML
1 INJECTION, SOLUTION SUBCUTANEOUS
Qty: 1 EACH | Refills: 5 | Status: SHIPPED | OUTPATIENT
Start: 2024-01-02 | End: 2024-01-29

## 2024-01-02 RX ORDER — NIFEDIPINE 60 MG/1
60 TABLET, EXTENDED RELEASE ORAL DAILY
Qty: 90 TABLET | Refills: 2 | Status: SHIPPED | OUTPATIENT
Start: 2024-01-02 | End: 2024-01-29

## 2024-01-02 RX ORDER — CLOTRIMAZOLE 1 %
CREAM (GRAM) TOPICAL 2 TIMES DAILY
Qty: 45 G | Refills: 1 | Status: SHIPPED | OUTPATIENT
Start: 2024-01-02

## 2024-01-02 RX ORDER — MELOXICAM 15 MG/1
15 TABLET ORAL DAILY
Qty: 30 TABLET | Refills: 2 | Status: SHIPPED | OUTPATIENT
Start: 2024-01-02

## 2024-01-02 NOTE — TELEPHONE ENCOUNTER
----- Message from Tejal Whatley sent at 1/2/2024 10:18 AM CST -----  Regarding: Refill  Contact: Pt @[phone]  Rx Refill/Request     Is this a Refill or New Rx:  refill      Rx Name and Strength:     blood-glucose sensor (DEXCOM G7 SENSOR) Susan              [id number] - [place] [id number] AT [facility] [address]         Communication Preference: Please call Pt @[phone]     Additional Information:

## 2024-01-02 NOTE — TELEPHONE ENCOUNTER
Patient says he need a refill on his medication but don't remember which med it is, he will call gary to see which med needs a refill and call back with the information

## 2024-01-02 NOTE — TELEPHONE ENCOUNTER
Care Due:                  Date            Visit Type   Department     Provider  --------------------------------------------------------------------------------                                EP -                              PRIMARY      [facility] INTERNAL  Last Visit: 07-      CARE ([facility])   MEDICINE       Zoin Espinoza                              EP -                              PRIMARY      [facility] INTERNAL  Next Visit: 01-      CARE ([facility])   MEDICINE       Zion Espinoza                                                            Last  Test          Frequency    Reason                     Performed    Due Date  --------------------------------------------------------------------------------    CBC.........  12 months..  meloxicam................  Not Found    Overdue    CMP.........  12 months..  insulin, meloxicam.......  11-   10-    [facility] Embedded Care Due Messages. Reference number: [id number].   1/02/2024 11:15:19 AM CST

## 2024-01-02 NOTE — TELEPHONE ENCOUNTER
LOV: 10/25/2023 with MD FLORENCE Osborne  Upcoming appointment with PCP set for 1/29/2024  Also has appointment set with MD Osborne.    Allergies confirmed, medication pended, and routed to MD for advise.

## 2024-01-03 RX ORDER — BLOOD-GLUCOSE SENSOR
EACH MISCELLANEOUS
Qty: 9 EACH | Refills: 3 | Status: SHIPPED | OUTPATIENT
Start: 2024-01-03

## 2024-01-03 NOTE — TELEPHONE ENCOUNTER
Refill Routing Note   Medication(s) are not appropriate for processing by [facility] for the following reason(s):        New or recently adjusted medication  No active prescription written by provider    [facility] action(s):  Defer               Appointments  past 12m or future 3m with PCP    Date Provider   Last Visit   7/27/2023 Zion Espinoza MD   Next Visit   1/29/2024 Zion Espinoza MD   ED visits in past 90 days: 0        Note composed:4:08 AM 01/03/2024

## 2024-01-03 NOTE — TELEPHONE ENCOUNTER
No care due was identified.  [facility] Embedded Care Due Messages. Reference number: [id number].   1/03/2024 3:54:01 AM CST

## 2024-01-29 ENCOUNTER — PATIENT MESSAGE (OUTPATIENT)
Dept: INTERNAL MEDICINE | Facility: CLINIC | Age: 59
End: 2024-01-29

## 2024-01-29 ENCOUNTER — LAB VISIT (OUTPATIENT)
Dept: LAB | Facility: OTHER | Age: 59
End: 2024-01-29
Attending: INTERNAL MEDICINE
Payer: MEDICAID

## 2024-01-29 ENCOUNTER — OFFICE VISIT (OUTPATIENT)
Dept: INTERNAL MEDICINE | Facility: CLINIC | Age: 59
End: 2024-01-29
Attending: INTERNAL MEDICINE
Payer: MEDICAID

## 2024-01-29 ENCOUNTER — PATIENT OUTREACH (OUTPATIENT)
Dept: ADMINISTRATIVE | Facility: HOSPITAL | Age: 59
End: 2024-01-29
Payer: MEDICAID

## 2024-01-29 VITALS
SYSTOLIC BLOOD PRESSURE: 150 MMHG | HEIGHT: 71 IN | OXYGEN SATURATION: 97 % | BODY MASS INDEX: 31.85 KG/M2 | WEIGHT: 227.5 LBS | HEART RATE: 78 BPM | DIASTOLIC BLOOD PRESSURE: 100 MMHG

## 2024-01-29 DIAGNOSIS — K74.00 LIVER FIBROSIS: ICD-10-CM

## 2024-01-29 DIAGNOSIS — Z12.5 PROSTATE CANCER SCREENING: ICD-10-CM

## 2024-01-29 DIAGNOSIS — E03.4 HYPOTHYROIDISM DUE TO ACQUIRED ATROPHY OF THYROID: ICD-10-CM

## 2024-01-29 DIAGNOSIS — G63 POLYNEUROPATHY ASSOCIATED WITH UNDERLYING DISEASE: ICD-10-CM

## 2024-01-29 DIAGNOSIS — E11.42 DIABETIC POLYNEUROPATHY ASSOCIATED WITH TYPE 2 DIABETES MELLITUS: ICD-10-CM

## 2024-01-29 DIAGNOSIS — I10 ESSENTIAL HYPERTENSION: ICD-10-CM

## 2024-01-29 DIAGNOSIS — I10 ESSENTIAL HYPERTENSION: Primary | ICD-10-CM

## 2024-01-29 LAB
ALBUMIN SERPL BCP-MCNC: 4.1 G/DL (ref 3.5–5.2)
ALP SERPL-CCNC: 95 U/L (ref 55–135)
ALT SERPL W/O P-5'-P-CCNC: 36 U/L (ref 10–44)
ANION GAP SERPL CALC-SCNC: 10 MMOL/L (ref 8–16)
AST SERPL-CCNC: 26 U/L (ref 10–40)
BASOPHILS # BLD AUTO: 0.04 K/UL (ref 0–0.2)
BASOPHILS NFR BLD: 0.5 % (ref 0–1.9)
BILIRUB SERPL-MCNC: 0.4 MG/DL (ref 0.1–1)
BUN SERPL-MCNC: 14 MG/DL (ref 6–20)
CALCIUM SERPL-MCNC: 9.9 MG/DL (ref 8.7–10.5)
CHLORIDE SERPL-SCNC: 103 MMOL/L (ref 95–110)
CO2 SERPL-SCNC: 26 MMOL/L (ref 23–29)
COMPLEXED PSA SERPL-MCNC: 0.3 NG/ML (ref 0–4)
CREAT SERPL-MCNC: 1.1 MG/DL (ref 0.5–1.4)
DIFFERENTIAL METHOD BLD: ABNORMAL
EOSINOPHIL # BLD AUTO: 0.2 K/UL (ref 0–0.5)
EOSINOPHIL NFR BLD: 1.7 % (ref 0–8)
ERYTHROCYTE [DISTWIDTH] IN BLOOD BY AUTOMATED COUNT: 12.9 % (ref 11.5–14.5)
EST. GFR  (NO RACE VARIABLE): >60 ML/MIN/1.73 M^2
ESTIMATED AVG GLUCOSE: 223 MG/DL (ref 68–131)
GLUCOSE SERPL-MCNC: 198 MG/DL (ref 70–110)
HBA1C MFR BLD: 9.4 % (ref 4–5.6)
HCT VFR BLD AUTO: 44.2 % (ref 40–54)
HGB BLD-MCNC: 14.4 G/DL (ref 14–18)
IMM GRANULOCYTES # BLD AUTO: 0.01 K/UL (ref 0–0.04)
IMM GRANULOCYTES NFR BLD AUTO: 0.1 % (ref 0–0.5)
LYMPHOCYTES # BLD AUTO: 1.4 K/UL (ref 1–4.8)
LYMPHOCYTES NFR BLD: 15.7 % (ref 18–48)
MCH RBC QN AUTO: 27.2 PG (ref 27–31)
MCHC RBC AUTO-ENTMCNC: 32.6 G/DL (ref 32–36)
MCV RBC AUTO: 84 FL (ref 82–98)
MONOCYTES # BLD AUTO: 0.5 K/UL (ref 0.3–1)
MONOCYTES NFR BLD: 6.2 % (ref 4–15)
NEUTROPHILS # BLD AUTO: 6.6 K/UL (ref 1.8–7.7)
NEUTROPHILS NFR BLD: 75.8 % (ref 38–73)
NRBC BLD-RTO: 0 /100 WBC
PLATELET # BLD AUTO: 359 K/UL (ref 150–450)
PMV BLD AUTO: 9.9 FL (ref 9.2–12.9)
POTASSIUM SERPL-SCNC: 3.7 MMOL/L (ref 3.5–5.1)
PROT SERPL-MCNC: 8.2 G/DL (ref 6–8.4)
RBC # BLD AUTO: 5.29 M/UL (ref 4.6–6.2)
SODIUM SERPL-SCNC: 139 MMOL/L (ref 136–145)
TSH SERPL DL<=0.005 MIU/L-ACNC: 2.56 UIU/ML (ref 0.4–4)
WBC # BLD AUTO: 8.74 K/UL (ref 3.9–12.7)

## 2024-01-29 PROCEDURE — 99999 PR PBB SHADOW E&M-EST. PATIENT-LVL IV: CPT | Mod: PBBFAC,,, | Performed by: INTERNAL MEDICINE

## 2024-01-29 PROCEDURE — 3080F DIAST BP >= 90 MM HG: CPT | Mod: CPTII,,, | Performed by: INTERNAL MEDICINE

## 2024-01-29 PROCEDURE — 1159F MED LIST DOCD IN RCRD: CPT | Mod: CPTII,,, | Performed by: INTERNAL MEDICINE

## 2024-01-29 PROCEDURE — 1160F RVW MEDS BY RX/DR IN RCRD: CPT | Mod: CPTII,,, | Performed by: INTERNAL MEDICINE

## 2024-01-29 PROCEDURE — 3077F SYST BP >= 140 MM HG: CPT | Mod: CPTII,,, | Performed by: INTERNAL MEDICINE

## 2024-01-29 PROCEDURE — 3008F BODY MASS INDEX DOCD: CPT | Mod: CPTII,,, | Performed by: INTERNAL MEDICINE

## 2024-01-29 PROCEDURE — 36415 COLL VENOUS BLD VENIPUNCTURE: CPT | Performed by: INTERNAL MEDICINE

## 2024-01-29 PROCEDURE — 85025 COMPLETE CBC W/AUTO DIFF WBC: CPT | Performed by: INTERNAL MEDICINE

## 2024-01-29 PROCEDURE — 99214 OFFICE O/P EST MOD 30 MIN: CPT | Mod: S$PBB,,, | Performed by: INTERNAL MEDICINE

## 2024-01-29 PROCEDURE — 84443 ASSAY THYROID STIM HORMONE: CPT | Performed by: INTERNAL MEDICINE

## 2024-01-29 PROCEDURE — 3072F LOW RISK FOR RETINOPATHY: CPT | Mod: CPTII,,, | Performed by: INTERNAL MEDICINE

## 2024-01-29 PROCEDURE — 83036 HEMOGLOBIN GLYCOSYLATED A1C: CPT | Performed by: INTERNAL MEDICINE

## 2024-01-29 PROCEDURE — 84153 ASSAY OF PSA TOTAL: CPT | Performed by: INTERNAL MEDICINE

## 2024-01-29 PROCEDURE — 80053 COMPREHEN METABOLIC PANEL: CPT | Performed by: INTERNAL MEDICINE

## 2024-01-29 PROCEDURE — 99214 OFFICE O/P EST MOD 30 MIN: CPT | Mod: PBBFAC | Performed by: INTERNAL MEDICINE

## 2024-01-29 RX ORDER — NIFEDIPINE 90 MG/1
90 TABLET, EXTENDED RELEASE ORAL DAILY
Qty: 90 TABLET | Refills: 3 | Status: SHIPPED | OUTPATIENT
Start: 2024-01-29

## 2024-01-29 RX ORDER — SEMAGLUTIDE 2.68 MG/ML
2 INJECTION, SOLUTION SUBCUTANEOUS
Qty: 3 ML | Refills: 11 | Status: SHIPPED | OUTPATIENT
Start: 2024-01-29 | End: 2024-03-18 | Stop reason: SDUPTHER

## 2024-01-29 NOTE — PROGRESS NOTES
Subjective:       Patient ID: Juan Manuel Johnson is a 58 y.o. male.    Chief Complaint: Follow-up (D.M)    Here for 6 month f/u      175 BG as of late. Much improved. But not to goal. Am    Bp above goal     Chronic right elbow pain. Fell from 3 story rooff during his youth and had 12 pins placed. Chronic pain and swelling and reduced ROM.     Chronic low back pain. Patient denies radiation of pain, numbness/tingling of lower ext, weakness of LE, saddle anesthesia, bowel/bladder incontinence, F/C, unexplained weight loss, or consistent,sheet drenching night sweats.         ### DM ###  Previously managed by Dr Hayden in endocrine. [facility] exam at [facility].    Amenable to digital DM  does not have [facility] yet due to not having smart phone. Wife uses [facility] so he will on using hers for access.      -        HGBA1C                   8.0 (H)             10/23/2023 07:31 AM        HGBA1C                   10.3 (H)            07/19/2023 08:11 AM        HGBA1C                   >14.0 (H)           01/03/2023 09:45 AM        HGBA1C                   12.8 (H)            11/01/2022 07:39 AM        HGBA1C                   13.5 (H)            08/03/2022 09:23 AM        HGBA1C                   12.7 (H)            04/12/2022 09:30 AM        HGBA1C                   8.6 (H)             11/08/2021 04:29 PM        HGBA1C                   9.1 (H)             08/25/2021 09:42 AM        HGBA1C                   9.9 (H)             05/12/2021 08:47 AM        HGBA1C                   9.7 (H)             02/11/2021 08:00 AM        ### HTN ###  Coreg 12.5; losartan 100 hctz 25; nifedipine      ### hypothyroidism ###   Levothyroxine 50mcg         Review of Systems   Constitutional:  Negative for appetite change, chills, fever and unexpected weight change.   HENT:  Negative for hearing loss, sore throat and trouble swallowing.    Eyes:  Negative for visual disturbance.   Respiratory:  Negative for cough, chest tightness and shortness of  "breath.    Cardiovascular:  Negative for chest pain and leg swelling.   Gastrointestinal:  Negative for abdominal pain, blood in stool, constipation, diarrhea, nausea and vomiting.   Endocrine: Negative for polydipsia and polyuria.   Genitourinary:  Negative for decreased urine volume, difficulty urinating, dysuria, frequency and urgency.   Musculoskeletal:  Negative for gait problem.   Skin:  Negative for rash.   Neurological:  Negative for dizziness and numbness.   Psychiatric/Behavioral:  The patient is not nervous/anxious.        Objective:      Vitals:    01/29/24 1027 01/29/24 1112   BP: (!) 140/100 (!) 150/100   BP Location: Right arm    Patient Position: Sitting    BP Method: Large (Manual)    Pulse: 78    SpO2: 97%    Weight: 103.2 kg (227 lb 8.2 oz)    Height: 5' 11" (1.803 m)       Physical Exam  Vitals and nursing note reviewed.   Constitutional:       General: He is not in acute distress.     Appearance: Normal appearance. He is well-developed.   HENT:      Head: Normocephalic and atraumatic.      Mouth/Throat:      Pharynx: No oropharyngeal exudate.   Eyes:      General: No scleral icterus.     Conjunctiva/sclera: Conjunctivae normal.      Pupils: Pupils are equal, round, and reactive to light.   Neck:      Thyroid: No thyromegaly.   Cardiovascular:      Rate and Rhythm: Normal rate and regular rhythm.      Heart sounds: Normal heart sounds. No murmur heard.  Pulmonary:      Effort: Pulmonary effort is normal.      Breath sounds: Normal breath sounds. No wheezing or rales.   Abdominal:      General: There is no distension.   Musculoskeletal:         General: No tenderness.   Lymphadenopathy:      Cervical: No cervical adenopathy.   Skin:     General: Skin is warm and dry.   Neurological:      Mental Status: He is alert and oriented to person, place, and time.   Psychiatric:         Behavior: Behavior normal.         Assessment:       1. Essential hypertension    2. Polyneuropathy associated with " underlying disease    3. Diabetic polyneuropathy associated with type 2 diabetes mellitus    4. Hypothyroidism due to acquired atrophy of thyroid    5. Deep vein thrombosis prophylaxis    6. Prostate cancer screening    7. Liver fibrosis        Plan:       Juan Manuel was seen today for follow-up.    Diagnoses and all orders for this visit:    Essential hypertension   INCREASE nifedipine to 90mg. Nextis coreg.   Pt to keep log of home readings and will send me pic of log via [facility] in 1 weeks. Proper BP measuring techniques discussed.   -     Comprehensive Metabolic Panel; Future  -     TSH; Future  -     CBC Auto Differential; Future    Polyneuropathy associated with underlying disease    Diabetic polyneuropathy associated with type 2 diabetes mellitus  -     Comprehensive Metabolic Panel; Future  -     TSH; Future  -     CBC Auto Differential; Future  -     Hemoglobin A1C; Future  -     PSA, Screening; Future    Hypothyroidism due to acquired atrophy of thyroid  -     TSH; Future    Prostate cancer screening  -     PSA, Screening; Future    Liver fibrosis  -     Comprehensive Metabolic Panel; Future  -     FibroScan (Vibration Controlled Transient Elastography); Future    Other orders  -     NIFEdipine (PROCARDIA-XL) 90 MG (OSM) 24 hr tablet; Take 1 tablet (90 mg total) by mouth once daily.  -     semaglutide (OZEMPIC) 2 mg/dose (8 mg/3 mL) PnIj; Inject 2 mg into the skin every 7 days.           Zion Damian MD  Internal Medicine-[facility]        Side effects of medication(s) were discussed in detail and patient voiced understanding.  Patient will call back for any issues or complications.

## 2024-01-30 NOTE — PROGRESS NOTES
Your diabetes is un-controlled, defined by a hemoglobin A1c greater than 7%. This definition was made because those who keep their A1c consistently below 7% have a much, much less chance of developing the complications of diabetes or progression of their diabetes one day.  Complications include but are not limited to increased risk of heart attacks, strokes, blindness, kidney dysfunction, chronic stomach problems, and a permanent, painful neuropathy.   To avoid the above I would like to start a medication like Farxiga to your regimen. Please take this in  the morning.  Please make sure you are looking at all nutritional labels and please continue to reduce your consumption of simple sugars and processed complex carbohydrates. Weight loss is a good recommendation for most of my patients. We need to repeat your A1c in 2-3 months.

## 2024-02-05 ENCOUNTER — LAB VISIT (OUTPATIENT)
Dept: LAB | Facility: OTHER | Age: 59
End: 2024-02-05
Attending: INTERNAL MEDICINE
Payer: MEDICAID

## 2024-02-05 DIAGNOSIS — Z79.4 TYPE 2 DIABETES MELLITUS WITH HYPERGLYCEMIA, WITH LONG-TERM CURRENT USE OF INSULIN: ICD-10-CM

## 2024-02-05 DIAGNOSIS — E11.65 TYPE 2 DIABETES MELLITUS WITH HYPERGLYCEMIA, WITH LONG-TERM CURRENT USE OF INSULIN: ICD-10-CM

## 2024-02-05 LAB
ESTIMATED AVG GLUCOSE: 217 MG/DL (ref 68–131)
HBA1C MFR BLD: 9.2 % (ref 4–5.6)

## 2024-02-05 PROCEDURE — 83036 HEMOGLOBIN GLYCOSYLATED A1C: CPT | Performed by: NURSE PRACTITIONER

## 2024-02-05 PROCEDURE — 36415 COLL VENOUS BLD VENIPUNCTURE: CPT | Performed by: NURSE PRACTITIONER

## 2024-02-06 ENCOUNTER — PATIENT MESSAGE (OUTPATIENT)
Dept: INTERNAL MEDICINE | Facility: CLINIC | Age: 59
End: 2024-02-06
Payer: MEDICAID

## 2024-02-06 NOTE — PROGRESS NOTES
CHIEF COMPLAINT: Type 2 Diabetes     HPI: Mr. Juan Manuel Johnson is a 58 y.o. male who was diagnosed with Type 2 DM in [id number].   Previously seen by Dr. Cerda.  FREDIS Bonds RD, CDE.  +PN  Lab Results   Component Value Date    HGBA1C 9.2 (H) 02/05/2024     Just started back ozempic, out x 3-4 months.   Needs to re enroll with digital medicine for bp.   Has dexcom g7  Reports some bg > 250 mg/dl  10/11/23- ulnar nerve decompression  Stitches removed 10/25/23  Last seen by me in fall 2023.  Lab Results   Component Value Date    HGBA1C 9.2 (H) 02/05/2024       The patient location is: home   The chief complaint leading to consultation is: type 2 dm     Visit type: audiovisual    Face to Face time with patient: 15, 20   minutes of total time spent on the encounter, which includes face to face time and non-face to face time preparing to see the patient (eg, review of tests), Obtaining and/or reviewing separately obtained history, Documenting clinical information in the electronic or other health record, Independently interpreting results (not separately reported) and communicating results to the patient/family/caregiver, or Care coordination (not separately reported).     Each patient to whom he or she provides medical services by telemedicine is:  (1) informed of the relationship between the physician and patient and the respective role of any other health care provider with respect to management of the patient; and (2) notified that he or she may decline to receive medical services by telemedicine and may withdraw from such care at any time.    Notes:    PREVIOUS DIABETES MEDICATIONS TRIED  Lantus  Metformin  Ozempic   Prandin  Farxiga    CURRENT DIABETIC MEDS: ozempic 2 mg weekly, lantus 55 units bid, metformin 500 mg bid, prandin 4 mg once or bid  w/ meals, farxiga 5 mg daily     On MDI (injections 2x a day) or pump  Makes frequent changes to his/her insulin regimen on the basis of blood glucose  data  Testing 4 x a day  Patient is willing and able to use the device  Demonstrated an understanding of the technology and is motivated to use CGM  Patient expected to adhere to a comprehensive diabetes treatment plan and patient has adequate medical supervision  Patient experiences recurrent, unexplained, severe (lows of <50 mg/dl) hypoglycemia   Has 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   HgA1C Testing and Control   Lab Results   Component Value Date    HGBA1C 9.2 (H) 02/05/2024      Annually/Less than 8% No   Lipid profile : 07/19/2023 Annually Yes   LDL control Lab Results   Component Value Date    LDLCALC 56.8 (L) 07/19/2023    Annually/Less than 100 mg/dl  Yes   Nephropathy screening Lab Results   Component Value Date    LABMICR 143.0 07/19/2023     Lab Results   Component Value Date    PROTEINUA Trace (A) 09/09/2022    Annually Yes   Blood pressure BP Readings from Last 1 Encounters:   01/29/24 (!) 150/100    Less than 140/90 No   Dilated retinal exam : 05/10/2023 Annually Yes   Foot exam   : 10/25/2023 Annually Yes     REVIEW OF SYSTEMS  General: no weakness, fatigue, or weight changes.   Eyes: no double or blurred vision, eye pain, or redness  Cardiovascular: no chest pain, palpitations, edema, or murmurs.   Respiratory: no cough or dyspnea.   GI: no heartburn, nausea, or changes in bowel patterns; good appetite.   Skin: no rashes, dryness, itching, or reactions at insulin injection sites.  Neuro: no numbness, tingling, tremors, or vertigo.   Endocrine: no polyuria, polydipsia, polyphagia, heat or cold intolerance.     Vital Signs  There were no vitals taken for this visit.    Hemoglobin A1C   Date Value Ref Range Status   02/05/2024 9.2 (H) 4.0 - 5.6 % Final     Comment:     ADA Screening Guidelines:  5.7-6.4%  Consistent with  prediabetes  >or=6.5%  Consistent with diabetes    High levels of fetal hemoglobin interfere with the HbA1C  assay. Heterozygous hemoglobin variants (HbS, HgC, etc)do  not significantly interfere with this assay.   However, presence of multiple variants may affect accuracy.     01/29/2024 9.4 (H) 4.0 - 5.6 % Final     Comment:     ADA Screening Guidelines:  5.7-6.4%  Consistent with prediabetes  >or=6.5%  Consistent with diabetes    High levels of fetal hemoglobin interfere with the HbA1C  assay. Heterozygous hemoglobin variants (HbS, HgC, etc)do  not significantly interfere with this assay.   However, presence of multiple variants may affect accuracy.     10/23/2023 8.0 (H) 4.0 - 5.6 % Final     Comment:     ADA Screening Guidelines:  5.7-6.4%  Consistent with prediabetes  >or=6.5%  Consistent with diabetes    High levels of fetal hemoglobin interfere with the HbA1C  assay. Heterozygous hemoglobin variants (HbS, HgC, etc)do  not significantly interfere with this assay.   However, presence of multiple variants may affect accuracy.          Chemistry        Component Value Date/Time     01/29/2024 1119    K 3.7 01/29/2024 1119     01/29/2024 1119    CO2 26 01/29/2024 1119    BUN 14 01/29/2024 1119    CREATININE 1.1 01/29/2024 1119     (H) 01/29/2024 1119        Component Value Date/Time    CALCIUM 9.9 01/29/2024 1119    ALKPHOS 95 01/29/2024 1119    AST 26 01/29/2024 1119    ALT 36 01/29/2024 1119    BILITOT 0.4 01/29/2024 1119    ESTGFRAFRICA >60 04/12/2022 0930    EGFRNONAA >60 04/12/2022 0930           Lab Results   Component Value Date    TSH 2.561 01/29/2024      Lab Results   Component Value Date    CHOL 109 (L) 07/19/2023    CHOL 132 04/12/2022    CHOL 100 (L) 02/11/2021     Lab Results   Component Value Date    HDL 36 (L) 07/19/2023    HDL 40 04/12/2022    HDL 31 (L) 02/11/2021     Lab Results   Component Value Date    LDLCALC 56.8 (L) 07/19/2023    LDLCALC 70.8 04/12/2022    LDLCALC 50.8  (L) 2021     Lab Results   Component Value Date    TRIG 81 2023    TRIG 106 2022    TRIG 91 2021     Lab Results   Component Value Date    CHOLHDL 33.0 2023    CHOLHDL 30.3 2022    CHOLHDL 31.0 2021         PHYSICAL EXAMINATION  Constitutional: Appears well, no distress     Diabetes Foot Exam: deferred     Assessment/Plan    1. Type 2 diabetes mellitus with hyperglycemia, with long-term current use of insulin  Hemoglobin A1C    insulin (LANTUS SOLOSTAR U-100 INSULIN) glargine 100 units/mL SubQ pen    Ambulatory referral/consult to Diabetes Education      2. Polyneuropathy associated with underlying disease        3. 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 Essential hypertension        5. Chronic left shoulder pain        6. Type 2 diabetes mellitus with diabetic polyneuropathy, with long-term current use of insulin  losartan-hydrochlorothiazide 100-25 mg (HYZAAR) 100-25 mg per tablet      7. Hypothyroidism, unspecified type  levothyroxine (SYNTHROID) 50 MCG tablet        1-7. Follow up in 3 months w/ me  F/u with [place] upload of dexcom needed  Start novolog 12-12-12 units before meals  Stop bid lantus   Switch to lantus 60 units at night   Continue ozempic 2 mg weekly  Continue farxiga 5 mg daily  Continue metformin 500 mg bid   Stop prandin   Pa possibly needed for above meds-reach nurse staff   A1c goal less than 7%  Discussed dietary habits, stressors  Send  l-t4, and arb    FOLLOW UP  In 3 months     Time: 30 mins    Answers submitted by the patient for this visit:  Diabetes Questionnaire (Submitted on 2024)  Chief Complaint: Diabetes problem  Diabetes type: type 2  MedicAlert ID: No  blurred vision: No  chest pain: No  fatigue: No  foot paresthesias: Yes  foot ulcerations: No  polydipsia: Yes  polyphagia: No  polyuria: Yes  visual change: Yes  weakness: Yes  weight loss:  No  Symptom course: stable  confusion: Yes  dizziness: Yes  headaches: No  hunger: Yes  mood changes: No  nervous/anxious: No  pallor: No  seizures: No  sleepiness: Yes  speech difficulty: No  sweats: No  tremors: No  blackouts: No  hospitalization: No  nocturnal hypoglycemia: Yes  required assistance: No  required glucagon: Yes  CVA: No  heart disease: No  impotence: Yes  nephropathy: No  peripheral neuropathy: No  PVD: No  retinopathy: No  autonomic neuropathy: Yes  CAD risks: dyslipidemia, hypertension, obesity, diabetes mellitus  Current treatments: insulin injections, oral agent (triple therapy)  Treatment compliance: all of the time  Dose schedule: pre-breakfast, pre-dinner  Given by: patient  Injection sites: abdominal wall  Home blood tests: 1-2 x per day  Home urines: 1-2 x per day  Monitoring compliance: good  Blood glucose trend: decreasing rapidly  Weight trend: fluctuating minimally  Current diet: generally healthy  Meal planning: none  Exercise: daily  Dietitian visit: No  Eye exam current: Yes  Sees podiatrist: Yes

## 2024-02-07 ENCOUNTER — OFFICE VISIT (OUTPATIENT)
Dept: INTERNAL MEDICINE | Facility: CLINIC | Age: 59
End: 2024-02-07
Payer: MEDICAID

## 2024-02-07 ENCOUNTER — TELEPHONE (OUTPATIENT)
Dept: DIABETES | Facility: CLINIC | Age: 59
End: 2024-02-07
Payer: MEDICAID

## 2024-02-07 DIAGNOSIS — G89.29 CHRONIC LEFT SHOULDER PAIN: ICD-10-CM

## 2024-02-07 DIAGNOSIS — I10 ESSENTIAL HYPERTENSION: ICD-10-CM

## 2024-02-07 DIAGNOSIS — G63 POLYNEUROPATHY ASSOCIATED WITH UNDERLYING DISEASE: ICD-10-CM

## 2024-02-07 DIAGNOSIS — E03.9 HYPOTHYROIDISM, UNSPECIFIED TYPE: ICD-10-CM

## 2024-02-07 DIAGNOSIS — E11.65 TYPE 2 DIABETES MELLITUS WITH HYPERGLYCEMIA, WITH LONG-TERM CURRENT USE OF INSULIN: Primary | ICD-10-CM

## 2024-02-07 DIAGNOSIS — Z79.4 TYPE 2 DIABETES MELLITUS WITH HYPERGLYCEMIA, WITH LONG-TERM CURRENT USE OF INSULIN: Primary | ICD-10-CM

## 2024-02-07 DIAGNOSIS — E11.42 TYPE 2 DIABETES MELLITUS WITH DIABETIC POLYNEUROPATHY, WITH LONG-TERM CURRENT USE OF INSULIN: ICD-10-CM

## 2024-02-07 DIAGNOSIS — Z79.4 TYPE 2 DIABETES MELLITUS WITH DIABETIC POLYNEUROPATHY, WITH LONG-TERM CURRENT USE OF INSULIN: ICD-10-CM

## 2024-02-07 DIAGNOSIS — E11.3392 MODERATE NONPROLIFERATIVE DIABETIC RETINOPATHY OF LEFT EYE WITHOUT MACULAR EDEMA ASSOCIATED WITH TYPE 2 DIABETES MELLITUS: ICD-10-CM

## 2024-02-07 DIAGNOSIS — M25.512 CHRONIC LEFT SHOULDER PAIN: ICD-10-CM

## 2024-02-07 PROCEDURE — 99214 OFFICE O/P EST MOD 30 MIN: CPT | Mod: 95,,, | Performed by: NURSE PRACTITIONER

## 2024-02-07 PROCEDURE — 3072F LOW RISK FOR RETINOPATHY: CPT | Mod: CPTII,95,, | Performed by: NURSE PRACTITIONER

## 2024-02-07 PROCEDURE — 1160F RVW MEDS BY RX/DR IN RCRD: CPT | Mod: CPTII,95,, | Performed by: NURSE PRACTITIONER

## 2024-02-07 PROCEDURE — 3046F HEMOGLOBIN A1C LEVEL >9.0%: CPT | Mod: CPTII,95,, | Performed by: NURSE PRACTITIONER

## 2024-02-07 PROCEDURE — 1159F MED LIST DOCD IN RCRD: CPT | Mod: CPTII,95,, | Performed by: NURSE PRACTITIONER

## 2024-02-07 RX ORDER — INSULIN GLARGINE 100 [IU]/ML
INJECTION, SOLUTION SUBCUTANEOUS
Qty: 45 ML | Refills: 3
Start: 2024-02-07 | End: 2024-04-09 | Stop reason: SDUPTHER

## 2024-02-07 RX ORDER — INSULIN ASPART 100 [IU]/ML
INJECTION, SOLUTION INTRAVENOUS; SUBCUTANEOUS
Qty: 30 ML | Refills: 6 | Status: SHIPPED | OUTPATIENT
Start: 2024-02-07

## 2024-02-07 NOTE — Clinical Note
Stopping prandin Pt is interested in getting back in with digital medicine bp Switching to novolog 12-12-12 units ac  Just started back ozempic-out 3 months  Has dexcom g7-need upload-[facility] Nurse staff: pa for novolog

## 2024-02-11 RX ORDER — LEVOTHYROXINE SODIUM 50 UG/1
50 TABLET ORAL DAILY
Qty: 90 TABLET | Refills: 3 | Status: SHIPPED | OUTPATIENT
Start: 2024-02-11 | End: 2024-03-18 | Stop reason: SDUPTHER

## 2024-02-11 RX ORDER — LOSARTAN POTASSIUM AND HYDROCHLOROTHIAZIDE 25; 100 MG/1; MG/1
1 TABLET ORAL DAILY
Qty: 90 TABLET | Refills: 3 | Status: SHIPPED | OUTPATIENT
Start: 2024-02-11 | End: 2024-03-18 | Stop reason: SDUPTHER

## 2024-02-20 ENCOUNTER — NUTRITION (OUTPATIENT)
Dept: DIABETES | Facility: CLINIC | Age: 59
End: 2024-02-20
Payer: MEDICAID

## 2024-02-20 VITALS — WEIGHT: 221.31 LBS | BODY MASS INDEX: 30.87 KG/M2

## 2024-02-20 DIAGNOSIS — E11.65 TYPE 2 DIABETES MELLITUS WITH HYPERGLYCEMIA, WITH LONG-TERM CURRENT USE OF INSULIN: ICD-10-CM

## 2024-02-20 DIAGNOSIS — Z79.4 TYPE 2 DIABETES MELLITUS WITH HYPERGLYCEMIA, WITH LONG-TERM CURRENT USE OF INSULIN: ICD-10-CM

## 2024-02-20 PROCEDURE — 99999PBSHW PR PBB SHADOW TECHNICAL ONLY FILED TO HB: Mod: PBBFAC,,,

## 2024-02-20 PROCEDURE — 99211 OFF/OP EST MAY X REQ PHY/QHP: CPT | Mod: PBBFAC | Performed by: DIETITIAN, REGISTERED

## 2024-02-20 PROCEDURE — G0108 DIAB MANAGE TRN  PER INDIV: HCPCS | Mod: PBBFAC | Performed by: DIETITIAN, REGISTERED

## 2024-02-20 PROCEDURE — 99999 PR PBB SHADOW E&M-EST. PATIENT-LVL I: CPT | Mod: PBBFAC,,, | Performed by: DIETITIAN, REGISTERED

## 2024-02-28 ENCOUNTER — PROCEDURE VISIT (OUTPATIENT)
Dept: HEPATOLOGY | Facility: CLINIC | Age: 59
End: 2024-02-28
Payer: MEDICAID

## 2024-02-28 DIAGNOSIS — K74.00 LIVER FIBROSIS: ICD-10-CM

## 2024-02-28 PROCEDURE — 91200 LIVER ELASTOGRAPHY: CPT | Mod: PBBFAC | Performed by: NURSE PRACTITIONER

## 2024-02-28 NOTE — PROCEDURES
FibroScan (Vibration Controlled Transient Elastography)    Date/Time: 2/28/2024 8:45 AM    Performed by: Katharine Barrios NP  Authorized by: Zion Espinoza MD    Diagnosis:  Other    Probe:  M    Universal Protocol: Patient's identity, procedure and site were verified, confirmatory pause was performed.  Discussed procedure including risks and potential complications.  Questions answered.  Patient verbalizes understanding and wishes to proceed with VCTE.     Procedure: After providing explanations of the procedure, patient was placed in the supine position with right arm in maximum abduction to allow optimal exposure of right lateral abdomen.  Patient was briefly assessed, Testing was performed in the mid-axillary location, 50Hz Shear Wave pulses were applied and the resulting Shear Wave and Propagation Speed detected with a 3.5 MHz ultrasonic signal, using the FibroScan probe, Skin to liver capsule distance and liver parenchyma were accessed during the entire examination with the FibroScan probe, Patient was instructed to breathe normally and to abstain from sudden movements during the procedure, allowing for random measurements of liver stiffness. At least 10 Shear Waves were produced, Individual measurements of each Shear Wave were calculated.  Patient tolerated the procedure well with no complications.  Meets discharge criteria as was dismissed.  Rates pain 0 out of 10.  Patient will follow up with ordering provider to review results.    Findings  Median liver stiffness score:  6.5  CAP Reading: dB/m:  259    IQR/med %:  11  Interpretation  Fibrosis interpretation is based on medial liver stiffness - Kilopascal (kPa).    Fibrosis Stage:  F 0-1  Steatosis interpretation is based on controlled attenuation parameter - (dB/m).    Steatosis Grade:  S2  Comments/Plan:  External Fibroscan - Results sent to ordering provider.

## 2024-03-04 ENCOUNTER — PATIENT MESSAGE (OUTPATIENT)
Dept: ADMINISTRATIVE | Facility: HOSPITAL | Age: 59
End: 2024-03-04
Payer: MEDICAID

## 2024-03-04 ENCOUNTER — CLINICAL SUPPORT (OUTPATIENT)
Dept: DIABETES | Facility: CLINIC | Age: 59
End: 2024-03-04
Payer: MEDICAID

## 2024-03-04 VITALS — BODY MASS INDEX: 30.04 KG/M2 | WEIGHT: 215.38 LBS

## 2024-03-04 DIAGNOSIS — E11.65 TYPE 2 DIABETES MELLITUS WITH HYPERGLYCEMIA, WITH LONG-TERM CURRENT USE OF INSULIN: Primary | ICD-10-CM

## 2024-03-04 DIAGNOSIS — Z79.4 TYPE 2 DIABETES MELLITUS WITH HYPERGLYCEMIA, WITH LONG-TERM CURRENT USE OF INSULIN: Primary | ICD-10-CM

## 2024-03-04 PROCEDURE — G0108 DIAB MANAGE TRN  PER INDIV: HCPCS | Mod: PBBFAC | Performed by: DIETITIAN, REGISTERED

## 2024-03-04 PROCEDURE — 99999PBSHW PR PBB SHADOW TECHNICAL ONLY FILED TO HB: Mod: PBBFAC,,,

## 2024-03-05 ENCOUNTER — PATIENT MESSAGE (OUTPATIENT)
Dept: INTERNAL MEDICINE | Facility: CLINIC | Age: 59
End: 2024-03-05
Payer: MEDICAID

## 2024-03-05 NOTE — PROGRESS NOTES
Diabetes Care Specialist Progress Note  Author: Delmi Marina RD  Date: 3/5/2024         Lab Results   Component Value Date    HGBA1C 9.2 (H) 02/05/2024       Clinical    Weight: 97.7 kg (215 lb 6.2 oz)       Body mass index is 30.04 kg/m².                                  Additional Social History                                    Diabetes Self-Management Skills Assessment         Nutrition/Healthy Eating  Area of need?: Yes    Physical Activity/Exercise  Patient can identify forms of physical activity.: yes (No longer has a car, walks everywhere or take the bus. No formal exercise)    Medications  Area of need?: Yes    Home Blood Glucose Monitoring  Area of need?: Yes    Acute Complications  Patient is able to identify types of acute complications: Yes  Patient Identified:: Hypoglycemia  Patient is able to state the basic meaning of hypoglycemia?: Yes  Able to state the blood sugar range for hypoglycemia?: no (see comments)  Patient can identify general symptoms of hypoglycemia: yes  Able to state proper treatment of hypoglycemia?: no (see comments)  Acute Complications Skills Assessment Completed: : Yes  Assessment indicates:: Knowledge deficit, Instruction Needed  Area of need?: Yes    Chronic Complications  Patient can identify major chronic complications of diabetes.: no  Patient can identify ways to prevent or delay diabetes complications.: no  Patient is aware that having diabetes increases risk of heart disease?: No  Patient is aware that heart disease is the leading cause of death and disability in people with diabetes?: No  Patient able to state risk factors for heart disease?: No  Patient is taking statin?: Yes  Chronic Complications Skills Assessment Completed: : Yes  Assessment indicates:: Knowledge deficit, Instruction Needed  Area of need?: Yes           Assessment Summary and Plan    Based on today's diabetes care assessment, the following areas of need were identified:          2/20/2024     12:01 AM   Social   Support No   Access to Mass Media/Tech No   Cognitive/Behavioral Health No   Culture/Mu-ism No   Communication No   Health Literacy Yes            1/25/2021    12:00 AM   Clinical   Medication Adherence No   Lab Compliance No            3/4/2024    12:01 AM   Diabetes Self-Management Skills   Nutrition/Healthy Eating Yes-long discussion about low sodium diet   Medication Yes-see care plan   Home Blood Glucose Monitoring Yes-see care plan   Acute Complications Yes-ed on rule of 15, having regular hypoglycemia, will titrate Lantus to 55 units daily   Chronic Complications Yes-BP log reveals consistently elevated BP. Log shared with PCP. Ed on low sodium diet.           Today's interventions were provided through individual discussion, instruction, and written materials were provided.      Patient verbalized understanding of instruction and written materials.  Pt was able to return back demonstration of instructions today. Patient understood key points, needs reinforcement and further instruction.     Diabetes Self-Management Care Plan:    Today's Diabetes Self-Management Care Plan was developed with Juan Manuel's input. Juna Manuel has agreed to work toward the following goal(s) to improve his/her overall diabetes control.      Care Plan: Diabetes Management   Updates made since 2/4/2024 12:00 AM        Problem: Medications         Goal: Patient Agrees to take Diabetes Medications as prescribed.    Start Date: 2/20/2024   Expected End Date: 3/4/2024   This Visit's Progress: On track   Priority: High   Barriers: Knowledge deficit   Note:    2/20/24-Pt taking 60 units of lantus twice a day despite medication changes at his last endo visit. He picked up the novolog but did not think it was different than his lantus. He has not started taking it yet.  Printed out AVS from his last visit with Santosh. Long discussion about the differences between the 2 insulins and the proper timing of the insulins. Written  color coded instructions provided. He agrees to follow the written instructions so that he administers the insulin correctly. Agrees to skip the Novolog if he does not eat a meal. Will follow up in 2 weeks. Stressed that he can call if he is confused or has questions. Contact information provided.     3/4/24-Taking 60 units of Lantus around 6 or 7 pm.  Has not started taking Novolog yet. Has been taking Ozempic for the last 2 weeks which has decreased his appetite. Having some hypoglycemia. States he will decrease his Lantus to 55 units daily and not start meal time insulin at this time. He agrees to discuss this with Rea Bowie.       Task: Reviewed with patient all current diabetes medications and provided basic review of the purpose, dosage, frequency, side effects, and storage of both oral and injectable diabetes medications. Completed 2/20/2024        Task: Reviewed possible resources for acquiring cost prohibitive medication.         Task: Instructed patient on how to self-administer         Task: Discussed guidelines for preventing, detecting and treating hypoglycemia and hyperglycemia and reviewed the importance of meal and medication timing with diabetes mediations for prevention of hypoglycemia and maximum drug benefit. Completed 2/20/2024        Problem: Blood Glucose Self-Monitoring         Goal: Patient agrees to change Dexcom G7 every 10 days and bring his reciever to all clinic appointments    Start Date: 2/20/2024   Expected End Date: 3/4/2024   This Visit's Progress: No change   Priority: Medium   Barriers: Lack of Supplies   Note:    2/20/24-G7 sensor has fallen off twice and he is currently out of sensors. Provided him with a sample sensor today. He did not bring his reciever today so unable to download.    3/4/24-wearing his sensor on his abdomen with waterproof tape. Last sensor fell off early as well. Lost his  a couple of days ago. Is now pricking his finger. Bg logs reviewed.  Fastin,122,67,75,85,126. PP breakfast:135,158,125.130,125. Before dinner:139, 99, 66, 97. PP dinner:152,194,158,101, 100. He will continue to look for his dexcom . Ed on target BG ranges, proper treatment for hypoglycemia and insulin titration. Stressed eat 3 meals a day.       Task: Provided patient with a meter today and sent Rx request to provider to send to patients pharmacy.         Task: Reviewed the importance of self-monitoring blood glucose and keeping logs.         Task: Instructed on how to self-monitor blood glucose using a home glucometer, how to properly dispose of used strips and lancets after use, and how to appropriately store meter and supplies.         Task: Provided patient with blood glucose logs, reviewed appropriate timing and frequency to SMBG, education on parameters on when to notify provider and advised patient to bring logs to all appts with PCP/Endocrinologist/Diabetes Care Specialist.         Task: Discussed ways to minimize pain when monitoring blood glucose.           Follow Up Plan     No follow-ups on file.    Today's care plan and follow up schedule was discussed with patient.  Juan Manuel verbalized understanding of the care plan, goals, and agrees to follow up plan.        The patient was encouraged to communicate with his/her health care provider/physician and care team regarding his/her condition(s) and treatment.  I provided the patient with my contact information today and encouraged to contact me via phone or [facility] Patient Portal as needed.     Length of Visit   Total Time: 60 Minutes

## 2024-03-05 NOTE — PROGRESS NOTES
Diabetes Care Specialist Progress Note  Author: Delmi Marina RD  Date: 3/5/2024    Program Intake  Reason for Diabetes Program Visit:: Initial Diabetes Assessment  Current diabetes risk level:: moderate  In the last 12 months, have you:: none  Continuous Glucose Monitoring  Patient has CGM: Yes  Personal CGM type:: dexcom G7, not wearing at this time and does not have his reciever with him today    Lab Results   Component Value Date    HGBA1C 9.2 (H) 02/05/2024       Clinical    Weight: 100.4 kg (221 lb 5.5 oz)       Body mass index is 30.87 kg/m².    Patient Health Rating  Compared to other people your age, how would you rate your health?: Good    Problem Review  Reviewed Problem List with Patient: no (see comments)  Active comorbidities affecting diabetes self-care.: yes  Comorbidities: Cardiovascular Disease, Hypertension  Reviewed health maintenance: no (see comments)    Clinical Assessment  Current Diabetes Treatment: Oral Medication, Injectable, Insulin                   Additional Social History    Support  Does anyone support you with your diabetes care?: yes  Who supports you?: spouse  Who takes you to your medical appointments?: self  Does the current support meet the patient's needs?: Yes  Is Support an area impacting ability to self-manage diabetes?: No    Access to Mass Media & Technology  Does the patient have access to any of the following devices or technologies?: Smart phone  Media or technology needs impacting ability to self-manage diabetes?: No    Cognitive/Behavioral Health  Alert and Oriented: Yes  Difficulty Thinking: No  Requires Prompting: No  Requires assistance for routine expression?: No  Cognitive or behavioral barriers impacting ability to self-manage diabetes?: No    Culture/Hindu  Culture or Gnosticist beliefs that may impact ability to access healthcare: No    Communication  Language preference: English  Hearing Problems: No  Vision Problems: Yes  Vision Assistance:  Glasses  Communication needs impacting ability to self-manage diabetes?: No    Health Literacy  Preferred Learning Method: Face to Face  How often do you need to have someone help you read instructions, pamphlets, or written material from your doctor or pharmacy?: Rarely  Health literacy needs impacting ability to self-manage diabetes?: Yes (admits to being forgetful)      Diabetes Self-Management Skills Assessment    Diabetes Disease Process/Treatment Options  Patient/caregiver able to state what happens when someone has diabetes.: somewhat  Patient/caregiver knows what type of diabetes they have.: yes  Diabetes Type : Type II  Patient/caregiver able to identify at least three signs and symptoms of diabetes.: no  Patient able to identify at least three risk factors for diabetes.: no  Diabetes Disease Process/Treatment Options: Skills Assessment Completed: Yes  Assessment indicates:: Knowledge deficit, Instruction Needed  Area of need?: Yes    Nutrition/Healthy Eating  Challenges to healthy eating:: lack of will power  Method of carbohydrate measurement:: no method  Patient can identify foods that impact blood sugar.: yes  Patient-identified foods:: sweets  Nutrition/Healthy Eating Skills Assessment Completed:: Yes  Assessment indicates:: Instruction Needed  Area of need?: Yes    Physical Activity/Exercise  Patient's daily activity level:: lightly active  Patient formally exercises outside of work.: no  Patient can identify forms of physical activity.: yes  Stated forms of physical activity:: any movement performed by muscles that uses energy  Patient can identify reasons why exercise/physical activity is important in diabetes management.: no  Physical Activity/Exercise Skills Assessment Completed: : Yes  Assessment indicates:: Knowledge deficit, Instruction Needed  Area of need?: Yes    Medications  Patient is able to describe current diabetes management routine.: no  Patient is able to identify current diabetes  medications, dosages, and appropriate timing of medications.: no  Patient understands the purpose of the medications taken for diabetes.: no  Patient reports problems or concerns with current medication regimen.: yes  Medication regimen problems/concerns:: unsure of doses  Medication Skills Assessment Completed:: Yes  Assessment indicates:: Knowledge deficit, Instruction Needed  Area of need?: Yes    Home Blood Glucose Monitoring  Personal CGM type:: dexcom G7, not wearing at this time and does not have his reciever with him today  Home Blood Glucose Monitoring Skills Assessment Completed: : Yes  Assessment indicates:: Knowledge deficit, Instruction Needed  Area of need?: Yes    Acute Complications  Acute Complications Skills Assessment Completed: : No  Deffered due to:: Time    Chronic Complications  Chronic Complications Skills Assessment Completed: : No  Deferred due to:: Time    Psychosocial/Coping  Patient can identify ways of coping with chronic disease.: yes  Patient-stated ways of coping with chronic disease:: support from loved ones, spiritual/Yarsanism practices  Psychosocial/Coping Skills Assessment Completed: : Yes  Assessment indicates:: Adequate understanding  Area of need?: No      Assessment Summary and Plan    Based on today's diabetes care assessment, the following areas of need were identified:          2/20/2024    12:01 AM   Social   Support No   Access to [employer]/Tech No   Cognitive/Behavioral Health No   Culture/Rastafarian No   Communication No   Health Literacy Yes            1/25/2021    12:00 AM   Clinical   Medication Adherence No   Lab Compliance No            2/20/2024    12:01 AM   Diabetes Self-Management Skills   Diabetes Disease Process/Treatment Options Yes   Nutrition/Healthy Eating Yes-ed on low sodium diet   Physical Activity/Exercise Yes-time deferred   Medication Yes-see care plan   Home Blood Glucose Monitoring Yes-see care plan   Psychosocial/Coping No          Today's  interventions were provided through individual discussion, instruction, and written materials were provided.      Patient verbalized understanding of instruction and written materials.  Pt was able to return back demonstration of instructions today. Patient understood key points, needs reinforcement and further instruction.     Diabetes Self-Management Care Plan:    Today's Diabetes Self-Management Care Plan was developed with Juan Manuel's input. Juan Manuel has agreed to work toward the following goal(s) to improve his/her overall diabetes control.      Care Plan: Diabetes Management   Updates made since 2/4/2024 12:00 AM        Problem: Medications         Goal: Patient Agrees to take Diabetes Medications as prescribed.    Start Date: 2/20/2024   Expected End Date: 3/4/2024   This Visit's Progress: On track   Priority: High   Barriers: Knowledge deficit   Note:    2/20/24-Pt taking 60 units of lantus twice a day despite medication changes at his last endo visit. He picked up the novolog but did not think it was different than his lantus. He has not started taking it yet.  Printed out AVS from his last visit with Santosh. Long discussion about the differences between the 2 insulins and the proper timing of the insulins. Written color coded instructions provided. He agrees to follow the written instructions so that he administers the insulin correctly. Agrees to skip the Novolog if he does not eat a meal. Will follow up in 2 weeks. Stressed that he can call if he is confused or has questions. Contact information provided.     3/4/24-Taking 60 units of Lantus around 6 or 7 pm.  Has not started taking Novolog yet. Has been taking Ozempic for the last 2 weeks which has decreased his appetite. Having some hypoglycemia. States he will decrease his Lantus to 55 units daily and not start meal time insulin at this time.        Task: Reviewed with patient all current diabetes medications and provided basic review of the purpose,  dosage, frequency, side effects, and storage of both oral and injectable diabetes medications. Completed 2024        Task: Reviewed possible resources for acquiring cost prohibitive medication.         Task: Instructed patient on how to self-administer         Task: Discussed guidelines for preventing, detecting and treating hypoglycemia and hyperglycemia and reviewed the importance of meal and medication timing with diabetes mediations for prevention of hypoglycemia and maximum drug benefit. Completed 2024        Problem: Blood Glucose Self-Monitoring         Goal: Patient agrees to change Dexcom G7 every 10 days and bring his reciever to all clinic appointments    Start Date: 2024   Expected End Date: 3/4/2024   This Visit's Progress: No change   Priority: Medium   Barriers: Lack of Supplies   Note:    24-G7 sensor has fallen off twice and he is currently out of sensors. Provided him with a sample sensor today. He did not bring his reciever today so unable to download.    3/4/24-wearing his sensor on his abdomen with waterproof tape. Last sensor fell off early as well. Lost his  a couple of days ago. Is now pricking his finger. Bg logs reviewed. Fastin,122,67,75,85,126. PP breakfast:135,158,125.130,125. Before dinner:139, 99, 66, 97. PP dinner:152,194,158,101, 100. He will continue to look for his dexcom . Ed on target BG ranges.        Task: Provided patient with a meter today and sent Rx request to provider to send to patients pharmacy.         Task: Reviewed the importance of self-monitoring blood glucose and keeping logs.         Task: Instructed on how to self-monitor blood glucose using a home glucometer, how to properly dispose of used strips and lancets after use, and how to appropriately store meter and supplies.         Task: Provided patient with blood glucose logs, reviewed appropriate timing and frequency to SMBG, education on parameters on when to notify  provider and advised patient to bring logs to all appts with PCP/Endocrinologist/Diabetes Care Specialist.         Task: Discussed ways to minimize pain when monitoring blood glucose.           Follow Up Plan     No follow-ups on file.    Today's care plan and follow up schedule was discussed with patient.  Juan Manuel verbalized understanding of the care plan, goals, and agrees to follow up plan.        The patient was encouraged to communicate with his/her health care provider/physician and care team regarding his/her condition(s) and treatment.  I provided the patient with my contact information today and encouraged to contact me via phone or [facility] Patient Portal as needed.     Length of Visit   Total Time: 60 Minutes

## 2024-03-18 DIAGNOSIS — E11.65 TYPE 2 DIABETES MELLITUS WITH HYPERGLYCEMIA, WITH LONG-TERM CURRENT USE OF INSULIN: ICD-10-CM

## 2024-03-18 DIAGNOSIS — Z79.4 TYPE 2 DIABETES MELLITUS WITH HYPERGLYCEMIA, WITH LONG-TERM CURRENT USE OF INSULIN: ICD-10-CM

## 2024-03-18 DIAGNOSIS — E03.9 HYPOTHYROIDISM, UNSPECIFIED TYPE: ICD-10-CM

## 2024-03-18 DIAGNOSIS — E11.42 TYPE 2 DIABETES MELLITUS WITH DIABETIC POLYNEUROPATHY, WITH LONG-TERM CURRENT USE OF INSULIN: ICD-10-CM

## 2024-03-18 DIAGNOSIS — N47.1 PHIMOSIS: ICD-10-CM

## 2024-03-18 DIAGNOSIS — Z79.4 TYPE 2 DIABETES MELLITUS WITH DIABETIC POLYNEUROPATHY, WITH LONG-TERM CURRENT USE OF INSULIN: ICD-10-CM

## 2024-03-18 RX ORDER — BLOOD-GLUCOSE SENSOR
EACH MISCELLANEOUS
Qty: 9 EACH | Refills: 3 | Status: SHIPPED | OUTPATIENT
Start: 2024-03-18 | End: 2024-04-09 | Stop reason: SDUPTHER

## 2024-03-18 RX ORDER — LOSARTAN POTASSIUM AND HYDROCHLOROTHIAZIDE 25; 100 MG/1; MG/1
1 TABLET ORAL DAILY
Qty: 90 TABLET | Refills: 3 | Status: SHIPPED | OUTPATIENT
Start: 2024-03-18 | End: 2024-04-09 | Stop reason: SDUPTHER

## 2024-03-18 RX ORDER — DAPAGLIFLOZIN 5 MG/1
5 TABLET, FILM COATED ORAL DAILY
Qty: 90 TABLET | Refills: 3 | Status: SHIPPED | OUTPATIENT
Start: 2024-03-18

## 2024-03-18 RX ORDER — LEVOTHYROXINE SODIUM 50 UG/1
50 TABLET ORAL DAILY
Qty: 90 TABLET | Refills: 3 | Status: SHIPPED | OUTPATIENT
Start: 2024-03-18 | End: 2024-04-09 | Stop reason: SDUPTHER

## 2024-03-18 RX ORDER — CLOTRIMAZOLE 1 %
CREAM (GRAM) TOPICAL 2 TIMES DAILY
Qty: 45 G | Refills: 1 | Status: SHIPPED | OUTPATIENT
Start: 2024-03-18

## 2024-03-18 RX ORDER — SEMAGLUTIDE 2.68 MG/ML
2 INJECTION, SOLUTION SUBCUTANEOUS
Qty: 3 ML | Refills: 11 | Status: SHIPPED | OUTPATIENT
Start: 2024-03-18 | End: 2025-03-18

## 2024-03-18 NOTE — TELEPHONE ENCOUNTER
No care due was identified.  [facility] Embedded Care Due Messages. Reference number: [id number].   3/18/2024 8:56:48 AM CDT

## 2024-03-19 ENCOUNTER — CLINICAL SUPPORT (OUTPATIENT)
Dept: DIABETES | Facility: CLINIC | Age: 59
End: 2024-03-19
Payer: MEDICAID

## 2024-03-19 DIAGNOSIS — E11.65 TYPE 2 DIABETES MELLITUS WITH HYPERGLYCEMIA, WITH LONG-TERM CURRENT USE OF INSULIN: Primary | ICD-10-CM

## 2024-03-19 DIAGNOSIS — Z79.4 TYPE 2 DIABETES MELLITUS WITH HYPERGLYCEMIA, WITH LONG-TERM CURRENT USE OF INSULIN: Primary | ICD-10-CM

## 2024-03-19 PROCEDURE — 99999PBSHW PR PBB SHADOW TECHNICAL ONLY FILED TO HB: Mod: PBBFAC,,,

## 2024-03-19 PROCEDURE — G0108 DIAB MANAGE TRN  PER INDIV: HCPCS | Mod: PBBFAC | Performed by: DIETITIAN, REGISTERED

## 2024-03-19 NOTE — PROGRESS NOTES
Diabetes Care Specialist Progress Note  Author: Delmi Marina RD  Date: 3/19/2024    Program Intake  Reason for Diabetes Program Visit:: Intervention  Type of Intervention:: Individual    Lab Results   Component Value Date    HGBA1C 9.2 (H) 02/05/2024       Clinical            There is no height or weight on file to calculate BMI.                                  Additional Social History                                    Diabetes Self-Management Skills Assessment                   Medications  Area of need?: Yes    Home Blood Glucose Monitoring  Area of need?: Yes    Acute Complications  Area of need?: Yes                Assessment Summary and Plan    Based on today's diabetes care assessment, the following areas of need were identified:          2/20/2024    12:01 AM   Social   Support No   Access to Mass Media/Tech No   Cognitive/Behavioral Health No   Culture/Congregation No   Communication No   Health Literacy Yes            1/25/2021    12:00 AM   Clinical   Medication Adherence No   Lab Compliance No            3/19/2024    12:01 AM   Diabetes Self-Management Skills   Medication Yes-see care plan   Home Blood Glucose Monitoring Yes-see care plan   Acute Complications Yes-Cont to have some hypoglycemia, reviewed rule of 15. Pt carries 3 peppermints at all times and keeps a can of regular soda on his bedside table.          Today's interventions were provided through individual discussion, instruction, and written materials were provided.      Patient verbalized understanding of instruction and written materials.  Pt was able to return back demonstration of instructions today. Patient understood key points, needs reinforcement and further instruction.     Diabetes Self-Management Care Plan:    Today's Diabetes Self-Management Care Plan was developed with Juan Manuel's input. Juan Manuel has agreed to work toward the following goal(s) to improve his/her overall diabetes control.      Care Plan: Diabetes Management    Updates made since 2/18/2024 12:00 AM        Problem: Medications         Goal: Patient Agrees to take Diabetes Medications as prescribed.    Start Date: 2/20/2024   Expected End Date: 3/4/2024   This Visit's Progress: On track   Recent Progress: On track   Priority: High   Barriers: Knowledge deficit   Note:    2/20/24-Pt taking 60 units of lantus twice a day despite medication changes at his last endo visit. He picked up the novolog but did not think it was different than his lantus. He has not started taking it yet.  Printed out AVS from his last visit with Santosh. Long discussion about the differences between the 2 insulins and the proper timing of the insulins. Written color coded instructions provided. He agrees to follow the written instructions so that he administers the insulin correctly. Agrees to skip the Novolog if he does not eat a meal. Will follow up in 2 weeks. Stressed that he can call if he is confused or has questions. Contact information provided.     3/4/24-Taking 60 units of Lantus around 6 or 7 pm.  Has not started taking Novolog yet. Has been taking Ozempic for the last 2 weeks which has decreased his appetite. Having some hypoglycemia. States he will decrease his Lantus to 55 units daily and not start meal time insulin at this time. He agrees to discuss this with Rea Bowie.    3/19/24-Taking 50 units of lantus between 6-7 pm. No Novolog. Cont to take Ozempic, however, he is not taking the correct dose. He states that her just twist up to no particular amount.  Agrees to take 2 mg next Saturday which is his regular day. Demonstrated on a demo pen how to click up to 2mg with teach back.  Is still having hypoglycemia. Agrees to titrate down to 45 units of Lantus. Continues to take Farxiga and Metformin.  BP medications have been adjusted according to Pt. BP readings:121/81. 142/93, 129/94, 122/88, 148/93, 151/105, 138/98- much improved.       Task: Reviewed with patient all current  diabetes medications and provided basic review of the purpose, dosage, frequency, side effects, and storage of both oral and injectable diabetes medications. Completed 2024        Task: Reviewed possible resources for acquiring cost prohibitive medication.         Task: Instructed patient on how to self-administer         Task: Discussed guidelines for preventing, detecting and treating hypoglycemia and hyperglycemia and reviewed the importance of meal and medication timing with diabetes mediations for prevention of hypoglycemia and maximum drug benefit. Completed 2024        Problem: Blood Glucose Self-Monitoring         Goal: Patient agrees to change Dexcom G7 every 10 days and bring his reciever to all clinic appointments    Start Date: 2024   Expected End Date: 3/4/2024   This Visit's Progress: On track   Recent Progress: No change   Priority: Medium   Barriers: Lack of Supplies   Note:    24-G7 sensor has fallen off twice and he is currently out of sensors. Provided him with a sample sensor today. He did not bring his reciever today so unable to download.    3/4/24-wearing his sensor on his abdomen with waterproof tape. Last sensor fell off early as well. Lost his  a couple of days ago. Is now pricking his finger. Bg logs reviewed. Fastin,122,67,75,85,126. PP breakfast:135,158,125.130,125. Before dinner:139, 99, 66, 97. PP dinner:152,194,158,101, 100. He will continue to look for his dexcom . Ed on target BG ranges, proper treatment for hypoglycemia and insulin titration. Stressed eat 3 meals a day.    3/19/24-presents with . Report downloaded. Is 94% in range. Cont to have some low BG, insulin titrated to 45 units. Ave BG is 116.       Task: Provided patient with a meter today and sent Rx request to provider to send to patients pharmacy.         Task: Reviewed the importance of self-monitoring blood glucose and keeping logs.         Task: Instructed on how to  self-monitor blood glucose using a home glucometer, how to properly dispose of used strips and lancets after use, and how to appropriately store meter and supplies.         Task: Provided patient with blood glucose logs, reviewed appropriate timing and frequency to SMBG, education on parameters on when to notify provider and advised patient to bring logs to all appts with PCP/Endocrinologist/Diabetes Care Specialist.         Task: Discussed ways to minimize pain when monitoring blood glucose.           Follow Up Plan     No follow-ups on file.    Today's care plan and follow up schedule was discussed with patient.  Juan Manuel verbalized understanding of the care plan, goals, and agrees to follow up plan.        The patient was encouraged to communicate with his/her health care provider/physician and care team regarding his/her condition(s) and treatment.  I provided the patient with my contact information today and encouraged to contact me via phone or [facility] Patient Portal as needed.     Length of Visit   Total Time: 30 Minutes

## 2024-04-09 DIAGNOSIS — E11.42 TYPE 2 DIABETES MELLITUS WITH DIABETIC POLYNEUROPATHY, WITH LONG-TERM CURRENT USE OF INSULIN: ICD-10-CM

## 2024-04-09 DIAGNOSIS — Z79.4 TYPE 2 DIABETES MELLITUS WITH HYPERGLYCEMIA, WITH LONG-TERM CURRENT USE OF INSULIN: ICD-10-CM

## 2024-04-09 DIAGNOSIS — E03.9 HYPOTHYROIDISM, UNSPECIFIED TYPE: ICD-10-CM

## 2024-04-09 DIAGNOSIS — E11.65 TYPE 2 DIABETES MELLITUS WITH HYPERGLYCEMIA, WITH LONG-TERM CURRENT USE OF INSULIN: ICD-10-CM

## 2024-04-09 DIAGNOSIS — E78.5 HYPERLIPIDEMIA, UNSPECIFIED HYPERLIPIDEMIA TYPE: ICD-10-CM

## 2024-04-09 DIAGNOSIS — Z79.4 TYPE 2 DIABETES MELLITUS WITH DIABETIC POLYNEUROPATHY, WITH LONG-TERM CURRENT USE OF INSULIN: ICD-10-CM

## 2024-04-09 RX ORDER — BLOOD-GLUCOSE SENSOR
EACH MISCELLANEOUS
Qty: 9 EACH | Refills: 3 | Status: SHIPPED | OUTPATIENT
Start: 2024-04-09

## 2024-04-09 NOTE — TELEPHONE ENCOUNTER
No care due was identified.  [facility] Embedded Care Due Messages. Reference number: [id number].   4/09/2024 8:54:39 AM CDT

## 2024-04-09 NOTE — TELEPHONE ENCOUNTER
----- Message from Mickey Tolentino sent at 4/8/2024 10:07 AM CDT -----  Regarding: Refil Request  Who Called:DOMENICO MOSS [[id number]]        New Prescription or Refill : Refill      RX Name and Strength:  blood-glucose sensor (DEXCOM G7 SENSOR) Susan            Local or Mail Order : Local             Preferred Pharmacy:[facility][id number] [address] AT [facility] [address] [facility]      Would the patient rather a call back or a response via [facility]?no        Best Call Back Number:   [phone]        Additional Information:.PT needs his meds refilled and he said the pharmacy said the dr wouldn't refill this med if you wont please give the pt a call and tell him why not. Please assist

## 2024-04-09 NOTE — TELEPHONE ENCOUNTER
Refill Routing Note   Medication(s) are not appropriate for processing by [facility] for the following reason(s):        Required vitals abnormal  New or recently adjusted medication  No active prescription written by provider    [facility] action(s):  Defer  Approve             Appointments  past 12m or future 3m with PCP    Date Provider   Last Visit   1/29/2024 Zion Espinoza MD   Next Visit   Visit date not found Zion Espinoza MD   ED visits in past 90 days: 0        Note composed:9:57 AM 04/09/2024

## 2024-04-10 RX ORDER — DEXTROSE 4 G
TABLET,CHEWABLE ORAL
Qty: 1 EACH | Refills: 0 | Status: SHIPPED | OUTPATIENT
Start: 2024-04-10

## 2024-04-10 RX ORDER — INSULIN GLARGINE 100 [IU]/ML
INJECTION, SOLUTION SUBCUTANEOUS
Qty: 45 ML | Refills: 1
Start: 2024-04-10

## 2024-04-10 RX ORDER — LOSARTAN POTASSIUM AND HYDROCHLOROTHIAZIDE 25; 100 MG/1; MG/1
1 TABLET ORAL DAILY
Qty: 90 TABLET | Refills: 3 | Status: SHIPPED | OUTPATIENT
Start: 2024-04-10 | End: 2025-04-10

## 2024-04-10 RX ORDER — LEVOTHYROXINE SODIUM 50 UG/1
50 TABLET ORAL DAILY
Qty: 90 TABLET | Refills: 3 | Status: SHIPPED | OUTPATIENT
Start: 2024-04-10

## 2024-04-10 RX ORDER — ATORVASTATIN CALCIUM 20 MG/1
20 TABLET, FILM COATED ORAL DAILY
Qty: 90 TABLET | Refills: 1 | Status: SHIPPED | OUTPATIENT
Start: 2024-04-10

## 2024-04-10 RX ORDER — METFORMIN HYDROCHLORIDE 500 MG/1
500 TABLET ORAL 2 TIMES DAILY WITH MEALS
Qty: 180 TABLET | Refills: 1 | Status: SHIPPED | OUTPATIENT
Start: 2024-04-10

## 2024-04-16 ENCOUNTER — CLINICAL SUPPORT (OUTPATIENT)
Dept: DIABETES | Facility: CLINIC | Age: 59
End: 2024-04-16
Payer: MEDICAID

## 2024-04-16 VITALS — BODY MASS INDEX: 29.16 KG/M2 | WEIGHT: 209.13 LBS

## 2024-04-16 DIAGNOSIS — Z79.4 TYPE 2 DIABETES MELLITUS WITH HYPERGLYCEMIA, WITH LONG-TERM CURRENT USE OF INSULIN: Primary | ICD-10-CM

## 2024-04-16 DIAGNOSIS — E11.65 TYPE 2 DIABETES MELLITUS WITH HYPERGLYCEMIA, WITH LONG-TERM CURRENT USE OF INSULIN: Primary | ICD-10-CM

## 2024-04-16 PROCEDURE — 99999PBSHW PR PBB SHADOW TECHNICAL ONLY FILED TO HB: Mod: PBBFAC,,,

## 2024-04-16 PROCEDURE — G0108 DIAB MANAGE TRN  PER INDIV: HCPCS | Mod: PBBFAC | Performed by: DIETITIAN, REGISTERED

## 2024-04-16 NOTE — PROGRESS NOTES
Diabetes Care Specialist Progress Note  Author: Delmi Marina RD  Date: 4/16/2024    Program Intake  Reason for Diabetes Program Visit:: Intervention  Type of Intervention:: Individual  Individual: Education  Education: Self-Management Skill Review, Pattern Management  Continuous Glucose Monitoring  Patient has CGM: Yes  Personal CGM type:: Dexcom G7-not currently wearing, pharmacy did not fill his RX. Uses a reciever    Lab Results   Component Value Date    HGBA1C 9.2 (H) 02/05/2024       Clinical    Weight: 94.8 kg (209 lb 1.7 oz)       Body mass index is 29.16 kg/m².                                  Additional Social History                                    Diabetes Self-Management Skills Assessment         Nutrition/Healthy Eating  Area of need?: Yes         Medications  Area of need?: Yes    Home Blood Glucose Monitoring  Personal CGM type:: Dexcom G7-not currently wearing, pharmacy did not fill his RX. Uses a reciever  Area of need?: Yes                     Assessment Summary and Plan    Based on today's diabetes care assessment, the following areas of need were identified:          2/20/2024    12:01 AM   Social   Support No   Access to Mass Media/Tech No   Cognitive/Behavioral Health No   Culture/Holiness No   Communication No   Health Literacy Yes            1/25/2021    12:00 AM   Clinical   Medication Adherence No   Lab Compliance No            4/16/2024    12:01 AM   Diabetes Self-Management Skills   Nutrition/Healthy Eating Yes-see care plan   Medication Yes-see care plan   Home Blood Glucose Monitoring Yes-see care plan          Today's interventions were provided through individual discussion, instruction, and written materials were provided.      Patient verbalized understanding of instruction and written materials.  Pt was able to return back demonstration of instructions today. Patient understood key points, needs reinforcement and further instruction.     Diabetes Self-Management Care  Plan:    Today's Diabetes Self-Management Care Plan was developed with Juan Manuel's input. Juan Manuel has agreed to work toward the following goal(s) to improve his/her overall diabetes control.      Care Plan: Diabetes Management   Updates made since 3/17/2024 12:00 AM        Problem: Medications         Goal: Patient Agrees to take Diabetes Medications as prescribed.    Start Date: 2/20/2024   Expected End Date: 3/4/2024   This Visit's Progress: On track   Recent Progress: On track   Priority: High   Barriers: Knowledge deficit   Note:    2/20/24-Pt taking 60 units of lantus twice a day despite medication changes at his last endo visit. He picked up the novolog but did not think it was different than his lantus. He has not started taking it yet.  Printed out AVS from his last visit with Santosh. Long discussion about the differences between the 2 insulins and the proper timing of the insulins. Written color coded instructions provided. He agrees to follow the written instructions so that he administers the insulin correctly. Agrees to skip the Novolog if he does not eat a meal. Will follow up in 2 weeks. Stressed that he can call if he is confused or has questions. Contact information provided.     3/4/24-Taking 60 units of Lantus around 6 or 7 pm.  Has not started taking Novolog yet. Has been taking Ozempic for the last 2 weeks which has decreased his appetite. Having some hypoglycemia. States he will decrease his Lantus to 55 units daily and not start meal time insulin at this time. He agrees to discuss this with Rea Bowie.    3/19/24-Taking 50 units of lantus between 6-7 pm. No Novolog. Cont to take Ozempic, however, he is not taking the correct dose. He states that her just twist up to no particular amount.  Agrees to take 2 mg next Saturday which is his regular day. Demonstrated on a demo pen how to click up to 2mg with teach back.  Is still having hypoglycemia. Agrees to titrate down to 45 units of Lantus.  Continues to take Farxiga and Metformin.  BP medications have been adjusted according to Pt. BP readings:121/81. 142/93, 129/94, 122/88, 148/93, 151/105, 138/98- much improved.    24-Taking 2 mg of Ozempic every Saturday and taking 45 units of Lantus every evening at the same time. Cont to take Farxiga and Metformin. No novolog. No longer having low blood sugars.  BP readings:116/79, 120/82, 129/85, 115/77, 127/90, 122/85, 138/96.        Problem: Blood Glucose Self-Monitoring         Goal: Patient agrees to change Dexcom G7 every 10 days and bring his reciever to all clinic appointments    Start Date: 2024   Expected End Date: 3/4/2024   This Visit's Progress: Deferred   Recent Progress: On track   Priority: Medium   Barriers: Lack of Supplies   Note:    24-G7 sensor has fallen off twice and he is currently out of sensors. Provided him with a sample sensor today. He did not bring his reciever today so unable to download.    3/4/24-wearing his sensor on his abdomen with waterproof tape. Last sensor fell off early as well. Lost his  a couple of days ago. Is now pricking his finger. Bg logs reviewed. Fastin,122,67,75,85,126. PP breakfast:135,158,125.130,125. Before dinner:139, 99, 66, 97. PP dinner:152,194,158,101, 100. He will continue to look for his dexcom . Ed on target BG ranges, proper treatment for hypoglycemia and insulin titration. Stressed eat 3 meals a day.    3/19/24-presents with . Report downloaded. Is 94% in range. Cont to have some low BG, insulin titrated to 45 units. Ave BG is 116.    24-Tried to  Dexcom 7 supplies at the pharmacy and was told his RX was discontinued. Showed him his current RX and will return to the pharmacy today to speak with the pharmacist about this. He has not worn his sensor or pricked his finger in 16 days. He also agrees to  his backup meter and supplies today. BG today on clinic meter was 68 and is asymptomatic.  Has been up since 4 am and has not eaten. Glucerna provided. Agrees to eat breakfast daily by 8 am.        Problem: Healthy Eating         Goal: Agrees to eat breakfast by 8 am daily    Start Date: 4/16/2024   Expected End Date: 5/13/2024   Priority: Medium   Note:    4/16/24-Pt 68 during his appointment today and is asymptomatic. Glucerna provided. He has been up since 4 am and has not eaten yet. Agrees to eat breakfast daily. Breakfast is usually an egg sandwich with cappuccino no sugar. Sometimes eats a poptart but agrees to stop that. Lunch in afternoon is leftovers that his wife has cooked.Yesterday at spaghetti with baked chicken and green beans with water. Dinner:Fried shrimp and french fries with soda. Agrees to try diet soda and avoid juice to improve A1c.        Task: Reviewed the sources and role of Carbohydrate, Protein, and Fat and how each nutrient impacts blood sugar.         Task: Provided visual examples using dry measuring cups, food models, and other familiar objects such as computer mouse, deck or cards, tennis ball etc. to help with visualization of portions.         Task: Explained how to count carbohydrates using the food label and the use of dry measuring cups for accurate carb counting.         Task: Discussed strategies for choosing healthier menu options when dining out.         Task: Recommended replacing beverages containing high sugar content with noncaloric/sugar free options and/or water. Completed 4/16/2024        Task: Review the importance of balancing carbohydrates with each meal using portion control techniques to count servings of carbohydrate and label reading to identify serving size and amount of total carbs per serving.         Task: Provided Sample plate method and reviewed the use of the plate to estimate amounts of carbohydrate per meal.           Follow Up Plan     No follow-ups on file.    Today's care plan and follow up schedule was discussed with patient.  Juan Manuel  verbalized understanding of the care plan, goals, and agrees to follow up plan.        The patient was encouraged to communicate with his/her health care provider/physician and care team regarding his/her condition(s) and treatment.  I provided the patient with my contact information today and encouraged to contact me via phone or [facility] Patient Portal as needed.     Length of Visit   Total Time: 60 Minutes

## 2024-05-13 ENCOUNTER — CLINICAL SUPPORT (OUTPATIENT)
Dept: DIABETES | Facility: CLINIC | Age: 59
End: 2024-05-13
Payer: MEDICAID

## 2024-05-13 VITALS — BODY MASS INDEX: 29.13 KG/M2 | WEIGHT: 208.88 LBS

## 2024-05-13 DIAGNOSIS — E11.65 TYPE 2 DIABETES MELLITUS WITH HYPERGLYCEMIA, WITH LONG-TERM CURRENT USE OF INSULIN: Primary | ICD-10-CM

## 2024-05-13 DIAGNOSIS — Z79.4 TYPE 2 DIABETES MELLITUS WITH HYPERGLYCEMIA, WITH LONG-TERM CURRENT USE OF INSULIN: Primary | ICD-10-CM

## 2024-05-13 PROCEDURE — G0108 DIAB MANAGE TRN  PER INDIV: HCPCS | Mod: PBBFAC | Performed by: DIETITIAN, REGISTERED

## 2024-05-13 PROCEDURE — 99999PBSHW PR PBB SHADOW TECHNICAL ONLY FILED TO HB: Mod: PBBFAC,,,

## 2024-05-13 NOTE — PROGRESS NOTES
Diabetes Care Specialist Progress Note  Author: Delmi Marina RD  Date: 5/13/2024    Program Intake  Reason for Diabetes Program Visit:: Intervention  Type of Intervention:: Individual  Individual: Education  Education: Self-Management Skill Review  Continuous Glucose Monitoring  Patient has CGM: Yes  Personal CGM type:: Dexcom G7  GMI Date: 05/13/24  GMI Value: 6.4 %    Lab Results   Component Value Date    HGBA1C 9.2 (H) 02/05/2024       Clinical    Weight: 94.7 kg (208 lb 14.2 oz)       Body mass index is 29.13 kg/m².                                  Additional Social History                                    Diabetes Self-Management Skills Assessment         Nutrition/Healthy Eating  Area of need?: Yes    Physical Activity/Exercise  Area of need?: Yes    Medications  Area of need?: Yes    Home Blood Glucose Monitoring  Personal CGM type:: Dexcom G7  Area of need?: Yes                     Assessment Summary and Plan    Based on today's diabetes care assessment, the following areas of need were identified:          2/20/2024    12:01 AM   Social   Support No   Access to Mass Media/Tech No   Cognitive/Behavioral Health No   Culture/Latter day No   Communication No   Health Literacy Yes            1/25/2021    12:00 AM   Clinical   Medication Adherence No   Lab Compliance No            5/13/2024    12:01 AM   Diabetes Self-Management Skills   Nutrition/Healthy Eating Yes-see care planning   Physical Activity/Exercise Yes-see care planning   Medication Yes-see care planning   Home Blood Glucose Monitoring Yes-see care planning          Today's interventions were provided through individual discussion, instruction, and written materials were provided.      Patient verbalized understanding of instruction and written materials.  Pt was able to return back demonstration of instructions today. Patient understood key points, needs reinforcement and further instruction.     Diabetes Self-Management Care  Plan:    Today's Diabetes Self-Management Care Plan was developed with Juan Manuel's input. Juan Manuel has agreed to work toward the following goal(s) to improve his/her overall diabetes control.      Care Plan: Diabetes Management   Updates made since 4/13/2024 12:00 AM        Problem: Medications         Goal: Patient Agrees to take Diabetes Medications as prescribed.    Start Date: 2/20/2024   Expected End Date: 3/4/2024   This Visit's Progress: On track   Recent Progress: On track   Priority: High   Barriers: Knowledge deficit   Note:    2/20/24-Pt taking 60 units of lantus twice a day despite medication changes at his last endo visit. He picked up the novolog but did not think it was different than his lantus. He has not started taking it yet.  Printed out AVS from his last visit with Santosh. Long discussion about the differences between the 2 insulins and the proper timing of the insulins. Written color coded instructions provided. He agrees to follow the written instructions so that he administers the insulin correctly. Agrees to skip the Novolog if he does not eat a meal. Will follow up in 2 weeks. Stressed that he can call if he is confused or has questions. Contact information provided.     3/4/24-Taking 60 units of Lantus around 6 or 7 pm.  Has not started taking Novolog yet. Has been taking Ozempic for the last 2 weeks which has decreased his appetite. Having some hypoglycemia. States he will decrease his Lantus to 55 units daily and not start meal time insulin at this time. He agrees to discuss this with Rea Bowie.    3/19/24-Taking 50 units of lantus between 6-7 pm. No Novolog. Cont to take Ozempic, however, he is not taking the correct dose. He states that her just twist up to no particular amount.  Agrees to take 2 mg next Saturday which is his regular day. Demonstrated on a demo pen how to click up to 2mg with teach back.  Is still having hypoglycemia. Agrees to titrate down to 45 units of Lantus.  Continues to take Farxiga and Metformin.  BP medications have been adjusted according to Pt. BP readings:121/81. 142/93, 129/94, 122/88, 148/93, 151/105, 138/98- much improved.    24-Taking 2 mg of Ozempic every Saturday and taking 45 units of Lantus every evening at the same time. Cont to take Farxiga and Metformin. No novolog. No longer having low blood sugars.  BP readings:116/79, 120/82, 129/85, 115/77, 127/90, 122/85, 138/96.     24- takes 45 units of Lantus between 7-8 pm and Ozempic every Saturday. He cont to take Metformin and Farxiga. No Novolog needed.        Problem: Blood Glucose Self-Monitoring         Goal: Patient agrees to change Dexcom G7 every 10 days and bring his reciever to all clinic appointments    Start Date: 2024   Expected End Date: 3/4/2024   This Visit's Progress: On track   Recent Progress: Deferred   Priority: Medium   Barriers: Lack of Supplies   Note:    24-G7 sensor has fallen off twice and he is currently out of sensors. Provided him with a sample sensor today. He did not bring his reciever today so unable to download.    3/4/24-wearing his sensor on his abdomen with waterproof tape. Last sensor fell off early as well. Lost his  a couple of days ago. Is now pricking his finger. Bg logs reviewed. Fastin,122,67,75,85,126. PP breakfast:135,158,125.130,125. Before dinner:139, 99, 66, 97. PP dinner:152,194,158,101, 100. He will continue to look for his dexcom . Ed on target BG ranges, proper treatment for hypoglycemia and insulin titration. Stressed eat 3 meals a day.    3/19/24-presents with . Report downloaded. Is 94% in range. Cont to have some low BG, insulin titrated to 45 units. Ave BG is 116.    24-Tried to  Dexcom 7 supplies at the pharmacy and was told his RX was discontinued. Showed him his current RX and will return to the pharmacy today to speak with the pharmacist about this. He has not worn his sensor or pricked  his finger in 16 days. He also agrees to  his backup meter and supplies today. BG today on clinic meter was 68 and is asymptomatic. Has been up since 4 am and has not eaten. Glucerna provided. Agrees to eat breakfast daily by 8 am.     5/13/24-Is currently wearing his dexcom. Report reviewed and downloaded from his reader.  GMI:6.4% and he si 93% in range.       Problem: Healthy Eating         Goal: Agrees to eat breakfast by 8 am daily    Start Date: 4/16/2024   Expected End Date: 5/13/2024   This Visit's Progress: On track   Priority: Medium   Note:    4/16/24-Pt 68 during his appointment today and is asymptomatic. Glucerna provided. He has been up since 4 am and has not eaten yet. Agrees to eat breakfast daily. Breakfast is usually an egg sandwich with cappuccino no sugar. Sometimes eats a poptart but agrees to stop that. Lunch in afternoon is leftovers that his wife has cooked.Yesterday at [employer]hetti with baked chicken and green beans with water. Dinner:Fried shrimp and french fries with soda. Agrees to try diet soda and avoid juice to improve A1c.     5/13/24-Is eating breakfast every day, states at different times. Has been up since 7 am but has not eaten breakfast yet. States will eat when he gets home. Dexcom report reveals some low BG when he is waiting too long between meals. Encouraged him to eat 3 meals a day and avoid waiting more than 5 hours without food. Is drinking water and a 1 bottle of soda every 3 days. Agrees to try Coke Zero, Sprite Zero and Diet Rootbeer.        Task: Reviewed the sources and role of Carbohydrate, Protein, and Fat and how each nutrient impacts blood sugar.         Task: Provided visual examples using dry measuring cups, food models, and other familiar objects such as computer mouse, deck or cards, tennis ball etc. to help with visualization of portions.         Task: Explained how to count carbohydrates using the food label and the use of dry measuring cups for accurate  carb counting.         Task: Discussed strategies for choosing healthier menu options when dining out.         Task: Recommended replacing beverages containing high sugar content with noncaloric/sugar free options and/or water. Completed 4/16/2024        Task: Review the importance of balancing carbohydrates with each meal using portion control techniques to count servings of carbohydrate and label reading to identify serving size and amount of total carbs per serving.         Task: Provided Sample plate method and reviewed the use of the plate to estimate amounts of carbohydrate per meal.         Problem: Physical Activity and Exercise         Goal: Patient agrees to increase physical activity to a goal of 3 times per week for 15-20 minutes.    Start Date: 5/13/2024   Expected End Date: 7/8/2024   Priority: Low   Barriers: No Barriers Identified   Note:    5/13/24-Pt states he walks to a meeting every Saturday. Ed on benefits of 150 mins of exercise per week. States he is in bed too often and needs to be more physically active. He agrees to walk 3 times a week. States he does have back pain.        Task: Discussed role of physical activity on reducing insulin resistance and improvement in overall glycemic control. Completed 5/13/2024        Task: Discussed role of physical activity as it relates to weight loss Completed 5/13/2024        Task: Offered suggestions on how patient could increase their regular physical activity Completed 5/13/2024        Task: Reviewed blood glucose monitoring before, during and after exercise/activity           Follow Up Plan     No follow-ups on file.    Today's care plan and follow up schedule was discussed with patient.  Juan Manuel verbalized understanding of the care plan, goals, and agrees to follow up plan.        The patient was encouraged to communicate with his/her health care provider/physician and care team regarding his/her condition(s) and treatment.  I provided the patient  with my contact information today and encouraged to contact me via phone or [facility] Patient Portal as needed.     Length of Visit   Total Time: 30 Minutes

## 2024-05-30 ENCOUNTER — PATIENT MESSAGE (OUTPATIENT)
Dept: ADMINISTRATIVE | Facility: HOSPITAL | Age: 59
End: 2024-05-30
Payer: MEDICAID

## 2024-07-15 ENCOUNTER — CLINICAL SUPPORT (OUTPATIENT)
Dept: DIABETES | Facility: CLINIC | Age: 59
End: 2024-07-15
Payer: MEDICAID

## 2024-07-15 VITALS — WEIGHT: 205.56 LBS | BODY MASS INDEX: 28.67 KG/M2

## 2024-07-15 DIAGNOSIS — Z79.4 TYPE 2 DIABETES MELLITUS WITH HYPERGLYCEMIA, WITH LONG-TERM CURRENT USE OF INSULIN: Primary | ICD-10-CM

## 2024-07-15 DIAGNOSIS — E11.65 TYPE 2 DIABETES MELLITUS WITH HYPERGLYCEMIA, WITH LONG-TERM CURRENT USE OF INSULIN: Primary | ICD-10-CM

## 2024-07-15 PROCEDURE — 99999PBSHW PR PBB SHADOW TECHNICAL ONLY FILED TO HB: Mod: PBBFAC,,,

## 2024-07-15 PROCEDURE — G0108 DIAB MANAGE TRN  PER INDIV: HCPCS | Mod: PBBFAC | Performed by: DIETITIAN, REGISTERED

## 2024-07-16 NOTE — PROGRESS NOTES
Diabetes Care Specialist Progress Note  Author: Delmi Marina RD  Date: 7/16/2024    Intake    Program Intake  Reason for Diabetes Program Visit:: Intervention  Type of Intervention:: Individual  Individual: Education  Education: Self-Management Skill Review         Area of need?: Yes    Continuous Glucose Monitoring  Patient has CGM: Yes  Personal CGM type:: dexcom 7  GMI Date: 07/15/24  GMI Value: 6.8 %    Lab Results   Component Value Date    HGBA1C 9.2 (H) 02/05/2024       Weight: 93.2 kg (205 lb 9.3 oz)       Body mass index is 28.67 kg/m².    Lifestyle Coping Support & Clinical              Diabetes Self-Management Skills Assessment         Nutrition/Healthy Eating  Area of need?: Yes    Physical Activity/Exercise  Area of need?: Yes    Home Blood Glucose Monitoring  Personal CGM type:: dexcom 7  Area of need?: Yes                Assessment Summary and Plan    Based on today's diabetes care assessment, the following areas of need were identified:          7/15/2024    12:01 AM   Areas of Need   Nutrition/Healthy Eating Yes-see care plan   Physical Activity/Exercise Yes-see care plan   Medication Yes-see care plan   Home Blood Glucose Monitoring Yes-see care plan       Today's interventions were provided through individual discussion, instruction, and written materials were provided.      Patient verbalized understanding of instruction and written materials.  Pt was able to return back demonstration of instructions today. Patient understood key points, needs reinforcement and further instruction.     Diabetes Self-Management Care Plan:    Today's Diabetes Self-Management Care Plan was developed with Juan Manuel's input. Juan Manuel has agreed to work toward the following goal(s) to improve his/her overall diabetes control.      Care Plan: Diabetes Management   Updates made since 6/16/2024 12:00 AM        Problem: Medications         Goal: Patient Agrees to take Diabetes Medications as prescribed.    Start Date:  2/20/2024   Expected End Date: 3/4/2024   This Visit's Progress: On track   Recent Progress: On track   Priority: High   Barriers: Knowledge deficit   Note:    2/20/24-Pt taking 60 units of lantus twice a day despite medication changes at his last endo visit. He picked up the novolog but did not think it was different than his lantus. He has not started taking it yet.  Printed out AVS from his last visit with Santosh. Long discussion about the differences between the 2 insulins and the proper timing of the insulins. Written color coded instructions provided. He agrees to follow the written instructions so that he administers the insulin correctly. Agrees to skip the Novolog if he does not eat a meal. Will follow up in 2 weeks. Stressed that he can call if he is confused or has questions. Contact information provided.     3/4/24-Taking 60 units of Lantus around 6 or 7 pm.  Has not started taking Novolog yet. Has been taking Ozempic for the last 2 weeks which has decreased his appetite. Having some hypoglycemia. States he will decrease his Lantus to 55 units daily and not start meal time insulin at this time. He agrees to discuss this with Rea Bowie.    3/19/24-Taking 50 units of lantus between 6-7 pm. No Novolog. Cont to take Ozempic, however, he is not taking the correct dose. He states that her just twist up to no particular amount.  Agrees to take 2 mg next Saturday which is his regular day. Demonstrated on a demo pen how to click up to 2mg with teach back.  Is still having hypoglycemia. Agrees to titrate down to 45 units of Lantus. Continues to take Farxiga and Metformin.  BP medications have been adjusted according to Pt. BP readings:121/81. 142/93, 129/94, 122/88, 148/93, 151/105, 138/98- much improved.    4/16/24-Taking 2 mg of Ozempic every Saturday and taking 45 units of Lantus every evening at the same time. Cont to take Farxiga and Metformin. No novolog. No longer having low blood sugars.  BP  readings:116/79, 120/82, 129/85, 115/77, 127/90, 122/85, 138/96.     24- takes 45 units of Lantus between 7-8 pm and Ozempic every Saturday. He cont to take Metformin and Farxiga. No Novolog needed.     7/15/24-Taking 45 units of Lantus between 5-7pm. Ozempic 2 mg every Saturday. Cont to take Metformin and Farxiga as ordered. Taking all BP meds as ordered.        Problem: Blood Glucose Self-Monitoring         Goal: Patient agrees to change Dexcom G7 every 10 days and bring his reciever to all clinic appointments    Start Date: 2024   Expected End Date: 3/4/2024   This Visit's Progress: On track   Recent Progress: On track   Priority: Medium   Barriers: Lack of Supplies   Note:    24-G7 sensor has fallen off twice and he is currently out of sensors. Provided him with a sample sensor today. He did not bring his reciever today so unable to download.    3/4/24-wearing his sensor on his abdomen with waterproof tape. Last sensor fell off early as well. Lost his  a couple of days ago. Is now pricking his finger. Bg logs reviewed. Fastin,122,67,75,85,126. PP breakfast:135,158,125.130,125. Before dinner:139, 99, 66, 97. PP dinner:152,194,158,101, 100. He will continue to look for his dexcom . Ed on target BG ranges, proper treatment for hypoglycemia and insulin titration. Stressed eat 3 meals a day.    3/19/24-presents with . Report downloaded. Is 94% in range. Cont to have some low BG, insulin titrated to 45 units. Ave BG is 116.    24-Tried to  Dexcom 7 supplies at the pharmacy and was told his RX was discontinued. Showed him his current RX and will return to the pharmacy today to speak with the pharmacist about this. He has not worn his sensor or pricked his finger in 16 days. He also agrees to  his backup meter and supplies today. BG today on clinic meter was 68 and is asymptomatic. Has been up since 4 am and has not eaten. Glucerna provided. Agrees to eat  breakfast daily by 8 am.     5/13/24-Is currently wearing his dexcom. Report reviewed and downloaded from his reader.  GMI:6.4% and he si 93% in range.    7/15/24-presents wearing his Dexcom. Report downloaded from his . He is 93% in range with a GMI of 6.8%.       Problem: Healthy Eating         Goal: Agrees to eat breakfast by 8 am daily    Start Date: 4/16/2024   Expected End Date: 5/13/2024   This Visit's Progress: On track   Recent Progress: On track   Priority: Medium   Note:    4/16/24-Pt 68 during his appointment today and is asymptomatic. Glucerna provided. He has been up since 4 am and has not eaten yet. Agrees to eat breakfast daily. Breakfast is usually an egg sandwich with cappuccino no sugar. Sometimes eats a poptart but agrees to stop that. Lunch in afternoon is leftovers that his wife has cooked.[facility] [employer] with baked chicken and green beans with water. Dinner:Fried shrimp and french fries with soda. Agrees to try diet soda and avoid juice to improve A1c.     5/13/24-Is eating breakfast every day, states at different times. Has been up since 7 am but has not eaten breakfast yet. States will eat when he gets home. Dexcom report reveals some low BG when he is waiting too long between meals. Encouraged him to eat 3 meals a day and avoid waiting more than 5 hours without food. Is drinking water and a 1 bottle of soda every 3 days. Agrees to try Coke Zero, Sprite Zero and Diet Rootbeer.     7/15/24: States is eating small portions and is losing weight. Has lost 3lbs since his last visit. B:Honey nut cheerios with 1% milk with water. L: roast beef sandwich at home. D:gumbo made at home.  Still drinking regular coke and Dr. Pepper. States he only drinks the small can. Stressed need to omit sweet beverages from diet. Ed on low Sodium diet, warned against canned foods and processed meats.        Problem: Physical Activity and Exercise         Goal: Patient agrees to increase physical  activity to a goal of 3 times per week for 15-20 minutes.    Start Date: 5/13/2024   Expected End Date: 7/8/2024   This Visit's Progress: No change   Priority: Low   Barriers: No Barriers Identified   Note:    5/13/24-Pt states he walks to a meeting every Saturday. Ed on benefits of 150 mins of exercise per week. States he is in bed too often and needs to be more physically active. He agrees to walk 3 times a week. States he does have back pain.     7/15/24-Walks to the store 3 times a week which is 9 blocks. Ed on the benefits of 20-30 minutes of exercise at least 5 days a week. He cont to set goals for exercise and does not start. States he is going to start in August.        Problem: Chronic Complications           Follow Up Plan     No follow-ups on file.    Today's care plan and follow up schedule was discussed with patient.  Juan Manuel verbalized understanding of the care plan, goals, and agrees to follow up plan.        The patient was encouraged to communicate with his/her health care provider/physician and care team regarding his/her condition(s) and treatment.  I provided the patient with my contact information today and encouraged to contact me via phone or [facility] Patient Portal as needed.     Length of Visit   Total Time: 60 Minutes

## 2024-07-22 ENCOUNTER — TELEPHONE (OUTPATIENT)
Dept: INTERNAL MEDICINE | Facility: CLINIC | Age: 59
End: 2024-07-22
Payer: MEDICAID

## 2024-07-22 VITALS — DIASTOLIC BLOOD PRESSURE: 82 MMHG | SYSTOLIC BLOOD PRESSURE: 111 MMHG

## 2024-07-22 NOTE — TELEPHONE ENCOUNTER
----- Message from Zion Espinoza MD sent at 7/22/2024  7:45 AM CDT -----  Regarding: RE: Blood pressure  Patient continue to keep blood pressure log as well as schedule follow-up with me in coming weeks if not already scheduled  ----- Message -----  From: Delmi Marina RD  Sent: 7/16/2024   4:04 PM CDT  To: Zion Espinoza MD  Subject: Blood pressure                                   Good afternoon Dr. Espinoza,    I saw Mr. Johnson this week. Here are his latest blood pressures.     7/7//82-pm  7/9//88-am and 104/71-pm  7/11///88-am and 124/82-pm  7/13//93-am  7/14//82-am  7/15//94-am    ThanksCordelia

## 2024-07-24 DIAGNOSIS — E11.9 TYPE 2 DIABETES MELLITUS WITHOUT COMPLICATION: ICD-10-CM

## 2024-07-30 DIAGNOSIS — Z79.4 TYPE 2 DIABETES MELLITUS WITH DIABETIC POLYNEUROPATHY, WITH LONG-TERM CURRENT USE OF INSULIN: ICD-10-CM

## 2024-07-30 DIAGNOSIS — E11.42 TYPE 2 DIABETES MELLITUS WITH DIABETIC POLYNEUROPATHY, WITH LONG-TERM CURRENT USE OF INSULIN: ICD-10-CM

## 2024-07-30 RX ORDER — NAPROXEN SODIUM 220 MG/1
81 TABLET, FILM COATED ORAL DAILY
Qty: 90 TABLET | Refills: 3 | Status: SHIPPED | OUTPATIENT
Start: 2024-07-30 | End: 2025-07-30

## 2024-08-05 ENCOUNTER — PATIENT OUTREACH (OUTPATIENT)
Dept: ADMINISTRATIVE | Facility: HOSPITAL | Age: 59
End: 2024-08-05
Payer: MEDICAID

## 2024-08-05 DIAGNOSIS — E11.9 DIABETES MELLITUS WITHOUT COMPLICATION: Primary | ICD-10-CM

## 2024-08-06 ENCOUNTER — LAB VISIT (OUTPATIENT)
Dept: LAB | Facility: OTHER | Age: 59
End: 2024-08-06
Attending: INTERNAL MEDICINE
Payer: MEDICAID

## 2024-08-06 DIAGNOSIS — E11.9 TYPE 2 DIABETES MELLITUS WITHOUT COMPLICATION: ICD-10-CM

## 2024-08-06 DIAGNOSIS — Z79.4 TYPE 2 DIABETES MELLITUS WITH HYPERGLYCEMIA, WITH LONG-TERM CURRENT USE OF INSULIN: ICD-10-CM

## 2024-08-06 DIAGNOSIS — E11.65 TYPE 2 DIABETES MELLITUS WITH HYPERGLYCEMIA, WITH LONG-TERM CURRENT USE OF INSULIN: ICD-10-CM

## 2024-08-06 LAB
CHOLEST SERPL-MCNC: 102 MG/DL (ref 120–199)
CHOLEST/HDLC SERPL: 3.2 {RATIO} (ref 2–5)
ESTIMATED AVG GLUCOSE: 151 MG/DL (ref 68–131)
HBA1C MFR BLD: 6.9 % (ref 4–5.6)
HDLC SERPL-MCNC: 32 MG/DL (ref 40–75)
HDLC SERPL: 31.4 % (ref 20–50)
LDLC SERPL CALC-MCNC: 57.2 MG/DL (ref 63–159)
NONHDLC SERPL-MCNC: 70 MG/DL
TRIGL SERPL-MCNC: 64 MG/DL (ref 30–150)

## 2024-08-06 PROCEDURE — 36415 COLL VENOUS BLD VENIPUNCTURE: CPT | Performed by: INTERNAL MEDICINE

## 2024-08-06 PROCEDURE — 80061 LIPID PANEL: CPT | Performed by: INTERNAL MEDICINE

## 2024-08-06 PROCEDURE — 83036 HEMOGLOBIN GLYCOSYLATED A1C: CPT | Performed by: NURSE PRACTITIONER

## 2024-08-11 ENCOUNTER — PATIENT MESSAGE (OUTPATIENT)
Dept: INTERNAL MEDICINE | Facility: CLINIC | Age: 59
End: 2024-08-11
Payer: MEDICAID

## 2024-08-19 ENCOUNTER — CLINICAL SUPPORT (OUTPATIENT)
Dept: INTERNAL MEDICINE | Facility: CLINIC | Age: 59
End: 2024-08-19
Attending: INTERNAL MEDICINE
Payer: MEDICAID

## 2024-08-19 VITALS
HEART RATE: 85 BPM | WEIGHT: 209.44 LBS | OXYGEN SATURATION: 97 % | DIASTOLIC BLOOD PRESSURE: 62 MMHG | HEIGHT: 71 IN | BODY MASS INDEX: 29.32 KG/M2 | SYSTOLIC BLOOD PRESSURE: 100 MMHG

## 2024-08-19 DIAGNOSIS — K74.00 FIBROSIS OF LIVER: Primary | ICD-10-CM

## 2024-08-19 DIAGNOSIS — R20.0 NUMBNESS AND TINGLING IN RIGHT HAND: ICD-10-CM

## 2024-08-19 DIAGNOSIS — M25.521 RIGHT ELBOW PAIN: ICD-10-CM

## 2024-08-19 DIAGNOSIS — R20.2 NUMBNESS AND TINGLING IN RIGHT HAND: ICD-10-CM

## 2024-08-19 DIAGNOSIS — E11.65 TYPE 2 DIABETES MELLITUS WITH HYPERGLYCEMIA, WITH LONG-TERM CURRENT USE OF INSULIN: ICD-10-CM

## 2024-08-19 DIAGNOSIS — E03.9 HYPOTHYROIDISM, UNSPECIFIED TYPE: ICD-10-CM

## 2024-08-19 DIAGNOSIS — E11.42 TYPE 2 DIABETES MELLITUS WITH DIABETIC POLYNEUROPATHY, WITH LONG-TERM CURRENT USE OF INSULIN: ICD-10-CM

## 2024-08-19 DIAGNOSIS — Z98.890 HISTORY OF ELBOW SURGERY: ICD-10-CM

## 2024-08-19 DIAGNOSIS — I10 ESSENTIAL HYPERTENSION: ICD-10-CM

## 2024-08-19 DIAGNOSIS — E78.5 HYPERLIPIDEMIA, UNSPECIFIED HYPERLIPIDEMIA TYPE: ICD-10-CM

## 2024-08-19 DIAGNOSIS — Z79.4 TYPE 2 DIABETES MELLITUS WITH DIABETIC POLYNEUROPATHY, WITH LONG-TERM CURRENT USE OF INSULIN: ICD-10-CM

## 2024-08-19 DIAGNOSIS — Z98.890 S/P DECOMPRESSION OF ULNAR NERVE AT ELBOW: ICD-10-CM

## 2024-08-19 DIAGNOSIS — Z09 FOLLOW-UP EXAMINATION AFTER ORTHOPEDIC SURGERY: ICD-10-CM

## 2024-08-19 DIAGNOSIS — Z79.4 TYPE 2 DIABETES MELLITUS WITH HYPERGLYCEMIA, WITH LONG-TERM CURRENT USE OF INSULIN: ICD-10-CM

## 2024-08-19 PROCEDURE — 3074F SYST BP LT 130 MM HG: CPT | Mod: CPTII,,, | Performed by: INTERNAL MEDICINE

## 2024-08-19 PROCEDURE — 3008F BODY MASS INDEX DOCD: CPT | Mod: CPTII,,, | Performed by: INTERNAL MEDICINE

## 2024-08-19 PROCEDURE — 92228 IMG RTA DETC/MNTR DS PHY/QHP: CPT | Mod: PBBFAC

## 2024-08-19 PROCEDURE — 3066F NEPHROPATHY DOC TX: CPT | Mod: CPTII,,, | Performed by: INTERNAL MEDICINE

## 2024-08-19 PROCEDURE — 1159F MED LIST DOCD IN RCRD: CPT | Mod: CPTII,,, | Performed by: INTERNAL MEDICINE

## 2024-08-19 PROCEDURE — 1160F RVW MEDS BY RX/DR IN RCRD: CPT | Mod: CPTII,,, | Performed by: INTERNAL MEDICINE

## 2024-08-19 PROCEDURE — 99999 PR PBB SHADOW E&M-EST. PATIENT-LVL IV: CPT | Mod: PBBFAC,,, | Performed by: INTERNAL MEDICINE

## 2024-08-19 PROCEDURE — 3078F DIAST BP <80 MM HG: CPT | Mod: CPTII,,, | Performed by: INTERNAL MEDICINE

## 2024-08-19 PROCEDURE — 99214 OFFICE O/P EST MOD 30 MIN: CPT | Mod: PBBFAC,25 | Performed by: INTERNAL MEDICINE

## 2024-08-19 PROCEDURE — G2211 COMPLEX E/M VISIT ADD ON: HCPCS | Mod: S$PBB,,, | Performed by: INTERNAL MEDICINE

## 2024-08-19 PROCEDURE — 3061F NEG MICROALBUMINURIA REV: CPT | Mod: CPTII,,, | Performed by: INTERNAL MEDICINE

## 2024-08-19 PROCEDURE — 3044F HG A1C LEVEL LT 7.0%: CPT | Mod: CPTII,,, | Performed by: INTERNAL MEDICINE

## 2024-08-19 PROCEDURE — 92228 IMG RTA DETC/MNTR DS PHY/QHP: CPT | Mod: 26,S$PBB,, | Performed by: OPTOMETRIST

## 2024-08-19 PROCEDURE — 99214 OFFICE O/P EST MOD 30 MIN: CPT | Mod: S$PBB,,, | Performed by: INTERNAL MEDICINE

## 2024-08-19 RX ORDER — MELOXICAM 15 MG/1
15 TABLET ORAL DAILY
Qty: 90 TABLET | Refills: 2 | Status: SHIPPED | OUTPATIENT
Start: 2024-08-19

## 2024-08-19 RX ORDER — LEVOTHYROXINE SODIUM 50 UG/1
50 TABLET ORAL DAILY
Qty: 90 TABLET | Refills: 3 | Status: SHIPPED | OUTPATIENT
Start: 2024-08-19

## 2024-08-19 RX ORDER — DAPAGLIFLOZIN 5 MG/1
5 TABLET, FILM COATED ORAL DAILY
Qty: 90 TABLET | Refills: 3 | Status: SHIPPED | OUTPATIENT
Start: 2024-08-19

## 2024-08-19 RX ORDER — CARVEDILOL 12.5 MG/1
12.5 TABLET ORAL 2 TIMES DAILY WITH MEALS
Qty: 180 TABLET | Refills: 3 | Status: SHIPPED | OUTPATIENT
Start: 2024-08-19 | End: 2025-08-19

## 2024-08-19 RX ORDER — LOSARTAN POTASSIUM AND HYDROCHLOROTHIAZIDE 25; 100 MG/1; MG/1
1 TABLET ORAL DAILY
Qty: 90 TABLET | Refills: 3 | Status: SHIPPED | OUTPATIENT
Start: 2024-08-19 | End: 2025-08-19

## 2024-08-19 RX ORDER — HYDROCODONE BITARTRATE AND ACETAMINOPHEN 5; 325 MG/1; MG/1
1 TABLET ORAL EVERY 8 HOURS PRN
Qty: 12 TABLET | Refills: 0 | Status: CANCELLED | OUTPATIENT
Start: 2024-08-19

## 2024-08-19 RX ORDER — METFORMIN HYDROCHLORIDE 500 MG/1
500 TABLET ORAL 2 TIMES DAILY WITH MEALS
Qty: 180 TABLET | Refills: 2 | Status: SHIPPED | OUTPATIENT
Start: 2024-08-19

## 2024-08-19 RX ORDER — ATORVASTATIN CALCIUM 20 MG/1
20 TABLET, FILM COATED ORAL DAILY
Qty: 90 TABLET | Refills: 3 | Status: SHIPPED | OUTPATIENT
Start: 2024-08-19

## 2024-08-19 RX ORDER — NAPROXEN SODIUM 220 MG/1
81 TABLET, FILM COATED ORAL DAILY
Qty: 90 TABLET | Refills: 3 | Status: SHIPPED | OUTPATIENT
Start: 2024-08-19 | End: 2025-08-19

## 2024-08-19 RX ORDER — NIFEDIPINE 90 MG/1
90 TABLET, EXTENDED RELEASE ORAL DAILY
Qty: 90 TABLET | Refills: 3 | Status: SHIPPED | OUTPATIENT
Start: 2024-08-19

## 2024-08-19 NOTE — PROGRESS NOTES
Subjective:       Patient ID: Juan Manuel Johnson is a 59 y.o. male.    Chief Complaint: Hypertension (6m f/u)    Here for annual exam       Chronic low back pain. Patient denies radiation of pain, numbness/tingling of lower ext, weakness of LE, saddle anesthesia, bowel/bladder incontinence, F/C, unexplained weight loss, or consistent,sheet drenching night sweats. Takes prn norco.        ### DM ###  Previously managed by Dr Hayden in endocrine.   Last optometry: 05/2023 Has appt  - Dexcom            HGBA1C                   6.9 (H)             08/06/2024 09:43 AM        HGBA1C                   9.2 (H)             02/05/2024 11:04 AM        HGBA1C                   9.4 (H)             01/29/2024 11:19 AM        HGBA1C                   8.0 (H)             10/23/2023 07:31 AM        HGBA1C                   10.3 (H)            07/19/2023 08:11 AM        HGBA1C                   >14.0 (H)           01/03/2023 09:45 AM        HGBA1C                   12.8 (H)            11/01/2022 07:39 AM        HGBA1C                   13.5 (H)            08/03/2022 09:23 AM        HGBA1C                   12.7 (H)            04/12/2022 09:30 AM        HGBA1C                   8.6 (H)             11/08/2021 04:29 PM        ### MAFLD ###   FIB-4 Calculation: 0.71 at 8/21/2024  1:01 PM       ### HTN ###  Coreg 12.5; losartan 100 hctz 25; nifedipine    Zion Espinoza MD at 1/29/2024  INCREASE nifedipine to 90m    ### hypothyroidism ###   Levothyroxine 50mcg         Review of Systems   Constitutional:  Negative for appetite change, chills, fever and unexpected weight change.   HENT:  Negative for hearing loss, sore throat and trouble swallowing.    Eyes:  Negative for visual disturbance.   Respiratory:  Negative for cough, chest tightness and shortness of breath.    Cardiovascular:  Negative for chest pain and leg swelling.   Gastrointestinal:  Negative for abdominal pain, blood in stool, constipation, diarrhea, nausea and vomiting.  "  Endocrine: Negative for polydipsia and polyuria.   Genitourinary:  Negative for decreased urine volume, difficulty urinating, dysuria, frequency and urgency.   Musculoskeletal:  Negative for gait problem.   Skin:  Negative for rash.   Neurological:  Negative for dizziness and numbness.   Psychiatric/Behavioral:  The patient is not nervous/anxious.        Objective:      Vitals:    08/19/24 0948   BP: 100/62   BP Location: Left arm   Patient Position: Sitting   BP Method: Large (Manual)   Pulse: 85   SpO2: 97%   Weight: 95 kg (209 lb 7 oz)   Height: 5' 11" (1.803 m)      Physical Exam  Vitals and nursing note reviewed.   Constitutional:       General: He is not in acute distress.     Appearance: Normal appearance. He is well-developed.   HENT:      Head: Normocephalic and atraumatic.      Mouth/Throat:      Pharynx: No oropharyngeal exudate.   Eyes:      General: No scleral icterus.     Conjunctiva/sclera: Conjunctivae normal.      Pupils: Pupils are equal, round, and reactive to light.   Neck:      Thyroid: No thyromegaly.   Cardiovascular:      Rate and Rhythm: Normal rate and regular rhythm.      Heart sounds: Normal heart sounds. No murmur heard.  Pulmonary:      Effort: Pulmonary effort is normal.      Breath sounds: Normal breath sounds. No wheezing or rales.   Abdominal:      General: There is no distension.   Musculoskeletal:         General: No tenderness.   Lymphadenopathy:      Cervical: No cervical adenopathy.   Skin:     General: Skin is warm and dry.   Neurological:      Mental Status: He is alert and oriented to person, place, and time.   Psychiatric:         Behavior: Behavior normal.         Assessment:       1. Fibrosis of liver    2. Type 2 diabetes mellitus with diabetic polyneuropathy, with long-term current use of insulin    3. Hyperlipidemia, unspecified hyperlipidemia type    4. Essential hypertension    5. Follow-up examination after orthopedic surgery    6. S/P decompression of ulnar nerve " at elbow    7. Hypothyroidism, unspecified type    8. Right elbow pain    9. History of elbow surgery    10. Numbness and tingling in right hand    11. Type 2 diabetes mellitus with hyperglycemia, with long-term current use of insulin        Plan:       Juan Manuel was seen today for hypertension.    Diagnoses and all orders for this visit:    Fibrosis of liver  -     FibroScan (Vibration Controlled Transient Elastography); Future    Type 2 diabetes mellitus with diabetic polyneuropathy, with long-term current use of insulin  -     aspirin 81 MG Chew; Take 1 tablet (81 mg total) by mouth once daily.  -     losartan-hydrochlorothiazide 100-25 mg (HYZAAR) 100-25 mg per tablet; Take 1 tablet by mouth once daily.  -     Diabetic Eye Screening Photo; Future    Hyperlipidemia, unspecified hyperlipidemia type  -     atorvastatin (LIPITOR) 20 MG tablet; Take 1 tablet (20 mg total) by mouth once daily.    Essential hypertension  -     carvediloL (COREG) 12.5 MG tablet; Take 1 tablet (12.5 mg total) by mouth 2 (two) times daily with meals.    Follow-up examination after orthopedic surgery    S/P decompression of ulnar nerve at elbow    Hypothyroidism, unspecified type  -     levothyroxine (SYNTHROID) 50 MCG tablet; Take 1 tablet (50 mcg total) by mouth once daily.    Right elbow pain  -     meloxicam (MOBIC) 15 MG tablet; Take 1 tablet (15 mg total) by mouth once daily. Take with food.    History of elbow surgery  -     meloxicam (MOBIC) 15 MG tablet; Take 1 tablet (15 mg total) by mouth once daily. Take with food.    Numbness and tingling in right hand  -     meloxicam (MOBIC) 15 MG tablet; Take 1 tablet (15 mg total) by mouth once daily. Take with food.    Type 2 diabetes mellitus with hyperglycemia, with long-term current use of insulin  -     metFORMIN (GLUCOPHAGE) 500 MG tablet; Take 1 tablet (500 mg total) by mouth 2 (two) times daily with meals.    Other orders  -     dapagliflozin propanediol (FARXIGA) 5 mg Tab tablet;  Take 1 tablet (5 mg total) by mouth once daily.  -     NIFEdipine (PROCARDIA-XL) 90 MG (OSM) 24 hr tablet; Take 1 tablet (90 mg total) by mouth once daily.  The following orders have not been finalized:  -     Cancel: HYDROcodone-acetaminophen (NORCO) 5-325 mg per tablet       Visit today is associated with current or anticipated ongoing medical care related to this patient's single serious condition/complex condition of DM, MASLD, HTN, . The patient will return to see me as these issues will be followed longitudinally.      Zion Damian MD  Internal Medicine-[facility]        Side effects of medication(s) were discussed in detail and patient voiced understanding.  Patient will call back for any issues or complications.

## 2024-08-20 NOTE — PROGRESS NOTES
Juan Manuel Johnson is a 59 y.o. male here for a diabetic eye screening with non-dilated fundus photos per Dr. Espinoza.    Patient cooperative?: Yes  Small pupils?: No  Last eye exam: unknown      For exam results, see Encounter Report.

## 2024-09-26 ENCOUNTER — PROCEDURE VISIT (OUTPATIENT)
Dept: HEPATOLOGY | Facility: CLINIC | Age: 59
End: 2024-09-26
Payer: MEDICAID

## 2024-09-26 DIAGNOSIS — K74.00 FIBROSIS OF LIVER: Primary | ICD-10-CM

## 2024-09-26 PROCEDURE — 91200 LIVER ELASTOGRAPHY: CPT | Mod: PBBFAC | Performed by: NURSE PRACTITIONER

## 2024-09-26 NOTE — PROCEDURES
FibroScan Transplant Hepatology    Date/Time: 9/26/2024 9:00 AM    Performed by: Nicole Stack NP  Authorized by: Nicole Stack NP    Diagnosis:  NAFLD    Probe:  XL    Universal Protocol: Patient's identity, procedure and site were verified, confirmatory pause was performed.  Discussed procedure including risks and potential complications.  Questions answered.  Patient verbalizes understanding and wishes to proceed with VCTE.     Procedure: After providing explanations of the procedure, patient was placed in the supine position with right arm in maximum abduction to allow optimal exposure of right lateral abdomen.  Patient was briefly assessed, Testing was performed in the mid-axillary location, 50Hz Shear Wave pulses were applied and the resulting Shear Wave and Propagation Speed detected with a 3.5 MHz ultrasonic signal, using the FibroScan probe, Skin to liver capsule distance and liver parenchyma were accessed during the entire examination with the FibroScan probe, Patient was instructed to breathe normally and to abstain from sudden movements during the procedure, allowing for random measurements of liver stiffness. At least 10 Shear Waves were produced, Individual measurements of each Shear Wave were calculated.  Patient tolerated the procedure well with no complications.  Meets discharge criteria as was dismissed.  Rates pain 0 out of 10.  Patient will follow up with ordering provider to review results.    Findings  Median liver stiffness score:  4  CAP Reading: dB/m:  228    IQR/med %:  13  Interpretation  Fibrosis interpretation is based on medial liver stiffness - Kilopascal (kPa).    Fibrosis Stage:  F 0-1  Steatosis interpretation is based on controlled attenuation parameter - (dB/m).    Steatosis Grade:  <S1  Comments/Plan:  Fibroscan is suggestive of minimal fatty infiltration of the liver (<S1), with F0-F1 fibrosis, and a low likelihood of cirrhosis.

## 2024-10-15 ENCOUNTER — CLINICAL SUPPORT (OUTPATIENT)
Dept: DIABETES | Facility: CLINIC | Age: 59
End: 2024-10-15
Payer: MEDICAID

## 2024-10-15 VITALS — WEIGHT: 214.38 LBS | BODY MASS INDEX: 29.9 KG/M2

## 2024-10-15 DIAGNOSIS — E11.65 TYPE 2 DIABETES MELLITUS WITH HYPERGLYCEMIA, WITH LONG-TERM CURRENT USE OF INSULIN: Primary | ICD-10-CM

## 2024-10-15 DIAGNOSIS — Z79.4 TYPE 2 DIABETES MELLITUS WITH HYPERGLYCEMIA, WITH LONG-TERM CURRENT USE OF INSULIN: Primary | ICD-10-CM

## 2024-10-15 PROCEDURE — G0108 DIAB MANAGE TRN  PER INDIV: HCPCS | Mod: PBBFAC | Performed by: DIETITIAN, REGISTERED

## 2024-10-15 PROCEDURE — 99999PBSHW PR PBB SHADOW TECHNICAL ONLY FILED TO HB: Mod: PBBFAC,,,

## 2024-10-16 NOTE — PROGRESS NOTES
Diabetes Care Specialist Progress Note  Author: Delmi Marina, BATSHEVA  Date: 10/16/2024    Intake    Program Intake  Reason for Diabetes Program Visit:: Intervention  Type of Intervention:: Individual  Individual: Education  Education: Self-Management Skill Review         Continuous Glucose Monitoring  Patient has CGM: Yes  Personal CGM type:: dexcom 7    Lab Results   Component Value Date    HGBA1C 6.9 (H) 08/06/2024       Weight: 97.3 kg (214 lb 6.4 oz)       Body mass index is 29.9 kg/m².    Lifestyle Coping Support & Clinical              Diabetes Self-Management Skills Assessment    Medication Skills Assessment  Area of need?: Yes         Nutrition/Healthy Eating  Area of need?: Yes    Physical Activity/Exercise  Area of need?: Yes    Home Blood Glucose Monitoring  Personal CGM type:: dexcom 7  Area of need?: Yes                Assessment Summary and Plan    Based on today's diabetes care assessment, the following areas of need were identified:          10/16/2024   Areas of Need   Nutrition/Healthy Eating Yes-see care plan   Physical Activity/Exercise Yes-see care plan   Home Blood Glucose Monitoring Yes-see care plan            Today's interventions were provided through individual discussion, instruction, and written materials were provided.      Patient verbalized understanding of instruction and written materials.  Pt was able to return back demonstration of instructions today. Patient understood key points, needs reinforcement and further instruction.     Diabetes Self-Management Care Plan:    Today's Diabetes Self-Management Care Plan was developed with Juan Manuel's input. Juan Manuel has agreed to work toward the following goal(s) to improve his/her overall diabetes control.      Care Plan: Diabetes Management   Updates made since 9/16/2024 12:00 AM        Problem: Medications         Goal: Patient Agrees to take Diabetes Medications as prescribed.    Start Date: 2/20/2024   Expected End Date: 3/4/2024   This  Visit's Progress: On track   Recent Progress: On track   Priority: High   Barriers: Knowledge deficit   Note:    2/20/24-Pt taking 60 units of lantus twice a day despite medication changes at his last endo visit. He picked up the novolog but did not think it was different than his lantus. He has not started taking it yet.  Printed out AVS from his last visit with Santosh. Long discussion about the differences between the 2 insulins and the proper timing of the insulins. Written color coded instructions provided. He agrees to follow the written instructions so that he administers the insulin correctly. Agrees to skip the Novolog if he does not eat a meal. Will follow up in 2 weeks. Stressed that he can call if he is confused or has questions. Contact information provided.     3/4/24-Taking 60 units of Lantus around 6 or 7 pm.  Has not started taking Novolog yet. Has been taking Ozempic for the last 2 weeks which has decreased his appetite. Having some hypoglycemia. States he will decrease his Lantus to 55 units daily and not start meal time insulin at this time. He agrees to discuss this with Rae Bowie.    3/19/24-Taking 50 units of lantus between 6-7 pm. No Novolog. Cont to take Ozempic, however, he is not taking the correct dose. He states that her just twist up to no particular amount.  Agrees to take 2 mg next Saturday which is his regular day. Demonstrated on a demo pen how to click up to 2mg with teach back.  Is still having hypoglycemia. Agrees to titrate down to 45 units of Lantus. Continues to take Farxiga and Metformin.  BP medications have been adjusted according to Pt. BP readings:121/81. 142/93, 129/94, 122/88, 148/93, 151/105, 138/98- much improved.    4/16/24-Taking 2 mg of Ozempic every Saturday and taking 45 units of Lantus every evening at the same time. Cont to take Farxiga and Metformin. No novolog. No longer having low blood sugars.  BP readings:116/79, 120/82, 129/85, 115/77, 127/90, 122/85,  138/96.     24- takes 45 units of Lantus between 7-8 pm and Ozempic every Saturday. He cont to take Metformin and Farxiga. No Novolog needed.     7/15/24-Taking 45 units of Lantus between 5-7pm. Ozempic 2 mg every Saturday. Cont to take Metformin and Farxiga as ordered. Taking all BP meds as ordered.     10/15/24-Taking 45 units of Lantus between 5-7 pm. Ozempic 2 mg every Saturday. Conintues to take Metformin and Farxiga. Compliant with BP medications however, BP is elevated daily. BP:176/107, 149/92, 153/104, 138/92, 123/87, 129/86, 134/91. Ed on low sodium diet.        Problem: Blood Glucose Self-Monitoring         Goal: Patient agrees to change Dexcom G7 every 10 days and bring his reciever to all clinic appointments    Start Date: 2024   Expected End Date: 3/4/2024   This Visit's Progress: On track   Recent Progress: On track   Priority: Medium   Barriers: Lack of Supplies   Note:    24-G7 sensor has fallen off twice and he is currently out of sensors. Provided him with a sample sensor today. He did not bring his reciever today so unable to download.    3/4/24-wearing his sensor on his abdomen with waterproof tape. Last sensor fell off early as well. Lost his  a couple of days ago. Is now pricking his finger. Bg logs reviewed. Fastin,122,67,75,85,126. PP breakfast:135,158,125.130,125. Before dinner:139, 99, 66, 97. PP dinner:152,194,158,101, 100. He will continue to look for his dexcom . Ed on target BG ranges, proper treatment for hypoglycemia and insulin titration. Stressed eat 3 meals a day.    3/19/24-presents with . Report downloaded. Is 94% in range. Cont to have some low BG, insulin titrated to 45 units. Ave BG is 116.    24-Tried to  Dexcom 7 supplies at the pharmacy and was told his RX was discontinued. Showed him his current RX and will return to the pharmacy today to speak with the pharmacist about this. He has not worn his sensor or pricked his  finger in 16 days. He also agrees to  his backup meter and supplies today. BG today on clinic meter was 68 and is asymptomatic. Has been up since 4 am and has not eaten. Glucerna provided. Agrees to eat breakfast daily by 8 am.     5/13/24-Is currently wearing his dexcom. Report reviewed and downloaded from his reader.  GMI:6.4% and he si 93% in range.    7/15/24-presents wearing his Dexcom. Report downloaded from his . He is 93% in range with a GMI of 6.8%.    10/15/24-BP:176/107, 149/92, 153/104, 138/92, 123/87, 129/86, 134/91. Wearing his Dexcom. Report downloaded. Is 93% in range with a GMI of 6.1%. Some hypoglycemia during his sleep. He treats with a small juice that he keeps on his bedside table. Suggested he eat a small snack before bed. He states hypoglycemia happens when eats an early dinner. Agrees to eat 1/2 banana with peanut butter in the evening when he has an early dinner.        Problem: Healthy Eating         Goal: Agrees to eat breakfast by 8 am daily    Start Date: 4/16/2024   Expected End Date: 5/13/2024   This Visit's Progress: On track   Recent Progress: On track   Priority: Medium   Note:    4/16/24-Pt 68 during his appointment today and is asymptomatic. Glucerna provided. He has been up since 4 am and has not eaten yet. Agrees to eat breakfast daily. Breakfast is usually an egg sandwich with cappuccino no sugar. Sometimes eats a poptart but agrees to stop that. Lunch in afternoon is leftovers that his wife has cooked.Yesterday at spaghetti with baked chicken and green beans with water. Dinner:Fried shrimp and french fries with soda. Agrees to try diet soda and avoid juice to improve A1c.     5/13/24-Is eating breakfast every day, states at different times. Has been up since 7 am but has not eaten breakfast yet. States will eat when he gets home. Dexcom report reveals some low BG when he is waiting too long between meals. Encouraged him to eat 3 meals a day and avoid waiting  more than 5 hours without food. Is drinking water and a 1 bottle of soda every 3 days. Agrees to try Coke Zero, Sprite Zero and Diet Rootbeer.     7/15/24: States is eating small portions and is losing weight. Has lost 3lbs since his last visit. B:Honey nut cheerios with 1% milk with water. L: roast beef sandwich at home. D:gumbo made at home.  Still drinking regular coke and Dr. Pepper. States he only drinks the small can. Stressed need to omit sweet beverages from diet. Ed on low Sodium diet, warned against canned foods and processed meats.     10/15/24- B:smoked sausage and egg sandwich with apple juice. BG is 208 right now. Stressed no juice. He agrees. L: rib sandwich with water. D:ribs and red beans with coke zero. He has switched coke zero. Is drinking coke zero, diet root beer, water and juice. Will omit the juice. Ed on low Na diet. Stressed need to omit processed meats. He will omit the juice except for hypoglycemia treatment and will omit the smoked sausage at breakfast.        Problem: Physical Activity and Exercise         Goal: Patient agrees to increase physical activity to a goal of 3 times per week for 15-20 minutes.    Start Date: 5/13/2024   Expected End Date: 7/8/2024   This Visit's Progress: On track   Recent Progress: No change   Priority: Low   Barriers: No Barriers Identified   Note:    5/13/24-Pt states he walks to a meeting every Saturday. Ed on benefits of 150 mins of exercise per week. States he is in bed too often and needs to be more physically active. He agrees to walk 3 times a week. States he does have back pain.     7/15/24-Walks to the store 3 times a week which is 9 blocks. Ed on the benefits of 20-30 minutes of exercise at least 5 days a week. He cont to set goals for exercise and does not start. States he is going to start in August.     10/15/24-Rides stationary bike for 1 hour every Saturday. Has been laying in bed a lot recently due to back pain. Is going to start doing sit  ups due to his recent weight gain. Ed on benefits of even 10-15 minutes of walking daily. He agrees to purchase 10 lb weights. Demonstrated some exercises he could do daily including arm curls and squats. He agrees.          Follow Up Plan     No follow-ups on file.    Today's care plan and follow up schedule was discussed with patient.  Juan Manuel verbalized understanding of the care plan, goals, and agrees to follow up plan.        The patient was encouraged to communicate with his/her health care provider/physician and care team regarding his/her condition(s) and treatment.  I provided the patient with my contact information today and encouraged to contact me via phone or [facility] Portal as needed.     Length of Visit   Total Time: 60 Minutes

## 2024-12-03 ENCOUNTER — OFFICE VISIT (OUTPATIENT)
Dept: OPTOMETRY | Facility: CLINIC | Age: 59
End: 2024-12-03
Attending: INTERNAL MEDICINE
Payer: MEDICAID

## 2024-12-03 DIAGNOSIS — E11.9 DIABETES MELLITUS TYPE 2 WITHOUT RETINOPATHY: Primary | ICD-10-CM

## 2024-12-03 DIAGNOSIS — H25.813 COMBINED FORM OF SENILE CATARACT OF BOTH EYES: ICD-10-CM

## 2024-12-03 DIAGNOSIS — E11.9 DIABETES MELLITUS WITHOUT COMPLICATION: ICD-10-CM

## 2024-12-03 PROCEDURE — 2023F DILAT RTA XM W/O RTNOPTHY: CPT | Mod: CPTII,,, | Performed by: OPTOMETRIST

## 2024-12-03 PROCEDURE — 92014 COMPRE OPH EXAM EST PT 1/>: CPT | Mod: S$PBB,,, | Performed by: OPTOMETRIST

## 2024-12-03 PROCEDURE — 3044F HG A1C LEVEL LT 7.0%: CPT | Mod: CPTII,,, | Performed by: OPTOMETRIST

## 2024-12-03 PROCEDURE — 3061F NEG MICROALBUMINURIA REV: CPT | Mod: CPTII,,, | Performed by: OPTOMETRIST

## 2024-12-03 PROCEDURE — 99999 PR PBB SHADOW E&M-EST. PATIENT-LVL III: CPT | Mod: PBBFAC,,, | Performed by: OPTOMETRIST

## 2024-12-03 PROCEDURE — 99213 OFFICE O/P EST LOW 20 MIN: CPT | Mod: PBBFAC | Performed by: OPTOMETRIST

## 2024-12-03 PROCEDURE — 1159F MED LIST DOCD IN RCRD: CPT | Mod: CPTII,,, | Performed by: OPTOMETRIST

## 2024-12-03 PROCEDURE — 3066F NEPHROPATHY DOC TX: CPT | Mod: CPTII,,, | Performed by: OPTOMETRIST

## 2024-12-03 PROCEDURE — 1160F RVW MEDS BY RX/DR IN RCRD: CPT | Mod: CPTII,,, | Performed by: OPTOMETRIST

## 2024-12-03 NOTE — PROGRESS NOTES
HPI    JAZMIN: 05/10/2023 Dr. Tucker   Last DFE: 05/10/2023  Chief complaint (CC): 60 yo M presents for Diabetic eye exam. Pt denies   any ocular or visual complaints today.   Glasses? Wear bifocal glasses. New rx was given 9/2024 has not been   filled.   Contacts? No   H/o eye surgery, injections or laser: No   H/o eye injury: No   Known eye conditions?   Family h/o eye conditions? Materanl grandmother and mother Glaucoma   Eye gtts? No       (-) Flashes (+)  Floaters occasionally when raising head up to fast are   when glucose is low. (-) Mucous   (+)  Tearing, occasionally (-) Itching (-) Burning   (-) Headaches (-) Eye Pain/discomfort (-) Irritation   (-)  Redness (-) Double vision (+) Blurry vision     Diabetic? Yes  A1c? Lab Results       Component                Value               Date                       HGBA1C                   6.9 (H)             08/06/2024             Last edited by Raji Galicia, OD on 12/3/2024  2:45 PM.            Assessment /Plan     For exam results, see Encounter Report.        Diabetes mellitus type 2 without retinopathy  Diabetes mellitus without complication  - Pt educated on the importance of maintaining adequate glycemic and blood pressure control via diet, compliance with medications, exercise and timely follow up with PCP.  No diabetic retinopathy, No CSME.   - Return in 1 year for dilated eye exam.    Combined form of senile cataract of both eyes   - Not visually significant. Monitor.

## 2024-12-13 DIAGNOSIS — Z79.4 TYPE 2 DIABETES MELLITUS WITH HYPERGLYCEMIA, WITH LONG-TERM CURRENT USE OF INSULIN: ICD-10-CM

## 2024-12-13 DIAGNOSIS — E11.65 TYPE 2 DIABETES MELLITUS WITH HYPERGLYCEMIA, WITH LONG-TERM CURRENT USE OF INSULIN: ICD-10-CM

## 2024-12-13 NOTE — TELEPHONE ENCOUNTER
Care Due:                  Date            Visit Type   Department     Provider  --------------------------------------------------------------------------------                                EP -                              PRIMARY      [facility] INTERNAL  Last Visit: 08-      CARE ([facility])   MEDICINE       Zion Espinoza  Next Visit: None Scheduled  None         None Found                                                            Last  Test          Frequency    Reason                     Performed    Due Date  --------------------------------------------------------------------------------    CBC.........  12 months..  meloxicam................  01- 01-    CMP.........  12 months..  atorvastatin,              01- 01-                             dapagliflozin, insulin,                             losartan-hydrochlorothiaz                             bradley, meloxicam, metFORMIN    HBA1C.......  6 months...  dapagliflozin, insulin,    08- 02-                             metFORMIN, semaglutide...    TSH.........  12 months..  levothyroxine............  01- 01-    [employer] Embedded Care Due Messages. Reference number: [id number].   12/13/2024 4:59:58 PM CST

## 2024-12-14 RX ORDER — INSULIN GLARGINE 100 [IU]/ML
INJECTION, SOLUTION SUBCUTANEOUS
Qty: 45 ML | Refills: 0
Start: 2024-12-14

## 2024-12-14 NOTE — TELEPHONE ENCOUNTER
Provider Staff:  Action required for this patient    Requires labs      Please see care gap opportunities below in Care Due Message.    Thanks!  [facility]     Appointments      Date Provider   Last Visit   8/19/2024 Zion Espnioza MD   Next Visit   Visit date not found Zion Espinoza MD     Refill Decision Note   Juan Manuel Johnson  is requesting a refill authorization.  Brief Assessment and Rationale for Refill:  Approve     Medication Therapy Plan:         Comments:     Note composed:11:40 AM 12/14/2024

## 2024-12-20 DIAGNOSIS — Z79.4 TYPE 2 DIABETES MELLITUS WITH DIABETIC POLYNEUROPATHY, WITH LONG-TERM CURRENT USE OF INSULIN: ICD-10-CM

## 2024-12-20 DIAGNOSIS — E11.42 TYPE 2 DIABETES MELLITUS WITH DIABETIC POLYNEUROPATHY, WITH LONG-TERM CURRENT USE OF INSULIN: ICD-10-CM

## 2024-12-20 RX ORDER — DEXTROSE 4 G
TABLET,CHEWABLE ORAL
Qty: 1 EACH | Refills: 3 | Status: SHIPPED | OUTPATIENT
Start: 2024-12-20

## 2024-12-20 NOTE — TELEPHONE ENCOUNTER
Refill Decision Note   Juan Manuel Johnson  is requesting a refill authorization.  Brief Assessment and Rationale for Refill:  Approve     Medication Therapy Plan:         Comments:     Note composed:5:49 PM 12/20/2024

## 2024-12-20 NOTE — TELEPHONE ENCOUNTER
No care due was identified.  [facility] Embedded Care Due Messages. Reference number: [id number].   12/20/2024 3:51:48 PM CST

## 2025-01-07 ENCOUNTER — CLINICAL SUPPORT (OUTPATIENT)
Dept: DIABETES | Facility: CLINIC | Age: 60
End: 2025-01-07
Payer: MEDICAID

## 2025-01-07 VITALS — BODY MASS INDEX: 30.29 KG/M2 | WEIGHT: 217.13 LBS

## 2025-01-07 DIAGNOSIS — Z79.4 TYPE 2 DIABETES MELLITUS WITH HYPERGLYCEMIA, WITH LONG-TERM CURRENT USE OF INSULIN: ICD-10-CM

## 2025-01-07 DIAGNOSIS — E11.65 TYPE 2 DIABETES MELLITUS WITH HYPERGLYCEMIA, WITH LONG-TERM CURRENT USE OF INSULIN: ICD-10-CM

## 2025-01-07 PROCEDURE — 99999PBSHW PR PBB SHADOW TECHNICAL ONLY FILED TO HB: Mod: PBBFAC,,,

## 2025-01-07 PROCEDURE — G0108 DIAB MANAGE TRN  PER INDIV: HCPCS | Mod: PBBFAC | Performed by: DIETITIAN, REGISTERED

## 2025-01-07 RX ORDER — INSULIN GLARGINE 100 [IU]/ML
60 INJECTION, SOLUTION SUBCUTANEOUS NIGHTLY
Qty: 54 ML | Refills: 2 | Status: SHIPPED | OUTPATIENT
Start: 2025-01-07 | End: 2025-10-04

## 2025-01-07 NOTE — PROGRESS NOTES
Diabetes Care Specialist Progress Note  Author: Delmi Marina RD  Date: 1/7/2025    Intake    Program Intake  Reason for Diabetes Program Visit:: Intervention  Type of Intervention:: Individual  Individual: Education  Education: Self-Management Skill Review    Current Diabetes Treatment: Insulin  Method of insulin delivery?: Injections  Injection Type: Pens    Continuous Glucose Monitoring  Patient has CGM: Yes  Personal CGM type:: dexcom 7  GMI Date: 01/07/25  GMI Value: 7.2 %    Lab Results   Component Value Date    HGBA1C 6.9 (H) 08/06/2024       Weight: 98.5 kg (217 lb 2.5 oz)       Body mass index is 30.29 kg/m².    Lifestyle Coping Support & Clinical              Diabetes Self-Management Skills Assessment    Medication Skills Assessment  Patient is able to identify current diabetes medications, dosages, and appropriate timing of medications.: no  Medication regimen problems/concerns:: unsure of doses  Patient is  aware that some diabetes medications can cause low blood sugar?: Yes  Medication Skills Assessment Completed:: Yes  Assessment indicates:: Knowledge deficit, Instruction Needed  Area of need?: Yes         Nutrition/Healthy Eating  Area of need?: Yes    Physical Activity/Exercise  Area of need?: Yes    Home Blood Glucose Monitoring  Personal CGM type:: dexcom 7  Area of need?: Yes    Acute Complications  Area of need?: Yes           Assessment Summary and Plan    Based on today's diabetes care assessment, the following areas of need were identified:      Identified Areas of Need      Medication/Current Diabetes Treatment: Yes-see care plan   Lifestyle Coping/Support:     Diabetes Disease Process/Treatment Options:     Nutrition/Healthy Eating: Yes -see care plan   Physical Activity/Exercise: Yes -see care plan   Home Blood Glucose Monitoring: Yes -see care plan   Acute Complications: Yes -see care plan   Chronic Complications:         Today's interventions were provided through individual  discussion, instruction, and written materials were provided.      Patient verbalized understanding of instruction and written materials.  Pt was able to return back demonstration of instructions today. Patient understood key points, needs reinforcement and further instruction.     Diabetes Self-Management Care Plan:    Today's Diabetes Self-Management Care Plan was developed with Juan Manuel's input. Juan Manuel has agreed to work toward the following goal(s) to improve his/her overall diabetes control.      Care Plan: Diabetes Management   Updates made since 1/8/2024 12:00 AM        Problem: Medications         Goal: Patient Agrees to take Diabetes Medications as prescribed.    Start Date: 2/20/2024   Expected End Date: 3/4/2024   This Visit's Progress: Not met   Recent Progress: On track   Priority: High   Barriers: Knowledge deficit   Note:    2/20/24-Pt taking 60 units of lantus twice a day despite medication changes at his last endo visit. He picked up the novolog but did not think it was different than his lantus. He has not started taking it yet.  Printed out AVS from his last visit with Santosh. Long discussion about the differences between the 2 insulins and the proper timing of the insulins. Written color coded instructions provided. He agrees to follow the written instructions so that he administers the insulin correctly. Agrees to skip the Novolog if he does not eat a meal. Will follow up in 2 weeks. Stressed that he can call if he is confused or has questions. Contact information provided.     3/4/24-Taking 60 units of Lantus around 6 or 7 pm.  Has not started taking Novolog yet. Has been taking Ozempic for the last 2 weeks which has decreased his appetite. Having some hypoglycemia. States he will decrease his Lantus to 55 units daily and not start meal time insulin at this time. He agrees to discuss this with Rea Bowie.    3/19/24-Taking 50 units of lantus between 6-7 pm. No Novolog. Cont to take Ozempic,  however, he is not taking the correct dose. He states that her just twist up to no particular amount.  Agrees to take 2 mg next Saturday which is his regular day. Demonstrated on a demo pen how to click up to 2mg with teach back.  Is still having hypoglycemia. Agrees to titrate down to 45 units of Lantus. Continues to take Farxiga and Metformin.  BP medications have been adjusted according to Pt. BP readings:121/81. 142/93, 129/94, 122/88, 148/93, 151/105, 138/98- much improved.    4/16/24-Taking 2 mg of Ozempic every Saturday and taking 45 units of Lantus every evening at the same time. Cont to take Farxiga and Metformin. No novolog. No longer having low blood sugars.  BP readings:116/79, 120/82, 129/85, 115/77, 127/90, 122/85, 138/96.     5/13/24- takes 45 units of Lantus between 7-8 pm and Ozempic every Saturday. He cont to take Metformin and Farxiga. No Novolog needed.     7/15/24-Taking 45 units of Lantus between 5-7pm. Ozempic 2 mg every Saturday. Cont to take Metformin and Farxiga as ordered. Taking all BP meds as ordered.     10/15/24-Taking 45 units of Lantus between 5-7 pm. Ozempic 2 mg every Saturday. Conintues to take Metformin and Farxiga. Compliant with BP medications however, BP is elevated daily. BP:176/107, 149/92, 153/104, 138/92, 123/87, 129/86, 134/91. Ed on low sodium diet.     1/7/25-Out of Ozempic over the last 2 weeks, refill ordered at [facility]. Will speak with pharmacist today about the status. Takes 45 units of Novolog every evening instead of Lantus. Is currently out of Lantus and replaced it with Novolog. Frequent hypoglycemic events as a result. Cont to take Metformin and Farxiga daily. Compliant with BP meds as well. Long discussion about the difference between his two types of insulin. Written instructions provided. Pt able to verbalize correct insulin instructions. He will no longer take Novolog in place of Lantus. RX refill for Lantus pended to his PCP.       Task: Reviewed with  patient all current diabetes medications and provided basic review of the purpose, dosage, frequency, side effects, and storage of both oral and injectable diabetes medications. Completed 2024        Task: Reviewed possible resources for acquiring cost prohibitive medication.         Task: Instructed patient on how to self-administer         Task: Discussed guidelines for preventing, detecting and treating hypoglycemia and hyperglycemia and reviewed the importance of meal and medication timing with diabetes mediations for prevention of hypoglycemia and maximum drug benefit. Completed 2024        Problem: Blood Glucose Self-Monitoring         Goal: Patient agrees to change Dexcom G7 every 10 days and bring his reciever to all clinic appointments    Start Date: 2024   Expected End Date: 3/4/2024   This Visit's Progress: On track   Recent Progress: On track   Priority: Medium   Barriers: Lack of Supplies   Note:    24-G7 sensor has fallen off twice and he is currently out of sensors. Provided him with a sample sensor today. He did not bring his reciever today so unable to download.    3/4/24-wearing his sensor on his abdomen with waterproof tape. Last sensor fell off early as well. Lost his  a couple of days ago. Is now pricking his finger. Bg logs reviewed. Fastin,122,67,75,85,126. PP breakfast:135,158,125.130,125. Before dinner:139, 99, 66, 97. PP dinner:152,194,158,101, 100. He will continue to look for his dexcom . Ed on target BG ranges, proper treatment for hypoglycemia and insulin titration. Stressed eat 3 meals a day.    3/19/24-presents with . Report downloaded. Is 94% in range. Cont to have some low BG, insulin titrated to 45 units. Ave BG is 116.    24-Tried to  Dexcom 7 supplies at the pharmacy and was told his RX was discontinued. Showed him his current RX and will return to the pharmacy today to speak with the pharmacist about this. He has not  worn his sensor or pricked his finger in 16 days. He also agrees to  his backup meter and supplies today. BG today on clinic meter was 68 and is asymptomatic. Has been up since 4 am and has not eaten. Glucerna provided. Agrees to eat breakfast daily by 8 am.     5/13/24-Is currently wearing his dexcom. Report reviewed and downloaded from his reader.  GMI:6.4% and he si 93% in range.    7/15/24-presents wearing his Dexcom. Report downloaded from his . He is 93% in range with a GMI of 6.8%.    10/15/24-BP:176/107, 149/92, 153/104, 138/92, 123/87, 129/86, 134/91. Wearing his Dexcom. Report downloaded. Is 93% in range with a GMI of 6.1%. Some hypoglycemia during his sleep. He treats with a small juice that he keeps on his bedside table. Suggested he eat a small snack before bed. He states hypoglycemia happens when eats an early dinner. Agrees to eat 1/2 banana with peanut butter in the evening when he has an early dinner.     1/7/25-Changing sensor every 10 days. Report downloaded, multiple hypoglycemic events-taking his insulin wrong. Ed on proper timing of insulin and the difference between the two types of insulin he is taking. Average BG-164, GMI-7.2       Task: Provided patient with a meter today and sent Rx request to provider to send to patients pharmacy.         Task: Reviewed the importance of self-monitoring blood glucose and keeping logs.         Task: Instructed on how to self-monitor blood glucose using a home glucometer, how to properly dispose of used strips and lancets after use, and how to appropriately store meter and supplies.         Task: Provided patient with blood glucose logs, reviewed appropriate timing and frequency to SMBG, education on parameters on when to notify provider and advised patient to bring logs to all appts with PCP/Endocrinologist/Diabetes Care Specialist.         Task: Discussed ways to minimize pain when monitoring blood glucose.         Problem: Healthy Eating          Goal: Agrees to eat breakfast by 8 am daily    Start Date: 4/16/2024   Expected End Date: 5/13/2024   This Visit's Progress: On track   Recent Progress: On track   Priority: Medium   Note:    4/16/24-Pt 68 during his appointment today and is asymptomatic. Glucerna provided. He has been up since 4 am and has not eaten yet. Agrees to eat breakfast daily. Breakfast is usually an egg sandwich with cappuccino no sugar. Sometimes eats a poptart but agrees to stop that. Lunch in afternoon is leftovers that his wife has cooked.Yesterday at [employer] with baked chicken and green beans with water. Dinner:Fried shrimp and french fries with soda. Agrees to try diet soda and avoid juice to improve A1c.     5/13/24-Is eating breakfast every day, states at different times. Has been up since 7 am but has not eaten breakfast yet. States will eat when he gets home. Dexcom report reveals some low BG when he is waiting too long between meals. Encouraged him to eat 3 meals a day and avoid waiting more than 5 hours without food. Is drinking water and a 1 bottle of soda every 3 days. Agrees to try Coke Zero, Sprite Zero and Diet Rootbeer.     7/15/24: States is eating small portions and is losing weight. Has lost 3lbs since his last visit. B:Honey nut cheerios with 1% milk with water. L: roast beef sandwich at home. D:gumbo made at home.  Still drinking regular coke and Dr. Pepper. States he only drinks the small can. Stressed need to omit sweet beverages from diet. Ed on low Sodium diet, warned against canned foods and processed meats.     10/15/24- B:smoked sausage and egg sandwich with apple juice. BG is 208 right now. Stressed no juice. He agrees. L: rib sandwich with water. D:ribs and red beans with coke zero. He has switched coke zero. Is drinking coke zero, diet root beer, water and juice. Will omit the juice. Ed on low Na diet. Stressed need to omit processed meats. He will omit the juice except for hypoglycemia treatment  "and will omit the smoked sausage at breakfast.     1/7/25-Beverages:water, soda"every now and then", States will finish his wife's Coke 1-2 times per week. Advised him to stop drinking coke and juice. He agrees to start drinking Crystal light again. Does not drink any alcohol. Reviewed correct portion sizes for his starch servings.        Task: Reviewed the sources and role of Carbohydrate, Protein, and Fat and how each nutrient impacts blood sugar.         Task: Provided visual examples using dry measuring cups, food models, and other familiar objects such as computer mouse, deck or cards, tennis ball etc. to help with visualization of portions.         Task: Explained how to count carbohydrates using the food label and the use of dry measuring cups for accurate carb counting.         Task: Discussed strategies for choosing healthier menu options when dining out.         Task: Recommended replacing beverages containing high sugar content with noncaloric/sugar free options and/or water. Completed 4/16/2024        Task: Review the importance of balancing carbohydrates with each meal using portion control techniques to count servings of carbohydrate and label reading to identify serving size and amount of total carbs per serving.         Task: Provided Sample plate method and reviewed the use of the plate to estimate amounts of carbohydrate per meal.         Problem: Physical Activity and Exercise         Goal: Patient agrees to increase physical activity to a goal of 3 times per week for 15-20 minutes.    Start Date: 5/13/2024   Expected End Date: 7/8/2024   This Visit's Progress: On track   Recent Progress: On track   Priority: Low   Barriers: No Barriers Identified   Note:    5/13/24-Pt states he walks to a meeting every Saturday. Ed on benefits of 150 mins of exercise per week. States he is in bed too often and needs to be more physically active. He agrees to walk 3 times a week. States he does have back pain. " "    7/15/24-Walks to the store 3 times a week which is 9 blocks. Ed on the benefits of 20-30 minutes of exercise at least 5 days a week. He cont to set goals for exercise and does not start. States he is going to start in August.     10/15/24-Rides stationary bike for 1 hour every Saturday. Has been laying in bed a lot recently due to back pain. Is going to start doing sit ups due to his recent weight gain. Ed on benefits of even 10-15 minutes of walking daily. He agrees to purchase 10 lb weights. Demonstrated some exercises he could do daily including arm curls and squats. He agrees.     1/7/25-Has been riding his stationary bike for 1 hr a day for the last 6 days. Wants to "lose my stomach." He will continue to ride his bike daily.  Ed on exercise precautions, no exercising on an empty stomach.       Task: Discussed role of physical activity on reducing insulin resistance and improvement in overall glycemic control. Completed 5/13/2024        Task: Discussed role of physical activity as it relates to weight loss Completed 5/13/2024        Task: Offered suggestions on how patient could increase their regular physical activity Completed 5/13/2024        Task: Reviewed blood glucose monitoring before, during and after exercise/activity         Problem: Chronic Complications         Problem: Acute Complications         Goal: Patient agrees to identify and manage signs and symptoms of high/low blood sugar (hyper/hypoglycemia) by keeping a log of events and using proper treatment.    Start Date: 1/7/2025   Expected End Date: 1/27/2025   Priority: High   Barriers: No Barriers Identified   Note:    1/7/25-Pt taking his insulin incorrectly leading to recent hypoglycemic events. Ed on rule of 15. He treats with a can of soda which he keeps on his bedside table. Ed on proper insulin instructions.        Task: Reviewed proper treatment of hypoglycemia with the rule of 15--patient to eat 15g simple carbohydrate (4 glucose " tablets, 1 glucose gel, 5 pieces hard candy, ½ cup fruit juice, ½ can regular soda, etc) and wait 15 minutes and recheck home glucose.         Task: Reviewed common causes and precautions to help prevent hyper/hypoglycemic events.         Task: Reviewed signs and symptoms of hyper/hypoglycemia, what range is considered to be hyper/hypoglycemia, and when to seek further medical attention.         Task: Discussed, sick day planning, natural disaster planning, and/or travel planning to prevent hyper/hypoglycemia.         Task: Discussed risk factors for developing diabetic ketoacidosis (DKA), strategies for reducing risk, testing with ketone test strips if BG is >240mg/dl, basic protocol for managing DKA, and when to seek further medical attention.           Follow Up Plan     No follow-ups on file.    Today's care plan and follow up schedule was discussed with patient.  Juan Manuel verbalized understanding of the care plan, goals, and agrees to follow up plan.        The patient was encouraged to communicate with his/her health care provider/physician and care team regarding his/her condition(s) and treatment.  I provided the patient with my contact information today and encouraged to contact me via phone or [facility] Patient Portal as needed.     Length of Visit   Total Time: 60 Minutes

## 2025-01-16 DIAGNOSIS — E11.65 TYPE 2 DIABETES MELLITUS WITH HYPERGLYCEMIA, WITH LONG-TERM CURRENT USE OF INSULIN: ICD-10-CM

## 2025-01-16 DIAGNOSIS — Z79.4 TYPE 2 DIABETES MELLITUS WITH HYPERGLYCEMIA, WITH LONG-TERM CURRENT USE OF INSULIN: ICD-10-CM

## 2025-01-16 RX ORDER — BLOOD-GLUCOSE SENSOR
EACH MISCELLANEOUS
Qty: 9 EACH | Refills: 3 | Status: SHIPPED | OUTPATIENT
Start: 2025-01-16

## 2025-01-16 NOTE — TELEPHONE ENCOUNTER
No care due was identified.  [facility] Embedded Care Due Messages. Reference number: [id number].   1/16/2025 4:55:01 PM CST

## 2025-01-16 NOTE — TELEPHONE ENCOUNTER
Refill Decision Note   Juan Manuel Johnson  is requesting a refill authorization.  Brief Assessment and Rationale for Refill:  Approve     Medication Therapy Plan:         Comments:     Note composed:5:08 PM 01/16/2025

## 2025-02-02 NOTE — TELEPHONE ENCOUNTER
No care due was identified.  [facility] Embedded Care Due Messages. Reference number: [id number].   2/02/2025 5:47:18 AM CST

## 2025-02-03 RX ORDER — NIFEDIPINE 90 MG/1
90 TABLET, EXTENDED RELEASE ORAL
Qty: 90 TABLET | Refills: 3 | Status: SHIPPED | OUTPATIENT
Start: 2025-02-03

## 2025-02-03 NOTE — TELEPHONE ENCOUNTER
Refill Encounter    PCP Visits: Recent Visits  Date Type Provider Dept   08/19/24 Office Visit Zion Espinoza MD [facility]   Showing recent visits within past 360 days and meeting all other requirements  Future Appointments  No visits were found meeting these conditions.  Showing future appointments within next 720 days and meeting all other requirements     Last 3 Blood Pressure:   BP Readings from Last 3 Encounters:   08/19/24 100/62   07/22/24 111/82   01/29/24 (!) 150/100     Preferred Pharmacy:   [id number] - [place] - [address] AT [facility] [address]  [address]  [place] [address]  Phone: [phone] Fax: [phone]    Requested RX:  Requested Prescriptions     Pending Prescriptions Disp Refills    NIFEdipine (PROCARDIA-XL) 90 MG (OSM) 24 hr tablet [Pharmacy Med Name: NIFEDIPINE 90MG ER (XL/OSM) TABLET] 90 tablet 3     Sig: TAKE 1 TABLET(90 MG) BY MOUTH EVERY DAY      RX Route: Normal

## 2025-03-02 RX ORDER — INSULIN ASPART 100 [IU]/ML
INJECTION, SOLUTION INTRAVENOUS; SUBCUTANEOUS
Qty: 30 ML | Refills: 6 | Status: SHIPPED | OUTPATIENT
Start: 2025-03-02

## 2025-04-21 ENCOUNTER — HOSPITAL ENCOUNTER (EMERGENCY)
Facility: OTHER | Age: 60
Discharge: HOME | End: 2025-04-21
Attending: EMERGENCY MEDICINE
Payer: MEDICAID

## 2025-04-21 VITALS
HEART RATE: 65 BPM | DIASTOLIC BLOOD PRESSURE: 90 MMHG | WEIGHT: 216 LBS | HEIGHT: 73 IN | BODY MASS INDEX: 28.63 KG/M2 | TEMPERATURE: 98 F | OXYGEN SATURATION: 95 % | SYSTOLIC BLOOD PRESSURE: 147 MMHG | RESPIRATION RATE: 18 BRPM

## 2025-04-21 DIAGNOSIS — R07.9 RIGHT-SIDED CHEST PAIN: Primary | ICD-10-CM

## 2025-04-21 LAB
ABSOLUTE EOSINOPHIL (OHS): 0.37 K/UL
ABSOLUTE MONOCYTE (OHS): 0.6 K/UL (ref 0.3–1)
ABSOLUTE NEUTROPHIL COUNT (OHS): 2.52 K/UL (ref 1.8–7.7)
ALBUMIN SERPL BCP-MCNC: 3.9 G/DL (ref 3.5–5.2)
ALP SERPL-CCNC: 80 UNIT/L (ref 40–150)
ALT SERPL W/O P-5'-P-CCNC: 42 UNIT/L (ref 10–44)
ANION GAP (OHS): 11 MMOL/L (ref 8–16)
AST SERPL-CCNC: 32 UNIT/L (ref 11–45)
BASOPHILS # BLD AUTO: 0.07 K/UL
BASOPHILS NFR BLD AUTO: 1.1 %
BILIRUB SERPL-MCNC: 0.4 MG/DL (ref 0.1–1)
BNP SERPL-MCNC: <10 PG/ML (ref 0–99)
BUN SERPL-MCNC: 16 MG/DL (ref 6–20)
CALCIUM SERPL-MCNC: 9.5 MG/DL (ref 8.7–10.5)
CHLORIDE SERPL-SCNC: 107 MMOL/L (ref 95–110)
CO2 SERPL-SCNC: 20 MMOL/L (ref 23–29)
CREAT SERPL-MCNC: 1.1 MG/DL (ref 0.5–1.4)
ERYTHROCYTE [DISTWIDTH] IN BLOOD BY AUTOMATED COUNT: 13.2 % (ref 11.5–14.5)
GFR SERPLBLD CREATININE-BSD FMLA CKD-EPI: >60 ML/MIN/1.73/M2
GLUCOSE SERPL-MCNC: 150 MG/DL (ref 70–110)
HCT VFR BLD AUTO: 43 % (ref 40–54)
HCV AB SERPL QL IA: POSITIVE
HGB BLD-MCNC: 14.2 GM/DL (ref 14–18)
HIV 1+2 AB+HIV1 P24 AG SERPL QL IA: NEGATIVE
IMM GRANULOCYTES # BLD AUTO: 0.01 K/UL (ref 0–0.04)
IMM GRANULOCYTES NFR BLD AUTO: 0.2 % (ref 0–0.5)
LYMPHOCYTES # BLD AUTO: 2.53 K/UL (ref 1–4.8)
MCH RBC QN AUTO: 27.9 PG (ref 27–31)
MCHC RBC AUTO-ENTMCNC: 33 G/DL (ref 32–36)
MCV RBC AUTO: 85 FL (ref 82–98)
NUCLEATED RBC (/100WBC) (OHS): 0 /100 WBC
PLATELET # BLD AUTO: 333 K/UL (ref 150–450)
PMV BLD AUTO: 9.5 FL (ref 9.2–12.9)
POTASSIUM SERPL-SCNC: 4.6 MMOL/L (ref 3.5–5.1)
PROT SERPL-MCNC: 8.4 GM/DL (ref 6–8.4)
RBC # BLD AUTO: 5.09 M/UL (ref 4.6–6.2)
RELATIVE EOSINOPHIL (OHS): 6.1 %
RELATIVE LYMPHOCYTE (OHS): 41.5 % (ref 18–48)
RELATIVE MONOCYTE (OHS): 9.8 % (ref 4–15)
RELATIVE NEUTROPHIL (OHS): 41.3 % (ref 38–73)
SODIUM SERPL-SCNC: 138 MMOL/L (ref 136–145)
TROPONIN I SERPL DL<=0.01 NG/ML-MCNC: <0.006 NG/ML
WBC # BLD AUTO: 6.1 K/UL (ref 3.9–12.7)

## 2025-04-21 PROCEDURE — 25000003 PHARM REV CODE 250

## 2025-04-21 PROCEDURE — 63600175 PHARM REV CODE 636 W HCPCS: Mod: JZ,TB

## 2025-04-21 PROCEDURE — 85025 COMPLETE CBC W/AUTO DIFF WBC: CPT

## 2025-04-21 PROCEDURE — 96374 THER/PROPH/DIAG INJ IV PUSH: CPT

## 2025-04-21 PROCEDURE — 86803 HEPATITIS C AB TEST: CPT | Performed by: EMERGENCY MEDICINE

## 2025-04-21 PROCEDURE — 80053 COMPREHEN METABOLIC PANEL: CPT

## 2025-04-21 PROCEDURE — 83880 ASSAY OF NATRIURETIC PEPTIDE: CPT

## 2025-04-21 PROCEDURE — 84484 ASSAY OF TROPONIN QUANT: CPT

## 2025-04-21 PROCEDURE — 93005 ELECTROCARDIOGRAM TRACING: CPT

## 2025-04-21 PROCEDURE — 87389 HIV-1 AG W/HIV-1&-2 AB AG IA: CPT | Performed by: EMERGENCY MEDICINE

## 2025-04-21 PROCEDURE — 99285 EMERGENCY DEPT VISIT HI MDM: CPT | Mod: 25

## 2025-04-21 RX ORDER — ACETAMINOPHEN 500 MG
1000 TABLET ORAL
Status: COMPLETED | OUTPATIENT
Start: 2025-04-21 | End: 2025-04-21

## 2025-04-21 RX ORDER — KETOROLAC TROMETHAMINE 30 MG/ML
15 INJECTION, SOLUTION INTRAMUSCULAR; INTRAVENOUS
Status: COMPLETED | OUTPATIENT
Start: 2025-04-21 | End: 2025-04-21

## 2025-04-21 RX ADMIN — ACETAMINOPHEN 1000 MG: 500 TABLET ORAL at 09:04

## 2025-04-21 RX ADMIN — KETOROLAC TROMETHAMINE 15 MG: 30 INJECTION, SOLUTION INTRAMUSCULAR; INTRAVENOUS at 09:04

## 2025-04-21 NOTE — DISCHARGE INSTRUCTIONS
Continue to take ibuprofen and/or Tylenol as needed for your pain.    Please follow up with your PCP

## 2025-04-21 NOTE — ED TRIAGE NOTES
59 yr M presents to the ER w c/o R sided CP x 2 days. Pt states he has never had CP before and denies injury. -SOB

## 2025-04-21 NOTE — ED PROVIDER NOTES
Encounter Date: 4/21/2025       History     Chief Complaint   Patient presents with    Chest Pain     Pt reports constant aching R sided chest pain x 2 days worse on coughing and certain movements. Pt denies SOB, n/v     59-year-old male with history of hypertension, hyperlipidemia, type 2 diabetes presents for nonradiating right-sided sharp chest pain x2 days.  Patient states he was just sitting in his chair at home when this pain started.  Denies any recent trauma, injuries, weight lifting.  Denies any worsening of the pain with positional movements.  Denies tenderness to palpation.  Denies any alleviating factors.  Patient reports compliance with his daily medications.  Patient denies any cardiac history.  Patient denies any shortness of breath, vision changes, weakness.  Patient denies any recent illnesses.  Patient denies any hormone therapies, history of PE or DVT, leg swelling, cancer diagnosis, recent travel, recent surgeries.  Patient denies tobacco, alcohol, or other drug use.  This is the extent of the patient's complaint at this time.    The history is provided by the patient.     Review of patient's allergies indicates:  No Known Allergies  Past Medical History:   Diagnosis Date    Carpal tunnel syndrome, right     Diabetes mellitus, type 2     Hepatitis C, chronic 06/05/2013    Hyperlipidemia     Hypertension     Hypothyroidism     Painful orthopaedic hardware     Ulnar nerve compression, right      Past Surgical History:   Procedure Laterality Date    ANKLE SURGERY Left     CARPAL TUNNEL RELEASE Right 10/11/2023    Procedure: RELEASE, CARPAL TUNNEL;  Surgeon: Rancho Burrows Jr., MD;  Location: [facility] OR;  Service: Orthopedics;  Laterality: Right;    COLONOSCOPY      Normal by patient reporting 2017    DECOMPRESSION, NERVE, ULNAR Right 10/11/2023    Procedure: DECOMPRESSION, NERVE, ULNAR;  Surgeon: Rancho Burrows Jr., MD;  Location: [facility] OR;  Service: Orthopedics;  Laterality: Right;    ELBOW  SURGERY Right     EXTERNAL EAR SURGERY Left     REMOVAL,ORTHOPEDIC HARDWARE,UPPER EXTREMITY Right 10/11/2023    Procedure: REMOVAL,ORTHOPEDIC HARDWARE,UPPER EXTREMITY;  Surgeon: Rancho Burrows Jr., MD;  Location: [facility];  Service: Orthopedics;  Laterality: Right;  need screw  for hardware removal    WRIST SURGERY Left      Family History   Problem Relation Name Age of Onset    Glaucoma Mother      Diabetes Mother      Hypertension Mother      Glaucoma Maternal Grandmother      Amblyopia Neg Hx      Blindness Neg Hx      Cataracts Neg Hx      Macular degeneration Neg Hx      Retinal detachment Neg Hx      Strabismus Neg Hx       Social History[1]  Review of Systems  As per hpi  Physical Exam     Initial Vitals [04/21/25 0817]   BP Pulse Resp Temp SpO2   131/83 73 20 98.3 °F (36.8 °C) 97 %      MAP       --         Physical Exam    Nursing note and vitals reviewed.  Constitutional: He appears well-developed and well-nourished. He is cooperative.  Non-toxic appearance. He does not appear ill. No distress.   HENT:   Head: Normocephalic and atraumatic.   Eyes: Conjunctivae and lids are normal.   Neck: Trachea normal. Neck supple. No stridor present.   Cardiovascular:  Normal rate and regular rhythm.           Pulmonary/Chest: Effort normal. No tachypnea. No respiratory distress.   Abdominal: Abdomen is soft.   Musculoskeletal:      Cervical back: Neck supple.     Neurological: He is alert and oriented to person, place, and time. GCS eye subscore is 4. GCS verbal subscore is 5. GCS motor subscore is 6.   Skin: Skin is warm, dry and intact. No rash noted.   Psychiatric: He has a normal mood and affect. His speech is normal and behavior is normal. Thought content normal.         ED Course   Procedures  Labs Reviewed   COMPREHENSIVE METABOLIC PANEL - Abnormal       Result Value    Sodium 138      Potassium 4.6      Chloride 107      CO2 20 (*)     Glucose 150 (*)     BUN 16      Creatinine 1.1      Calcium 9.5       Protein Total 8.4      Albumin 3.9      Bilirubin Total 0.4      ALP 80      AST 32      ALT 42      Anion Gap 11      eGFR >60     HIV 1 / 2 ANTIBODY - Normal    HIV 1/2 Ag/Ab Negative     TROPONIN I - Normal    Troponin-I <0.006     B-TYPE NATRIURETIC PEPTIDE - Normal    BNP <10     CBC WITH DIFFERENTIAL - Normal    WBC 6.10      RBC 5.09      HGB 14.2      HCT 43.0      MCV 85      MCH 27.9      MCHC 33.0      RDW 13.2      Platelet Count 333      MPV 9.5      Nucleated RBC 0      Neut % 41.3      Lymph % 41.5      Mono % 9.8      Eos % 6.1      Basophil % 1.1      Imm Grans % 0.2      Neut # 2.52      Lymph # 2.53      Mono # 0.60      Eos # 0.37      Baso # 0.07      Imm Grans # 0.01     CBC W/ AUTO DIFFERENTIAL    Narrative:     The following orders were created for panel order CBC auto differential.  Procedure                               Abnormality         Status                     ---------                               -----------         ------                     CBC with Differential[[id number]]       Normal              Final result                 Please view results for these tests on the individual orders.   HEPATITIS C ANTIBODY          Imaging Results              X-Ray Chest AP Portable (Final result)  Result time 04/21/25 08:34:22      Final result by Merlin Al III, MD (04/21/25 08:34:22)                   Impression:      No acute process seen.      Electronically signed by: Merlin Al MD  Date:    04/21/2025  Time:    08:34               Narrative:    EXAMINATION:  XR CHEST AP PORTABLE    CLINICAL HISTORY:  Chest pain, unspecified    FINDINGS:  Chest one view portable.    Heart size is normal.  Lungs are clear.  The bones are noncontributory.                                       Medications   ketorolac injection 15 mg (has no administration in time range)   acetaminophen tablet 1,000 mg (has no administration in time range)     Medical Decision Making  This is an evaluation of  a well-appearing 59-year-old male for right-sided chest pain.  Vital signs stable. Cardiac workup ordered.    Differential Diagnosis includes, but is not limited to:  ACS/MI, PE, aortic dissection, pneumothorax, cardiac tamponade, pericarditis/myocarditis, pneumonia, infection/abscess, lung mass, trauma/fracture, costochondritis/pleurisy, MSK pain/contusion, GERD, biliary disease, pancreatitis, anemia    Cardiac Workup unremarkable today.  Patient without risk factors for PE or DVT aside from his age.  Vitals have remained stable without tachycardia or hypoxia, he denies any SOB at rest or with exertion--I have low suspicion for PE at this time.  I suggest close follow up with his PCP and continued symptomatic treatment with over-the-counter medications.  Given strict return precautions should these symptoms worsen.  Patient has verbalized understanding and agreement to this plan.  All questions answered at this time.    Problems Addressed:  Right-sided chest pain: acute illness or injury    Amount and/or Complexity of Data Reviewed  Labs: ordered.  Radiology: ordered. Decision-making details documented in ED Course.    Risk  OTC drugs.  Prescription drug management.               ED Course as of 25 0931      0821 EKG: NSR, 74bpm, normal axis, no stemi [RM]   0845 X-Ray Chest AP Portable  No acute process seen [RM]   0924 BNP, Trop, CMP, CBC unremarkable today. [RM]      ED Course User Index  [RM] Nevaeh Ross PA-C                           Clinical Impression:  Final diagnoses:  [R07.9] Right-sided chest pain (Primary)          ED Disposition Condition    Discharge Stable          ED Prescriptions    None       Follow-up Information    None            [1]   Social History  Tobacco Use    Smoking status: Former     Current packs/day: 0.00     Types: Cigarettes     Quit date: 1995     Years since quittin.9    Smokeless tobacco: Never   Substance Use Topics    Alcohol use: Not  Currently    Drug use: Not Currently     Types: Cocaine     Comment: 28 years        Nevaeh Ross PA-C  04/21/25 0931

## 2025-04-25 ENCOUNTER — RESULTS FOLLOW-UP (OUTPATIENT)
Dept: EMERGENCY MEDICINE | Facility: OTHER | Age: 60
End: 2025-04-25

## 2025-05-01 DIAGNOSIS — E11.8 TYPE 2 DIABETES MELLITUS WITH COMPLICATION, WITH LONG-TERM CURRENT USE OF INSULIN: ICD-10-CM

## 2025-05-01 DIAGNOSIS — Z79.4 TYPE 2 DIABETES MELLITUS WITH COMPLICATION, WITH LONG-TERM CURRENT USE OF INSULIN: ICD-10-CM

## 2025-05-01 NOTE — TELEPHONE ENCOUNTER
Care Due:                  Date            Visit Type   Department     Provider  --------------------------------------------------------------------------------                                EP -                              PRIMARY      [facility] INTERNAL  Last Visit: 08-      CARE ([facility])   MEDICINE       Zion Espinoza  Next Visit: None Scheduled  None         None Found                                                            Last  Test          Frequency    Reason                     Performed    Due Date  --------------------------------------------------------------------------------    HBA1C.......  6 months...  dapagliflozin, insulin,    08- 02-                             metFORMIN................    TSH.........  12 months..  levothyroxine............  01- 01-    Health [facility] Embedded Care Due Messages. Reference number: [id number].   5/01/2025 4:02:58 PM CDT

## 2025-05-01 NOTE — TELEPHONE ENCOUNTER
"Refill Encounter    PCP Visits: Recent Visits  Date Type Provider Dept   08/19/24 Office Visit Zion Espinoza MD [facility] Internal Medicine   Showing recent visits within past 360 days and meeting all other requirements  Future Appointments  No visits were found meeting these conditions.  Showing future appointments within next 720 days and meeting all other requirements      Last 3 Blood Pressure:   BP Readings from Last 3 Encounters:   04/21/25 (!) 147/90   08/19/24 100/62   07/22/24 111/82     Preferred Pharmacy:   [id number] - [place] - [address] AT [facility] [address]  [address]  [facility] [place] [address]  Phone: [phone] Fax: [phone]      Requested RX:  Requested Prescriptions     Pending Prescriptions Disp Refills    pen needle, diabetic (BD CHELSY 2ND GEN PEN NEEDLE) 32 gauge x 5/32" Ndle 200 each 11     Sig: Inject 1 each into the skin 2 hours after meals and at bedtime.      RX Route: Normal   "

## 2025-05-02 RX ORDER — PEN NEEDLE, DIABETIC 30 GX3/16"
1 NEEDLE, DISPOSABLE MISCELLANEOUS
Qty: 200 EACH | Refills: 11 | Status: SHIPPED | OUTPATIENT
Start: 2025-05-02

## 2025-05-07 ENCOUNTER — PATIENT MESSAGE (OUTPATIENT)
Dept: ADMINISTRATIVE | Facility: HOSPITAL | Age: 60
End: 2025-05-07
Payer: MEDICAID

## 2025-05-14 ENCOUNTER — TELEPHONE (OUTPATIENT)
Dept: INTERNAL MEDICINE | Facility: CLINIC | Age: 60
End: 2025-05-14
Payer: MEDICAID

## 2025-05-14 NOTE — TELEPHONE ENCOUNTER
----- Message from Channing sent at 5/14/2025  2:52 PM CDT -----  Regarding: self  Type: Patient Call BackWho called:selfWhat is the request in detail:Patient needs an appt for his medication refills.  Attempted to schedule an appt. Schedule blocked. Unknown. Patient requesting appt any time this month. Please call patient to schedule an appt.Can the clinic reply by [facility]? NoWould the patient rather a call back or a response via [facility]? Call [facility] call back number:[phone] Information:Thank you.

## 2025-05-15 ENCOUNTER — OFFICE VISIT (OUTPATIENT)
Dept: INTERNAL MEDICINE | Facility: CLINIC | Age: 60
End: 2025-05-15
Attending: INTERNAL MEDICINE
Payer: MEDICAID

## 2025-05-15 ENCOUNTER — PATIENT MESSAGE (OUTPATIENT)
Dept: INTERNAL MEDICINE | Facility: CLINIC | Age: 60
End: 2025-05-15

## 2025-05-15 ENCOUNTER — HOSPITAL ENCOUNTER (OUTPATIENT)
Dept: RADIOLOGY | Facility: OTHER | Age: 60
Discharge: HOME | End: 2025-05-15
Attending: INTERNAL MEDICINE
Payer: MEDICAID

## 2025-05-15 VITALS
WEIGHT: 228.81 LBS | OXYGEN SATURATION: 96 % | HEART RATE: 70 BPM | BODY MASS INDEX: 30.33 KG/M2 | SYSTOLIC BLOOD PRESSURE: 120 MMHG | HEIGHT: 73 IN | DIASTOLIC BLOOD PRESSURE: 80 MMHG

## 2025-05-15 DIAGNOSIS — K74.00 FIBROSIS OF LIVER: ICD-10-CM

## 2025-05-15 DIAGNOSIS — I10 ESSENTIAL HYPERTENSION: ICD-10-CM

## 2025-05-15 DIAGNOSIS — Z12.5 PROSTATE CANCER SCREENING: ICD-10-CM

## 2025-05-15 DIAGNOSIS — E11.8 TYPE 2 DIABETES MELLITUS WITH COMPLICATION, WITH LONG-TERM CURRENT USE OF INSULIN: Primary | ICD-10-CM

## 2025-05-15 DIAGNOSIS — E03.9 HYPOTHYROIDISM, UNSPECIFIED TYPE: ICD-10-CM

## 2025-05-15 DIAGNOSIS — R05.9 COUGH, UNSPECIFIED TYPE: ICD-10-CM

## 2025-05-15 DIAGNOSIS — E78.5 HYPERLIPIDEMIA, UNSPECIFIED HYPERLIPIDEMIA TYPE: ICD-10-CM

## 2025-05-15 DIAGNOSIS — Z79.4 TYPE 2 DIABETES MELLITUS WITH COMPLICATION, WITH LONG-TERM CURRENT USE OF INSULIN: Primary | ICD-10-CM

## 2025-05-15 PROCEDURE — 3074F SYST BP LT 130 MM HG: CPT | Mod: CPTII,,, | Performed by: INTERNAL MEDICINE

## 2025-05-15 PROCEDURE — 71046 X-RAY EXAM CHEST 2 VIEWS: CPT | Mod: TC,FY

## 2025-05-15 PROCEDURE — 71046 X-RAY EXAM CHEST 2 VIEWS: CPT | Mod: 26,,, | Performed by: RADIOLOGY

## 2025-05-15 PROCEDURE — 99214 OFFICE O/P EST MOD 30 MIN: CPT | Mod: PBBFAC | Performed by: INTERNAL MEDICINE

## 2025-05-15 PROCEDURE — 99999 PR PBB SHADOW E&M-EST. PATIENT-LVL IV: CPT | Mod: PBBFAC,,, | Performed by: INTERNAL MEDICINE

## 2025-05-15 PROCEDURE — 99214 OFFICE O/P EST MOD 30 MIN: CPT | Mod: S$PBB,,, | Performed by: INTERNAL MEDICINE

## 2025-05-15 PROCEDURE — 3072F LOW RISK FOR RETINOPATHY: CPT | Mod: CPTII,,, | Performed by: INTERNAL MEDICINE

## 2025-05-15 PROCEDURE — 1159F MED LIST DOCD IN RCRD: CPT | Mod: CPTII,,, | Performed by: INTERNAL MEDICINE

## 2025-05-15 PROCEDURE — 1160F RVW MEDS BY RX/DR IN RCRD: CPT | Mod: CPTII,,, | Performed by: INTERNAL MEDICINE

## 2025-05-15 PROCEDURE — G2211 COMPLEX E/M VISIT ADD ON: HCPCS | Mod: S$PBB,,, | Performed by: INTERNAL MEDICINE

## 2025-05-15 PROCEDURE — 3008F BODY MASS INDEX DOCD: CPT | Mod: CPTII,,, | Performed by: INTERNAL MEDICINE

## 2025-05-15 PROCEDURE — 3079F DIAST BP 80-89 MM HG: CPT | Mod: CPTII,,, | Performed by: INTERNAL MEDICINE

## 2025-05-15 NOTE — PROGRESS NOTES
Subjective:       Patient ID: Juan Manuel Johnson is a 59 y.o. male.    Chief Complaint: Diabetes (Medication Refill)      1 week prior developed right sided chest pain with cough, some nasal congestion and facial pressure. Was in ED 3 weeks prior with right sided CP.  Denies CP at rest or with exertion, dizziness with exertion, shortness of breath out of proportion to level of activity, frequent or sustained palpitations, orthopnea, PND, or LE edema.       ### DM ###  Previously managed by Dr Hayden in endocrine.   Last optometry: 05/2023 Has appt  - Lantus, novolog, Farxiga and metformin; Lipitor 20; Dexcom                  HGBA1C                   6.9 (H)             08/06/2024 09:43 AM        HGBA1C                   9.2 (H)             02/05/2024 11:04 AM        HGBA1C                   9.4 (H)             01/29/2024 11:19 AM        HGBA1C                   8.0 (H)             10/23/2023 07:31 AM        HGBA1C                   10.3 (H)            07/19/2023 08:11 AM        HGBA1C                   >14.0 (H)           01/03/2023 09:45 AM        HGBA1C                   12.8 (H)            11/01/2022 07:39 AM        HGBA1C                   13.5 (H)            08/03/2022 09:23 AM        HGBA1C                   12.7 (H)            04/12/2022 09:30 AM        HGBA1C                   8.6 (H)             11/08/2021 04:29 PM        ### MAFLD ###   FIB-4 Calculation: 0.71 at 8/21/2024    FIB-4 Calculation: 0.87 at 4/21/2025           ### HTN ###  Coreg 12.5; losartan 100 hctz 25; nifedipine    OlgaZion graham MD at 1/29/2024  INCREASE nifedipine to 90m     ### hypothyroidism ###   Levothyroxine 50mcg              History of Present Illness              Review of Systems   Constitutional:  Negative for appetite change, chills, fever and unexpected weight change.   HENT:  Negative for hearing loss, sore throat and trouble swallowing.    Eyes:  Negative for visual disturbance.   Respiratory:  Negative for cough, chest  "tightness and shortness of breath.    Cardiovascular:  Negative for chest pain and leg swelling.   Gastrointestinal:  Negative for abdominal pain, blood in stool, constipation, diarrhea, nausea and vomiting.   Endocrine: Negative for polydipsia and polyuria.   Genitourinary:  Negative for decreased urine volume, difficulty urinating, dysuria, frequency and urgency.   Musculoskeletal:  Negative for gait problem.   Skin:  Negative for rash.   Neurological:  Negative for dizziness and numbness.   Psychiatric/Behavioral:  The patient is not nervous/anxious.        Objective:      Vitals:    05/15/25 0808   BP: 120/80   BP Location: Left arm   Patient Position: Sitting   Pulse: 70   SpO2: 96%   Weight: 103.8 kg (228 lb 13.4 oz)   Height: 6' 1" (1.854 m)      Physical Exam  Vitals and nursing note reviewed.   Constitutional:       General: He is not in acute distress.     Appearance: Normal appearance. He is well-developed. He is not diaphoretic.   HENT:      Head: Normocephalic and atraumatic.      Mouth/Throat:      Pharynx: No oropharyngeal exudate.   Eyes:      General: No scleral icterus.        Right eye: No discharge.         Left eye: No discharge.      Conjunctiva/sclera: Conjunctivae normal.      Pupils: Pupils are equal, round, and reactive to light.   Neck:      Thyroid: No thyromegaly.   Cardiovascular:      Rate and Rhythm: Normal rate and regular rhythm.      Pulses:           Dorsalis pedis pulses are 2+ on the right side and 2+ on the left side.      Heart sounds: Normal heart sounds. No murmur heard.  Pulmonary:      Effort: Pulmonary effort is normal. No respiratory distress.      Breath sounds: Normal breath sounds. No wheezing or rales.   Abdominal:      General: There is no distension.   Musculoskeletal:         General: No tenderness.      Right foot: No deformity.      Left foot: No deformity.   Feet:      Right foot:      Protective Sensation: 6 sites tested.  6 sites sensed.      Skin integrity: " No ulcer, blister or skin breakdown.      Left foot:      Protective Sensation: 6 sites tested.  6 sites sensed.      Skin integrity: No ulcer, blister or skin breakdown.   Lymphadenopathy:      Cervical: No cervical adenopathy.   Skin:     General: Skin is warm and dry.      Coloration: Skin is not pale.   Neurological:      Mental Status: He is alert and oriented to person, place, and time.   Psychiatric:         Speech: Speech normal.         Behavior: Behavior normal.         Assessment:       1. Type 2 diabetes mellitus with complication, with long-term current use of insulin    2. Hyperlipidemia, unspecified hyperlipidemia type    3. Essential hypertension    4. Hypothyroidism, unspecified type    5. Fibrosis of liver    6. Prostate cancer screening    7. Cough, unspecified type        Plan:       Juan Manuel was seen today for diabetes.    Diagnoses and all orders for this visit:    Type 2 diabetes mellitus with complication, with long-term current use of insulin  -     Hemoglobin A1C; Future    Hyperlipidemia, unspecified hyperlipidemia type   Controlled and asymptomatic.  Continue current Rx regimen.    Essential hypertension   Controlled and asymptomatic.  Continue current Rx regimen.    Hypothyroidism, unspecified type   Controlled and asymptomatic.  Continue current Rx regimen.    Fibrosis of liver  -     FibroScan (Vibration Controlled Transient Elastography); Future    Prostate cancer screening  -     PSA, Screening; Future    Cough, unspecified type   Lungs clear. Suspect this is URI but with minimal sinus symptoms and his focal CP r/o PNA  -     X-Ray Chest PA And Lateral; Future           Assessment & Plan            Visit today is associated with current or anticipated ongoing medical care related to this patient's single serious condition/complex condition of DM, HTN, fibrosis of liver. The patient will return to see me as these issues will be followed longitudinally.    This note was generated with  the assistance of ambient listening technology. Verbal consent was obtained by the patient and accompanying visitor(s) for the recording of patient appointment to facilitate this note. I attest to having reviewed and edited the generated note for accuracy, though some syntax or spelling errors may persist. Please contact the author of this note for any clarification.      Zion Damian MD  Internal Medicine-[facility]        Side effects of medication(s) were discussed in detail and patient voiced understanding.  Patient will call back for any issues or complications.

## 2025-05-19 DIAGNOSIS — E78.5 HYPERLIPIDEMIA, UNSPECIFIED HYPERLIPIDEMIA TYPE: ICD-10-CM

## 2025-05-19 RX ORDER — ATORVASTATIN CALCIUM 20 MG/1
TABLET, FILM COATED ORAL
Qty: 90 TABLET | Refills: 0 | Status: SHIPPED | OUTPATIENT
Start: 2025-05-19

## 2025-05-19 NOTE — TELEPHONE ENCOUNTER
Care Due:                  Date            Visit Type   Department     Provider  --------------------------------------------------------------------------------                                EP -                              PRIMARY      [facility] INTERNAL  Last Visit: 05-      CARE ([facility])   MEDICINE       Zion Espinoza  Next Visit: None Scheduled  None         None Found                                                            Last  Test          Frequency    Reason                     Performed    Due Date  --------------------------------------------------------------------------------    Lipid Panel.  12 months..  atorvastatin.............  08- 08-    Health [facility] Embedded Care Due Messages. Reference number: [id number].   5/19/2025 2:02:26 PM CDT

## 2025-05-20 NOTE — TELEPHONE ENCOUNTER
Provider Staff:  Action required for this patient    Requires labs      Please see care gap opportunities below in Care Due Message.    Thanks!  [facility]     Appointments      Date Provider   Last Visit   5/15/2025 Zion Espinoza MD   Next Visit   Visit date not found Zion Espinoza MD     Refill Decision Note   Juan Manuel Johnson  is requesting a refill authorization.  Brief Assessment and Rationale for Refill:  Approve     Medication Therapy Plan:         Comments:     Note composed:11:13 PM 05/19/2025

## 2025-05-27 DIAGNOSIS — Z79.4 TYPE 2 DIABETES MELLITUS WITH DIABETIC POLYNEUROPATHY, WITH LONG-TERM CURRENT USE OF INSULIN: ICD-10-CM

## 2025-05-27 DIAGNOSIS — E11.42 TYPE 2 DIABETES MELLITUS WITH DIABETIC POLYNEUROPATHY, WITH LONG-TERM CURRENT USE OF INSULIN: ICD-10-CM

## 2025-05-27 DIAGNOSIS — Z79.4 TYPE 2 DIABETES MELLITUS WITH HYPERGLYCEMIA, WITH LONG-TERM CURRENT USE OF INSULIN: ICD-10-CM

## 2025-05-27 DIAGNOSIS — E11.65 TYPE 2 DIABETES MELLITUS WITH HYPERGLYCEMIA, WITH LONG-TERM CURRENT USE OF INSULIN: ICD-10-CM

## 2025-05-27 NOTE — TELEPHONE ENCOUNTER
----- Message from Med Assistant Chavez sent at 5/27/2025  1:21 PM CDT -----  Regarding: Refill Request  Who Called:DOMENICO MOSS [[id number]] New Prescription or Refill : RefillRX Name and Strength:  gabapentin (NEURONTIN) 400 MG capsule  RX Name and Strength:  blood sugar diagnostic (ONETOUCH ULTRA TEST) Strp  RX Name and Strength:  blood-glucose sensor (DEXCOM G7 SENSOR) Susan 30 day or 90 day RX: 90 Local or Mail Order : local  Preferred Pharmacy:[id number] [place] [address] AT [facility] DANIELLE PIZANO Would the patient rather a call back or a response via [facility]? [facility] Call Back Number:  [phone]

## 2025-05-27 NOTE — TELEPHONE ENCOUNTER
No care due was identified.  Health [facility] Embedded Care Due Messages. Reference number: [id number].   5/27/2025 12:03:09 PM CDT

## 2025-05-29 RX ORDER — BLOOD-GLUCOSE SENSOR
EACH MISCELLANEOUS
Qty: 9 EACH | Refills: 3 | Status: SHIPPED | OUTPATIENT
Start: 2025-05-29

## 2025-05-29 RX ORDER — BLOOD SUGAR DIAGNOSTIC
STRIP MISCELLANEOUS 3 TIMES DAILY
Qty: 400 STRIP | Refills: 3 | Status: SHIPPED | OUTPATIENT
Start: 2025-05-29

## 2025-05-29 RX ORDER — GABAPENTIN 400 MG/1
CAPSULE ORAL
Qty: 180 CAPSULE | Refills: 2 | Status: SHIPPED | OUTPATIENT
Start: 2025-05-29

## 2025-08-05 ENCOUNTER — TELEPHONE (OUTPATIENT)
Dept: INTERNAL MEDICINE | Facility: CLINIC | Age: 60
End: 2025-08-05
Payer: MEDICAID

## 2025-08-05 NOTE — TELEPHONE ENCOUNTER
----- Message from Zion Espinoza MD sent at 8/4/2025  6:21 PM CDT -----  Please schedule f/ visit within a few weeks, VV is okay  ----- Message -----  From: Lab, Background User  Sent: 5/15/2025   4:22 PM CDT  To: Zion Espinoza MD

## 2025-08-20 DIAGNOSIS — E11.9 TYPE 2 DIABETES MELLITUS WITHOUT COMPLICATION: ICD-10-CM

## 2025-08-26 ENCOUNTER — OFFICE VISIT (OUTPATIENT)
Dept: INTERNAL MEDICINE | Facility: CLINIC | Age: 60
End: 2025-08-26
Attending: INTERNAL MEDICINE
Payer: COMMERCIAL

## 2025-08-26 ENCOUNTER — LAB VISIT (OUTPATIENT)
Dept: LAB | Facility: OTHER | Age: 60
End: 2025-08-26
Attending: INTERNAL MEDICINE
Payer: COMMERCIAL

## 2025-08-26 VITALS
BODY MASS INDEX: 31.85 KG/M2 | OXYGEN SATURATION: 98 % | SYSTOLIC BLOOD PRESSURE: 142 MMHG | DIASTOLIC BLOOD PRESSURE: 90 MMHG | WEIGHT: 240.31 LBS | HEART RATE: 69 BPM | HEIGHT: 73 IN

## 2025-08-26 DIAGNOSIS — R06.09 DOE (DYSPNEA ON EXERTION): Primary | ICD-10-CM

## 2025-08-26 DIAGNOSIS — E11.65 TYPE 2 DIABETES MELLITUS WITH HYPERGLYCEMIA, WITH LONG-TERM CURRENT USE OF INSULIN: ICD-10-CM

## 2025-08-26 DIAGNOSIS — Z79.4 TYPE 2 DIABETES MELLITUS WITH HYPERGLYCEMIA, WITH LONG-TERM CURRENT USE OF INSULIN: ICD-10-CM

## 2025-08-26 DIAGNOSIS — E11.42 TYPE 2 DIABETES MELLITUS WITH DIABETIC POLYNEUROPATHY, WITH LONG-TERM CURRENT USE OF INSULIN: ICD-10-CM

## 2025-08-26 DIAGNOSIS — Z79.4 TYPE 2 DIABETES MELLITUS WITH DIABETIC POLYNEUROPATHY, WITH LONG-TERM CURRENT USE OF INSULIN: ICD-10-CM

## 2025-08-26 PROCEDURE — 3046F HEMOGLOBIN A1C LEVEL >9.0%: CPT | Mod: CPTII,S$GLB,, | Performed by: INTERNAL MEDICINE

## 2025-08-26 PROCEDURE — 3066F NEPHROPATHY DOC TX: CPT | Mod: CPTII,S$GLB,, | Performed by: INTERNAL MEDICINE

## 2025-08-26 PROCEDURE — 3008F BODY MASS INDEX DOCD: CPT | Mod: CPTII,S$GLB,, | Performed by: INTERNAL MEDICINE

## 2025-08-26 PROCEDURE — 3080F DIAST BP >= 90 MM HG: CPT | Mod: CPTII,S$GLB,, | Performed by: INTERNAL MEDICINE

## 2025-08-26 PROCEDURE — G2211 COMPLEX E/M VISIT ADD ON: HCPCS | Mod: S$GLB,,, | Performed by: INTERNAL MEDICINE

## 2025-08-26 PROCEDURE — 3072F LOW RISK FOR RETINOPATHY: CPT | Mod: CPTII,S$GLB,, | Performed by: INTERNAL MEDICINE

## 2025-08-26 PROCEDURE — 1159F MED LIST DOCD IN RCRD: CPT | Mod: CPTII,S$GLB,, | Performed by: INTERNAL MEDICINE

## 2025-08-26 PROCEDURE — 3077F SYST BP >= 140 MM HG: CPT | Mod: CPTII,S$GLB,, | Performed by: INTERNAL MEDICINE

## 2025-08-26 PROCEDURE — 1160F RVW MEDS BY RX/DR IN RCRD: CPT | Mod: CPTII,S$GLB,, | Performed by: INTERNAL MEDICINE

## 2025-08-26 PROCEDURE — 99214 OFFICE O/P EST MOD 30 MIN: CPT | Mod: S$GLB,,, | Performed by: INTERNAL MEDICINE

## 2025-08-26 PROCEDURE — 99999 PR PBB SHADOW E&M-EST. PATIENT-LVL V: CPT | Mod: PBBFAC,,, | Performed by: INTERNAL MEDICINE

## 2025-08-26 PROCEDURE — 3060F POS MICROALBUMINURIA REV: CPT | Mod: CPTII,S$GLB,, | Performed by: INTERNAL MEDICINE

## 2025-08-26 RX ORDER — LOSARTAN POTASSIUM AND HYDROCHLOROTHIAZIDE 25; 100 MG/1; MG/1
1 TABLET ORAL DAILY
Qty: 90 TABLET | Refills: 3 | Status: SHIPPED | OUTPATIENT
Start: 2025-08-26 | End: 2026-08-26

## 2025-08-26 RX ORDER — TIRZEPATIDE 2.5 MG/.5ML
2.5 INJECTION, SOLUTION SUBCUTANEOUS
Qty: 2 ML | Refills: 0 | Status: SHIPPED | OUTPATIENT
Start: 2025-08-26

## 2025-09-03 DIAGNOSIS — E11.42 TYPE 2 DIABETES MELLITUS WITH DIABETIC POLYNEUROPATHY, WITH LONG-TERM CURRENT USE OF INSULIN: ICD-10-CM

## 2025-09-03 DIAGNOSIS — Z79.4 TYPE 2 DIABETES MELLITUS WITH DIABETIC POLYNEUROPATHY, WITH LONG-TERM CURRENT USE OF INSULIN: ICD-10-CM

## 2025-09-03 RX ORDER — NAPROXEN SODIUM 220 MG/1
81 TABLET, FILM COATED ORAL DAILY
Qty: 90 TABLET | Refills: 3 | Status: SHIPPED | OUTPATIENT
Start: 2025-09-03 | End: 2026-09-03